# Patient Record
Sex: FEMALE | Race: WHITE | Employment: OTHER | URBAN - METROPOLITAN AREA
[De-identification: names, ages, dates, MRNs, and addresses within clinical notes are randomized per-mention and may not be internally consistent; named-entity substitution may affect disease eponyms.]

---

## 2018-04-27 DIAGNOSIS — C50.921 INVASIVE DUCTAL CARCINOMA OF MALE BREAST, RIGHT (HCC): Primary | ICD-10-CM

## 2018-04-30 LAB
ALBUMIN: 4.1 GM/DL (ref 3.1–4.5)
ALP BLD-CCNC: 71 U/L (ref 45–117)
ALT SERPL-CCNC: 41 U/L (ref 12–78)
AST SERPL-CCNC: 32 IU/L (ref 3–35)
BILIRUB SERPL-MCNC: 0.4 MG/DL (ref 0.2–1)
BUN BLDV-MCNC: 16 MG/DL (ref 7–24)
CALCIUM SERPL-MCNC: 9.5 MG/DL (ref 8.5–10.5)
CHLORIDE BLD-SCNC: 100 MMOL/L (ref 98–107)
CO2: 31 MMOL/L (ref 21–32)
CREAT SERPL-MCNC: 1.01 MG/DL (ref 0.55–1.02)
GFR AFRICAN AMERICAN: > 60 ML/MIN
GFR SERPL CREATININE-BSD FRML MDRD: 55 ML/MIN/
GLUCOSE: 152 MG/DL (ref 65–99)
POTASSIUM SERPL-SCNC: 4.6 MMOL/L (ref 3.5–5.1)
SODIUM BLD-SCNC: 136 MMOL/L (ref 136–145)
TOTAL PROTEIN: 8.1 GM/DL (ref 6.4–8.2)

## 2018-05-01 ENCOUNTER — TELEPHONE (OUTPATIENT)
Dept: BREAST CENTER | Age: 68
End: 2018-05-01

## 2018-05-11 ENCOUNTER — HOSPITAL ENCOUNTER (OUTPATIENT)
Dept: NUCLEAR MEDICINE | Age: 68
Discharge: HOME | End: 2018-05-11
Payer: MEDICARE

## 2018-05-11 ENCOUNTER — HOSPITAL ENCOUNTER (OUTPATIENT)
Dept: CT IMAGING | Age: 68
Discharge: HOME | End: 2018-05-13
Payer: MEDICARE

## 2018-05-11 DIAGNOSIS — C50.921 INVASIVE DUCTAL CARCINOMA OF MALE BREAST, RIGHT (HCC): ICD-10-CM

## 2018-05-11 DIAGNOSIS — C50.011 MALIGNANT NEOPLASM OF NIPPLE OF RIGHT BREAST IN FEMALE, UNSPECIFIED ESTROGEN RECEPTOR STATUS (HCC): ICD-10-CM

## 2018-05-11 PROCEDURE — 74177 CT ABD & PELVIS W/CONTRAST: CPT

## 2018-05-11 PROCEDURE — 71260 CT THORAX DX C+: CPT

## 2018-05-11 PROCEDURE — 3430000000 HC RX DIAGNOSTIC RADIOPHARMACEUTICAL: Performed by: RADIOLOGY

## 2018-05-11 PROCEDURE — 78306 BONE IMAGING WHOLE BODY: CPT

## 2018-05-11 PROCEDURE — A9503 TC99M MEDRONATE: HCPCS | Performed by: RADIOLOGY

## 2018-05-11 PROCEDURE — 6360000004 HC RX CONTRAST MEDICATION: Performed by: RADIOLOGY

## 2018-05-11 RX ORDER — TC 99M MEDRONATE 20 MG/10ML
25 INJECTION, POWDER, LYOPHILIZED, FOR SOLUTION INTRAVENOUS
Status: COMPLETED | OUTPATIENT
Start: 2018-05-11 | End: 2018-05-11

## 2018-05-11 RX ADMIN — IOHEXOL 50 ML: 240 INJECTION, SOLUTION INTRATHECAL; INTRAVASCULAR; INTRAVENOUS; ORAL at 11:05

## 2018-05-11 RX ADMIN — Medication 25 MILLICURIE: at 14:24

## 2018-05-11 RX ADMIN — IOPAMIDOL 100 ML: 755 INJECTION, SOLUTION INTRAVENOUS at 11:04

## 2018-05-15 ENCOUNTER — HOSPITAL ENCOUNTER (OUTPATIENT)
Age: 68
Discharge: HOME | End: 2018-05-17
Payer: MEDICARE

## 2018-05-15 ENCOUNTER — TELEPHONE (OUTPATIENT)
Dept: BREAST CENTER | Age: 68
End: 2018-05-15

## 2018-05-15 ENCOUNTER — OFFICE VISIT (OUTPATIENT)
Dept: BREAST CENTER | Age: 68
End: 2018-05-15
Payer: MEDICARE

## 2018-05-15 VITALS
RESPIRATION RATE: 16 BRPM | BODY MASS INDEX: 27.94 KG/M2 | SYSTOLIC BLOOD PRESSURE: 138 MMHG | HEART RATE: 62 BPM | HEIGHT: 63 IN | WEIGHT: 157.7 LBS | OXYGEN SATURATION: 98 % | TEMPERATURE: 97.6 F | DIASTOLIC BLOOD PRESSURE: 80 MMHG

## 2018-05-15 DIAGNOSIS — C50.911 INFILTRATING DUCTAL CARCINOMA OF RIGHT BREAST (HCC): ICD-10-CM

## 2018-05-15 DIAGNOSIS — Z17.1 MALIGNANT NEOPLASM OF CENTRAL PORTION OF RIGHT BREAST IN FEMALE, ESTROGEN RECEPTOR NEGATIVE (HCC): ICD-10-CM

## 2018-05-15 DIAGNOSIS — C77.3 BREAST CANCER METASTASIZED TO AXILLARY LYMPH NODE, RIGHT (HCC): ICD-10-CM

## 2018-05-15 DIAGNOSIS — C50.111 MALIGNANT NEOPLASM OF CENTRAL PORTION OF RIGHT BREAST IN FEMALE, ESTROGEN RECEPTOR NEGATIVE (HCC): ICD-10-CM

## 2018-05-15 DIAGNOSIS — C50.911 BREAST CANCER METASTASIZED TO AXILLARY LYMPH NODE, RIGHT (HCC): ICD-10-CM

## 2018-05-15 DIAGNOSIS — E07.89 THYROID MASS OF UNCLEAR ETIOLOGY: Primary | ICD-10-CM

## 2018-05-15 DIAGNOSIS — C50.921 INVASIVE DUCTAL CARCINOMA OF MALE BREAST, RIGHT (HCC): ICD-10-CM

## 2018-05-15 DIAGNOSIS — R93.7 ABNORMAL CT OF SPINE: ICD-10-CM

## 2018-05-15 LAB
BASOPHILS ABSOLUTE: 0.05 E9/L (ref 0–0.2)
BASOPHILS RELATIVE PERCENT: 0.7 % (ref 0–2)
EOSINOPHILS ABSOLUTE: 0.5 E9/L (ref 0.05–0.5)
EOSINOPHILS RELATIVE PERCENT: 7.3 % (ref 0–6)
HCT VFR BLD CALC: 41.7 % (ref 34–48)
HEMOGLOBIN: 13.8 G/DL (ref 11.5–15.5)
IMMATURE GRANULOCYTES #: 0.06 E9/L
IMMATURE GRANULOCYTES %: 0.9 % (ref 0–5)
LYMPHOCYTES ABSOLUTE: 1.62 E9/L (ref 1.5–4)
LYMPHOCYTES RELATIVE PERCENT: 23.8 % (ref 20–42)
MCH RBC QN AUTO: 28.8 PG (ref 26–35)
MCHC RBC AUTO-ENTMCNC: 33.1 % (ref 32–34.5)
MCV RBC AUTO: 86.9 FL (ref 80–99.9)
MONOCYTES ABSOLUTE: 0.55 E9/L (ref 0.1–0.95)
MONOCYTES RELATIVE PERCENT: 8.1 % (ref 2–12)
NEUTROPHILS ABSOLUTE: 4.04 E9/L (ref 1.8–7.3)
NEUTROPHILS RELATIVE PERCENT: 59.2 % (ref 43–80)
PDW BLD-RTO: 13.2 FL (ref 11.5–15)
PLATELET # BLD: 185 E9/L (ref 130–450)
PMV BLD AUTO: 11.7 FL (ref 7–12)
RBC # BLD: 4.8 E12/L (ref 3.5–5.5)
WBC # BLD: 6.8 E9/L (ref 4.5–11.5)

## 2018-05-15 PROCEDURE — 1123F ACP DISCUSS/DSCN MKR DOCD: CPT | Performed by: SURGERY

## 2018-05-15 PROCEDURE — 4004F PT TOBACCO SCREEN RCVD TLK: CPT | Performed by: SURGERY

## 2018-05-15 PROCEDURE — G8427 DOCREV CUR MEDS BY ELIG CLIN: HCPCS | Performed by: SURGERY

## 2018-05-15 PROCEDURE — 85025 COMPLETE CBC W/AUTO DIFF WBC: CPT

## 2018-05-15 PROCEDURE — 1090F PRES/ABSN URINE INCON ASSESS: CPT | Performed by: SURGERY

## 2018-05-15 PROCEDURE — 99205 OFFICE O/P NEW HI 60 MIN: CPT | Performed by: SURGERY

## 2018-05-15 PROCEDURE — G8400 PT W/DXA NO RESULTS DOC: HCPCS | Performed by: SURGERY

## 2018-05-15 PROCEDURE — 3017F COLORECTAL CA SCREEN DOC REV: CPT | Performed by: SURGERY

## 2018-05-15 PROCEDURE — 36415 COLL VENOUS BLD VENIPUNCTURE: CPT

## 2018-05-15 PROCEDURE — 4040F PNEUMOC VAC/ADMIN/RCVD: CPT | Performed by: SURGERY

## 2018-05-15 PROCEDURE — G8419 CALC BMI OUT NRM PARAM NOF/U: HCPCS | Performed by: SURGERY

## 2018-05-15 PROCEDURE — 99203 OFFICE O/P NEW LOW 30 MIN: CPT | Performed by: SURGERY

## 2018-05-15 PROCEDURE — 99203 OFFICE O/P NEW LOW 30 MIN: CPT

## 2018-05-15 RX ORDER — LISINOPRIL 20 MG/1
20 TABLET ORAL EVERY EVENING
COMMUNITY

## 2018-05-15 RX ORDER — ESCITALOPRAM OXALATE 20 MG/1
10 TABLET ORAL EVERY EVENING
COMMUNITY

## 2018-05-15 RX ORDER — ATENOLOL AND CHLORTHALIDONE TABLET 50; 25 MG/1; MG/1
1 TABLET ORAL DAILY
COMMUNITY

## 2018-05-15 RX ORDER — LANOLIN ALCOHOL/MO/W.PET/CERES
3 CREAM (GRAM) TOPICAL DAILY
COMMUNITY
End: 2019-06-24 | Stop reason: ALTCHOICE

## 2018-05-15 RX ORDER — CETIRIZINE HYDROCHLORIDE 10 MG/1
10 TABLET ORAL DAILY
COMMUNITY

## 2018-05-15 RX ORDER — SIMVASTATIN 40 MG
40 TABLET ORAL DAILY
Status: ON HOLD | COMMUNITY
End: 2021-02-24 | Stop reason: HOSPADM

## 2018-05-15 RX ORDER — ASPIRIN AND DIPYRIDAMOLE 25; 200 MG/1; MG/1
1 CAPSULE, EXTENDED RELEASE ORAL 2 TIMES DAILY
Status: ON HOLD | COMMUNITY
End: 2021-02-24 | Stop reason: HOSPADM

## 2018-05-15 RX ORDER — ACYCLOVIR 400 MG/1
400 TABLET ORAL
COMMUNITY
End: 2018-06-20

## 2018-05-15 RX ORDER — ALPRAZOLAM 0.25 MG/1
0.25 TABLET ORAL NIGHTLY PRN
COMMUNITY
End: 2018-10-17

## 2018-05-15 RX ORDER — ZOLPIDEM TARTRATE 5 MG/1
5 TABLET ORAL NIGHTLY PRN
COMMUNITY
End: 2018-10-17

## 2018-05-15 ASSESSMENT — ENCOUNTER SYMPTOMS
BACK PAIN: 1
CHOKING: 1
TROUBLE SWALLOWING: 1

## 2018-05-18 ENCOUNTER — TELEPHONE (OUTPATIENT)
Dept: BREAST CENTER | Age: 68
End: 2018-05-18

## 2018-05-23 ENCOUNTER — HOSPITAL ENCOUNTER (OUTPATIENT)
Dept: PET IMAGING | Age: 68
Discharge: HOME | End: 2018-05-25
Payer: MEDICARE

## 2018-05-23 ENCOUNTER — TELEPHONE (OUTPATIENT)
Dept: BREAST CENTER | Age: 68
End: 2018-05-23

## 2018-05-23 DIAGNOSIS — C50.111 MALIGNANT NEOPLASM OF CENTRAL PORTION OF RIGHT BREAST IN FEMALE, ESTROGEN RECEPTOR NEGATIVE (HCC): ICD-10-CM

## 2018-05-23 DIAGNOSIS — C77.3 BREAST CANCER METASTASIZED TO AXILLARY LYMPH NODE, RIGHT (HCC): ICD-10-CM

## 2018-05-23 DIAGNOSIS — C50.911 BREAST CANCER METASTASIZED TO AXILLARY LYMPH NODE, RIGHT (HCC): ICD-10-CM

## 2018-05-23 DIAGNOSIS — E07.89 THYROID MASS OF UNCLEAR ETIOLOGY: ICD-10-CM

## 2018-05-23 DIAGNOSIS — Z17.1 MALIGNANT NEOPLASM OF CENTRAL PORTION OF RIGHT BREAST IN FEMALE, ESTROGEN RECEPTOR NEGATIVE (HCC): ICD-10-CM

## 2018-05-23 DIAGNOSIS — R93.7 ABNORMAL CT OF SPINE: ICD-10-CM

## 2018-05-23 PROCEDURE — 78815 PET IMAGE W/CT SKULL-THIGH: CPT

## 2018-05-23 PROCEDURE — A9552 F18 FDG: HCPCS | Performed by: RADIOLOGY

## 2018-05-23 PROCEDURE — 3430000000 HC RX DIAGNOSTIC RADIOPHARMACEUTICAL: Performed by: RADIOLOGY

## 2018-05-23 RX ORDER — FLUDEOXYGLUCOSE F 18 200 MCI/ML
15 INJECTION, SOLUTION INTRAVENOUS
Status: COMPLETED | OUTPATIENT
Start: 2018-05-23 | End: 2018-05-23

## 2018-05-23 RX ADMIN — FLUDEOXYGLUCOSE F 18 15 MILLICURIE: 200 INJECTION, SOLUTION INTRAVENOUS at 09:14

## 2018-05-24 ENCOUNTER — TELEPHONE (OUTPATIENT)
Dept: BREAST CENTER | Age: 68
End: 2018-05-24

## 2018-05-24 DIAGNOSIS — C77.3 CARCINOMA OF RIGHT BREAST METASTATIC TO AXILLARY LYMPH NODE (HCC): ICD-10-CM

## 2018-05-24 DIAGNOSIS — C50.911 CARCINOMA OF RIGHT BREAST METASTATIC TO AXILLARY LYMPH NODE (HCC): ICD-10-CM

## 2018-05-24 DIAGNOSIS — C77.0 SECONDARY MALIGNANT NEOPLASM OF CERVICAL LYMPH NODE (HCC): ICD-10-CM

## 2018-05-24 DIAGNOSIS — E07.9 THYROID MASS: Primary | ICD-10-CM

## 2018-05-25 ENCOUNTER — HOSPITAL ENCOUNTER (OUTPATIENT)
Age: 68
Discharge: HOME | End: 2018-05-27
Payer: MEDICARE

## 2018-05-30 ENCOUNTER — HOSPITAL ENCOUNTER (OUTPATIENT)
Dept: ULTRASOUND IMAGING | Age: 68
Discharge: HOME | End: 2018-06-01
Payer: MEDICARE

## 2018-05-30 DIAGNOSIS — C77.0 SECONDARY MALIGNANT NEOPLASM OF CERVICAL LYMPH NODE (HCC): ICD-10-CM

## 2018-05-30 DIAGNOSIS — C77.3 CARCINOMA OF RIGHT BREAST METASTATIC TO AXILLARY LYMPH NODE (HCC): ICD-10-CM

## 2018-05-30 DIAGNOSIS — E07.9 THYROID MASS: ICD-10-CM

## 2018-05-30 DIAGNOSIS — C50.911 CARCINOMA OF RIGHT BREAST METASTATIC TO AXILLARY LYMPH NODE (HCC): ICD-10-CM

## 2018-05-30 DIAGNOSIS — E04.1 THYROID NODULE: ICD-10-CM

## 2018-05-30 DIAGNOSIS — R59.9 ENLARGED LYMPH NODE: ICD-10-CM

## 2018-05-30 PROCEDURE — 88305 TISSUE EXAM BY PATHOLOGIST: CPT

## 2018-05-30 PROCEDURE — 88173 CYTOPATH EVAL FNA REPORT: CPT

## 2018-05-30 PROCEDURE — 10022 US FINE NEEDLE ASPIRATION: CPT

## 2018-05-30 PROCEDURE — 76942 ECHO GUIDE FOR BIOPSY: CPT

## 2018-05-31 ENCOUNTER — TELEPHONE (OUTPATIENT)
Dept: INFUSION THERAPY | Age: 68
End: 2018-05-31

## 2018-05-31 ENCOUNTER — HOSPITAL ENCOUNTER (OUTPATIENT)
Dept: INFUSION THERAPY | Age: 68
Discharge: HOME | End: 2018-05-31
Payer: MEDICARE

## 2018-05-31 ENCOUNTER — OFFICE VISIT (OUTPATIENT)
Dept: ONCOLOGY | Age: 68
End: 2018-05-31
Payer: MEDICARE

## 2018-05-31 VITALS
DIASTOLIC BLOOD PRESSURE: 64 MMHG | BODY MASS INDEX: 27.36 KG/M2 | WEIGHT: 154.4 LBS | RESPIRATION RATE: 20 BRPM | SYSTOLIC BLOOD PRESSURE: 121 MMHG | HEIGHT: 63 IN | TEMPERATURE: 99.2 F | HEART RATE: 65 BPM

## 2018-05-31 DIAGNOSIS — C77.3 BREAST CANCER METASTASIZED TO AXILLARY LYMPH NODE, RIGHT (HCC): ICD-10-CM

## 2018-05-31 DIAGNOSIS — C50.911 BREAST CANCER METASTASIZED TO AXILLARY LYMPH NODE, RIGHT (HCC): ICD-10-CM

## 2018-05-31 DIAGNOSIS — C50.111 MALIGNANT NEOPLASM OF CENTRAL PORTION OF RIGHT BREAST IN FEMALE, ESTROGEN RECEPTOR NEGATIVE (HCC): ICD-10-CM

## 2018-05-31 DIAGNOSIS — Z17.1 MALIGNANT NEOPLASM OF CENTRAL PORTION OF RIGHT BREAST IN FEMALE, ESTROGEN RECEPTOR NEGATIVE (HCC): ICD-10-CM

## 2018-05-31 LAB
ALBUMIN SERPL-MCNC: 4.1 G/DL (ref 3.5–5.2)
ALP BLD-CCNC: 69 U/L (ref 35–104)
ALT SERPL-CCNC: 30 U/L (ref 0–32)
ANION GAP SERPL CALCULATED.3IONS-SCNC: 15 MMOL/L (ref 7–16)
AST SERPL-CCNC: 19 U/L (ref 0–31)
BASOPHILS ABSOLUTE: 0.06 E9/L (ref 0–0.2)
BASOPHILS RELATIVE PERCENT: 0.7 % (ref 0–2)
BILIRUB SERPL-MCNC: 0.5 MG/DL (ref 0–1.2)
BUN BLDV-MCNC: 14 MG/DL (ref 8–23)
CALCIUM SERPL-MCNC: 9.5 MG/DL (ref 8.6–10.2)
CEA: 4.3 NG/ML (ref 0–5.2)
CHLORIDE BLD-SCNC: 95 MMOL/L (ref 98–107)
CO2: 27 MMOL/L (ref 22–29)
CREAT SERPL-MCNC: 1 MG/DL (ref 0.5–1)
EOSINOPHILS ABSOLUTE: 0.25 E9/L (ref 0.05–0.5)
EOSINOPHILS RELATIVE PERCENT: 3 % (ref 0–6)
GFR AFRICAN AMERICAN: >60
GFR NON-AFRICAN AMERICAN: 55 ML/MIN/1.73
GLUCOSE BLD-MCNC: 225 MG/DL (ref 74–109)
HCT VFR BLD CALC: 42.2 % (ref 34–48)
HEMOGLOBIN: 14.2 G/DL (ref 11.5–15.5)
IMMATURE GRANULOCYTES #: 0.05 E9/L
IMMATURE GRANULOCYTES %: 0.6 % (ref 0–5)
LYMPHOCYTES ABSOLUTE: 1.22 E9/L (ref 1.5–4)
LYMPHOCYTES RELATIVE PERCENT: 14.6 % (ref 20–42)
MCH RBC QN AUTO: 28.7 PG (ref 26–35)
MCHC RBC AUTO-ENTMCNC: 33.6 % (ref 32–34.5)
MCV RBC AUTO: 85.3 FL (ref 80–99.9)
MONOCYTES ABSOLUTE: 0.72 E9/L (ref 0.1–0.95)
MONOCYTES RELATIVE PERCENT: 8.6 % (ref 2–12)
NEUTROPHILS ABSOLUTE: 6.05 E9/L (ref 1.8–7.3)
NEUTROPHILS RELATIVE PERCENT: 72.5 % (ref 43–80)
PDW BLD-RTO: 12.8 FL (ref 11.5–15)
PLATELET # BLD: 211 E9/L (ref 130–450)
PMV BLD AUTO: 11.2 FL (ref 7–12)
POTASSIUM SERPL-SCNC: 3.7 MMOL/L (ref 3.5–5)
RBC # BLD: 4.95 E12/L (ref 3.5–5.5)
SODIUM BLD-SCNC: 137 MMOL/L (ref 132–146)
TOTAL PROTEIN: 7.4 G/DL (ref 6.4–8.3)
WBC # BLD: 8.4 E9/L (ref 4.5–11.5)

## 2018-05-31 PROCEDURE — G8419 CALC BMI OUT NRM PARAM NOF/U: HCPCS | Performed by: INTERNAL MEDICINE

## 2018-05-31 PROCEDURE — 36415 COLL VENOUS BLD VENIPUNCTURE: CPT | Performed by: INTERNAL MEDICINE

## 2018-05-31 PROCEDURE — 36415 COLL VENOUS BLD VENIPUNCTURE: CPT

## 2018-05-31 PROCEDURE — 85025 COMPLETE CBC W/AUTO DIFF WBC: CPT

## 2018-05-31 PROCEDURE — 1090F PRES/ABSN URINE INCON ASSESS: CPT | Performed by: INTERNAL MEDICINE

## 2018-05-31 PROCEDURE — 3017F COLORECTAL CA SCREEN DOC REV: CPT | Performed by: INTERNAL MEDICINE

## 2018-05-31 PROCEDURE — 82378 CARCINOEMBRYONIC ANTIGEN: CPT

## 2018-05-31 PROCEDURE — 99214 OFFICE O/P EST MOD 30 MIN: CPT | Performed by: INTERNAL MEDICINE

## 2018-05-31 PROCEDURE — G8427 DOCREV CUR MEDS BY ELIG CLIN: HCPCS | Performed by: INTERNAL MEDICINE

## 2018-05-31 PROCEDURE — 80053 COMPREHEN METABOLIC PANEL: CPT

## 2018-05-31 PROCEDURE — 86300 IMMUNOASSAY TUMOR CA 15-3: CPT

## 2018-05-31 PROCEDURE — 99204 OFFICE O/P NEW MOD 45 MIN: CPT | Performed by: INTERNAL MEDICINE

## 2018-06-01 ENCOUNTER — TELEPHONE (OUTPATIENT)
Dept: ONCOLOGY | Age: 68
End: 2018-06-01

## 2018-06-01 DIAGNOSIS — Z17.1 MALIGNANT NEOPLASM OF CENTRAL PORTION OF RIGHT BREAST IN FEMALE, ESTROGEN RECEPTOR NEGATIVE (HCC): Primary | ICD-10-CM

## 2018-06-01 DIAGNOSIS — C50.111 MALIGNANT NEOPLASM OF CENTRAL PORTION OF RIGHT BREAST IN FEMALE, ESTROGEN RECEPTOR NEGATIVE (HCC): Primary | ICD-10-CM

## 2018-06-02 LAB
CA 27.29: 26.4 U/ML (ref 0–40)
URINE CULTURE, ROUTINE: ABNORMAL

## 2018-06-04 ENCOUNTER — TELEPHONE (OUTPATIENT)
Dept: SURGERY | Age: 68
End: 2018-06-04

## 2018-06-05 ENCOUNTER — HOSPITAL ENCOUNTER (OUTPATIENT)
Dept: MRI IMAGING | Age: 68
Discharge: HOME | End: 2018-06-07
Payer: MEDICARE

## 2018-06-05 DIAGNOSIS — C50.911 BREAST CANCER METASTASIZED TO AXILLARY LYMPH NODE, RIGHT (HCC): ICD-10-CM

## 2018-06-05 DIAGNOSIS — C77.3 BREAST CANCER METASTASIZED TO AXILLARY LYMPH NODE, RIGHT (HCC): ICD-10-CM

## 2018-06-05 PROCEDURE — A9577 INJ MULTIHANCE: HCPCS | Performed by: RADIOLOGY

## 2018-06-05 PROCEDURE — C8908 MRI W/O FOL W/CONT, BREAST,: HCPCS

## 2018-06-05 PROCEDURE — 6360000004 HC RX CONTRAST MEDICATION: Performed by: RADIOLOGY

## 2018-06-05 RX ADMIN — GADOBENATE DIMEGLUMINE 20 ML: 529 INJECTION, SOLUTION INTRAVENOUS at 11:37

## 2018-06-10 LAB
ABSOLUTE BASO #: 0 10*3/UL (ref 0–0.1)
ABSOLUTE EOS #: 0.3 10*3/UL (ref 0–0.4)
ABSOLUTE NEUT #: 4.2 10*3/UL (ref 2.3–7.9)
ALBUMIN: 3.7 GM/DL (ref 3.1–4.5)
ALP BLD-CCNC: 76 U/L (ref 45–117)
ALT SERPL-CCNC: 40 U/L (ref 12–78)
AST SERPL-CCNC: 32 IU/L (ref 3–35)
BASOPHILS %: 0.7 % (ref 0–1)
BILIRUB SERPL-MCNC: 0.3 MG/DL (ref 0.2–1)
BUN BLDV-MCNC: 15 MG/DL (ref 7–24)
CALCIUM SERPL-MCNC: 8.9 MG/DL (ref 8.5–10.5)
CHLORIDE BLD-SCNC: 98 MMOL/L (ref 98–107)
CO2: 30 MMOL/L (ref 21–32)
CREAT SERPL-MCNC: 1.19 MG/DL (ref 0.55–1.02)
EOSINOPHILS %: 5.6 % (ref 1–4)
GFR AFRICAN AMERICAN: 54 ML/MIN
GFR SERPL CREATININE-BSD FRML MDRD: 45 ML/MIN/
GLUCOSE: 424 MG/DL (ref 65–99)
HCT VFR BLD CALC: 39.9 % (ref 37–47)
HEMOGLOBIN: 13.4 G/DL (ref 12–16)
IMMATURE GRANULOCYTES #: 0 10*3/UL (ref 0–0.1)
IMMATURE GRANULOCYTES: 0.5 % (ref 0–1)
LYMPHOCYTE %: 17 % (ref 27–41)
LYMPHOCYTES # BLD: 1 10*3/UL (ref 1.3–4.4)
MCH RBC QN AUTO: 29.2 PG (ref 27–31)
MCHC RBC AUTO-ENTMCNC: 33.6 G/DL (ref 33–37)
MCV RBC AUTO: 86.9 FL (ref 81–99)
MONOCYTES # BLD: 0.5 10*3/UL (ref 0.1–1)
MONOCYTES %: 7.4 % (ref 3–9)
NEUTROPHILS %: 68.8 % (ref 47–73)
NUCLEATED RED BLOOD CELLS: 0 % (ref 0–0)
PDW BLD-RTO: 12.5 % (ref 0–14.5)
PLATELET # BLD: 158 10*3/UL (ref 130–400)
PMV BLD AUTO: 10.9 FL (ref 9.6–12.3)
POTASSIUM SERPL-SCNC: 3.9 MMOL/L (ref 3.5–5.1)
RBC # BLD: 4.59 10*6/UL (ref 4.1–5.1)
SODIUM BLD-SCNC: 137 MMOL/L (ref 136–145)
TOTAL PROTEIN: 7.4 GM/DL (ref 6.4–8.2)
WBC # BLD: 6.1 10*3/UL (ref 4.8–10.8)

## 2018-06-11 ENCOUNTER — TELEPHONE (OUTPATIENT)
Dept: ONCOLOGY | Age: 68
End: 2018-06-11

## 2018-06-11 ENCOUNTER — TELEPHONE (OUTPATIENT)
Dept: SURGERY | Age: 68
End: 2018-06-11

## 2018-06-12 ENCOUNTER — APPOINTMENT (OUTPATIENT)
Dept: GENERAL RADIOLOGY | Age: 68
End: 2018-06-12
Attending: SURGERY
Payer: MEDICARE

## 2018-06-12 ENCOUNTER — ANESTHESIA EVENT (OUTPATIENT)
Dept: OPERATING ROOM | Age: 68
End: 2018-06-12
Payer: MEDICARE

## 2018-06-12 ENCOUNTER — ANESTHESIA (OUTPATIENT)
Dept: OPERATING ROOM | Age: 68
End: 2018-06-12
Payer: MEDICARE

## 2018-06-12 ENCOUNTER — HOSPITAL ENCOUNTER (OUTPATIENT)
Age: 68
Setting detail: OUTPATIENT SURGERY
Discharge: HOME | End: 2018-06-12
Attending: SURGERY | Admitting: SURGERY
Payer: MEDICARE

## 2018-06-12 VITALS
BODY MASS INDEX: 27.29 KG/M2 | DIASTOLIC BLOOD PRESSURE: 64 MMHG | HEIGHT: 63 IN | RESPIRATION RATE: 16 BRPM | OXYGEN SATURATION: 96 % | WEIGHT: 154 LBS | TEMPERATURE: 98.4 F | HEART RATE: 69 BPM | SYSTOLIC BLOOD PRESSURE: 137 MMHG

## 2018-06-12 VITALS
DIASTOLIC BLOOD PRESSURE: 77 MMHG | RESPIRATION RATE: 17 BRPM | OXYGEN SATURATION: 100 % | SYSTOLIC BLOOD PRESSURE: 117 MMHG

## 2018-06-12 DIAGNOSIS — Z45.2 ENCOUNTER FOR INSERTION OF VENOUS ACCESS PORT: ICD-10-CM

## 2018-06-12 LAB — METER GLUCOSE: 184 MG/DL (ref 70–110)

## 2018-06-12 PROCEDURE — 2500000003 HC RX 250 WO HCPCS: Performed by: SURGERY

## 2018-06-12 PROCEDURE — 3700000001 HC ADD 15 MINUTES (ANESTHESIA): Performed by: SURGERY

## 2018-06-12 PROCEDURE — 82962 GLUCOSE BLOOD TEST: CPT

## 2018-06-12 PROCEDURE — 3209999900 FLUORO FOR SURGICAL PROCEDURES

## 2018-06-12 PROCEDURE — 7100000000 HC PACU RECOVERY - FIRST 15 MIN: Performed by: SURGERY

## 2018-06-12 PROCEDURE — 6370000000 HC RX 637 (ALT 250 FOR IP): Performed by: ANESTHESIOLOGY

## 2018-06-12 PROCEDURE — 3600000003 HC SURGERY LEVEL 3 BASE: Performed by: SURGERY

## 2018-06-12 PROCEDURE — 7100000010 HC PHASE II RECOVERY - FIRST 15 MIN: Performed by: SURGERY

## 2018-06-12 PROCEDURE — 36561 INSERT TUNNELED CV CATH: CPT | Performed by: SURGERY

## 2018-06-12 PROCEDURE — 6360000002 HC RX W HCPCS: Performed by: NURSE ANESTHETIST, CERTIFIED REGISTERED

## 2018-06-12 PROCEDURE — 3600000013 HC SURGERY LEVEL 3 ADDTL 15MIN: Performed by: SURGERY

## 2018-06-12 PROCEDURE — 71045 X-RAY EXAM CHEST 1 VIEW: CPT

## 2018-06-12 PROCEDURE — 6360000002 HC RX W HCPCS: Performed by: SURGERY

## 2018-06-12 PROCEDURE — 7100000011 HC PHASE II RECOVERY - ADDTL 15 MIN: Performed by: SURGERY

## 2018-06-12 PROCEDURE — 2580000003 HC RX 258: Performed by: SURGERY

## 2018-06-12 PROCEDURE — 7100000001 HC PACU RECOVERY - ADDTL 15 MIN: Performed by: SURGERY

## 2018-06-12 PROCEDURE — 3700000000 HC ANESTHESIA ATTENDED CARE: Performed by: SURGERY

## 2018-06-12 DEVICE — PORT SMARTPORT 8FR CT TI W/VLV SHTH: Type: IMPLANTABLE DEVICE | Site: CHEST | Status: FUNCTIONAL

## 2018-06-12 RX ORDER — LIDOCAINE HYDROCHLORIDE AND EPINEPHRINE 10; 10 MG/ML; UG/ML
INJECTION, SOLUTION INFILTRATION; PERINEURAL PRN
Status: DISCONTINUED | OUTPATIENT
Start: 2018-06-12 | End: 2018-06-12 | Stop reason: HOSPADM

## 2018-06-12 RX ORDER — OXYCODONE HYDROCHLORIDE AND ACETAMINOPHEN 5; 325 MG/1; MG/1
1 TABLET ORAL
Status: COMPLETED | OUTPATIENT
Start: 2018-06-12 | End: 2018-06-12

## 2018-06-12 RX ORDER — MEPERIDINE HYDROCHLORIDE 50 MG/ML
12.5 INJECTION INTRAMUSCULAR; INTRAVENOUS; SUBCUTANEOUS EVERY 5 MIN PRN
Status: DISCONTINUED | OUTPATIENT
Start: 2018-06-12 | End: 2018-06-12 | Stop reason: HOSPADM

## 2018-06-12 RX ORDER — PROMETHAZINE HYDROCHLORIDE 25 MG/ML
6.25 INJECTION, SOLUTION INTRAMUSCULAR; INTRAVENOUS
Status: DISCONTINUED | OUTPATIENT
Start: 2018-06-12 | End: 2018-06-12 | Stop reason: HOSPADM

## 2018-06-12 RX ORDER — SODIUM CHLORIDE 0.9 % (FLUSH) 0.9 %
10 SYRINGE (ML) INJECTION PRN
Status: DISCONTINUED | OUTPATIENT
Start: 2018-06-12 | End: 2018-06-12 | Stop reason: HOSPADM

## 2018-06-12 RX ORDER — HYDROCODONE BITARTRATE AND ACETAMINOPHEN 5; 325 MG/1; MG/1
1 TABLET ORAL EVERY 6 HOURS PRN
Qty: 5 TABLET | Refills: 0 | Status: SHIPPED | OUTPATIENT
Start: 2018-06-12 | End: 2018-06-15

## 2018-06-12 RX ORDER — ONDANSETRON 4 MG/1
4 TABLET, FILM COATED ORAL DAILY PRN
Qty: 12 TABLET | Refills: 0 | Status: SHIPPED | OUTPATIENT
Start: 2018-06-12 | End: 2018-06-24

## 2018-06-12 RX ORDER — HEPARIN SODIUM 1000 [USP'U]/ML
INJECTION, SOLUTION INTRAVENOUS; SUBCUTANEOUS PRN
Status: DISCONTINUED | OUTPATIENT
Start: 2018-06-12 | End: 2018-06-12 | Stop reason: HOSPADM

## 2018-06-12 RX ORDER — MORPHINE SULFATE 2 MG/ML
1 INJECTION, SOLUTION INTRAMUSCULAR; INTRAVENOUS EVERY 5 MIN PRN
Status: DISCONTINUED | OUTPATIENT
Start: 2018-06-12 | End: 2018-06-12 | Stop reason: HOSPADM

## 2018-06-12 RX ORDER — FENTANYL CITRATE 50 UG/ML
INJECTION, SOLUTION INTRAMUSCULAR; INTRAVENOUS PRN
Status: DISCONTINUED | OUTPATIENT
Start: 2018-06-12 | End: 2018-06-12 | Stop reason: SDUPTHER

## 2018-06-12 RX ORDER — MIDAZOLAM HYDROCHLORIDE 1 MG/ML
INJECTION INTRAMUSCULAR; INTRAVENOUS PRN
Status: DISCONTINUED | OUTPATIENT
Start: 2018-06-12 | End: 2018-06-12 | Stop reason: SDUPTHER

## 2018-06-12 RX ORDER — SODIUM CHLORIDE 9 MG/ML
INJECTION, SOLUTION INTRAVENOUS CONTINUOUS
Status: DISCONTINUED | OUTPATIENT
Start: 2018-06-12 | End: 2018-06-12 | Stop reason: HOSPADM

## 2018-06-12 RX ORDER — SODIUM CHLORIDE 0.9 % (FLUSH) 0.9 %
10 SYRINGE (ML) INJECTION EVERY 12 HOURS SCHEDULED
Status: DISCONTINUED | OUTPATIENT
Start: 2018-06-12 | End: 2018-06-12 | Stop reason: HOSPADM

## 2018-06-12 RX ORDER — HEPARIN SODIUM (PORCINE) LOCK FLUSH IV SOLN 100 UNIT/ML 100 UNIT/ML
SOLUTION INTRAVENOUS PRN
Status: DISCONTINUED | OUTPATIENT
Start: 2018-06-12 | End: 2018-06-12 | Stop reason: HOSPADM

## 2018-06-12 RX ORDER — PROPOFOL 10 MG/ML
INJECTION, EMULSION INTRAVENOUS PRN
Status: DISCONTINUED | OUTPATIENT
Start: 2018-06-12 | End: 2018-06-12 | Stop reason: SDUPTHER

## 2018-06-12 RX ORDER — DOCUSATE SODIUM 100 MG/1
100 CAPSULE, LIQUID FILLED ORAL 2 TIMES DAILY
Qty: 50 CAPSULE | Refills: 0 | Status: SHIPPED | OUTPATIENT
Start: 2018-06-12 | End: 2018-09-07

## 2018-06-12 RX ADMIN — FENTANYL CITRATE 100 MCG: 50 INJECTION, SOLUTION INTRAMUSCULAR; INTRAVENOUS at 07:35

## 2018-06-12 RX ADMIN — SODIUM CHLORIDE: 9 INJECTION, SOLUTION INTRAVENOUS at 06:20

## 2018-06-12 RX ADMIN — PROPOFOL 50 MG: 10 INJECTION, EMULSION INTRAVENOUS at 07:37

## 2018-06-12 RX ADMIN — PROPOFOL 50 MG: 10 INJECTION, EMULSION INTRAVENOUS at 07:49

## 2018-06-12 RX ADMIN — CEFAZOLIN SODIUM 2 G: 2 SOLUTION INTRAVENOUS at 07:29

## 2018-06-12 RX ADMIN — OXYCODONE HYDROCHLORIDE AND ACETAMINOPHEN 1 TABLET: 5; 325 TABLET ORAL at 08:59

## 2018-06-12 RX ADMIN — PROPOFOL 50 MG: 10 INJECTION, EMULSION INTRAVENOUS at 07:43

## 2018-06-12 RX ADMIN — MIDAZOLAM HYDROCHLORIDE 2 MG: 1 INJECTION, SOLUTION INTRAMUSCULAR; INTRAVENOUS at 07:29

## 2018-06-12 ASSESSMENT — PAIN SCALES - GENERAL
PAINLEVEL_OUTOF10: 10
PAINLEVEL_OUTOF10: 5
PAINLEVEL_OUTOF10: 2

## 2018-06-12 ASSESSMENT — PULMONARY FUNCTION TESTS
PIF_VALUE: 0
PIF_VALUE: 1
PIF_VALUE: 0

## 2018-06-12 ASSESSMENT — PAIN DESCRIPTION - ORIENTATION
ORIENTATION: RIGHT

## 2018-06-12 ASSESSMENT — PAIN DESCRIPTION - LOCATION
LOCATION: JAW;NECK;TEETH

## 2018-06-12 ASSESSMENT — PAIN DESCRIPTION - PAIN TYPE
TYPE: CHRONIC PAIN

## 2018-06-12 ASSESSMENT — PAIN - FUNCTIONAL ASSESSMENT: PAIN_FUNCTIONAL_ASSESSMENT: 0-10

## 2018-06-12 ASSESSMENT — ACTIVITIES OF DAILY LIVING (ADL): EFFECT OF PAIN ON DAILY ACTIVITIES: WITH MOVEMENT

## 2018-06-12 ASSESSMENT — PAIN DESCRIPTION - FREQUENCY: FREQUENCY: CONTINUOUS

## 2018-06-12 ASSESSMENT — PAIN DESCRIPTION - DESCRIPTORS
DESCRIPTORS: ACHING;STABBING
DESCRIPTORS: STABBING

## 2018-06-16 ENCOUNTER — HOSPITAL ENCOUNTER (OUTPATIENT)
Dept: NON INVASIVE DIAGNOSTICS | Age: 68
Discharge: HOME | End: 2018-06-16
Payer: MEDICARE

## 2018-06-16 DIAGNOSIS — C77.3 BREAST CANCER METASTASIZED TO AXILLARY LYMPH NODE, RIGHT (HCC): ICD-10-CM

## 2018-06-16 DIAGNOSIS — C50.911 BREAST CANCER METASTASIZED TO AXILLARY LYMPH NODE, RIGHT (HCC): ICD-10-CM

## 2018-06-16 PROCEDURE — 93308 TTE F-UP OR LMTD: CPT

## 2018-06-20 ENCOUNTER — HOSPITAL ENCOUNTER (OUTPATIENT)
Dept: INFUSION THERAPY | Age: 68
Discharge: HOME | End: 2018-06-20
Payer: MEDICARE

## 2018-06-20 ENCOUNTER — OFFICE VISIT (OUTPATIENT)
Dept: ONCOLOGY | Age: 68
End: 2018-06-20
Payer: MEDICARE

## 2018-06-20 VITALS
TEMPERATURE: 98.7 F | HEIGHT: 63 IN | HEART RATE: 69 BPM | WEIGHT: 152.5 LBS | SYSTOLIC BLOOD PRESSURE: 141 MMHG | RESPIRATION RATE: 16 BRPM | BODY MASS INDEX: 27.02 KG/M2 | DIASTOLIC BLOOD PRESSURE: 76 MMHG

## 2018-06-20 DIAGNOSIS — C50.111 MALIGNANT NEOPLASM OF CENTRAL PORTION OF RIGHT BREAST IN FEMALE, ESTROGEN RECEPTOR NEGATIVE (HCC): Primary | ICD-10-CM

## 2018-06-20 DIAGNOSIS — Z17.1 MALIGNANT NEOPLASM OF CENTRAL PORTION OF RIGHT BREAST IN FEMALE, ESTROGEN RECEPTOR NEGATIVE (HCC): Primary | ICD-10-CM

## 2018-06-20 DIAGNOSIS — Z17.1 MALIGNANT NEOPLASM OF CENTRAL PORTION OF RIGHT BREAST IN FEMALE, ESTROGEN RECEPTOR NEGATIVE (HCC): ICD-10-CM

## 2018-06-20 DIAGNOSIS — C50.111 MALIGNANT NEOPLASM OF CENTRAL PORTION OF RIGHT BREAST IN FEMALE, ESTROGEN RECEPTOR NEGATIVE (HCC): ICD-10-CM

## 2018-06-20 LAB
ALBUMIN SERPL-MCNC: 4.4 G/DL (ref 3.5–5.2)
ALP BLD-CCNC: 73 U/L (ref 35–104)
ALT SERPL-CCNC: 42 U/L (ref 0–32)
ANION GAP SERPL CALCULATED.3IONS-SCNC: 16 MMOL/L (ref 7–16)
AST SERPL-CCNC: 45 U/L (ref 0–31)
BILIRUB SERPL-MCNC: 0.4 MG/DL (ref 0–1.2)
BUN BLDV-MCNC: 19 MG/DL (ref 8–23)
CALCIUM SERPL-MCNC: 9.9 MG/DL (ref 8.6–10.2)
CHLORIDE BLD-SCNC: 99 MMOL/L (ref 98–107)
CO2: 27 MMOL/L (ref 22–29)
CREAT SERPL-MCNC: 1.3 MG/DL (ref 0.5–1)
GFR AFRICAN AMERICAN: 49
GFR NON-AFRICAN AMERICAN: 41 ML/MIN/1.73
GLUCOSE BLD-MCNC: 130 MG/DL (ref 74–109)
MAGNESIUM: 1.7 MG/DL (ref 1.6–2.6)
POTASSIUM SERPL-SCNC: 4.9 MMOL/L (ref 3.5–5)
SODIUM BLD-SCNC: 142 MMOL/L (ref 132–146)
TOTAL PROTEIN: 8 G/DL (ref 6.4–8.3)

## 2018-06-20 PROCEDURE — G8400 PT W/DXA NO RESULTS DOC: HCPCS | Performed by: INTERNAL MEDICINE

## 2018-06-20 PROCEDURE — 1123F ACP DISCUSS/DSCN MKR DOCD: CPT | Performed by: INTERNAL MEDICINE

## 2018-06-20 PROCEDURE — 80053 COMPREHEN METABOLIC PANEL: CPT

## 2018-06-20 PROCEDURE — 99215 OFFICE O/P EST HI 40 MIN: CPT | Performed by: INTERNAL MEDICINE

## 2018-06-20 PROCEDURE — 83735 ASSAY OF MAGNESIUM: CPT

## 2018-06-20 PROCEDURE — 4040F PNEUMOC VAC/ADMIN/RCVD: CPT | Performed by: INTERNAL MEDICINE

## 2018-06-20 PROCEDURE — 99212 OFFICE O/P EST SF 10 MIN: CPT

## 2018-06-20 PROCEDURE — G8419 CALC BMI OUT NRM PARAM NOF/U: HCPCS | Performed by: INTERNAL MEDICINE

## 2018-06-20 PROCEDURE — 36415 COLL VENOUS BLD VENIPUNCTURE: CPT

## 2018-06-20 PROCEDURE — 1090F PRES/ABSN URINE INCON ASSESS: CPT | Performed by: INTERNAL MEDICINE

## 2018-06-20 PROCEDURE — G8427 DOCREV CUR MEDS BY ELIG CLIN: HCPCS | Performed by: INTERNAL MEDICINE

## 2018-06-20 PROCEDURE — 4004F PT TOBACCO SCREEN RCVD TLK: CPT | Performed by: INTERNAL MEDICINE

## 2018-06-20 PROCEDURE — 85025 COMPLETE CBC W/AUTO DIFF WBC: CPT

## 2018-06-20 PROCEDURE — 3017F COLORECTAL CA SCREEN DOC REV: CPT | Performed by: INTERNAL MEDICINE

## 2018-06-20 RX ORDER — METHYLPREDNISOLONE SODIUM SUCCINATE 125 MG/2ML
125 INJECTION, POWDER, LYOPHILIZED, FOR SOLUTION INTRAMUSCULAR; INTRAVENOUS ONCE
Status: CANCELLED | OUTPATIENT
Start: 2018-06-21 | End: 2018-06-21

## 2018-06-20 RX ORDER — DEXAMETHASONE SODIUM PHOSPHATE 10 MG/ML
6 INJECTION, SOLUTION INTRAMUSCULAR; INTRAVENOUS ONCE
Status: CANCELLED | OUTPATIENT
Start: 2018-06-21

## 2018-06-20 RX ORDER — 0.9 % SODIUM CHLORIDE 0.9 %
10 VIAL (ML) INJECTION ONCE
Status: CANCELLED | OUTPATIENT
Start: 2018-06-21 | End: 2018-06-21

## 2018-06-20 RX ORDER — SODIUM CHLORIDE 9 MG/ML
INJECTION, SOLUTION INTRAVENOUS CONTINUOUS
Status: CANCELLED | OUTPATIENT
Start: 2018-06-21

## 2018-06-20 RX ORDER — HEPARIN SODIUM (PORCINE) LOCK FLUSH IV SOLN 100 UNIT/ML 100 UNIT/ML
500 SOLUTION INTRAVENOUS PRN
Status: CANCELLED | OUTPATIENT
Start: 2018-06-21

## 2018-06-20 RX ORDER — PALONOSETRON 0.05 MG/ML
0.25 INJECTION, SOLUTION INTRAVENOUS ONCE
Status: CANCELLED | OUTPATIENT
Start: 2018-06-21

## 2018-06-20 RX ORDER — DOXORUBICIN HYDROCHLORIDE 2 MG/ML
60 INJECTION, SOLUTION INTRAVENOUS ONCE
Status: CANCELLED | OUTPATIENT
Start: 2018-06-21

## 2018-06-20 RX ORDER — EPINEPHRINE 1 MG/ML
0.3 INJECTION, SOLUTION, CONCENTRATE INTRAVENOUS PRN
Status: CANCELLED | OUTPATIENT
Start: 2018-06-21

## 2018-06-20 RX ORDER — SODIUM CHLORIDE 0.9 % (FLUSH) 0.9 %
10 SYRINGE (ML) INJECTION PRN
Status: CANCELLED | OUTPATIENT
Start: 2018-06-21

## 2018-06-20 RX ORDER — SODIUM CHLORIDE 0.9 % (FLUSH) 0.9 %
5 SYRINGE (ML) INJECTION PRN
Status: CANCELLED | OUTPATIENT
Start: 2018-06-21

## 2018-06-20 RX ORDER — DIPHENHYDRAMINE HYDROCHLORIDE 50 MG/ML
50 INJECTION INTRAMUSCULAR; INTRAVENOUS ONCE
Status: CANCELLED | OUTPATIENT
Start: 2018-06-21 | End: 2018-06-21

## 2018-06-20 RX ORDER — DIPHENOXYLATE HYDROCHLORIDE AND ATROPINE SULFATE 2.5; .025 MG/1; MG/1
2 TABLET ORAL 4 TIMES DAILY PRN
Qty: 120 TABLET | Refills: 3 | Status: SHIPPED | OUTPATIENT
Start: 2018-06-20 | End: 2019-06-10

## 2018-06-20 RX ORDER — SODIUM CHLORIDE 9 MG/ML
INJECTION, SOLUTION INTRAVENOUS ONCE
Status: CANCELLED | OUTPATIENT
Start: 2018-06-21 | End: 2018-06-21

## 2018-06-20 RX ORDER — PROCHLORPERAZINE MALEATE 10 MG
10 TABLET ORAL EVERY 6 HOURS PRN
Qty: 120 TABLET | Refills: 3 | Status: SHIPPED | OUTPATIENT
Start: 2018-06-20 | End: 2020-04-14 | Stop reason: ALTCHOICE

## 2018-06-20 RX ORDER — ONDANSETRON HYDROCHLORIDE 8 MG/1
8 TABLET, FILM COATED ORAL EVERY 8 HOURS PRN
Qty: 30 TABLET | Refills: 1 | Status: SHIPPED | OUTPATIENT
Start: 2018-06-20 | End: 2019-02-20 | Stop reason: ALTCHOICE

## 2018-06-20 RX ORDER — LORAZEPAM 2 MG/ML
0.5 INJECTION INTRAMUSCULAR ONCE
Status: CANCELLED | OUTPATIENT
Start: 2018-06-21

## 2018-06-21 ENCOUNTER — HOSPITAL ENCOUNTER (OUTPATIENT)
Dept: INFUSION THERAPY | Age: 68
Discharge: HOME | End: 2018-06-21
Payer: MEDICARE

## 2018-06-21 ENCOUNTER — TELEPHONE (OUTPATIENT)
Dept: ONCOLOGY | Age: 68
End: 2018-06-21

## 2018-06-21 VITALS
TEMPERATURE: 99.1 F | SYSTOLIC BLOOD PRESSURE: 119 MMHG | HEART RATE: 66 BPM | DIASTOLIC BLOOD PRESSURE: 58 MMHG | RESPIRATION RATE: 20 BRPM

## 2018-06-21 DIAGNOSIS — Z17.1 MALIGNANT NEOPLASM OF CENTRAL PORTION OF RIGHT BREAST IN FEMALE, ESTROGEN RECEPTOR NEGATIVE (HCC): Primary | ICD-10-CM

## 2018-06-21 DIAGNOSIS — C50.111 MALIGNANT NEOPLASM OF CENTRAL PORTION OF RIGHT BREAST IN FEMALE, ESTROGEN RECEPTOR NEGATIVE (HCC): ICD-10-CM

## 2018-06-21 DIAGNOSIS — C50.111 MALIGNANT NEOPLASM OF CENTRAL PORTION OF RIGHT BREAST IN FEMALE, ESTROGEN RECEPTOR NEGATIVE (HCC): Primary | ICD-10-CM

## 2018-06-21 DIAGNOSIS — Z17.1 MALIGNANT NEOPLASM OF CENTRAL PORTION OF RIGHT BREAST IN FEMALE, ESTROGEN RECEPTOR NEGATIVE (HCC): ICD-10-CM

## 2018-06-21 LAB
BASOPHILS ABSOLUTE: 0.08 E9/L (ref 0–0.2)
BASOPHILS RELATIVE PERCENT: 1 % (ref 0–2)
EOSINOPHILS ABSOLUTE: 0.39 E9/L (ref 0.05–0.5)
EOSINOPHILS RELATIVE PERCENT: 4.9 % (ref 0–6)
HCT VFR BLD CALC: 43.7 % (ref 34–48)
HEMOGLOBIN: 14.5 G/DL (ref 11.5–15.5)
IMMATURE GRANULOCYTES #: 0.04 E9/L
IMMATURE GRANULOCYTES %: 0.5 % (ref 0–5)
LYMPHOCYTES ABSOLUTE: 1.52 E9/L (ref 1.5–4)
LYMPHOCYTES RELATIVE PERCENT: 18.9 % (ref 20–42)
MCH RBC QN AUTO: 28.8 PG (ref 26–35)
MCHC RBC AUTO-ENTMCNC: 33.2 % (ref 32–34.5)
MCV RBC AUTO: 86.7 FL (ref 80–99.9)
MONOCYTES ABSOLUTE: 0.64 E9/L (ref 0.1–0.95)
MONOCYTES RELATIVE PERCENT: 8 % (ref 2–12)
NEUTROPHILS ABSOLUTE: 5.36 E9/L (ref 1.8–7.3)
NEUTROPHILS RELATIVE PERCENT: 66.7 % (ref 43–80)
PDW BLD-RTO: 12.4 FL (ref 11.5–15)
PLATELET # BLD: 313 E9/L (ref 130–450)
PMV BLD AUTO: 10.2 FL (ref 7–12)
RBC # BLD: 5.04 E12/L (ref 3.5–5.5)
WBC # BLD: 8.1 E9/L (ref 4.5–11.5)

## 2018-06-21 PROCEDURE — 6360000002 HC RX W HCPCS: Performed by: INTERNAL MEDICINE

## 2018-06-21 PROCEDURE — 96375 TX/PRO/DX INJ NEW DRUG ADDON: CPT

## 2018-06-21 PROCEDURE — 96413 CHEMO IV INFUSION 1 HR: CPT

## 2018-06-21 PROCEDURE — 96365 THER/PROPH/DIAG IV INF INIT: CPT

## 2018-06-21 PROCEDURE — 2580000003 HC RX 258

## 2018-06-21 PROCEDURE — 96374 THER/PROPH/DIAG INJ IV PUSH: CPT

## 2018-06-21 PROCEDURE — 96361 HYDRATE IV INFUSION ADD-ON: CPT

## 2018-06-21 PROCEDURE — 2580000003 HC RX 258: Performed by: INTERNAL MEDICINE

## 2018-06-21 PROCEDURE — 96377 APPLICATON ON-BODY INJECTOR: CPT

## 2018-06-21 PROCEDURE — 96411 CHEMO IV PUSH ADDL DRUG: CPT

## 2018-06-21 RX ORDER — SODIUM CHLORIDE 0.9 % (FLUSH) 0.9 %
10 SYRINGE (ML) INJECTION PRN
Status: DISCONTINUED | OUTPATIENT
Start: 2018-06-21 | End: 2018-06-22 | Stop reason: HOSPADM

## 2018-06-21 RX ORDER — DOXORUBICIN HYDROCHLORIDE 2 MG/ML
60 INJECTION, SOLUTION INTRAVENOUS ONCE
Status: COMPLETED | OUTPATIENT
Start: 2018-06-21 | End: 2018-06-21

## 2018-06-21 RX ORDER — SODIUM CHLORIDE 0.9 % (FLUSH) 0.9 %
SYRINGE (ML) INJECTION
Status: COMPLETED
Start: 2018-06-21 | End: 2018-06-21

## 2018-06-21 RX ORDER — 0.9 % SODIUM CHLORIDE 0.9 %
1000 INTRAVENOUS SOLUTION INTRAVENOUS ONCE
Status: COMPLETED | OUTPATIENT
Start: 2018-06-21 | End: 2018-06-21

## 2018-06-21 RX ORDER — SODIUM CHLORIDE 9 MG/ML
INJECTION, SOLUTION INTRAVENOUS
Status: COMPLETED
Start: 2018-06-21 | End: 2018-06-21

## 2018-06-21 RX ORDER — PALONOSETRON 0.05 MG/ML
0.25 INJECTION, SOLUTION INTRAVENOUS ONCE
Status: COMPLETED | OUTPATIENT
Start: 2018-06-21 | End: 2018-06-21

## 2018-06-21 RX ORDER — LORAZEPAM 2 MG/ML
0.5 INJECTION INTRAMUSCULAR ONCE
Status: COMPLETED | OUTPATIENT
Start: 2018-06-21 | End: 2018-06-21

## 2018-06-21 RX ORDER — HEPARIN SODIUM (PORCINE) LOCK FLUSH IV SOLN 100 UNIT/ML 100 UNIT/ML
500 SOLUTION INTRAVENOUS PRN
Status: DISCONTINUED | OUTPATIENT
Start: 2018-06-21 | End: 2018-06-22 | Stop reason: HOSPADM

## 2018-06-21 RX ORDER — DEXAMETHASONE SODIUM PHOSPHATE 10 MG/ML
6 INJECTION, SOLUTION INTRAMUSCULAR; INTRAVENOUS ONCE
Status: COMPLETED | OUTPATIENT
Start: 2018-06-21 | End: 2018-06-21

## 2018-06-21 RX ADMIN — LORAZEPAM 0.5 MG: 2 INJECTION INTRAMUSCULAR; INTRAVENOUS at 11:36

## 2018-06-21 RX ADMIN — PALONOSETRON HYDROCHLORIDE 0.25 MG: 0.25 INJECTION INTRAVENOUS at 11:19

## 2018-06-21 RX ADMIN — Medication 10 ML: at 11:39

## 2018-06-21 RX ADMIN — DEXAMETHASONE SODIUM PHOSPHATE 6 MG: 10 INJECTION, SOLUTION INTRAMUSCULAR; INTRAVENOUS at 11:24

## 2018-06-21 RX ADMIN — Medication 10 ML: at 11:43

## 2018-06-21 RX ADMIN — SODIUM CHLORIDE 1000 ML: 9 INJECTION, SOLUTION INTRAVENOUS at 10:50

## 2018-06-21 RX ADMIN — CYCLOPHOSPHAMIDE 1060 MG: 1 INJECTION, POWDER, FOR SOLUTION INTRAVENOUS; ORAL at 12:46

## 2018-06-21 RX ADMIN — DOXORUBICIN HYDROCHLORIDE 106 MG: 2 INJECTION, SOLUTION INTRAVENOUS at 12:20

## 2018-06-21 RX ADMIN — Medication 1000 ML: at 10:50

## 2018-06-21 RX ADMIN — PEGFILGRASTIM 6 MG: KIT SUBCUTANEOUS at 13:38

## 2018-06-21 RX ADMIN — HEPARIN 500 UNITS: 100 SYRINGE at 14:07

## 2018-06-21 RX ADMIN — Medication 10 ML: at 14:07

## 2018-06-21 RX ADMIN — SODIUM CHLORIDE 150 MG: 9 INJECTION, SOLUTION INTRAVENOUS at 11:28

## 2018-06-21 NOTE — PROGRESS NOTES
Discharged to home in stable condition, tolerated chemo infusion well, dc papers signed, pt given copy, port deaccessed, flushed  Per protocol, good blood return noted, site asymptomatic.

## 2018-06-22 ENCOUNTER — TELEPHONE (OUTPATIENT)
Dept: ADMINISTRATIVE | Age: 68
End: 2018-06-22

## 2018-07-04 ENCOUNTER — HOSPITAL ENCOUNTER (OUTPATIENT)
Age: 68
Discharge: HOME | End: 2018-07-04
Payer: MEDICARE

## 2018-07-04 DIAGNOSIS — C50.111 MALIGNANT NEOPLASM OF CENTRAL PORTION OF RIGHT BREAST IN FEMALE, ESTROGEN RECEPTOR NEGATIVE (HCC): ICD-10-CM

## 2018-07-04 DIAGNOSIS — Z17.1 MALIGNANT NEOPLASM OF CENTRAL PORTION OF RIGHT BREAST IN FEMALE, ESTROGEN RECEPTOR NEGATIVE (HCC): ICD-10-CM

## 2018-07-04 LAB
ALBUMIN SERPL-MCNC: 4.6 G/DL (ref 3.5–5.2)
ALP BLD-CCNC: 81 U/L (ref 35–104)
ALT SERPL-CCNC: 24 U/L (ref 0–32)
ANION GAP SERPL CALCULATED.3IONS-SCNC: 15 MMOL/L (ref 7–16)
AST SERPL-CCNC: 24 U/L (ref 0–31)
ATYPICAL LYMPHOCYTE RELATIVE PERCENT: 2 % (ref 0–4)
BASOPHILS ABSOLUTE: 0.1 E9/L (ref 0–0.2)
BASOPHILS RELATIVE PERCENT: 1 % (ref 0–2)
BILIRUB SERPL-MCNC: 0.2 MG/DL (ref 0–1.2)
BUN BLDV-MCNC: 10 MG/DL (ref 8–23)
CALCIUM SERPL-MCNC: 10 MG/DL (ref 8.6–10.2)
CHLORIDE BLD-SCNC: 92 MMOL/L (ref 98–107)
CO2: 28 MMOL/L (ref 22–29)
CREAT SERPL-MCNC: 1 MG/DL (ref 0.5–1)
EOSINOPHILS ABSOLUTE: 0.22 E9/L (ref 0.05–0.5)
EOSINOPHILS RELATIVE PERCENT: 2.1 % (ref 0–6)
GFR AFRICAN AMERICAN: >60
GFR NON-AFRICAN AMERICAN: 55 ML/MIN/1.73
GLUCOSE BLD-MCNC: 187 MG/DL (ref 74–109)
HCT VFR BLD CALC: 39.3 % (ref 34–48)
HEMOGLOBIN: 13.2 G/DL (ref 11.5–15.5)
LYMPHOCYTES ABSOLUTE: 1.25 E9/L (ref 1.5–4)
LYMPHOCYTES RELATIVE PERCENT: 10.2 % (ref 20–42)
MAGNESIUM: 1.5 MG/DL (ref 1.6–2.6)
MCH RBC QN AUTO: 28.8 PG (ref 26–35)
MCHC RBC AUTO-ENTMCNC: 33.6 % (ref 32–34.5)
MCV RBC AUTO: 85.8 FL (ref 80–99.9)
METAMYELOCYTES RELATIVE PERCENT: 3.1 % (ref 0–1)
MONOCYTES ABSOLUTE: 0.83 E9/L (ref 0.1–0.95)
MONOCYTES RELATIVE PERCENT: 8.2 % (ref 2–12)
MYELOCYTE PERCENT: 2 % (ref 0–0)
NEUTROPHILS ABSOLUTE: 8.01 E9/L (ref 1.8–7.3)
NEUTROPHILS RELATIVE PERCENT: 71.4 % (ref 43–80)
NUCLEATED RED BLOOD CELLS: 1 /100 WBC
PDW BLD-RTO: 12.7 FL (ref 11.5–15)
PLATELET # BLD: 162 E9/L (ref 130–450)
PMV BLD AUTO: 10.6 FL (ref 7–12)
POLYCHROMASIA: ABNORMAL
POTASSIUM SERPL-SCNC: 4.5 MMOL/L (ref 3.5–5)
RBC # BLD: 4.58 E12/L (ref 3.5–5.5)
SODIUM BLD-SCNC: 135 MMOL/L (ref 132–146)
TOTAL PROTEIN: 7.5 G/DL (ref 6.4–8.3)
WBC # BLD: 10.4 E9/L (ref 4.5–11.5)

## 2018-07-04 PROCEDURE — 80053 COMPREHEN METABOLIC PANEL: CPT

## 2018-07-04 PROCEDURE — 83735 ASSAY OF MAGNESIUM: CPT

## 2018-07-04 PROCEDURE — 85025 COMPLETE CBC W/AUTO DIFF WBC: CPT

## 2018-07-04 PROCEDURE — 36415 COLL VENOUS BLD VENIPUNCTURE: CPT

## 2018-07-05 ENCOUNTER — HOSPITAL ENCOUNTER (OUTPATIENT)
Dept: INFUSION THERAPY | Age: 68
Discharge: HOME | End: 2018-07-05
Payer: MEDICARE

## 2018-07-05 ENCOUNTER — OFFICE VISIT (OUTPATIENT)
Dept: ONCOLOGY | Age: 68
End: 2018-07-05
Payer: MEDICARE

## 2018-07-05 VITALS
TEMPERATURE: 97.2 F | HEIGHT: 62 IN | WEIGHT: 152.2 LBS | DIASTOLIC BLOOD PRESSURE: 68 MMHG | RESPIRATION RATE: 20 BRPM | BODY MASS INDEX: 28.01 KG/M2 | SYSTOLIC BLOOD PRESSURE: 118 MMHG | HEART RATE: 82 BPM

## 2018-07-05 VITALS
RESPIRATION RATE: 20 BRPM | DIASTOLIC BLOOD PRESSURE: 60 MMHG | HEART RATE: 77 BPM | SYSTOLIC BLOOD PRESSURE: 123 MMHG | TEMPERATURE: 97.9 F

## 2018-07-05 DIAGNOSIS — Z17.1 MALIGNANT NEOPLASM OF CENTRAL PORTION OF RIGHT BREAST IN FEMALE, ESTROGEN RECEPTOR NEGATIVE (HCC): ICD-10-CM

## 2018-07-05 DIAGNOSIS — C50.111 MALIGNANT NEOPLASM OF CENTRAL PORTION OF RIGHT BREAST IN FEMALE, ESTROGEN RECEPTOR NEGATIVE (HCC): ICD-10-CM

## 2018-07-05 DIAGNOSIS — C50.111 MALIGNANT NEOPLASM OF CENTRAL PORTION OF RIGHT BREAST IN FEMALE, ESTROGEN RECEPTOR NEGATIVE (HCC): Primary | ICD-10-CM

## 2018-07-05 DIAGNOSIS — Z17.1 MALIGNANT NEOPLASM OF CENTRAL PORTION OF RIGHT BREAST IN FEMALE, ESTROGEN RECEPTOR NEGATIVE (HCC): Primary | ICD-10-CM

## 2018-07-05 PROCEDURE — 96417 CHEMO IV INFUS EACH ADDL SEQ: CPT

## 2018-07-05 PROCEDURE — G8400 PT W/DXA NO RESULTS DOC: HCPCS | Performed by: NURSE PRACTITIONER

## 2018-07-05 PROCEDURE — 2580000003 HC RX 258

## 2018-07-05 PROCEDURE — 96377 APPLICATON ON-BODY INJECTOR: CPT

## 2018-07-05 PROCEDURE — 99213 OFFICE O/P EST LOW 20 MIN: CPT | Performed by: NURSE PRACTITIONER

## 2018-07-05 PROCEDURE — 3017F COLORECTAL CA SCREEN DOC REV: CPT | Performed by: NURSE PRACTITIONER

## 2018-07-05 PROCEDURE — 96366 THER/PROPH/DIAG IV INF ADDON: CPT

## 2018-07-05 PROCEDURE — 4040F PNEUMOC VAC/ADMIN/RCVD: CPT | Performed by: NURSE PRACTITIONER

## 2018-07-05 PROCEDURE — 96375 TX/PRO/DX INJ NEW DRUG ADDON: CPT

## 2018-07-05 PROCEDURE — 4004F PT TOBACCO SCREEN RCVD TLK: CPT | Performed by: NURSE PRACTITIONER

## 2018-07-05 PROCEDURE — 2580000003 HC RX 258: Performed by: NURSE PRACTITIONER

## 2018-07-05 PROCEDURE — 6360000002 HC RX W HCPCS: Performed by: NURSE PRACTITIONER

## 2018-07-05 PROCEDURE — G8427 DOCREV CUR MEDS BY ELIG CLIN: HCPCS | Performed by: NURSE PRACTITIONER

## 2018-07-05 PROCEDURE — 1123F ACP DISCUSS/DSCN MKR DOCD: CPT | Performed by: NURSE PRACTITIONER

## 2018-07-05 PROCEDURE — G8419 CALC BMI OUT NRM PARAM NOF/U: HCPCS | Performed by: NURSE PRACTITIONER

## 2018-07-05 PROCEDURE — 1090F PRES/ABSN URINE INCON ASSESS: CPT | Performed by: NURSE PRACTITIONER

## 2018-07-05 PROCEDURE — 96413 CHEMO IV INFUSION 1 HR: CPT

## 2018-07-05 RX ORDER — NAPROXEN 500 MG/1
500 TABLET ORAL 2 TIMES DAILY WITH MEALS
Qty: 60 TABLET | Refills: 3 | Status: ON HOLD | OUTPATIENT
Start: 2018-07-05 | End: 2018-10-01 | Stop reason: HOSPADM

## 2018-07-05 RX ORDER — LORAZEPAM 2 MG/ML
0.5 INJECTION INTRAMUSCULAR ONCE
Status: CANCELLED | OUTPATIENT
Start: 2018-07-05

## 2018-07-05 RX ORDER — DEXAMETHASONE SODIUM PHOSPHATE 10 MG/ML
6 INJECTION, SOLUTION INTRAMUSCULAR; INTRAVENOUS ONCE
Status: CANCELLED | OUTPATIENT
Start: 2018-07-05

## 2018-07-05 RX ORDER — SODIUM CHLORIDE 9 MG/ML
INJECTION, SOLUTION INTRAVENOUS CONTINUOUS
Status: CANCELLED
Start: 2018-07-05

## 2018-07-05 RX ORDER — DEXAMETHASONE SODIUM PHOSPHATE 10 MG/ML
6 INJECTION, SOLUTION INTRAMUSCULAR; INTRAVENOUS ONCE
Status: COMPLETED | OUTPATIENT
Start: 2018-07-05 | End: 2018-07-05

## 2018-07-05 RX ORDER — LORAZEPAM 2 MG/ML
0.5 INJECTION INTRAMUSCULAR ONCE
Status: COMPLETED | OUTPATIENT
Start: 2018-07-05 | End: 2018-07-05

## 2018-07-05 RX ORDER — DOXORUBICIN HYDROCHLORIDE 2 MG/ML
60 INJECTION, SOLUTION INTRAVENOUS ONCE
Status: COMPLETED | OUTPATIENT
Start: 2018-07-05 | End: 2018-07-05

## 2018-07-05 RX ORDER — SODIUM CHLORIDE 9 MG/ML
INJECTION, SOLUTION INTRAVENOUS
Status: COMPLETED
Start: 2018-07-05 | End: 2018-07-05

## 2018-07-05 RX ORDER — DOXORUBICIN HYDROCHLORIDE 2 MG/ML
60 INJECTION, SOLUTION INTRAVENOUS ONCE
Status: CANCELLED | OUTPATIENT
Start: 2018-07-05

## 2018-07-05 RX ORDER — DIPHENHYDRAMINE HYDROCHLORIDE 50 MG/ML
50 INJECTION INTRAMUSCULAR; INTRAVENOUS ONCE
Status: CANCELLED | OUTPATIENT
Start: 2018-07-05 | End: 2018-07-05

## 2018-07-05 RX ORDER — 0.9 % SODIUM CHLORIDE 0.9 %
1000 INTRAVENOUS SOLUTION INTRAVENOUS ONCE
Status: COMPLETED | OUTPATIENT
Start: 2018-07-05 | End: 2018-07-05

## 2018-07-05 RX ORDER — SODIUM CHLORIDE 0.9 % (FLUSH) 0.9 %
5 SYRINGE (ML) INJECTION PRN
Status: CANCELLED | OUTPATIENT
Start: 2018-07-05

## 2018-07-05 RX ORDER — 0.9 % SODIUM CHLORIDE 0.9 %
10 VIAL (ML) INJECTION ONCE
Status: CANCELLED | OUTPATIENT
Start: 2018-07-05 | End: 2018-07-05

## 2018-07-05 RX ORDER — METHYLPREDNISOLONE SODIUM SUCCINATE 125 MG/2ML
125 INJECTION, POWDER, LYOPHILIZED, FOR SOLUTION INTRAMUSCULAR; INTRAVENOUS ONCE
Status: CANCELLED | OUTPATIENT
Start: 2018-07-05 | End: 2018-07-05

## 2018-07-05 RX ORDER — SODIUM CHLORIDE 9 MG/ML
INJECTION, SOLUTION INTRAVENOUS CONTINUOUS
Status: CANCELLED | OUTPATIENT
Start: 2018-07-05

## 2018-07-05 RX ORDER — SODIUM CHLORIDE 0.9 % (FLUSH) 0.9 %
10 SYRINGE (ML) INJECTION PRN
Status: DISCONTINUED | OUTPATIENT
Start: 2018-07-05 | End: 2018-07-06 | Stop reason: HOSPADM

## 2018-07-05 RX ORDER — EPINEPHRINE 1 MG/ML
0.3 INJECTION, SOLUTION, CONCENTRATE INTRAVENOUS PRN
Status: CANCELLED | OUTPATIENT
Start: 2018-07-05

## 2018-07-05 RX ORDER — 0.9 % SODIUM CHLORIDE 0.9 %
1000 INTRAVENOUS SOLUTION INTRAVENOUS ONCE
Status: CANCELLED
Start: 2018-07-05 | End: 2018-07-05

## 2018-07-05 RX ORDER — PALONOSETRON 0.05 MG/ML
0.25 INJECTION, SOLUTION INTRAVENOUS ONCE
Status: COMPLETED | OUTPATIENT
Start: 2018-07-05 | End: 2018-07-05

## 2018-07-05 RX ORDER — PALONOSETRON 0.05 MG/ML
0.25 INJECTION, SOLUTION INTRAVENOUS ONCE
Status: CANCELLED | OUTPATIENT
Start: 2018-07-05

## 2018-07-05 RX ORDER — SODIUM CHLORIDE 0.9 % (FLUSH) 0.9 %
10 SYRINGE (ML) INJECTION PRN
Status: CANCELLED | OUTPATIENT
Start: 2018-07-05

## 2018-07-05 RX ORDER — MAGNESIUM SULFATE IN WATER 40 MG/ML
2 INJECTION, SOLUTION INTRAVENOUS ONCE
Status: CANCELLED
Start: 2018-07-05 | End: 2018-07-05

## 2018-07-05 RX ORDER — HEPARIN SODIUM (PORCINE) LOCK FLUSH IV SOLN 100 UNIT/ML 100 UNIT/ML
500 SOLUTION INTRAVENOUS PRN
Status: CANCELLED | OUTPATIENT
Start: 2018-07-05

## 2018-07-05 RX ORDER — HEPARIN SODIUM (PORCINE) LOCK FLUSH IV SOLN 100 UNIT/ML 100 UNIT/ML
500 SOLUTION INTRAVENOUS PRN
Status: DISCONTINUED | OUTPATIENT
Start: 2018-07-05 | End: 2018-07-06 | Stop reason: HOSPADM

## 2018-07-05 RX ADMIN — LORAZEPAM 0.5 MG: 2 INJECTION INTRAMUSCULAR; INTRAVENOUS at 12:04

## 2018-07-05 RX ADMIN — PALONOSETRON HYDROCHLORIDE 0.25 MG: 0.25 INJECTION INTRAVENOUS at 11:53

## 2018-07-05 RX ADMIN — Medication 10 ML: at 11:58

## 2018-07-05 RX ADMIN — Medication 10 ML: at 11:15

## 2018-07-05 RX ADMIN — SODIUM CHLORIDE 150 MG: 9 INJECTION, SOLUTION INTRAVENOUS at 12:50

## 2018-07-05 RX ADMIN — MAGNESIUM SULFATE HEPTAHYDRATE: 500 INJECTION, SOLUTION INTRAMUSCULAR; INTRAVENOUS at 14:43

## 2018-07-05 RX ADMIN — PEGFILGRASTIM 6 MG: KIT SUBCUTANEOUS at 14:41

## 2018-07-05 RX ADMIN — Medication 10 ML: at 12:05

## 2018-07-05 RX ADMIN — SODIUM CHLORIDE 250 ML: 9 INJECTION, SOLUTION INTRAVENOUS at 11:45

## 2018-07-05 RX ADMIN — DEXAMETHASONE SODIUM PHOSPHATE 6 MG: 10 INJECTION, SOLUTION INTRAMUSCULAR; INTRAVENOUS at 11:57

## 2018-07-05 RX ADMIN — Medication 10 ML: at 13:30

## 2018-07-05 RX ADMIN — Medication 10 ML: at 16:58

## 2018-07-05 RX ADMIN — Medication 250 ML: at 11:45

## 2018-07-05 RX ADMIN — DOXORUBICIN HYDROCHLORIDE 106 MG: 2 INJECTION, SOLUTION INTRAVENOUS at 13:32

## 2018-07-05 RX ADMIN — HEPARIN 500 UNITS: 100 SYRINGE at 16:58

## 2018-07-05 RX ADMIN — CYCLOPHOSPHAMIDE 1060 MG: 1 INJECTION, POWDER, FOR SOLUTION INTRAVENOUS; ORAL at 13:56

## 2018-07-05 NOTE — PROGRESS NOTES
imodium PRN. No constipation. GENITOURINARY: No dysuria, urinary frequency, hematuria. NEURO: No syncope, presyncope, headache. Remainder:  ROS NEGATIVE    Past Medical History:      Diagnosis Date    Acid reflux     Breast cancer ([address]     right    Hypertension     Insomnia     Seasonal allergies     Stroke [facility]) 2013    tia    Type 2 diabetes mellitus without complication (HCC)      Medications:  Reviewed and reconciled. Allergies: Allergies   Allergen Reactions    Avelox [Moxifloxacin] Hives     Physical Exam:  GENERAL: Alert, oriented x 3, not in acute distress. HEENT: PERRLA; EOMI. Oropharynx clear. NECK: Supple. Post-biopsy  LUNGS: Good air entry bilaterally. No wheezing, crackles or ronchi. CARDIOVASCULAR: Regular rate. No murmurs, rubs or gallops. ABDOMEN: Soft. Non-tender, non-distended. Positive bowel sounds. EXTREMITIES: Without clubbing, cyanosis, or edema. NEUROLOGIC: No focal deficits. ECOG PS 1    Impression/Plan:  Right Breast Cancer: On 04/10/2018:  A. Right breast, core biopsy: Poorly differentiated ductal carcinoma, [place] score 3+3+2 = 8.  B. Right lymph node, core biopsy:  Metastatic poorly differentiated carcinoma    Comment: Per report from [facility] laboratory:  Estrogen receptor: Negative  Progesterone receptor: Negative  HER-2/jocelyn status: Negative (0)  Enclosed keratin immunohistochemical stains are positive    CT chest 05/11/2018:  Nodule just above the thyroid measures 2 x 2.7 cm. Thyroid nodules range up to 10 mm. Right axillary lymph nodes range up to 1.9 x 3.1 cm. No significant mediastinal or hilar LN. 2.4 mm groundglass nodule in the lingula     CT abdomen/pelvis 05/11/2018 noted no evidence of metastatic disease. Bone scan 05/11/2018 noted no convincing evidence of metastatic disease.     PET/CT scan 05/23/2018:  Multiple enlarged hypermetabolic lymph nodes at level of the right axilla measuring up to 33 x 22 mm with hypermetabolic

## 2018-07-16 ENCOUNTER — OFFICE VISIT (OUTPATIENT)
Dept: ENT CLINIC | Age: 68
End: 2018-07-16
Payer: MEDICARE

## 2018-07-16 VITALS
SYSTOLIC BLOOD PRESSURE: 118 MMHG | HEART RATE: 69 BPM | TEMPERATURE: 98.5 F | OXYGEN SATURATION: 98 % | HEIGHT: 62 IN | DIASTOLIC BLOOD PRESSURE: 80 MMHG | WEIGHT: 152 LBS | BODY MASS INDEX: 27.97 KG/M2

## 2018-07-16 DIAGNOSIS — D11.9 WARTHIN'S TUMOR: Primary | ICD-10-CM

## 2018-07-16 PROCEDURE — 1090F PRES/ABSN URINE INCON ASSESS: CPT | Performed by: OTOLARYNGOLOGY

## 2018-07-16 PROCEDURE — 99204 OFFICE O/P NEW MOD 45 MIN: CPT | Performed by: OTOLARYNGOLOGY

## 2018-07-16 PROCEDURE — 1123F ACP DISCUSS/DSCN MKR DOCD: CPT | Performed by: OTOLARYNGOLOGY

## 2018-07-16 PROCEDURE — G8419 CALC BMI OUT NRM PARAM NOF/U: HCPCS | Performed by: OTOLARYNGOLOGY

## 2018-07-16 PROCEDURE — 4040F PNEUMOC VAC/ADMIN/RCVD: CPT | Performed by: OTOLARYNGOLOGY

## 2018-07-16 PROCEDURE — 4004F PT TOBACCO SCREEN RCVD TLK: CPT | Performed by: OTOLARYNGOLOGY

## 2018-07-16 PROCEDURE — 1101F PT FALLS ASSESS-DOCD LE1/YR: CPT | Performed by: OTOLARYNGOLOGY

## 2018-07-16 PROCEDURE — G8427 DOCREV CUR MEDS BY ELIG CLIN: HCPCS | Performed by: OTOLARYNGOLOGY

## 2018-07-16 PROCEDURE — G8400 PT W/DXA NO RESULTS DOC: HCPCS | Performed by: OTOLARYNGOLOGY

## 2018-07-16 PROCEDURE — 3017F COLORECTAL CA SCREEN DOC REV: CPT | Performed by: OTOLARYNGOLOGY

## 2018-07-16 ASSESSMENT — ENCOUNTER SYMPTOMS
EYES NEGATIVE: 1
GASTROINTESTINAL NEGATIVE: 1
RESPIRATORY NEGATIVE: 1

## 2018-07-16 NOTE — PROGRESS NOTES
Subjective:      Patient ID:  Nba Peters is a 80 y.o. female. HPI:    Patient presents today for Patient w/ PMHx of metastatic breast cancer presents today for a post-mandibular mass that was noted 3 months ago. She is currently undergoing chemotherapy for her medical condition. She recently had a biopsy of her anterior neck and right submandibular region which came back negative. Patient reports developing a right sided neck hematoma soon after the biopsies which she attributes to starting a blood thinner. She is a current smoker of 50 pack years. Denies alcohol or illicit drug use. She reports associated unintentional weight loss of 5 pounds in 2 months, night sweats, and fatigue. Denies fever or chills. She is a current smoker of 50 pack years. Denies alcohol or illicit drug use. Patient's medications, allergies, past medical, surgical, social and family histories were reviewed and updated as appropriate. Review of Systems   Constitutional: Positive for fatigue and unexpected weight change. Negative for chills, diaphoresis and fever. HENT: Negative. Eyes: Negative. Respiratory: Negative. Cardiovascular: Negative. Gastrointestinal: Negative. Hematological: Bruises/bleeds easily. Objective:   Physical Exam   HENT:   Head: Normocephalic and atraumatic. Right Ear: External ear normal.   Left Ear: External ear normal.   Nose: Nose normal. No mucosal edema, rhinorrhea, nose lacerations or sinus tenderness. Mouth/Throat: Uvula is midline and oropharynx is clear and moist. No oral lesions. No uvula swelling. No oropharyngeal exudate. Neck: Neck supple. Assessment:       Diagnosis Orders   1. Warthin's tumor           Plan:      1. Informed patient that mass is likely due to Warthin's tumor due to her extensive history of tobacco use. I educated the patient on the benign nature of the disease and advised her to remove her parotid gland post-cancer treatment.  I

## 2018-07-19 ENCOUNTER — OFFICE VISIT (OUTPATIENT)
Dept: ONCOLOGY | Age: 68
End: 2018-07-19
Payer: MEDICARE

## 2018-07-19 ENCOUNTER — HOSPITAL ENCOUNTER (OUTPATIENT)
Dept: INFUSION THERAPY | Age: 68
Discharge: HOME | End: 2018-07-19
Payer: MEDICARE

## 2018-07-19 VITALS
HEART RATE: 73 BPM | SYSTOLIC BLOOD PRESSURE: 115 MMHG | DIASTOLIC BLOOD PRESSURE: 58 MMHG | TEMPERATURE: 97.6 F | RESPIRATION RATE: 18 BRPM

## 2018-07-19 VITALS
TEMPERATURE: 98.7 F | WEIGHT: 153.3 LBS | HEART RATE: 88 BPM | SYSTOLIC BLOOD PRESSURE: 115 MMHG | HEIGHT: 62 IN | DIASTOLIC BLOOD PRESSURE: 60 MMHG | RESPIRATION RATE: 20 BRPM | BODY MASS INDEX: 28.21 KG/M2

## 2018-07-19 DIAGNOSIS — C50.111 MALIGNANT NEOPLASM OF CENTRAL PORTION OF RIGHT BREAST IN FEMALE, ESTROGEN RECEPTOR NEGATIVE (HCC): ICD-10-CM

## 2018-07-19 DIAGNOSIS — Z17.1 MALIGNANT NEOPLASM OF CENTRAL PORTION OF RIGHT BREAST IN FEMALE, ESTROGEN RECEPTOR NEGATIVE (HCC): ICD-10-CM

## 2018-07-19 LAB
ALBUMIN SERPL-MCNC: 4.1 G/DL (ref 3.5–5.2)
ALP BLD-CCNC: 79 U/L (ref 35–104)
ALT SERPL-CCNC: 21 U/L (ref 0–32)
ANION GAP SERPL CALCULATED.3IONS-SCNC: 17 MMOL/L (ref 7–16)
ANISOCYTOSIS: ABNORMAL
AST SERPL-CCNC: 26 U/L (ref 0–31)
BASOPHILS ABSOLUTE: 0 E9/L (ref 0–0.2)
BASOPHILS RELATIVE PERCENT: 0.8 % (ref 0–2)
BILIRUB SERPL-MCNC: 0.4 MG/DL (ref 0–1.2)
BUN BLDV-MCNC: 15 MG/DL (ref 8–23)
CALCIUM SERPL-MCNC: 8.5 MG/DL (ref 8.6–10.2)
CHLORIDE BLD-SCNC: 94 MMOL/L (ref 98–107)
CO2: 25 MMOL/L (ref 22–29)
CREAT SERPL-MCNC: 1.2 MG/DL (ref 0.5–1)
EOSINOPHILS ABSOLUTE: 0.26 E9/L (ref 0.05–0.5)
EOSINOPHILS RELATIVE PERCENT: 1.8 % (ref 0–6)
GFR AFRICAN AMERICAN: 54
GFR NON-AFRICAN AMERICAN: 45 ML/MIN/1.73
GLUCOSE BLD-MCNC: 189 MG/DL (ref 74–109)
HCT VFR BLD CALC: 31.7 % (ref 34–48)
HEMOGLOBIN: 10.8 G/DL (ref 11.5–15.5)
LYMPHOCYTES ABSOLUTE: 1.72 E9/L (ref 1.5–4)
LYMPHOCYTES RELATIVE PERCENT: 12.3 % (ref 20–42)
MAGNESIUM: 1.2 MG/DL (ref 1.6–2.6)
MCH RBC QN AUTO: 29 PG (ref 26–35)
MCHC RBC AUTO-ENTMCNC: 34.1 % (ref 32–34.5)
MCV RBC AUTO: 85 FL (ref 80–99.9)
METAMYELOCYTES RELATIVE PERCENT: 4.4 % (ref 0–1)
MONOCYTES ABSOLUTE: 0.72 E9/L (ref 0.1–0.95)
MONOCYTES RELATIVE PERCENT: 5.3 % (ref 2–12)
MYELOCYTE PERCENT: 4.4 % (ref 0–0)
NEUTROPHILS ABSOLUTE: 11.58 E9/L (ref 1.8–7.3)
NEUTROPHILS RELATIVE PERCENT: 71.9 % (ref 43–80)
PDW BLD-RTO: 13.4 FL (ref 11.5–15)
PLATELET # BLD: 192 E9/L (ref 130–450)
PMV BLD AUTO: 10.4 FL (ref 7–12)
POLYCHROMASIA: ABNORMAL
POTASSIUM SERPL-SCNC: 3.5 MMOL/L (ref 3.5–5)
RBC # BLD: 3.73 E12/L (ref 3.5–5.5)
SODIUM BLD-SCNC: 136 MMOL/L (ref 132–146)
TOTAL PROTEIN: 6.6 G/DL (ref 6.4–8.3)
WBC # BLD: 14.3 E9/L (ref 4.5–11.5)

## 2018-07-19 PROCEDURE — 80053 COMPREHEN METABOLIC PANEL: CPT

## 2018-07-19 PROCEDURE — 4004F PT TOBACCO SCREEN RCVD TLK: CPT | Performed by: NURSE PRACTITIONER

## 2018-07-19 PROCEDURE — G8400 PT W/DXA NO RESULTS DOC: HCPCS | Performed by: NURSE PRACTITIONER

## 2018-07-19 PROCEDURE — 6360000002 HC RX W HCPCS: Performed by: NURSE PRACTITIONER

## 2018-07-19 PROCEDURE — 99213 OFFICE O/P EST LOW 20 MIN: CPT | Performed by: NURSE PRACTITIONER

## 2018-07-19 PROCEDURE — 96417 CHEMO IV INFUS EACH ADDL SEQ: CPT

## 2018-07-19 PROCEDURE — G8427 DOCREV CUR MEDS BY ELIG CLIN: HCPCS | Performed by: NURSE PRACTITIONER

## 2018-07-19 PROCEDURE — 85025 COMPLETE CBC W/AUTO DIFF WBC: CPT

## 2018-07-19 PROCEDURE — 96361 HYDRATE IV INFUSION ADD-ON: CPT

## 2018-07-19 PROCEDURE — 1090F PRES/ABSN URINE INCON ASSESS: CPT | Performed by: NURSE PRACTITIONER

## 2018-07-19 PROCEDURE — 2580000003 HC RX 258: Performed by: NURSE PRACTITIONER

## 2018-07-19 PROCEDURE — 96413 CHEMO IV INFUSION 1 HR: CPT

## 2018-07-19 PROCEDURE — 96375 TX/PRO/DX INJ NEW DRUG ADDON: CPT

## 2018-07-19 PROCEDURE — 96377 APPLICATON ON-BODY INJECTOR: CPT

## 2018-07-19 PROCEDURE — 4040F PNEUMOC VAC/ADMIN/RCVD: CPT | Performed by: NURSE PRACTITIONER

## 2018-07-19 PROCEDURE — G8419 CALC BMI OUT NRM PARAM NOF/U: HCPCS | Performed by: NURSE PRACTITIONER

## 2018-07-19 PROCEDURE — 1101F PT FALLS ASSESS-DOCD LE1/YR: CPT | Performed by: NURSE PRACTITIONER

## 2018-07-19 PROCEDURE — 1123F ACP DISCUSS/DSCN MKR DOCD: CPT | Performed by: NURSE PRACTITIONER

## 2018-07-19 PROCEDURE — 3017F COLORECTAL CA SCREEN DOC REV: CPT | Performed by: NURSE PRACTITIONER

## 2018-07-19 PROCEDURE — 96366 THER/PROPH/DIAG IV INF ADDON: CPT

## 2018-07-19 PROCEDURE — 83735 ASSAY OF MAGNESIUM: CPT

## 2018-07-19 RX ORDER — PALONOSETRON 0.05 MG/ML
0.25 INJECTION, SOLUTION INTRAVENOUS ONCE
Status: CANCELLED | OUTPATIENT
Start: 2018-07-19

## 2018-07-19 RX ORDER — SODIUM CHLORIDE 0.9 % (FLUSH) 0.9 %
5 SYRINGE (ML) INJECTION PRN
Status: CANCELLED | OUTPATIENT
Start: 2018-07-19

## 2018-07-19 RX ORDER — 0.9 % SODIUM CHLORIDE 0.9 %
1000 INTRAVENOUS SOLUTION INTRAVENOUS ONCE
Status: CANCELLED
Start: 2018-07-19 | End: 2018-07-19

## 2018-07-19 RX ORDER — SODIUM CHLORIDE 9 MG/ML
INJECTION, SOLUTION INTRAVENOUS CONTINUOUS
Status: CANCELLED | OUTPATIENT
Start: 2018-07-19

## 2018-07-19 RX ORDER — LORAZEPAM 2 MG/ML
0.5 INJECTION INTRAMUSCULAR ONCE
Status: COMPLETED | OUTPATIENT
Start: 2018-07-19 | End: 2018-07-19

## 2018-07-19 RX ORDER — DOXORUBICIN HYDROCHLORIDE 2 MG/ML
60 INJECTION, SOLUTION INTRAVENOUS ONCE
Status: COMPLETED | OUTPATIENT
Start: 2018-07-19 | End: 2018-07-19

## 2018-07-19 RX ORDER — HEPARIN SODIUM (PORCINE) LOCK FLUSH IV SOLN 100 UNIT/ML 100 UNIT/ML
500 SOLUTION INTRAVENOUS PRN
Status: CANCELLED | OUTPATIENT
Start: 2018-07-19

## 2018-07-19 RX ORDER — DEXAMETHASONE SODIUM PHOSPHATE 10 MG/ML
6 INJECTION, SOLUTION INTRAMUSCULAR; INTRAVENOUS ONCE
Status: COMPLETED | OUTPATIENT
Start: 2018-07-19 | End: 2018-07-19

## 2018-07-19 RX ORDER — SODIUM CHLORIDE 0.9 % (FLUSH) 0.9 %
10 SYRINGE (ML) INJECTION PRN
Status: CANCELLED | OUTPATIENT
Start: 2018-07-19

## 2018-07-19 RX ORDER — DOXORUBICIN HYDROCHLORIDE 2 MG/ML
60 INJECTION, SOLUTION INTRAVENOUS ONCE
Status: CANCELLED | OUTPATIENT
Start: 2018-07-19

## 2018-07-19 RX ORDER — HEPARIN SODIUM (PORCINE) LOCK FLUSH IV SOLN 100 UNIT/ML 100 UNIT/ML
500 SOLUTION INTRAVENOUS PRN
Status: DISCONTINUED | OUTPATIENT
Start: 2018-07-19 | End: 2018-07-20 | Stop reason: HOSPADM

## 2018-07-19 RX ORDER — LORAZEPAM 2 MG/ML
0.5 INJECTION INTRAMUSCULAR ONCE
Status: CANCELLED | OUTPATIENT
Start: 2018-07-19

## 2018-07-19 RX ORDER — EPINEPHRINE 1 MG/ML
0.3 INJECTION, SOLUTION, CONCENTRATE INTRAVENOUS PRN
Status: CANCELLED | OUTPATIENT
Start: 2018-07-19

## 2018-07-19 RX ORDER — DIPHENHYDRAMINE HYDROCHLORIDE 50 MG/ML
50 INJECTION INTRAMUSCULAR; INTRAVENOUS ONCE
Status: CANCELLED | OUTPATIENT
Start: 2018-07-19 | End: 2018-07-19

## 2018-07-19 RX ORDER — DEXAMETHASONE SODIUM PHOSPHATE 10 MG/ML
6 INJECTION, SOLUTION INTRAMUSCULAR; INTRAVENOUS ONCE
Status: CANCELLED | OUTPATIENT
Start: 2018-07-19

## 2018-07-19 RX ORDER — SODIUM CHLORIDE 0.9 % (FLUSH) 0.9 %
10 SYRINGE (ML) INJECTION PRN
Status: DISCONTINUED | OUTPATIENT
Start: 2018-07-19 | End: 2018-07-20 | Stop reason: HOSPADM

## 2018-07-19 RX ORDER — METHYLPREDNISOLONE SODIUM SUCCINATE 125 MG/2ML
125 INJECTION, POWDER, LYOPHILIZED, FOR SOLUTION INTRAMUSCULAR; INTRAVENOUS ONCE
Status: CANCELLED | OUTPATIENT
Start: 2018-07-19 | End: 2018-07-19

## 2018-07-19 RX ORDER — PALONOSETRON 0.05 MG/ML
0.25 INJECTION, SOLUTION INTRAVENOUS ONCE
Status: COMPLETED | OUTPATIENT
Start: 2018-07-19 | End: 2018-07-19

## 2018-07-19 RX ORDER — 0.9 % SODIUM CHLORIDE 0.9 %
10 VIAL (ML) INJECTION ONCE
Status: CANCELLED | OUTPATIENT
Start: 2018-07-19 | End: 2018-07-19

## 2018-07-19 RX ORDER — 0.9 % SODIUM CHLORIDE 0.9 %
1000 INTRAVENOUS SOLUTION INTRAVENOUS ONCE
Status: COMPLETED | OUTPATIENT
Start: 2018-07-19 | End: 2018-07-19

## 2018-07-19 RX ADMIN — Medication 10 ML: at 12:09

## 2018-07-19 RX ADMIN — DEXAMETHASONE SODIUM PHOSPHATE 6 MG: 10 INJECTION, SOLUTION INTRAMUSCULAR; INTRAVENOUS at 12:21

## 2018-07-19 RX ADMIN — PEGFILGRASTIM 6 MG: KIT SUBCUTANEOUS at 15:03

## 2018-07-19 RX ADMIN — Medication 10 ML: at 12:31

## 2018-07-19 RX ADMIN — LORAZEPAM 0.5 MG: 2 INJECTION INTRAMUSCULAR; INTRAVENOUS at 12:31

## 2018-07-19 RX ADMIN — MAGNESIUM SULFATE HEPTAHYDRATE: 500 INJECTION, SOLUTION INTRAMUSCULAR; INTRAVENOUS at 12:11

## 2018-07-19 RX ADMIN — CYCLOPHOSPHAMIDE 1060 MG: 1 INJECTION, POWDER, FOR SOLUTION INTRAVENOUS; ORAL at 13:37

## 2018-07-19 RX ADMIN — HEPARIN 500 UNITS: 100 SYRINGE at 14:57

## 2018-07-19 RX ADMIN — SODIUM CHLORIDE 150 MG: 9 INJECTION, SOLUTION INTRAVENOUS at 12:37

## 2018-07-19 RX ADMIN — Medication 10 ML: at 14:57

## 2018-07-19 RX ADMIN — DOXORUBICIN HYDROCHLORIDE 106 MG: 2 INJECTION, SOLUTION INTRAVENOUS at 13:18

## 2018-07-19 RX ADMIN — PALONOSETRON HYDROCHLORIDE 0.25 MG: 0.25 INJECTION INTRAVENOUS at 12:17

## 2018-07-19 RX ADMIN — Medication 10 ML: at 12:11

## 2018-07-19 RX ADMIN — SODIUM CHLORIDE 1000 ML: 9 INJECTION, SOLUTION INTRAVENOUS at 12:09

## 2018-07-19 NOTE — PROGRESS NOTES
Patient here for treatment. Port accessed per protocol. Port wouldn't flush patient but pink tinged fluid returned. Patient repositioned and port now flushed easily with good blood return noted. Lab work drawn and sent. Port then flushed with saline and patient stated that it hurst and burns and requests that needle be removed. Port de-access and patient sent to physician office and office notified to evaluate port.

## 2018-07-19 NOTE — PROGRESS NOTES
Department of [facility] Oncology  Attending Clinic Note    Reason for Visit: Follow-up on a patient with Right Breast Cancer    PCP:  Trish Cabral DO, FACOI    History of Present Illness: The mass was located in the 12 o'clock position of the right breast.     Breast cancer risk factors include age, gender, mother with breast cancer. Age of menarche was 16. Total hysterectomy at age 53. Patient was on hormonal therapy, patient unsure of how long. Patient is . Age of first live birth was 33. Patient did not breast feed.     Bilateral screening mammogram on 2018:  Probable 15 mm thick soft tissue mass in the 12:00 position of the right breast.     Right Diagnostic Mammogram on 2018:  there is an 18 mm mass in the 12:00 position of the right breast consistent with carcinoma until proven otherwise. There is also an abnormal right axillary lymph node. Right Breast U/S on 2018:  18 mm mass in the 12:00 position of the right breast consistent with carcinoma until proven otherwise. Enlarged right axillary lymph node. On 04/10/2018:  A. Right breast, core biopsy, slides for review ( A): Poorly differentiated ductal carcinoma, [place] score 3+3+2 = 8.  B. Right lymph node, core biopsy, slides for review ( B):  Metastatic poorly differentiated carcinoma    Comment:   Per report from [facility] laboratory:  Estrogen receptor: Negative  Progesterone receptor: Negative  HER-2/jocelyn status: Negative (0)  Enclosed keratin immunohistochemical stains are positive    Review of Systems;  CONSTITUTIONAL: No fever, chills. Good appetite and energy level. ENMT: Eyes: No diplopia; Nose: No epistaxis. Mouth: No sore throat. RESPIRATORY: No hemoptysis, shortness of breath, cough. CARDIOVASCULAR: No chest pain, palpitations. GASTROINTESTINAL: + nausea managed by PRN Zofran/Compazine. No vomiting or abdominal pain. intermittent diarrhea controlled with imodium PRN.   No

## 2018-07-19 NOTE — PROGRESS NOTES
Patient tolerated infusions well without complaint. Port flushed per protocol and de-accessed. IV  site removed and site looks good. Patient discharged ambulatory.

## 2018-08-01 ENCOUNTER — HOSPITAL ENCOUNTER (OUTPATIENT)
Age: 68
Discharge: HOME | End: 2018-08-01
Payer: MEDICARE

## 2018-08-01 DIAGNOSIS — C50.111 MALIGNANT NEOPLASM OF CENTRAL PORTION OF RIGHT BREAST IN FEMALE, ESTROGEN RECEPTOR NEGATIVE (HCC): ICD-10-CM

## 2018-08-01 DIAGNOSIS — Z17.1 MALIGNANT NEOPLASM OF CENTRAL PORTION OF RIGHT BREAST IN FEMALE, ESTROGEN RECEPTOR NEGATIVE (HCC): ICD-10-CM

## 2018-08-01 LAB
ALBUMIN SERPL-MCNC: 4.6 G/DL (ref 3.5–5.2)
ALP BLD-CCNC: 90 U/L (ref 35–104)
ALT SERPL-CCNC: 23 U/L (ref 0–32)
ANION GAP SERPL CALCULATED.3IONS-SCNC: 15 MMOL/L (ref 7–16)
ANISOCYTOSIS: ABNORMAL
AST SERPL-CCNC: 25 U/L (ref 0–31)
ATYPICAL LYMPHOCYTE RELATIVE PERCENT: 2 % (ref 0–4)
BASOPHILS ABSOLUTE: 0.15 E9/L (ref 0–0.2)
BASOPHILS RELATIVE PERCENT: 1 % (ref 0–2)
BILIRUB SERPL-MCNC: 0.3 MG/DL (ref 0–1.2)
BUN BLDV-MCNC: 7 MG/DL (ref 8–23)
CALCIUM SERPL-MCNC: 9.6 MG/DL (ref 8.6–10.2)
CHLORIDE BLD-SCNC: 92 MMOL/L (ref 98–107)
CO2: 28 MMOL/L (ref 22–29)
CREAT SERPL-MCNC: 1.1 MG/DL (ref 0.5–1)
EOSINOPHILS ABSOLUTE: 0 E9/L (ref 0.05–0.5)
EOSINOPHILS RELATIVE PERCENT: 0 % (ref 0–6)
GFR AFRICAN AMERICAN: 60
GFR NON-AFRICAN AMERICAN: 49 ML/MIN/1.73
GLUCOSE BLD-MCNC: 157 MG/DL (ref 74–109)
HCT VFR BLD CALC: 30 % (ref 34–48)
HEMOGLOBIN: 10.4 G/DL (ref 11.5–15.5)
LYMPHOCYTES ABSOLUTE: 1.22 E9/L (ref 1.5–4)
LYMPHOCYTES RELATIVE PERCENT: 6 % (ref 20–42)
MAGNESIUM: 1.5 MG/DL (ref 1.6–2.6)
MCH RBC QN AUTO: 29.5 PG (ref 26–35)
MCHC RBC AUTO-ENTMCNC: 34.7 % (ref 32–34.5)
MCV RBC AUTO: 85 FL (ref 80–99.9)
METAMYELOCYTES RELATIVE PERCENT: 2 % (ref 0–1)
MONOCYTES ABSOLUTE: 1.37 E9/L (ref 0.1–0.95)
MONOCYTES RELATIVE PERCENT: 9 % (ref 2–12)
MYELOCYTE PERCENT: 2 % (ref 0–0)
NEUTROPHILS ABSOLUTE: 12.46 E9/L (ref 1.8–7.3)
NEUTROPHILS RELATIVE PERCENT: 78 % (ref 43–80)
OVALOCYTES: ABNORMAL
PDW BLD-RTO: 15.9 FL (ref 11.5–15)
PLATELET # BLD: 183 E9/L (ref 130–450)
PMV BLD AUTO: 10.9 FL (ref 7–12)
POIKILOCYTES: ABNORMAL
POTASSIUM SERPL-SCNC: 4.2 MMOL/L (ref 3.5–5)
RBC # BLD: 3.53 E12/L (ref 3.5–5.5)
SODIUM BLD-SCNC: 135 MMOL/L (ref 132–146)
TOTAL PROTEIN: 7.4 G/DL (ref 6.4–8.3)
WBC # BLD: 15.2 E9/L (ref 4.5–11.5)

## 2018-08-01 PROCEDURE — 36415 COLL VENOUS BLD VENIPUNCTURE: CPT

## 2018-08-01 PROCEDURE — 83735 ASSAY OF MAGNESIUM: CPT

## 2018-08-01 PROCEDURE — 85025 COMPLETE CBC W/AUTO DIFF WBC: CPT

## 2018-08-01 PROCEDURE — 80053 COMPREHEN METABOLIC PANEL: CPT

## 2018-08-02 ENCOUNTER — HOSPITAL ENCOUNTER (OUTPATIENT)
Dept: INFUSION THERAPY | Age: 68
Discharge: HOME | End: 2018-08-02
Payer: MEDICARE

## 2018-08-02 ENCOUNTER — TELEPHONE (OUTPATIENT)
Dept: ONCOLOGY | Age: 68
End: 2018-08-02

## 2018-08-02 ENCOUNTER — OFFICE VISIT (OUTPATIENT)
Dept: ONCOLOGY | Age: 68
End: 2018-08-02
Payer: MEDICARE

## 2018-08-02 VITALS
SYSTOLIC BLOOD PRESSURE: 101 MMHG | WEIGHT: 150 LBS | HEART RATE: 82 BPM | HEIGHT: 62 IN | TEMPERATURE: 97.8 F | DIASTOLIC BLOOD PRESSURE: 70 MMHG | BODY MASS INDEX: 27.6 KG/M2 | RESPIRATION RATE: 20 BRPM

## 2018-08-02 VITALS
TEMPERATURE: 98.1 F | SYSTOLIC BLOOD PRESSURE: 102 MMHG | HEART RATE: 75 BPM | DIASTOLIC BLOOD PRESSURE: 54 MMHG | RESPIRATION RATE: 20 BRPM

## 2018-08-02 DIAGNOSIS — Z17.1 MALIGNANT NEOPLASM OF CENTRAL PORTION OF RIGHT BREAST IN FEMALE, ESTROGEN RECEPTOR NEGATIVE (HCC): ICD-10-CM

## 2018-08-02 DIAGNOSIS — C50.111 MALIGNANT NEOPLASM OF CENTRAL PORTION OF RIGHT BREAST IN FEMALE, ESTROGEN RECEPTOR NEGATIVE (HCC): ICD-10-CM

## 2018-08-02 DIAGNOSIS — Z17.1 MALIGNANT NEOPLASM OF CENTRAL PORTION OF RIGHT BREAST IN FEMALE, ESTROGEN RECEPTOR NEGATIVE (HCC): Primary | ICD-10-CM

## 2018-08-02 DIAGNOSIS — C50.111 MALIGNANT NEOPLASM OF CENTRAL PORTION OF RIGHT BREAST IN FEMALE, ESTROGEN RECEPTOR NEGATIVE (HCC): Primary | ICD-10-CM

## 2018-08-02 PROCEDURE — 2580000003 HC RX 258

## 2018-08-02 PROCEDURE — 1101F PT FALLS ASSESS-DOCD LE1/YR: CPT | Performed by: INTERNAL MEDICINE

## 2018-08-02 PROCEDURE — 3017F COLORECTAL CA SCREEN DOC REV: CPT | Performed by: INTERNAL MEDICINE

## 2018-08-02 PROCEDURE — G8419 CALC BMI OUT NRM PARAM NOF/U: HCPCS | Performed by: INTERNAL MEDICINE

## 2018-08-02 PROCEDURE — 96372 THER/PROPH/DIAG INJ SC/IM: CPT

## 2018-08-02 PROCEDURE — 2580000003 HC RX 258: Performed by: INTERNAL MEDICINE

## 2018-08-02 PROCEDURE — 96366 THER/PROPH/DIAG IV INF ADDON: CPT

## 2018-08-02 PROCEDURE — 96374 THER/PROPH/DIAG INJ IV PUSH: CPT

## 2018-08-02 PROCEDURE — 1090F PRES/ABSN URINE INCON ASSESS: CPT | Performed by: INTERNAL MEDICINE

## 2018-08-02 PROCEDURE — 1123F ACP DISCUSS/DSCN MKR DOCD: CPT | Performed by: INTERNAL MEDICINE

## 2018-08-02 PROCEDURE — 99214 OFFICE O/P EST MOD 30 MIN: CPT | Performed by: INTERNAL MEDICINE

## 2018-08-02 PROCEDURE — G8427 DOCREV CUR MEDS BY ELIG CLIN: HCPCS | Performed by: INTERNAL MEDICINE

## 2018-08-02 PROCEDURE — 96375 TX/PRO/DX INJ NEW DRUG ADDON: CPT

## 2018-08-02 PROCEDURE — G8400 PT W/DXA NO RESULTS DOC: HCPCS | Performed by: INTERNAL MEDICINE

## 2018-08-02 PROCEDURE — 96365 THER/PROPH/DIAG IV INF INIT: CPT

## 2018-08-02 PROCEDURE — 6360000002 HC RX W HCPCS: Performed by: NURSE PRACTITIONER

## 2018-08-02 PROCEDURE — 96377 APPLICATON ON-BODY INJECTOR: CPT

## 2018-08-02 PROCEDURE — 6360000002 HC RX W HCPCS

## 2018-08-02 PROCEDURE — 96413 CHEMO IV INFUSION 1 HR: CPT

## 2018-08-02 PROCEDURE — 4004F PT TOBACCO SCREEN RCVD TLK: CPT | Performed by: INTERNAL MEDICINE

## 2018-08-02 PROCEDURE — 96367 TX/PROPH/DG ADDL SEQ IV INF: CPT

## 2018-08-02 PROCEDURE — 6360000002 HC RX W HCPCS: Performed by: INTERNAL MEDICINE

## 2018-08-02 PROCEDURE — 4040F PNEUMOC VAC/ADMIN/RCVD: CPT | Performed by: INTERNAL MEDICINE

## 2018-08-02 PROCEDURE — 96411 CHEMO IV PUSH ADDL DRUG: CPT

## 2018-08-02 RX ORDER — LORAZEPAM 2 MG/ML
0.5 INJECTION INTRAMUSCULAR ONCE
Status: CANCELLED | OUTPATIENT
Start: 2018-08-02

## 2018-08-02 RX ORDER — OCTREOTIDE ACETATE 100 UG/ML
100 INJECTION, SOLUTION INTRAVENOUS; SUBCUTANEOUS ONCE
Status: COMPLETED | OUTPATIENT
Start: 2018-08-02 | End: 2018-08-02

## 2018-08-02 RX ORDER — METHYLPREDNISOLONE SODIUM SUCCINATE 125 MG/2ML
125 INJECTION, POWDER, LYOPHILIZED, FOR SOLUTION INTRAMUSCULAR; INTRAVENOUS ONCE
Status: CANCELLED | OUTPATIENT
Start: 2018-08-02 | End: 2018-08-02

## 2018-08-02 RX ORDER — LORAZEPAM 2 MG/ML
0.5 INJECTION INTRAMUSCULAR ONCE
Status: COMPLETED | OUTPATIENT
Start: 2018-08-02 | End: 2018-08-02

## 2018-08-02 RX ORDER — SODIUM CHLORIDE 9 MG/ML
INJECTION, SOLUTION INTRAVENOUS ONCE
Status: CANCELLED | OUTPATIENT
Start: 2018-08-02 | End: 2018-08-02

## 2018-08-02 RX ORDER — DEXAMETHASONE 4 MG/1
4 TABLET ORAL 2 TIMES DAILY
Qty: 40 TABLET | Refills: 0 | Status: SHIPPED | OUTPATIENT
Start: 2018-08-02 | End: 2018-10-17

## 2018-08-02 RX ORDER — SODIUM CHLORIDE 9 MG/ML
250 INJECTION, SOLUTION INTRAVENOUS ONCE
Status: DISCONTINUED | OUTPATIENT
Start: 2018-08-02 | End: 2018-08-03 | Stop reason: HOSPADM

## 2018-08-02 RX ORDER — HEPARIN SODIUM (PORCINE) LOCK FLUSH IV SOLN 100 UNIT/ML 100 UNIT/ML
500 SOLUTION INTRAVENOUS PRN
Status: DISCONTINUED | OUTPATIENT
Start: 2018-08-02 | End: 2018-08-03 | Stop reason: HOSPADM

## 2018-08-02 RX ORDER — PALONOSETRON 0.05 MG/ML
0.25 INJECTION, SOLUTION INTRAVENOUS ONCE
Status: COMPLETED | OUTPATIENT
Start: 2018-08-02 | End: 2018-08-02

## 2018-08-02 RX ORDER — SUCRALFATE 1 G/1
1 TABLET ORAL 4 TIMES DAILY
Qty: 120 TABLET | Refills: 0 | Status: SHIPPED | OUTPATIENT
Start: 2018-08-02 | End: 2018-09-07

## 2018-08-02 RX ORDER — DEXAMETHASONE SODIUM PHOSPHATE 10 MG/ML
6 INJECTION, SOLUTION INTRAMUSCULAR; INTRAVENOUS ONCE
Status: COMPLETED | OUTPATIENT
Start: 2018-08-02 | End: 2018-08-02

## 2018-08-02 RX ORDER — HEPARIN SODIUM (PORCINE) LOCK FLUSH IV SOLN 100 UNIT/ML 100 UNIT/ML
500 SOLUTION INTRAVENOUS PRN
Status: CANCELLED | OUTPATIENT
Start: 2018-08-02

## 2018-08-02 RX ORDER — EPINEPHRINE 1 MG/ML
0.3 INJECTION, SOLUTION, CONCENTRATE INTRAVENOUS PRN
Status: CANCELLED | OUTPATIENT
Start: 2018-08-02

## 2018-08-02 RX ORDER — SODIUM CHLORIDE 0.9 % (FLUSH) 0.9 %
SYRINGE (ML) INJECTION
Status: COMPLETED
Start: 2018-08-02 | End: 2018-08-02

## 2018-08-02 RX ORDER — SODIUM CHLORIDE 9 MG/ML
INJECTION, SOLUTION INTRAVENOUS CONTINUOUS
Status: CANCELLED | OUTPATIENT
Start: 2018-08-02

## 2018-08-02 RX ORDER — HEPARIN SODIUM (PORCINE) LOCK FLUSH IV SOLN 100 UNIT/ML 100 UNIT/ML
SOLUTION INTRAVENOUS
Status: COMPLETED
Start: 2018-08-02 | End: 2018-08-02

## 2018-08-02 RX ORDER — 0.9 % SODIUM CHLORIDE 0.9 %
10 VIAL (ML) INJECTION ONCE
Status: CANCELLED | OUTPATIENT
Start: 2018-08-02 | End: 2018-08-02

## 2018-08-02 RX ORDER — PALONOSETRON 0.05 MG/ML
0.25 INJECTION, SOLUTION INTRAVENOUS ONCE
Status: CANCELLED | OUTPATIENT
Start: 2018-08-02

## 2018-08-02 RX ORDER — DOXORUBICIN HYDROCHLORIDE 2 MG/ML
60 INJECTION, SOLUTION INTRAVENOUS ONCE
Status: CANCELLED | OUTPATIENT
Start: 2018-08-02

## 2018-08-02 RX ORDER — SODIUM CHLORIDE 0.9 % (FLUSH) 0.9 %
10 SYRINGE (ML) INJECTION PRN
Status: CANCELLED | OUTPATIENT
Start: 2018-08-02

## 2018-08-02 RX ORDER — SODIUM CHLORIDE 0.9 % (FLUSH) 0.9 %
5 SYRINGE (ML) INJECTION PRN
Status: CANCELLED | OUTPATIENT
Start: 2018-08-02

## 2018-08-02 RX ORDER — DIPHENHYDRAMINE HYDROCHLORIDE 50 MG/ML
50 INJECTION INTRAMUSCULAR; INTRAVENOUS ONCE
Status: CANCELLED | OUTPATIENT
Start: 2018-08-02 | End: 2018-08-02

## 2018-08-02 RX ORDER — DOXORUBICIN HYDROCHLORIDE 2 MG/ML
60 INJECTION, SOLUTION INTRAVENOUS ONCE
Status: COMPLETED | OUTPATIENT
Start: 2018-08-02 | End: 2018-08-02

## 2018-08-02 RX ORDER — SODIUM CHLORIDE 0.9 % (FLUSH) 0.9 %
10 SYRINGE (ML) INJECTION PRN
Status: DISCONTINUED | OUTPATIENT
Start: 2018-08-02 | End: 2018-08-03 | Stop reason: HOSPADM

## 2018-08-02 RX ORDER — DEXAMETHASONE SODIUM PHOSPHATE 10 MG/ML
6 INJECTION, SOLUTION INTRAMUSCULAR; INTRAVENOUS ONCE
Status: CANCELLED | OUTPATIENT
Start: 2018-08-02

## 2018-08-02 RX ADMIN — CYCLOPHOSPHAMIDE 1060 MG: 1 INJECTION, POWDER, FOR SOLUTION INTRAVENOUS; ORAL at 11:24

## 2018-08-02 RX ADMIN — Medication 10 ML: at 09:32

## 2018-08-02 RX ADMIN — PALONOSETRON HYDROCHLORIDE 0.25 MG: 0.25 INJECTION INTRAVENOUS at 09:56

## 2018-08-02 RX ADMIN — DOXORUBICIN HYDROCHLORIDE 106 MG: 2 INJECTION, SOLUTION INTRAVENOUS at 10:57

## 2018-08-02 RX ADMIN — Medication 10 ML: at 14:33

## 2018-08-02 RX ADMIN — Medication 10 ML: at 10:05

## 2018-08-02 RX ADMIN — HEPARIN SODIUM (PORCINE) LOCK FLUSH IV SOLN 100 UNIT/ML 500 UNITS: 100 SOLUTION at 14:34

## 2018-08-02 RX ADMIN — HEPARIN 500 UNITS: 100 SYRINGE at 14:34

## 2018-08-02 RX ADMIN — DEXAMETHASONE SODIUM PHOSPHATE 6 MG: 10 INJECTION, SOLUTION INTRAMUSCULAR; INTRAVENOUS at 09:59

## 2018-08-02 RX ADMIN — Medication 10 ML: at 10:06

## 2018-08-02 RX ADMIN — PEGFILGRASTIM 6 MG: KIT SUBCUTANEOUS at 13:43

## 2018-08-02 RX ADMIN — SODIUM CHLORIDE 250 ML: 9 INJECTION, SOLUTION INTRAVENOUS at 10:05

## 2018-08-02 RX ADMIN — OCTREOTIDE ACETATE 100 MCG: 100 INJECTION, SOLUTION INTRAVENOUS; SUBCUTANEOUS at 09:53

## 2018-08-02 RX ADMIN — MAGNESIUM SULFATE HEPTAHYDRATE: 500 INJECTION, SOLUTION INTRAMUSCULAR; INTRAVENOUS at 12:20

## 2018-08-02 RX ADMIN — LORAZEPAM 0.5 MG: 2 INJECTION INTRAMUSCULAR; INTRAVENOUS at 10:10

## 2018-08-02 RX ADMIN — SODIUM CHLORIDE 150 MG: 9 INJECTION, SOLUTION INTRAVENOUS at 10:16

## 2018-08-02 NOTE — PROGRESS NOTES
Patient was having diarrhea when she got to oncology infusion services. Spoke with Chuyita Sánchez Ohms, APRN-MARILYN, [facility], Care in collaboration with Dr. Swathi Rain and recommend octreotide 100 mcg sub-q once now. Patient then after receiving that with dexamethasone she had 1 episode of emesis. Patient then got her ativan premed stat and is resting comfortably now. No episodes of N/V/D have been reported since 1000 am.      Spoke with Patient at 11 am to confirm the plan is to receive 1 liter of fluid with 2 grams of magnesium today for SCr and replace magnesium d/t diarrhea. She will get another dose of octreotide if she has another episode prior to leaving today. She is to take 2 imodium at bedtime tonight also and then take Lomotil next day for ANY episodes of diarrhea. Patient had the opportunity to ask questions with all questions being answered to their satisfaction. I told patient to call if there are any questions. The patient expresses understanding and acceptance of instructions.      Electronically signed by DORIAN Cason [facility] on 8/2/2018 at 12:17 PM

## 2018-08-02 NOTE — PROGRESS NOTES
Department of [facility] Oncology  Attending Clinic Note    Reason for Visit: Follow-up on a patient with Right Breast Cancer    PCP:  Emmanuel Lo DO, FACOI    History of Present Illness: The mass was located in the 12 o'clock position of the right breast.     Breast cancer risk factors include age, gender, mother with breast cancer. Age of menarche was 16. Total hysterectomy at age 53. Patient was on hormonal therapy, patient unsure of how long. Patient is . Age of first live birth was 33. Patient did not breast feed.     Bilateral screening mammogram on 2018:  Probable 15 mm thick soft tissue mass in the 12:00 position of the right breast.     Right Diagnostic Mammogram on 2018:  there is an 18 mm mass in the 12:00 position of the right breast consistent with carcinoma until proven otherwise. There is also an abnormal right axillary lymph node. Right Breast U/S on 2018:  18 mm mass in the 12:00 position of the right breast consistent with carcinoma until proven otherwise. Enlarged right axillary lymph node. On 04/10/2018:  A. Right breast, core biopsy, slides for review ( A): Poorly differentiated ductal carcinoma, [place] score 3+3+2 = 8.  B. Right lymph node, core biopsy, slides for review ( B):  Metastatic poorly differentiated carcinoma    Comment:   Per report from [facility] laboratory:  Estrogen receptor: Negative  Progesterone receptor: Negative  HER-2/jocelyn status: Negative (0)  Enclosed keratin immunohistochemical stains are positive    Stage IIIA T2 N2 M0 IDC Grade 3 Triple Negative Right Breast Cancer: We recommended neoadjuvant chemotherapy consisting of Dose-Dense AC-T. Side effects of AC-T reviewed with patient. She agreed to proceed. Mediport was placed. 2018 2D-Echo: EF 60%. Cycle # 1 Dose-Dense AC was on 2018. Cycle # 2 Dose-Dense AC was on 2018. Cycle # 3 Dose-Dense AC was on 2018.     Cycle # 4 Dose-Dense AC is scheduled 08/02/2018. Review of Systems;  CONSTITUTIONAL: No fever, chills. Fair appetite and energy level. ENMT: Eyes: No diplopia; Nose: No epistaxis. Mouth: No sore throat. RESPIRATORY: No hemoptysis, shortness of breath, cough. CARDIOVASCULAR: No chest pain, palpitations. GASTROINTESTINAL: No vomiting or abdominal pain. Intermittent diarrhea controlled with imodium PRN. GENITOURINARY: No dysuria, urinary frequency, hematuria. NEURO: No syncope, presyncope, headache. Remainder:  ROS NEGATIVE    Past Medical History:      Diagnosis Date    Acid reflux     [address] [facility] [address] [address]     right    Hypertension     Insomnia     Seasonal allergies     Stroke [facility]) 2013    tia    Type 2 diabetes mellitus without complication (HCC)      Medications:  Reviewed and reconciled. Allergies: Allergies   Allergen Reactions    Avelox [Moxifloxacin] Hives     Physical Exam:  /70 (Site: Right Arm, Position: Sitting, Cuff Size: Medium Adult)   Pulse 82   Temp 97.8 °F (36.6 °C) (Temporal)   Resp 20   Ht 5' 2\" (1.575 m)   Wt 150 lb (68 kg)   BMI 27.44 kg/m²   GENERAL: Alert, oriented x 3, not in acute distress. HEENT: PERRLA; EOMI. Oropharynx clear. NECK: Supple. LUNGS: Good air entry bilaterally. No wheezing, crackles or ronchi. CARDIOVASCULAR: Regular rate. No murmurs, rubs or gallops. ABDOMEN: Soft. Non-tender, non-distended. Positive bowel sounds. EXTREMITIES: Without clubbing, cyanosis, or edema. NEUROLOGIC: No focal deficits. ECOG PS 1    Impression/Plan:  Right Breast Cancer: On 04/10/2018:  A.  Right breast, core biopsy: Poorly differentiated ductal carcinoma, [place] score 3+3+2 = 8.  B. Right lymph node, core biopsy:  Metastatic poorly differentiated carcinoma    Comment: Per report from [facility] laboratory:  Estrogen receptor: Negative  Progesterone receptor: Negative  HER-2/jocelyn status: Negative (0)  Enclosed keratin immunohistochemical stains are positive    CT chest 05/11/2018:  Nodule just above the thyroid measures 2 x 2.7 cm. Thyroid nodules range up to 10 mm. Right axillary lymph nodes range up to 1.9 x 3.1 cm. No significant mediastinal or hilar LN. 2.4 mm groundglass nodule in the lingula     CT abdomen/pelvis 05/11/2018 noted no evidence of metastatic disease. Bone scan 05/11/2018 noted no convincing evidence of metastatic disease. PET/CT scan 05/23/2018:  Multiple enlarged hypermetabolic lymph nodes at level of the right axilla measuring up to 33 x 22 mm with hypermetabolic activity and SUV measuring up to 5.6. Enlarged lymph node located adjacent to posterior aspect of the right parotid gland which measures 12 x 10 mm with SUV of 4.8. Nodule associated with thyroid isthmus measuring 22 x 27 mm. This nodule is solid; however no FDG uptake within this lesion. Otherwise negative. U/S guided fine needle aspiration of a right parotid tail on 05/30/2018:   Cytology: Negative for malignant cells. Sheets of benign-appearing oncocytic cells present in a background of mature lymphocytes. Pattern best fits with Warthin tumor. U/S guided fine needle aspiration of thyroid isthmus on 05/30/2018:  Negative for malignant cells. Benign-appearing follicular cells, foam cells, abundant colloid and blood present. These findings would be compatible with colloid nodule/nodular goiter. 06/10/2018 CT Soft Tissue: 2.3 x 1.7 cm solid lesion within the superficial tail of the right parotid gland. Question a focal solid neoplasm such as a Warthin's tumor. 2.5 x 2.0 cm hyperdense structure within the midline visceral space just inferior to the cricoid cartilage  This may represent a solid lesion arising from the thyroid gland. ENT team consulted. 06/05/2018 MRI Breast: 2.5 x 0.7 x 1.4 cm Irregular, enhancing mass in the right breast 12:00 with associated artifact from a metallic clip.    At least 15 abnormal right axillary lymph nodes consistent with known metastatic disease. No evidence of neoplasm on the left. 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 Side effects of AC-T reviewed with patient. She agreed to proceed. Mediport was placed. 06/16/2018  2D-Echo: EF 60%. Cycle # 1 Dose-Dense AC was on 06/21/2018. Cycle # 2 Dose-Dense AC was on 07/05/2018. Cycle # 3 Dose-Dense AC was on 07/19/2018. Cycle # 4 Dose-Dense AC is scheduled 08/02/2018. We will give her 1L IVF NS 0.9% for increased Creat, and 2 grams of Magnesium for hypoMg    RTC 2 weeks for Cycle # 1 Dose-Dense Taxol. Saloni Gavin MD  Medical Oncologist  Board Certified, Medical Oncology  Board Certified, Internal Medicine  August 2, 2018    Chuyita Gasca, [address] [place] [address] in collaboration with Dr. Saloni Gavin / Dr. María Guerrero

## 2018-08-14 ENCOUNTER — TELEPHONE (OUTPATIENT)
Dept: ONCOLOGY | Age: 68
End: 2018-08-14

## 2018-08-14 DIAGNOSIS — C50.111 MALIGNANT NEOPLASM OF CENTRAL PORTION OF RIGHT BREAST IN FEMALE, ESTROGEN RECEPTOR NEGATIVE (HCC): Primary | ICD-10-CM

## 2018-08-14 DIAGNOSIS — Z17.1 MALIGNANT NEOPLASM OF CENTRAL PORTION OF RIGHT BREAST IN FEMALE, ESTROGEN RECEPTOR NEGATIVE (HCC): Primary | ICD-10-CM

## 2018-08-15 ENCOUNTER — HOSPITAL ENCOUNTER (OUTPATIENT)
Age: 68
Discharge: HOME | End: 2018-08-15
Payer: MEDICARE

## 2018-08-15 DIAGNOSIS — Z17.1 MALIGNANT NEOPLASM OF CENTRAL PORTION OF RIGHT BREAST IN FEMALE, ESTROGEN RECEPTOR NEGATIVE (HCC): ICD-10-CM

## 2018-08-15 DIAGNOSIS — C50.111 MALIGNANT NEOPLASM OF CENTRAL PORTION OF RIGHT BREAST IN FEMALE, ESTROGEN RECEPTOR NEGATIVE (HCC): ICD-10-CM

## 2018-08-15 LAB
ALBUMIN SERPL-MCNC: 4.4 G/DL (ref 3.5–5.2)
ALP BLD-CCNC: 92 U/L (ref 35–104)
ALT SERPL-CCNC: 20 U/L (ref 0–32)
ANION GAP SERPL CALCULATED.3IONS-SCNC: 15 MMOL/L (ref 7–16)
ANISOCYTOSIS: ABNORMAL
AST SERPL-CCNC: 24 U/L (ref 0–31)
BASOPHILIC STIPPLING: ABNORMAL
BASOPHILS ABSOLUTE: 0.11 E9/L (ref 0–0.2)
BASOPHILS RELATIVE PERCENT: 1 % (ref 0–2)
BILIRUB SERPL-MCNC: 0.4 MG/DL (ref 0–1.2)
BUN BLDV-MCNC: 11 MG/DL (ref 8–23)
CALCIUM SERPL-MCNC: 9.5 MG/DL (ref 8.6–10.2)
CHLORIDE BLD-SCNC: 97 MMOL/L (ref 98–107)
CO2: 26 MMOL/L (ref 22–29)
CREAT SERPL-MCNC: 1.1 MG/DL (ref 0.5–1)
DOHLE BODIES: ABNORMAL
EOSINOPHILS ABSOLUTE: 0.11 E9/L (ref 0.05–0.5)
EOSINOPHILS RELATIVE PERCENT: 1 % (ref 0–6)
GFR AFRICAN AMERICAN: 60
GFR NON-AFRICAN AMERICAN: 49 ML/MIN/1.73
GLUCOSE BLD-MCNC: 226 MG/DL (ref 74–109)
HCT VFR BLD CALC: 26.1 % (ref 34–48)
HEMOGLOBIN: 9.1 G/DL (ref 11.5–15.5)
LYMPHOCYTES ABSOLUTE: 0.63 E9/L (ref 1.5–4)
LYMPHOCYTES RELATIVE PERCENT: 6 % (ref 20–42)
MAGNESIUM: 1.5 MG/DL (ref 1.6–2.6)
MCH RBC QN AUTO: 30.5 PG (ref 26–35)
MCHC RBC AUTO-ENTMCNC: 34.9 % (ref 32–34.5)
MCV RBC AUTO: 87.6 FL (ref 80–99.9)
METAMYELOCYTES RELATIVE PERCENT: 4 % (ref 0–1)
MONOCYTES ABSOLUTE: 0.84 E9/L (ref 0.1–0.95)
MONOCYTES RELATIVE PERCENT: 8 % (ref 2–12)
NEUTROPHILS ABSOLUTE: 8.82 E9/L (ref 1.8–7.3)
NEUTROPHILS RELATIVE PERCENT: 80 % (ref 43–80)
NUCLEATED RED BLOOD CELLS: 3 /100 WBC
OVALOCYTES: ABNORMAL
PDW BLD-RTO: 17.9 FL (ref 11.5–15)
PLATELET # BLD: 124 E9/L (ref 130–450)
PMV BLD AUTO: 10.8 FL (ref 7–12)
POIKILOCYTES: ABNORMAL
POLYCHROMASIA: ABNORMAL
POTASSIUM SERPL-SCNC: 4.6 MMOL/L (ref 3.5–5)
RBC # BLD: 2.98 E12/L (ref 3.5–5.5)
SODIUM BLD-SCNC: 138 MMOL/L (ref 132–146)
TOTAL PROTEIN: 7.2 G/DL (ref 6.4–8.3)
WBC # BLD: 10.5 E9/L (ref 4.5–11.5)

## 2018-08-15 PROCEDURE — 83735 ASSAY OF MAGNESIUM: CPT

## 2018-08-15 PROCEDURE — 36415 COLL VENOUS BLD VENIPUNCTURE: CPT

## 2018-08-15 PROCEDURE — 80053 COMPREHEN METABOLIC PANEL: CPT

## 2018-08-15 PROCEDURE — 85025 COMPLETE CBC W/AUTO DIFF WBC: CPT

## 2018-08-16 ENCOUNTER — TELEPHONE (OUTPATIENT)
Dept: ONCOLOGY | Age: 68
End: 2018-08-16

## 2018-08-16 ENCOUNTER — HOSPITAL ENCOUNTER (OUTPATIENT)
Dept: INFUSION THERAPY | Age: 68
Discharge: HOME | End: 2018-08-16
Payer: MEDICARE

## 2018-08-16 ENCOUNTER — OFFICE VISIT (OUTPATIENT)
Dept: ONCOLOGY | Age: 68
End: 2018-08-16
Payer: MEDICARE

## 2018-08-16 VITALS
DIASTOLIC BLOOD PRESSURE: 81 MMHG | HEART RATE: 92 BPM | HEIGHT: 62 IN | RESPIRATION RATE: 20 BRPM | TEMPERATURE: 96.9 F | WEIGHT: 148.1 LBS | SYSTOLIC BLOOD PRESSURE: 130 MMHG | BODY MASS INDEX: 27.25 KG/M2

## 2018-08-16 VITALS
SYSTOLIC BLOOD PRESSURE: 120 MMHG | OXYGEN SATURATION: 99 % | DIASTOLIC BLOOD PRESSURE: 61 MMHG | TEMPERATURE: 97.8 F | RESPIRATION RATE: 20 BRPM | HEART RATE: 80 BPM

## 2018-08-16 DIAGNOSIS — C50.111 MALIGNANT NEOPLASM OF CENTRAL PORTION OF RIGHT BREAST IN FEMALE, ESTROGEN RECEPTOR NEGATIVE (HCC): Primary | ICD-10-CM

## 2018-08-16 DIAGNOSIS — C50.111 MALIGNANT NEOPLASM OF CENTRAL PORTION OF RIGHT BREAST IN FEMALE, ESTROGEN RECEPTOR NEGATIVE (HCC): ICD-10-CM

## 2018-08-16 DIAGNOSIS — Z17.1 MALIGNANT NEOPLASM OF CENTRAL PORTION OF RIGHT BREAST IN FEMALE, ESTROGEN RECEPTOR NEGATIVE (HCC): Primary | ICD-10-CM

## 2018-08-16 DIAGNOSIS — Z17.1 MALIGNANT NEOPLASM OF CENTRAL PORTION OF RIGHT BREAST IN FEMALE, ESTROGEN RECEPTOR NEGATIVE (HCC): ICD-10-CM

## 2018-08-16 PROCEDURE — 96413 CHEMO IV INFUSION 1 HR: CPT

## 2018-08-16 PROCEDURE — 96415 CHEMO IV INFUSION ADDL HR: CPT

## 2018-08-16 PROCEDURE — 1101F PT FALLS ASSESS-DOCD LE1/YR: CPT | Performed by: INTERNAL MEDICINE

## 2018-08-16 PROCEDURE — 96375 TX/PRO/DX INJ NEW DRUG ADDON: CPT

## 2018-08-16 PROCEDURE — 99214 OFFICE O/P EST MOD 30 MIN: CPT | Performed by: INTERNAL MEDICINE

## 2018-08-16 PROCEDURE — 6360000002 HC RX W HCPCS: Performed by: INTERNAL MEDICINE

## 2018-08-16 PROCEDURE — 2500000003 HC RX 250 WO HCPCS: Performed by: INTERNAL MEDICINE

## 2018-08-16 PROCEDURE — 96361 HYDRATE IV INFUSION ADD-ON: CPT

## 2018-08-16 PROCEDURE — G8419 CALC BMI OUT NRM PARAM NOF/U: HCPCS | Performed by: INTERNAL MEDICINE

## 2018-08-16 PROCEDURE — G8427 DOCREV CUR MEDS BY ELIG CLIN: HCPCS | Performed by: INTERNAL MEDICINE

## 2018-08-16 PROCEDURE — 96377 APPLICATON ON-BODY INJECTOR: CPT

## 2018-08-16 PROCEDURE — 4040F PNEUMOC VAC/ADMIN/RCVD: CPT | Performed by: INTERNAL MEDICINE

## 2018-08-16 PROCEDURE — 1123F ACP DISCUSS/DSCN MKR DOCD: CPT | Performed by: INTERNAL MEDICINE

## 2018-08-16 PROCEDURE — S0028 INJECTION, FAMOTIDINE, 20 MG: HCPCS | Performed by: INTERNAL MEDICINE

## 2018-08-16 PROCEDURE — 2580000003 HC RX 258: Performed by: INTERNAL MEDICINE

## 2018-08-16 PROCEDURE — 1090F PRES/ABSN URINE INCON ASSESS: CPT | Performed by: INTERNAL MEDICINE

## 2018-08-16 PROCEDURE — 96366 THER/PROPH/DIAG IV INF ADDON: CPT

## 2018-08-16 PROCEDURE — G8400 PT W/DXA NO RESULTS DOC: HCPCS | Performed by: INTERNAL MEDICINE

## 2018-08-16 PROCEDURE — 4004F PT TOBACCO SCREEN RCVD TLK: CPT | Performed by: INTERNAL MEDICINE

## 2018-08-16 PROCEDURE — 3017F COLORECTAL CA SCREEN DOC REV: CPT | Performed by: INTERNAL MEDICINE

## 2018-08-16 RX ORDER — 0.9 % SODIUM CHLORIDE 0.9 %
1000 INTRAVENOUS SOLUTION INTRAVENOUS ONCE
Status: CANCELLED
Start: 2018-08-16 | End: 2018-08-16

## 2018-08-16 RX ORDER — SODIUM CHLORIDE 0.9 % (FLUSH) 0.9 %
5 SYRINGE (ML) INJECTION PRN
Status: CANCELLED | OUTPATIENT
Start: 2018-08-16

## 2018-08-16 RX ORDER — SODIUM CHLORIDE 9 MG/ML
INJECTION, SOLUTION INTRAVENOUS CONTINUOUS
Status: CANCELLED | OUTPATIENT
Start: 2018-08-16

## 2018-08-16 RX ORDER — 0.9 % SODIUM CHLORIDE 0.9 %
10 VIAL (ML) INJECTION ONCE
Status: CANCELLED | OUTPATIENT
Start: 2018-08-16 | End: 2018-08-16

## 2018-08-16 RX ORDER — EPINEPHRINE 1 MG/ML
0.3 INJECTION, SOLUTION, CONCENTRATE INTRAVENOUS PRN
Status: CANCELLED | OUTPATIENT
Start: 2018-08-16

## 2018-08-16 RX ORDER — DIPHENHYDRAMINE HYDROCHLORIDE 50 MG/ML
50 INJECTION INTRAMUSCULAR; INTRAVENOUS ONCE
Status: CANCELLED | OUTPATIENT
Start: 2018-08-16 | End: 2018-08-16

## 2018-08-16 RX ORDER — MEPERIDINE HYDROCHLORIDE 25 MG/ML
12.5 INJECTION INTRAMUSCULAR; INTRAVENOUS; SUBCUTANEOUS ONCE
Status: CANCELLED | OUTPATIENT
Start: 2018-08-16 | End: 2018-08-16

## 2018-08-16 RX ORDER — DIPHENHYDRAMINE HYDROCHLORIDE 50 MG/ML
50 INJECTION INTRAMUSCULAR; INTRAVENOUS ONCE
Status: COMPLETED | OUTPATIENT
Start: 2018-08-16 | End: 2018-08-16

## 2018-08-16 RX ORDER — 0.9 % SODIUM CHLORIDE 0.9 %
10 VIAL (ML) INJECTION ONCE
Status: DISCONTINUED | OUTPATIENT
Start: 2018-08-16 | End: 2018-08-17 | Stop reason: HOSPADM

## 2018-08-16 RX ORDER — HEPARIN SODIUM (PORCINE) LOCK FLUSH IV SOLN 100 UNIT/ML 100 UNIT/ML
500 SOLUTION INTRAVENOUS PRN
Status: DISCONTINUED | OUTPATIENT
Start: 2018-08-16 | End: 2018-08-17 | Stop reason: HOSPADM

## 2018-08-16 RX ORDER — 0.9 % SODIUM CHLORIDE 0.9 %
1000 INTRAVENOUS SOLUTION INTRAVENOUS ONCE
Status: COMPLETED | OUTPATIENT
Start: 2018-08-16 | End: 2018-08-16

## 2018-08-16 RX ORDER — DEXAMETHASONE SODIUM PHOSPHATE 10 MG/ML
6 INJECTION, SOLUTION INTRAMUSCULAR; INTRAVENOUS ONCE
Status: COMPLETED | OUTPATIENT
Start: 2018-08-16 | End: 2018-08-16

## 2018-08-16 RX ORDER — ONDANSETRON 2 MG/ML
8 INJECTION INTRAMUSCULAR; INTRAVENOUS ONCE
Status: COMPLETED | OUTPATIENT
Start: 2018-08-16 | End: 2018-08-16

## 2018-08-16 RX ORDER — ONDANSETRON 2 MG/ML
8 INJECTION INTRAMUSCULAR; INTRAVENOUS ONCE
Status: CANCELLED | OUTPATIENT
Start: 2018-08-16

## 2018-08-16 RX ORDER — METHYLPREDNISOLONE SODIUM SUCCINATE 125 MG/2ML
125 INJECTION, POWDER, LYOPHILIZED, FOR SOLUTION INTRAMUSCULAR; INTRAVENOUS ONCE
Status: CANCELLED | OUTPATIENT
Start: 2018-08-16 | End: 2018-08-16

## 2018-08-16 RX ORDER — DEXAMETHASONE SODIUM PHOSPHATE 10 MG/ML
6 INJECTION, SOLUTION INTRAMUSCULAR; INTRAVENOUS ONCE
Status: CANCELLED | OUTPATIENT
Start: 2018-08-16

## 2018-08-16 RX ORDER — SODIUM CHLORIDE 0.9 % (FLUSH) 0.9 %
10 SYRINGE (ML) INJECTION PRN
Status: DISCONTINUED | OUTPATIENT
Start: 2018-08-16 | End: 2018-08-17 | Stop reason: HOSPADM

## 2018-08-16 RX ORDER — SODIUM CHLORIDE 0.9 % (FLUSH) 0.9 %
10 SYRINGE (ML) INJECTION PRN
Status: CANCELLED | OUTPATIENT
Start: 2018-08-16

## 2018-08-16 RX ORDER — HEPARIN SODIUM (PORCINE) LOCK FLUSH IV SOLN 100 UNIT/ML 100 UNIT/ML
500 SOLUTION INTRAVENOUS PRN
Status: CANCELLED | OUTPATIENT
Start: 2018-08-16

## 2018-08-16 RX ADMIN — PEGFILGRASTIM 6 MG: KIT SUBCUTANEOUS at 13:35

## 2018-08-16 RX ADMIN — PACLITAXEL 306 MG: 6 INJECTION INTRAVENOUS at 09:51

## 2018-08-16 RX ADMIN — DEXAMETHASONE SODIUM PHOSPHATE 6 MG: 10 INJECTION, SOLUTION INTRAMUSCULAR; INTRAVENOUS at 09:23

## 2018-08-16 RX ADMIN — Medication 10 ML: at 09:15

## 2018-08-16 RX ADMIN — SODIUM CHLORIDE 1000 ML: 9 INJECTION, SOLUTION INTRAVENOUS at 09:10

## 2018-08-16 RX ADMIN — ONDANSETRON 8 MG: 2 INJECTION INTRAMUSCULAR; INTRAVENOUS at 09:12

## 2018-08-16 RX ADMIN — DIPHENHYDRAMINE HYDROCHLORIDE 50 MG: 50 INJECTION, SOLUTION INTRAMUSCULAR; INTRAVENOUS at 09:16

## 2018-08-16 RX ADMIN — Medication 10 ML: at 09:03

## 2018-08-16 RX ADMIN — FAMOTIDINE 20 MG: 10 INJECTION, SOLUTION INTRAVENOUS at 09:18

## 2018-08-16 RX ADMIN — Medication 10 ML: at 13:34

## 2018-08-16 RX ADMIN — MAGNESIUM SULFATE HEPTAHYDRATE: 500 INJECTION, SOLUTION INTRAMUSCULAR; INTRAVENOUS at 10:34

## 2018-08-16 RX ADMIN — HEPARIN 500 UNITS: 100 SYRINGE at 13:34

## 2018-08-16 NOTE — PROGRESS NOTES
Patient tolerated infusion well without complaint. Port flushed per protocol and de-accessed. Patient discharged ambulatory.

## 2018-08-16 NOTE — PROGRESS NOTES
Department of [facility] Oncology  Attending Clinic Note    Reason for Visit: Follow-up on a patient with Right Breast Cancer    PCP:  Mel Guillermo DO, FACOI    History of Present Illness: The mass was located in the 12 o'clock position of the right breast.     Breast cancer risk factors include age, gender, mother with breast cancer. Age of menarche was 16. Total hysterectomy at age 53. Patient was on hormonal therapy, patient unsure of how long. Patient is . Age of first live birth was 33. Patient did not breast feed.     Bilateral screening mammogram on 2018:  Probable 15 mm thick soft tissue mass in the 12:00 position of the right breast.     Right Diagnostic Mammogram on 2018:  there is an 18 mm mass in the 12:00 position of the right breast consistent with carcinoma until proven otherwise. There is also an abnormal right axillary lymph node. Right Breast U/S on 2018:  18 mm mass in the 12:00 position of the right breast consistent with carcinoma until proven otherwise. Enlarged right axillary lymph node. On 04/10/2018:  A. Right breast, core biopsy, slides for review ( A): Poorly differentiated ductal carcinoma, [place] score 3+3+2 = 8.  B. Right lymph node, core biopsy, slides for review ( B):  Metastatic poorly differentiated carcinoma    Comment:   Per report from [facility] laboratory:  Estrogen receptor: Negative  Progesterone receptor: Negative  HER-2/jocelyn status: Negative (0)  Enclosed keratin immunohistochemical stains are positive    Stage IIIA T2 N2 M0 IDC Grade 3 Triple Negative Right Breast Cancer: We recommended neoadjuvant chemotherapy consisting of Dose-Dense AC-T. Side effects of AC-T reviewed with patient. She agreed to proceed. Mediport was placed. 2018 2D-Echo: EF 60%. Cycle # 1 Dose-Dense AC was on 2018. Cycle # 2 Dose-Dense AC was on 2018. Cycle # 3 Dose-Dense AC was on 2018.     Cycle # 4 Dose-Dense stains are positive    CT chest 05/11/2018:  Nodule just above the thyroid measures 2 x 2.7 cm. Thyroid nodules range up to 10 mm. Right axillary lymph nodes range up to 1.9 x 3.1 cm. No significant mediastinal or hilar LN. 2.4 mm groundglass nodule in the lingula     CT abdomen/pelvis 05/11/2018 noted no evidence of metastatic disease. Bone scan 05/11/2018 noted no convincing evidence of metastatic disease. PET/CT scan 05/23/2018:  Multiple enlarged hypermetabolic lymph nodes at level of the right axilla measuring up to 33 x 22 mm with hypermetabolic activity and SUV measuring up to 5.6. Enlarged lymph node located adjacent to posterior aspect of the right parotid gland which measures 12 x 10 mm with SUV of 4.8. Nodule associated with thyroid isthmus measuring 22 x 27 mm. This nodule is solid; however no FDG uptake within this lesion. Otherwise negative. U/S guided fine needle aspiration of a right parotid tail on 05/30/2018:   Cytology: Negative for malignant cells. Sheets of benign-appearing oncocytic cells present in a background of mature lymphocytes. Pattern best fits with Warthin tumor. U/S guided fine needle aspiration of thyroid isthmus on 05/30/2018:  Negative for malignant cells. Benign-appearing follicular cells, foam cells, abundant colloid and blood present. These findings would be compatible with colloid nodule/nodular goiter. 06/10/2018 CT Soft Tissue: 2.3 x 1.7 cm solid lesion within the superficial tail of the right parotid gland. Question a focal solid neoplasm such as a Warthin's tumor. 2.5 x 2.0 cm hyperdense structure within the midline visceral space just inferior to the cricoid cartilage  This may represent a solid lesion arising from the thyroid gland. ENT team consulted. 06/05/2018 MRI Breast: 2.5 x 0.7 x 1.4 cm Irregular, enhancing mass in the right breast 12:00 with associated artifact from a metallic clip.    At least 15 abnormal right axillary lymph nodes

## 2018-08-16 NOTE — TELEPHONE ENCOUNTER
Met with patient and  during her follow up with Dr. Jovany Cunha for her recent  Breast  cancer diagnosis. Reviewed with patient symptom management and resources available. Patient was friendly and receptive. Reviewed with patient most common side effect with Taxol is numbness and tingling, she will reach out to our office if this becomes too bothersome. Today is Taxol #1. Encouraged patient to call with questions or concerns between office visits. Verbalizes understanding. Patient appreciative of visit. NN Will continue to follow.

## 2018-08-29 ENCOUNTER — HOSPITAL ENCOUNTER (OUTPATIENT)
Age: 68
Discharge: HOME | End: 2018-08-29
Payer: MEDICARE

## 2018-08-29 DIAGNOSIS — Z17.1 MALIGNANT NEOPLASM OF CENTRAL PORTION OF RIGHT BREAST IN FEMALE, ESTROGEN RECEPTOR NEGATIVE (HCC): ICD-10-CM

## 2018-08-29 DIAGNOSIS — C50.111 MALIGNANT NEOPLASM OF CENTRAL PORTION OF RIGHT BREAST IN FEMALE, ESTROGEN RECEPTOR NEGATIVE (HCC): ICD-10-CM

## 2018-08-29 LAB
ALBUMIN SERPL-MCNC: 4.3 G/DL (ref 3.5–5.2)
ALP BLD-CCNC: 110 U/L (ref 35–104)
ALT SERPL-CCNC: 17 U/L (ref 0–32)
ANION GAP SERPL CALCULATED.3IONS-SCNC: 15 MMOL/L (ref 7–16)
ANISOCYTOSIS: ABNORMAL
AST SERPL-CCNC: 22 U/L (ref 0–31)
BASOPHILIC STIPPLING: ABNORMAL
BASOPHILS ABSOLUTE: 0 E9/L (ref 0–0.2)
BASOPHILS RELATIVE PERCENT: 0 % (ref 0–2)
BILIRUB SERPL-MCNC: 0.9 MG/DL (ref 0–1.2)
BUN BLDV-MCNC: 10 MG/DL (ref 8–23)
CALCIUM SERPL-MCNC: 9.1 MG/DL (ref 8.6–10.2)
CHLORIDE BLD-SCNC: 95 MMOL/L (ref 98–107)
CO2: 24 MMOL/L (ref 22–29)
CREAT SERPL-MCNC: 1 MG/DL (ref 0.5–1)
DOHLE BODIES: ABNORMAL
EOSINOPHILS ABSOLUTE: 0 E9/L (ref 0.05–0.5)
EOSINOPHILS RELATIVE PERCENT: 0 % (ref 0–6)
GFR AFRICAN AMERICAN: >60
GFR NON-AFRICAN AMERICAN: 55 ML/MIN/1.73
GLUCOSE BLD-MCNC: 240 MG/DL (ref 74–109)
HCT VFR BLD CALC: 23.8 % (ref 34–48)
HEMOGLOBIN: 8 G/DL (ref 11.5–15.5)
LYMPHOCYTES ABSOLUTE: 0.51 E9/L (ref 1.5–4)
LYMPHOCYTES RELATIVE PERCENT: 2.6 % (ref 20–42)
MAGNESIUM: 1.5 MG/DL (ref 1.6–2.6)
MCH RBC QN AUTO: 30.9 PG (ref 26–35)
MCHC RBC AUTO-ENTMCNC: 33.6 % (ref 32–34.5)
MCV RBC AUTO: 91.9 FL (ref 80–99.9)
MONOCYTES ABSOLUTE: 0.51 E9/L (ref 0.1–0.95)
MONOCYTES RELATIVE PERCENT: 2.6 % (ref 2–12)
NEUTROPHILS ABSOLUTE: 16.06 E9/L (ref 1.8–7.3)
NEUTROPHILS RELATIVE PERCENT: 94.8 % (ref 43–80)
NUCLEATED RED BLOOD CELLS: 0 /100 WBC
OVALOCYTES: ABNORMAL
PDW BLD-RTO: 21.4 FL (ref 11.5–15)
PLATELET # BLD: 115 E9/L (ref 130–450)
PMV BLD AUTO: 10.7 FL (ref 7–12)
POIKILOCYTES: ABNORMAL
POTASSIUM SERPL-SCNC: 3.9 MMOL/L (ref 3.5–5)
RBC # BLD: 2.59 E12/L (ref 3.5–5.5)
SODIUM BLD-SCNC: 134 MMOL/L (ref 132–146)
TEAR DROP CELLS: ABNORMAL
TOTAL PROTEIN: 7.1 G/DL (ref 6.4–8.3)
TOXIC GRANULATION: ABNORMAL
WBC # BLD: 16.9 E9/L (ref 4.5–11.5)

## 2018-08-29 PROCEDURE — 85025 COMPLETE CBC W/AUTO DIFF WBC: CPT

## 2018-08-29 PROCEDURE — 36415 COLL VENOUS BLD VENIPUNCTURE: CPT

## 2018-08-29 PROCEDURE — 83735 ASSAY OF MAGNESIUM: CPT

## 2018-08-29 PROCEDURE — 80053 COMPREHEN METABOLIC PANEL: CPT

## 2018-08-30 ENCOUNTER — HOSPITAL ENCOUNTER (OUTPATIENT)
Dept: INFUSION THERAPY | Age: 68
Discharge: HOME | End: 2018-08-30
Payer: MEDICARE

## 2018-08-30 ENCOUNTER — OFFICE VISIT (OUTPATIENT)
Dept: ONCOLOGY | Age: 68
End: 2018-08-30
Payer: MEDICARE

## 2018-08-30 VITALS
SYSTOLIC BLOOD PRESSURE: 112 MMHG | TEMPERATURE: 97.5 F | DIASTOLIC BLOOD PRESSURE: 56 MMHG | RESPIRATION RATE: 18 BRPM | HEART RATE: 74 BPM | OXYGEN SATURATION: 98 %

## 2018-08-30 VITALS
SYSTOLIC BLOOD PRESSURE: 107 MMHG | HEIGHT: 62 IN | TEMPERATURE: 97.2 F | DIASTOLIC BLOOD PRESSURE: 77 MMHG | HEART RATE: 92 BPM | WEIGHT: 148.2 LBS | BODY MASS INDEX: 27.27 KG/M2 | RESPIRATION RATE: 20 BRPM

## 2018-08-30 DIAGNOSIS — C50.111 MALIGNANT NEOPLASM OF CENTRAL PORTION OF RIGHT BREAST IN FEMALE, ESTROGEN RECEPTOR NEGATIVE (HCC): ICD-10-CM

## 2018-08-30 DIAGNOSIS — Z17.1 MALIGNANT NEOPLASM OF CENTRAL PORTION OF RIGHT BREAST IN FEMALE, ESTROGEN RECEPTOR NEGATIVE (HCC): ICD-10-CM

## 2018-08-30 DIAGNOSIS — C50.111 MALIGNANT NEOPLASM OF CENTRAL PORTION OF RIGHT BREAST IN FEMALE, ESTROGEN RECEPTOR NEGATIVE (HCC): Primary | ICD-10-CM

## 2018-08-30 DIAGNOSIS — T45.1X5A CHEMOTHERAPY-INDUCED NEUROPATHY (HCC): Primary | ICD-10-CM

## 2018-08-30 DIAGNOSIS — G62.0 CHEMOTHERAPY-INDUCED NEUROPATHY (HCC): Primary | ICD-10-CM

## 2018-08-30 DIAGNOSIS — Z17.1 MALIGNANT NEOPLASM OF CENTRAL PORTION OF RIGHT BREAST IN FEMALE, ESTROGEN RECEPTOR NEGATIVE (HCC): Primary | ICD-10-CM

## 2018-08-30 LAB
ABO/RH: NORMAL
ANTIBODY SCREEN: NORMAL
BLOOD BANK DISPENSE STATUS: NORMAL
BLOOD BANK PRODUCT CODE: NORMAL
BPU ID: NORMAL
DESCRIPTION BLOOD BANK: NORMAL

## 2018-08-30 PROCEDURE — 3017F COLORECTAL CA SCREEN DOC REV: CPT | Performed by: INTERNAL MEDICINE

## 2018-08-30 PROCEDURE — 86923 COMPATIBILITY TEST ELECTRIC: CPT

## 2018-08-30 PROCEDURE — G8427 DOCREV CUR MEDS BY ELIG CLIN: HCPCS | Performed by: INTERNAL MEDICINE

## 2018-08-30 PROCEDURE — 2580000003 HC RX 258

## 2018-08-30 PROCEDURE — 1101F PT FALLS ASSESS-DOCD LE1/YR: CPT | Performed by: INTERNAL MEDICINE

## 2018-08-30 PROCEDURE — 99214 OFFICE O/P EST MOD 30 MIN: CPT | Performed by: INTERNAL MEDICINE

## 2018-08-30 PROCEDURE — 1123F ACP DISCUSS/DSCN MKR DOCD: CPT | Performed by: INTERNAL MEDICINE

## 2018-08-30 PROCEDURE — 96366 THER/PROPH/DIAG IV INF ADDON: CPT

## 2018-08-30 PROCEDURE — G8419 CALC BMI OUT NRM PARAM NOF/U: HCPCS | Performed by: INTERNAL MEDICINE

## 2018-08-30 PROCEDURE — 96415 CHEMO IV INFUSION ADDL HR: CPT

## 2018-08-30 PROCEDURE — 6370000000 HC RX 637 (ALT 250 FOR IP)

## 2018-08-30 PROCEDURE — 6360000002 HC RX W HCPCS: Performed by: INTERNAL MEDICINE

## 2018-08-30 PROCEDURE — P9016 RBC LEUKOCYTES REDUCED: HCPCS

## 2018-08-30 PROCEDURE — 4040F PNEUMOC VAC/ADMIN/RCVD: CPT | Performed by: INTERNAL MEDICINE

## 2018-08-30 PROCEDURE — 86900 BLOOD TYPING SEROLOGIC ABO: CPT

## 2018-08-30 PROCEDURE — 96375 TX/PRO/DX INJ NEW DRUG ADDON: CPT

## 2018-08-30 PROCEDURE — 1090F PRES/ABSN URINE INCON ASSESS: CPT | Performed by: INTERNAL MEDICINE

## 2018-08-30 PROCEDURE — 2580000003 HC RX 258: Performed by: INTERNAL MEDICINE

## 2018-08-30 PROCEDURE — 2500000003 HC RX 250 WO HCPCS: Performed by: INTERNAL MEDICINE

## 2018-08-30 PROCEDURE — S0028 INJECTION, FAMOTIDINE, 20 MG: HCPCS | Performed by: INTERNAL MEDICINE

## 2018-08-30 PROCEDURE — 86901 BLOOD TYPING SEROLOGIC RH(D): CPT

## 2018-08-30 PROCEDURE — 4004F PT TOBACCO SCREEN RCVD TLK: CPT | Performed by: INTERNAL MEDICINE

## 2018-08-30 PROCEDURE — 96377 APPLICATON ON-BODY INJECTOR: CPT

## 2018-08-30 PROCEDURE — 96413 CHEMO IV INFUSION 1 HR: CPT

## 2018-08-30 PROCEDURE — G8400 PT W/DXA NO RESULTS DOC: HCPCS | Performed by: INTERNAL MEDICINE

## 2018-08-30 PROCEDURE — 86850 RBC ANTIBODY SCREEN: CPT

## 2018-08-30 PROCEDURE — 36430 TRANSFUSION BLD/BLD COMPNT: CPT

## 2018-08-30 RX ORDER — DIPHENHYDRAMINE HYDROCHLORIDE 50 MG/ML
50 INJECTION INTRAMUSCULAR; INTRAVENOUS ONCE
Status: CANCELLED | OUTPATIENT
Start: 2018-08-30 | End: 2018-08-30

## 2018-08-30 RX ORDER — SODIUM CHLORIDE 0.9 % (FLUSH) 0.9 %
20 SYRINGE (ML) INJECTION PRN
Status: CANCELLED | OUTPATIENT
Start: 2018-08-30

## 2018-08-30 RX ORDER — GABAPENTIN 300 MG/1
300 CAPSULE ORAL 3 TIMES DAILY
Qty: 90 CAPSULE | Refills: 1 | Status: SHIPPED | OUTPATIENT
Start: 2018-08-30 | End: 2019-02-20 | Stop reason: ALTCHOICE

## 2018-08-30 RX ORDER — SODIUM CHLORIDE 9 MG/ML
INJECTION, SOLUTION INTRAVENOUS CONTINUOUS
Status: CANCELLED | OUTPATIENT
Start: 2018-08-30

## 2018-08-30 RX ORDER — ONDANSETRON 2 MG/ML
8 INJECTION INTRAMUSCULAR; INTRAVENOUS ONCE
Status: CANCELLED | OUTPATIENT
Start: 2018-08-30

## 2018-08-30 RX ORDER — LOPERAMIDE HYDROCHLORIDE 2 MG/1
2 CAPSULE ORAL 4 TIMES DAILY PRN
COMMUNITY

## 2018-08-30 RX ORDER — 0.9 % SODIUM CHLORIDE 0.9 %
10 VIAL (ML) INJECTION ONCE
Status: CANCELLED | OUTPATIENT
Start: 2018-08-30 | End: 2018-08-30

## 2018-08-30 RX ORDER — ACETAMINOPHEN 325 MG/1
650 TABLET ORAL ONCE
Status: CANCELLED | OUTPATIENT
Start: 2018-08-30 | End: 2018-08-30

## 2018-08-30 RX ORDER — MEPERIDINE HYDROCHLORIDE 25 MG/ML
12.5 INJECTION INTRAMUSCULAR; INTRAVENOUS; SUBCUTANEOUS ONCE
Status: CANCELLED | OUTPATIENT
Start: 2018-08-30 | End: 2018-08-30

## 2018-08-30 RX ORDER — SODIUM CHLORIDE 9 MG/ML
INJECTION, SOLUTION INTRAVENOUS CONTINUOUS
Status: DISCONTINUED | OUTPATIENT
Start: 2018-08-30 | End: 2018-08-31 | Stop reason: HOSPADM

## 2018-08-30 RX ORDER — FUROSEMIDE 10 MG/ML
20 INJECTION INTRAMUSCULAR; INTRAVENOUS ONCE
Status: CANCELLED | OUTPATIENT
Start: 2018-08-30 | End: 2018-08-30

## 2018-08-30 RX ORDER — DIPHENHYDRAMINE HYDROCHLORIDE 50 MG/ML
25 INJECTION INTRAMUSCULAR; INTRAVENOUS ONCE
Status: CANCELLED | OUTPATIENT
Start: 2018-08-30 | End: 2018-08-30

## 2018-08-30 RX ORDER — DIPHENHYDRAMINE HCL 25 MG
25 TABLET ORAL ONCE
Status: CANCELLED | OUTPATIENT
Start: 2018-08-30 | End: 2018-08-30

## 2018-08-30 RX ORDER — EPINEPHRINE 1 MG/ML
0.3 INJECTION, SOLUTION, CONCENTRATE INTRAVENOUS PRN
Status: CANCELLED | OUTPATIENT
Start: 2018-08-30

## 2018-08-30 RX ORDER — SODIUM CHLORIDE 0.9 % (FLUSH) 0.9 %
5 SYRINGE (ML) INJECTION PRN
Status: CANCELLED | OUTPATIENT
Start: 2018-08-30

## 2018-08-30 RX ORDER — METHYLPREDNISOLONE SODIUM SUCCINATE 125 MG/2ML
125 INJECTION, POWDER, LYOPHILIZED, FOR SOLUTION INTRAMUSCULAR; INTRAVENOUS ONCE
Status: CANCELLED | OUTPATIENT
Start: 2018-08-30 | End: 2018-08-30

## 2018-08-30 RX ORDER — 0.9 % SODIUM CHLORIDE 0.9 %
10 VIAL (ML) INJECTION ONCE
Status: COMPLETED | OUTPATIENT
Start: 2018-08-30 | End: 2018-08-30

## 2018-08-30 RX ORDER — HEPARIN SODIUM (PORCINE) LOCK FLUSH IV SOLN 100 UNIT/ML 100 UNIT/ML
500 SOLUTION INTRAVENOUS PRN
Status: DISCONTINUED | OUTPATIENT
Start: 2018-08-30 | End: 2018-08-31 | Stop reason: HOSPADM

## 2018-08-30 RX ORDER — SODIUM CHLORIDE 9 MG/ML
INJECTION, SOLUTION INTRAVENOUS
Status: COMPLETED
Start: 2018-08-30 | End: 2018-08-30

## 2018-08-30 RX ORDER — SODIUM CHLORIDE 0.9 % (FLUSH) 0.9 %
10 SYRINGE (ML) INJECTION PRN
Status: CANCELLED | OUTPATIENT
Start: 2018-08-30

## 2018-08-30 RX ORDER — SODIUM CHLORIDE 0.9 % (FLUSH) 0.9 %
10 SYRINGE (ML) INJECTION PRN
Status: DISCONTINUED | OUTPATIENT
Start: 2018-08-30 | End: 2018-08-31 | Stop reason: HOSPADM

## 2018-08-30 RX ORDER — DEXAMETHASONE SODIUM PHOSPHATE 10 MG/ML
6 INJECTION, SOLUTION INTRAMUSCULAR; INTRAVENOUS ONCE
Status: COMPLETED | OUTPATIENT
Start: 2018-08-30 | End: 2018-08-30

## 2018-08-30 RX ORDER — ONDANSETRON 2 MG/ML
8 INJECTION INTRAMUSCULAR; INTRAVENOUS ONCE
Status: COMPLETED | OUTPATIENT
Start: 2018-08-30 | End: 2018-08-30

## 2018-08-30 RX ORDER — ACETAMINOPHEN 325 MG/1
TABLET ORAL
Status: COMPLETED
Start: 2018-08-30 | End: 2018-08-30

## 2018-08-30 RX ORDER — DEXAMETHASONE SODIUM PHOSPHATE 10 MG/ML
6 INJECTION, SOLUTION INTRAMUSCULAR; INTRAVENOUS ONCE
Status: CANCELLED | OUTPATIENT
Start: 2018-08-30

## 2018-08-30 RX ORDER — HEPARIN SODIUM (PORCINE) LOCK FLUSH IV SOLN 100 UNIT/ML 100 UNIT/ML
500 SOLUTION INTRAVENOUS PRN
Status: CANCELLED | OUTPATIENT
Start: 2018-08-30

## 2018-08-30 RX ORDER — LANOLIN ALCOHOL/MO/W.PET/CERES
400 CREAM (GRAM) TOPICAL 2 TIMES DAILY
Qty: 60 TABLET | Refills: 0 | Status: SHIPPED | OUTPATIENT
Start: 2018-08-30 | End: 2018-09-13

## 2018-08-30 RX ORDER — DIPHENHYDRAMINE HYDROCHLORIDE 50 MG/ML
50 INJECTION INTRAMUSCULAR; INTRAVENOUS ONCE
Status: COMPLETED | OUTPATIENT
Start: 2018-08-30 | End: 2018-08-30

## 2018-08-30 RX ORDER — ACETAMINOPHEN 325 MG/1
650 TABLET ORAL ONCE
Status: COMPLETED | OUTPATIENT
Start: 2018-08-30 | End: 2018-08-30

## 2018-08-30 RX ORDER — SODIUM CHLORIDE 9 MG/ML
250 INJECTION, SOLUTION INTRAVENOUS ONCE
Status: COMPLETED | OUTPATIENT
Start: 2018-08-30 | End: 2018-08-30

## 2018-08-30 RX ADMIN — Medication 10 ML: at 09:35

## 2018-08-30 RX ADMIN — Medication 10 ML: at 09:20

## 2018-08-30 RX ADMIN — Medication 10 ML: at 09:25

## 2018-08-30 RX ADMIN — Medication 10 ML: at 09:41

## 2018-08-30 RX ADMIN — FAMOTIDINE 20 MG: 10 INJECTION, SOLUTION INTRAVENOUS at 09:20

## 2018-08-30 RX ADMIN — DEXAMETHASONE SODIUM PHOSPHATE 6 MG: 10 INJECTION, SOLUTION INTRAMUSCULAR; INTRAVENOUS at 09:35

## 2018-08-30 RX ADMIN — SODIUM CHLORIDE 250 ML: 9 INJECTION, SOLUTION INTRAVENOUS at 09:19

## 2018-08-30 RX ADMIN — PACLITAXEL 306 MG: 6 INJECTION INTRAVENOUS at 09:54

## 2018-08-30 RX ADMIN — HEPARIN 500 UNITS: 100 SYRINGE at 15:23

## 2018-08-30 RX ADMIN — Medication 10 ML: at 15:23

## 2018-08-30 RX ADMIN — DIPHENHYDRAMINE HYDROCHLORIDE 50 MG: 50 INJECTION, SOLUTION INTRAMUSCULAR; INTRAVENOUS at 09:25

## 2018-08-30 RX ADMIN — ACETAMINOPHEN 650 MG: 325 TABLET ORAL at 13:08

## 2018-08-30 RX ADMIN — ACETAMINOPHEN 650 MG: 325 TABLET, FILM COATED ORAL at 13:08

## 2018-08-30 RX ADMIN — MAGNESIUM SULFATE HEPTAHYDRATE: 500 INJECTION, SOLUTION INTRAMUSCULAR; INTRAVENOUS at 09:51

## 2018-08-30 RX ADMIN — SODIUM CHLORIDE: 9 INJECTION, SOLUTION INTRAVENOUS at 13:09

## 2018-08-30 RX ADMIN — ONDANSETRON 8 MG: 2 INJECTION, SOLUTION INTRAMUSCULAR; INTRAVENOUS at 09:41

## 2018-08-30 RX ADMIN — PEGFILGRASTIM 6 MG: KIT SUBCUTANEOUS at 13:24

## 2018-08-30 NOTE — PROGRESS NOTES
Department of [facility] Oncology  Attending Clinic Note    Reason for Visit: Follow-up on a patient with Right Breast Cancer    PCP:  Arleth Hernandez DO, FACOI    History of Present Illness: The mass was located in the 12 o'clock position of the right breast.     Breast cancer risk factors include age, gender, mother with breast cancer. Age of menarche was 16. Total hysterectomy at age 53. Patient was on hormonal therapy, patient unsure of how long. Patient is . Age of first live birth was 33. Patient did not breast feed.     Bilateral screening mammogram on 2018:  Probable 15 mm thick soft tissue mass in the 12:00 position of the right breast.     Right Diagnostic Mammogram on 2018:  there is an 18 mm mass in the 12:00 position of the right breast consistent with carcinoma until proven otherwise. There is also an abnormal right axillary lymph node. Right Breast U/S on 2018:  18 mm mass in the 12:00 position of the right breast consistent with carcinoma until proven otherwise. Enlarged right axillary lymph node. On 04/10/2018:  A. Right breast, core biopsy, slides for review ( A): Poorly differentiated ductal carcinoma, [place] score 3+3+2 = 8.  B. Right lymph node, core biopsy, slides for review ( B):  Metastatic poorly differentiated carcinoma    Comment:   Per report from [facility] laboratory:  Estrogen receptor: Negative  Progesterone receptor: Negative  HER-2/jocelyn status: Negative (0)  Enclosed keratin immunohistochemical stains are positive    Stage IIIA T2 N2 M0 IDC Grade 3 Triple Negative Right Breast Cancer: We recommended neoadjuvant chemotherapy consisting of Dose-Dense AC-T. Side effects of AC-T reviewed with patient. She agreed to proceed. Mediport was placed. 2018 2D-Echo: EF 60%. Cycle # 1 Dose-Dense AC was on 2018. Cycle # 2 Dose-Dense AC was on 2018. Cycle # 3 Dose-Dense AC was on 2018.     Cycle # 4 Dose-Dense breast 12:00 with associated artifact from a metallic clip. At least 15 abnormal right axillary lymph nodes consistent with known metastatic disease. No evidence of neoplasm on the left. 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 Side effects of AC-T reviewed with patient. She agreed to proceed. Mediport was placed. 06/16/2018  2D-Echo: EF 60%. Cycle # 1 Dose-Dense AC was on 06/21/2018. Cycle # 2 Dose-Dense AC was on 07/05/2018. Cycle # 3 Dose-Dense AC was on 07/19/2018. Cycle # 4 Dose-Dense AC was on 08/02/2018. Cycle # 1 Dose-Dense Taxol was on 08/16/2018. Cycle # 2 Dose-Dense Taxol is today 08/30/2018. HypoMg; to be replaced. Fatigue and SOB on exertion; 1 unit prbc ordered. Peripheral neuropathy; recommended VitB complex and Gabapentin 300 mg po TID. RTC 2 weeks for Cycle # 3 Dose-Dense Taxol. Saeid Jacobs MD  Medical Oncologist  Board Certified, Medical Oncology  Board Certified, Internal Medicine  August 30, 2018    Chuyita Baker, [address] [facility] in collaboration with Dr. Saeid Jacobs / Dr. Luna Matthews

## 2018-08-30 NOTE — PROGRESS NOTES
Assisted patient to and from bathroom.  also present. Blood infusing well. Vitals stable. No distress noted. Patient denies any complaints.

## 2018-09-07 ENCOUNTER — HOSPITAL ENCOUNTER (OUTPATIENT)
Dept: INFUSION THERAPY | Age: 68
Discharge: HOME | End: 2018-09-07
Payer: MEDICARE

## 2018-09-07 ENCOUNTER — OFFICE VISIT (OUTPATIENT)
Dept: ONCOLOGY | Age: 68
End: 2018-09-07
Payer: MEDICARE

## 2018-09-07 VITALS
SYSTOLIC BLOOD PRESSURE: 117 MMHG | TEMPERATURE: 97 F | DIASTOLIC BLOOD PRESSURE: 59 MMHG | RESPIRATION RATE: 20 BRPM | HEART RATE: 68 BPM

## 2018-09-07 VITALS
SYSTOLIC BLOOD PRESSURE: 122 MMHG | WEIGHT: 147.5 LBS | TEMPERATURE: 97.3 F | HEART RATE: 81 BPM | HEIGHT: 62 IN | DIASTOLIC BLOOD PRESSURE: 64 MMHG | RESPIRATION RATE: 20 BRPM | BODY MASS INDEX: 27.14 KG/M2

## 2018-09-07 DIAGNOSIS — Z17.1 MALIGNANT NEOPLASM OF CENTRAL PORTION OF RIGHT BREAST IN FEMALE, ESTROGEN RECEPTOR NEGATIVE (HCC): ICD-10-CM

## 2018-09-07 DIAGNOSIS — C50.111 MALIGNANT NEOPLASM OF CENTRAL PORTION OF RIGHT BREAST IN FEMALE, ESTROGEN RECEPTOR NEGATIVE (HCC): ICD-10-CM

## 2018-09-07 DIAGNOSIS — C50.911 BREAST CANCER METASTASIZED TO AXILLARY LYMPH NODE, RIGHT (HCC): ICD-10-CM

## 2018-09-07 DIAGNOSIS — C77.3 BREAST CANCER METASTASIZED TO AXILLARY LYMPH NODE, RIGHT (HCC): ICD-10-CM

## 2018-09-07 LAB
ALBUMIN SERPL-MCNC: 3.8 G/DL (ref 3.5–5.2)
ALP BLD-CCNC: 121 U/L (ref 35–104)
ALT SERPL-CCNC: 29 U/L (ref 0–32)
ANION GAP SERPL CALCULATED.3IONS-SCNC: 14 MMOL/L (ref 7–16)
ANISOCYTOSIS: ABNORMAL
AST SERPL-CCNC: 26 U/L (ref 0–31)
BASOPHILS ABSOLUTE: 0 E9/L (ref 0–0.2)
BASOPHILS RELATIVE PERCENT: 0.7 % (ref 0–2)
BILIRUB SERPL-MCNC: 0.4 MG/DL (ref 0–1.2)
BUN BLDV-MCNC: 11 MG/DL (ref 8–23)
CALCIUM SERPL-MCNC: 9.2 MG/DL (ref 8.6–10.2)
CHLORIDE BLD-SCNC: 94 MMOL/L (ref 98–107)
CO2: 26 MMOL/L (ref 22–29)
CREAT SERPL-MCNC: 0.9 MG/DL (ref 0.5–1)
EOSINOPHILS ABSOLUTE: 0.63 E9/L (ref 0.05–0.5)
EOSINOPHILS RELATIVE PERCENT: 2.7 % (ref 0–6)
GFR AFRICAN AMERICAN: >60
GFR NON-AFRICAN AMERICAN: >60 ML/MIN/1.73
GLUCOSE BLD-MCNC: 215 MG/DL (ref 74–109)
HCT VFR BLD CALC: 25.8 % (ref 34–48)
HEMOGLOBIN: 8.7 G/DL (ref 11.5–15.5)
LYMPHOCYTES ABSOLUTE: 0.71 E9/L (ref 1.5–4)
LYMPHOCYTES RELATIVE PERCENT: 2.7 % (ref 20–42)
MCH RBC QN AUTO: 30.9 PG (ref 26–35)
MCHC RBC AUTO-ENTMCNC: 33.7 % (ref 32–34.5)
MCV RBC AUTO: 91.5 FL (ref 80–99.9)
METAMYELOCYTES RELATIVE PERCENT: 5.3 % (ref 0–1)
MONOCYTES ABSOLUTE: 1.88 E9/L (ref 0.1–0.95)
MONOCYTES RELATIVE PERCENT: 8 % (ref 2–12)
NEUTROPHILS ABSOLUTE: 19.98 E9/L (ref 1.8–7.3)
NEUTROPHILS RELATIVE PERCENT: 79.6 % (ref 43–80)
NUCLEATED RED BLOOD CELLS: 2.7 /100 WBC
OVALOCYTES: ABNORMAL
PDW BLD-RTO: 20.1 FL (ref 11.5–15)
PLATELET # BLD: 105 E9/L (ref 130–450)
PMV BLD AUTO: 11.5 FL (ref 7–12)
POIKILOCYTES: ABNORMAL
POLYCHROMASIA: ABNORMAL
POTASSIUM SERPL-SCNC: 3.3 MMOL/L (ref 3.5–5)
PROMYELOCYTES PERCENT: 1.8 % (ref 0–0)
RBC # BLD: 2.82 E12/L (ref 3.5–5.5)
SODIUM BLD-SCNC: 134 MMOL/L (ref 132–146)
TEAR DROP CELLS: ABNORMAL
TOTAL PROTEIN: 6.4 G/DL (ref 6.4–8.3)
WBC # BLD: 23.5 E9/L (ref 4.5–11.5)

## 2018-09-07 PROCEDURE — 99213 OFFICE O/P EST LOW 20 MIN: CPT | Performed by: NURSE PRACTITIONER

## 2018-09-07 PROCEDURE — 85025 COMPLETE CBC W/AUTO DIFF WBC: CPT

## 2018-09-07 PROCEDURE — G8419 CALC BMI OUT NRM PARAM NOF/U: HCPCS | Performed by: NURSE PRACTITIONER

## 2018-09-07 PROCEDURE — 6360000002 HC RX W HCPCS: Performed by: NURSE PRACTITIONER

## 2018-09-07 PROCEDURE — 1123F ACP DISCUSS/DSCN MKR DOCD: CPT | Performed by: NURSE PRACTITIONER

## 2018-09-07 PROCEDURE — G8427 DOCREV CUR MEDS BY ELIG CLIN: HCPCS | Performed by: NURSE PRACTITIONER

## 2018-09-07 PROCEDURE — 2580000003 HC RX 258: Performed by: NURSE PRACTITIONER

## 2018-09-07 PROCEDURE — 96366 THER/PROPH/DIAG IV INF ADDON: CPT

## 2018-09-07 PROCEDURE — 4004F PT TOBACCO SCREEN RCVD TLK: CPT | Performed by: NURSE PRACTITIONER

## 2018-09-07 PROCEDURE — G8400 PT W/DXA NO RESULTS DOC: HCPCS | Performed by: NURSE PRACTITIONER

## 2018-09-07 PROCEDURE — 96365 THER/PROPH/DIAG IV INF INIT: CPT

## 2018-09-07 PROCEDURE — 3017F COLORECTAL CA SCREEN DOC REV: CPT | Performed by: NURSE PRACTITIONER

## 2018-09-07 PROCEDURE — 1101F PT FALLS ASSESS-DOCD LE1/YR: CPT | Performed by: NURSE PRACTITIONER

## 2018-09-07 PROCEDURE — 80053 COMPREHEN METABOLIC PANEL: CPT

## 2018-09-07 PROCEDURE — 4040F PNEUMOC VAC/ADMIN/RCVD: CPT | Performed by: NURSE PRACTITIONER

## 2018-09-07 PROCEDURE — 1090F PRES/ABSN URINE INCON ASSESS: CPT | Performed by: NURSE PRACTITIONER

## 2018-09-07 RX ORDER — SODIUM CHLORIDE 0.9 % (FLUSH) 0.9 %
10 SYRINGE (ML) INJECTION PRN
Status: CANCELLED | OUTPATIENT
Start: 2018-09-07

## 2018-09-07 RX ORDER — RANITIDINE 150 MG/1
150 TABLET ORAL NIGHTLY PRN
COMMUNITY
End: 2021-03-19 | Stop reason: ALTCHOICE

## 2018-09-07 RX ORDER — HEPARIN SODIUM (PORCINE) LOCK FLUSH IV SOLN 100 UNIT/ML 100 UNIT/ML
500 SOLUTION INTRAVENOUS PRN
Status: CANCELLED | OUTPATIENT
Start: 2018-09-07

## 2018-09-07 RX ORDER — SODIUM CHLORIDE 0.9 % (FLUSH) 0.9 %
10 SYRINGE (ML) INJECTION PRN
Status: DISCONTINUED | OUTPATIENT
Start: 2018-09-07 | End: 2018-09-08 | Stop reason: HOSPADM

## 2018-09-07 RX ORDER — HEPARIN SODIUM (PORCINE) LOCK FLUSH IV SOLN 100 UNIT/ML 100 UNIT/ML
500 SOLUTION INTRAVENOUS PRN
Status: DISCONTINUED | OUTPATIENT
Start: 2018-09-07 | End: 2018-09-08 | Stop reason: HOSPADM

## 2018-09-07 RX ADMIN — Medication 10 ML: at 13:31

## 2018-09-07 RX ADMIN — MAGNESIUM SULFATE HEPTAHYDRATE: 500 INJECTION, SOLUTION INTRAMUSCULAR; INTRAVENOUS at 11:20

## 2018-09-07 RX ADMIN — HEPARIN 500 UNITS: 100 SYRINGE at 13:31

## 2018-09-07 NOTE — PROGRESS NOTES
Department of [facility] Oncology  Attending Clinic Note    Reason for Visit: Follow-up on a patient with Right Breast Cancer    PCP:  García Fernández DO, FACOI    History of Present Illness: The mass was located in the 12 o'clock position of the right breast.     Breast cancer risk factors include age, gender, mother with breast cancer. Age of menarche was 16. Total hysterectomy at age 53. Patient was on hormonal therapy, patient unsure of how long. Patient is . Age of first live birth was 33. Patient did not breast feed.     Bilateral screening mammogram on 2018:  Probable 15 mm thick soft tissue mass in the 12:00 position of the right breast.     Right Diagnostic Mammogram on 2018:  there is an 18 mm mass in the 12:00 position of the right breast consistent with carcinoma until proven otherwise. There is also an abnormal right axillary lymph node. Right Breast U/S on 2018:  18 mm mass in the 12:00 position of the right breast consistent with carcinoma until proven otherwise. Enlarged right axillary lymph node. On 04/10/2018:  A. Right breast, core biopsy, slides for review ( A): Poorly differentiated ductal carcinoma, [place] score 3+3+2 = 8.  B. 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 Side effects of AC-T reviewed with patient. She agreed to proceed. Mediport was placed. 2018 2D-Echo: EF 60%. Cycle # 1 Dose-Dense AC was on 2018. Cycle # 2 Dose-Dense AC was on 2018. Cycle # 3 Dose-Dense AC was on 2018.     Cycle # 4 Dose-Dense AC was on 08/02/2018. Cycle # 1 Dose-Dense Taxol was on 08/16/2018. Cycle # 2 Dose-Dense Taxol is today 08/30/2018. Review of Systems;  CONSTITUTIONAL: No fever, chills. Fair appetite. Positive for fatigue. ENMT: Eyes: No diplopia; Nose: No epistaxis. Mouth: No sore throat. RESPIRATORY: No hemoptysis. Shortness of breath on exertion has resolved and been more managable. CARDIOVASCULAR: No chest pain, palpitations. GASTROINTESTINAL: No nausea/vomiting. ++Diarrhea and abdominal cramping intermittently. GENITOURINARY: No dysuria, urinary frequency, hematuria. NEURO: No syncope, presyncope, headache. Positive for peripheral neuropathy, which she states has increased in the past week. Remainder:  ROS NEGATIVE    Past Medical History:      Diagnosis Date    Acid reflux     Breast cancer ([facility]     right    Hypertension     Insomnia     Seasonal allergies     Stroke [facility]) 2013    tia    Type 2 diabetes mellitus without complication (HCC)      Medications:  Reviewed and reconciled. Allergies: Allergies   Allergen Reactions    Avelox [Moxifloxacin] Hives     Physical Exam:  /64 (Site: Right Upper Arm, Position: Sitting, Cuff Size: Medium Adult)   Pulse 81   Temp 97.3 °F (36.3 °C) (Temporal)   Resp 20   Ht 5' 2\" (1.575 m)   Wt 147 lb 8 oz (66.9 kg)   BMI 26.98 kg/m²   GENERAL: Alert, oriented x 3, not in acute distress. HEENT: PERRLA; EOMI. Oropharynx clear. NECK: Supple. No palpable LN  LUNGS: Good air entry bilaterally. No wheezing, crackles or ronchi. CARDIOVASCULAR: Regular rate. No murmurs, rubs or gallops. Left chest Mediport site. ABDOMEN: Soft. Non-tender, non-distended. Positive bowel sounds. EXTREMITIES: Without clubbing, cyanosis, or edema. NEUROLOGIC: No focal deficits. ECOG PS 1    Impression/Plan:  Right Breast Cancer: On 04/10/2018:  A.  Right breast, core biopsy: Poorly differentiated ductal carcinoma, [place] score 3+3+2 = 8.  B. Right lymph node, core the thyroid gland. ENT team consulted. 06/05/2018 MRI Breast: 2.5 x 0.7 x 1.4 cm Irregular, enhancing mass in the right breast 12:00 with associated artifact from a metallic clip. At least 15 abnormal right axillary lymph nodes consistent with known metastatic disease. No evidence of neoplasm on the left. 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 Side effects of AC-T reviewed with patient. She agreed to proceed. Mediport was placed. 06/16/2018  2D-Echo: EF 60%. Cycle # 1 Dose-Dense AC was on 06/21/2018. Cycle # 2 Dose-Dense AC was on 07/05/2018. Cycle # 3 Dose-Dense AC was on 07/19/2018. Cycle # 4 Dose-Dense AC was on 08/02/2018. Cycle # 1 Dose-Dense Taxol was on 08/16/2018. Cycle # 2 Dose-Dense Taxol is today 08/30/2018. Peripheral neuropathy; patient has started VitB complex and Gabapentin 300 mg po BID. Patient was seen today for increased diarrhea, and her  is concerned for her. Reviewed again her medications, and the proper use of the same to manage and prevent dehydration from diarrhea. Patient will receive IVF with Magnesium today, in addition to oral potassium replacement. RTC 1 weeks for Cycle # 3 Dose-Dense Taxol. Chuyita Huggins, [address] [place] [address] in collaboration with Dr. José Rebolledo / Dr. Franks Lat

## 2018-09-12 ENCOUNTER — HOSPITAL ENCOUNTER (OUTPATIENT)
Age: 68
Discharge: HOME | End: 2018-09-12
Payer: MEDICARE

## 2018-09-12 DIAGNOSIS — Z17.1 MALIGNANT NEOPLASM OF CENTRAL PORTION OF RIGHT BREAST IN FEMALE, ESTROGEN RECEPTOR NEGATIVE (HCC): ICD-10-CM

## 2018-09-12 DIAGNOSIS — C50.111 MALIGNANT NEOPLASM OF CENTRAL PORTION OF RIGHT BREAST IN FEMALE, ESTROGEN RECEPTOR NEGATIVE (HCC): ICD-10-CM

## 2018-09-12 LAB
ALBUMIN SERPL-MCNC: 4 G/DL (ref 3.5–5.2)
ALP BLD-CCNC: 105 U/L (ref 35–104)
ALT SERPL-CCNC: 21 U/L (ref 0–32)
ANION GAP SERPL CALCULATED.3IONS-SCNC: 13 MMOL/L (ref 7–16)
ANISOCYTOSIS: ABNORMAL
AST SERPL-CCNC: 22 U/L (ref 0–31)
BASOPHILIC STIPPLING: ABNORMAL
BASOPHILS ABSOLUTE: 0 E9/L (ref 0–0.2)
BASOPHILS RELATIVE PERCENT: 0 % (ref 0–2)
BILIRUB SERPL-MCNC: 0.6 MG/DL (ref 0–1.2)
BUN BLDV-MCNC: 12 MG/DL (ref 8–23)
CALCIUM SERPL-MCNC: 9.2 MG/DL (ref 8.6–10.2)
CHLORIDE BLD-SCNC: 99 MMOL/L (ref 98–107)
CO2: 25 MMOL/L (ref 22–29)
CREAT SERPL-MCNC: 1 MG/DL (ref 0.5–1)
EOSINOPHILS ABSOLUTE: 0.63 E9/L (ref 0.05–0.5)
EOSINOPHILS RELATIVE PERCENT: 6.1 % (ref 0–6)
GFR AFRICAN AMERICAN: >60
GFR NON-AFRICAN AMERICAN: 55 ML/MIN/1.73
GLUCOSE BLD-MCNC: 246 MG/DL (ref 74–109)
HCT VFR BLD CALC: 26.1 % (ref 34–48)
HEMOGLOBIN: 8.5 G/DL (ref 11.5–15.5)
LYMPHOCYTES ABSOLUTE: 0.62 E9/L (ref 1.5–4)
LYMPHOCYTES RELATIVE PERCENT: 6.1 % (ref 20–42)
MAGNESIUM: 1.6 MG/DL (ref 1.6–2.6)
MCH RBC QN AUTO: 31.7 PG (ref 26–35)
MCHC RBC AUTO-ENTMCNC: 32.6 % (ref 32–34.5)
MCV RBC AUTO: 97.4 FL (ref 80–99.9)
MONOCYTES ABSOLUTE: 0.41 E9/L (ref 0.1–0.95)
MONOCYTES RELATIVE PERCENT: 3.5 % (ref 2–12)
NEUTROPHILS ABSOLUTE: 8.65 E9/L (ref 1.8–7.3)
NEUTROPHILS RELATIVE PERCENT: 84.3 % (ref 43–80)
NUCLEATED RED BLOOD CELLS: 0 /100 WBC
PDW BLD-RTO: 21.3 FL (ref 11.5–15)
PLATELET # BLD: 115 E9/L (ref 130–450)
PMV BLD AUTO: 10.5 FL (ref 7–12)
POIKILOCYTES: ABNORMAL
POLYCHROMASIA: ABNORMAL
POTASSIUM SERPL-SCNC: 5 MMOL/L (ref 3.5–5)
RBC # BLD: 2.68 E12/L (ref 3.5–5.5)
SODIUM BLD-SCNC: 137 MMOL/L (ref 132–146)
TEAR DROP CELLS: ABNORMAL
TOTAL PROTEIN: 6.7 G/DL (ref 6.4–8.3)
WBC # BLD: 10.3 E9/L (ref 4.5–11.5)

## 2018-09-12 PROCEDURE — 36415 COLL VENOUS BLD VENIPUNCTURE: CPT

## 2018-09-12 PROCEDURE — 80053 COMPREHEN METABOLIC PANEL: CPT

## 2018-09-12 PROCEDURE — 83735 ASSAY OF MAGNESIUM: CPT

## 2018-09-12 PROCEDURE — 85025 COMPLETE CBC W/AUTO DIFF WBC: CPT

## 2018-09-13 ENCOUNTER — HOSPITAL ENCOUNTER (OUTPATIENT)
Dept: INFUSION THERAPY | Age: 68
Discharge: HOME | End: 2018-09-13
Payer: MEDICARE

## 2018-09-13 ENCOUNTER — OFFICE VISIT (OUTPATIENT)
Dept: ONCOLOGY | Age: 68
End: 2018-09-13
Payer: MEDICARE

## 2018-09-13 VITALS
TEMPERATURE: 96.4 F | HEIGHT: 62 IN | DIASTOLIC BLOOD PRESSURE: 83 MMHG | WEIGHT: 151 LBS | OXYGEN SATURATION: 98 % | BODY MASS INDEX: 27.79 KG/M2 | HEART RATE: 98 BPM | RESPIRATION RATE: 12 BRPM | SYSTOLIC BLOOD PRESSURE: 132 MMHG

## 2018-09-13 VITALS
SYSTOLIC BLOOD PRESSURE: 121 MMHG | RESPIRATION RATE: 18 BRPM | DIASTOLIC BLOOD PRESSURE: 57 MMHG | HEART RATE: 71 BPM | TEMPERATURE: 97.9 F

## 2018-09-13 DIAGNOSIS — C50.111 MALIGNANT NEOPLASM OF CENTRAL PORTION OF RIGHT BREAST IN FEMALE, ESTROGEN RECEPTOR NEGATIVE (HCC): ICD-10-CM

## 2018-09-13 DIAGNOSIS — Z17.1 MALIGNANT NEOPLASM OF CENTRAL PORTION OF RIGHT BREAST IN FEMALE, ESTROGEN RECEPTOR NEGATIVE (HCC): ICD-10-CM

## 2018-09-13 DIAGNOSIS — Z85.3 PERSONAL HISTORY OF BREAST CANCER: Primary | ICD-10-CM

## 2018-09-13 PROCEDURE — 96413 CHEMO IV INFUSION 1 HR: CPT

## 2018-09-13 PROCEDURE — 2500000003 HC RX 250 WO HCPCS: Performed by: INTERNAL MEDICINE

## 2018-09-13 PROCEDURE — 96415 CHEMO IV INFUSION ADDL HR: CPT

## 2018-09-13 PROCEDURE — 1123F ACP DISCUSS/DSCN MKR DOCD: CPT | Performed by: INTERNAL MEDICINE

## 2018-09-13 PROCEDURE — 96375 TX/PRO/DX INJ NEW DRUG ADDON: CPT

## 2018-09-13 PROCEDURE — G8427 DOCREV CUR MEDS BY ELIG CLIN: HCPCS | Performed by: INTERNAL MEDICINE

## 2018-09-13 PROCEDURE — 1101F PT FALLS ASSESS-DOCD LE1/YR: CPT | Performed by: INTERNAL MEDICINE

## 2018-09-13 PROCEDURE — 96377 APPLICATON ON-BODY INJECTOR: CPT

## 2018-09-13 PROCEDURE — 2580000003 HC RX 258: Performed by: INTERNAL MEDICINE

## 2018-09-13 PROCEDURE — 6360000002 HC RX W HCPCS: Performed by: NURSE PRACTITIONER

## 2018-09-13 PROCEDURE — S0028 INJECTION, FAMOTIDINE, 20 MG: HCPCS | Performed by: INTERNAL MEDICINE

## 2018-09-13 PROCEDURE — G8400 PT W/DXA NO RESULTS DOC: HCPCS | Performed by: INTERNAL MEDICINE

## 2018-09-13 PROCEDURE — 3017F COLORECTAL CA SCREEN DOC REV: CPT | Performed by: INTERNAL MEDICINE

## 2018-09-13 PROCEDURE — 6360000002 HC RX W HCPCS: Performed by: INTERNAL MEDICINE

## 2018-09-13 PROCEDURE — 4004F PT TOBACCO SCREEN RCVD TLK: CPT | Performed by: INTERNAL MEDICINE

## 2018-09-13 PROCEDURE — 99214 OFFICE O/P EST MOD 30 MIN: CPT | Performed by: INTERNAL MEDICINE

## 2018-09-13 PROCEDURE — G8419 CALC BMI OUT NRM PARAM NOF/U: HCPCS | Performed by: INTERNAL MEDICINE

## 2018-09-13 PROCEDURE — 1090F PRES/ABSN URINE INCON ASSESS: CPT | Performed by: INTERNAL MEDICINE

## 2018-09-13 PROCEDURE — 96361 HYDRATE IV INFUSION ADD-ON: CPT

## 2018-09-13 PROCEDURE — 4040F PNEUMOC VAC/ADMIN/RCVD: CPT | Performed by: INTERNAL MEDICINE

## 2018-09-13 RX ORDER — DIPHENHYDRAMINE HYDROCHLORIDE 50 MG/ML
50 INJECTION INTRAMUSCULAR; INTRAVENOUS ONCE
Status: CANCELLED | OUTPATIENT
Start: 2018-09-13 | End: 2018-09-13

## 2018-09-13 RX ORDER — SODIUM CHLORIDE 0.9 % (FLUSH) 0.9 %
10 SYRINGE (ML) INJECTION PRN
Status: DISCONTINUED | OUTPATIENT
Start: 2018-09-13 | End: 2018-09-14 | Stop reason: HOSPADM

## 2018-09-13 RX ORDER — 0.9 % SODIUM CHLORIDE 0.9 %
1000 INTRAVENOUS SOLUTION INTRAVENOUS ONCE
Status: CANCELLED
Start: 2018-09-13 | End: 2018-09-13

## 2018-09-13 RX ORDER — SODIUM CHLORIDE 9 MG/ML
INJECTION, SOLUTION INTRAVENOUS CONTINUOUS
Status: CANCELLED | OUTPATIENT
Start: 2018-09-13

## 2018-09-13 RX ORDER — 0.9 % SODIUM CHLORIDE 0.9 %
10 VIAL (ML) INJECTION ONCE
Status: CANCELLED | OUTPATIENT
Start: 2018-09-13 | End: 2018-09-13

## 2018-09-13 RX ORDER — HEPARIN SODIUM (PORCINE) LOCK FLUSH IV SOLN 100 UNIT/ML 100 UNIT/ML
500 SOLUTION INTRAVENOUS PRN
Status: CANCELLED | OUTPATIENT
Start: 2018-09-13

## 2018-09-13 RX ORDER — SODIUM CHLORIDE 0.9 % (FLUSH) 0.9 %
5 SYRINGE (ML) INJECTION PRN
Status: CANCELLED | OUTPATIENT
Start: 2018-09-13

## 2018-09-13 RX ORDER — DIPHENHYDRAMINE HYDROCHLORIDE 50 MG/ML
50 INJECTION INTRAMUSCULAR; INTRAVENOUS ONCE
Status: DISCONTINUED | OUTPATIENT
Start: 2018-09-13 | End: 2018-09-13

## 2018-09-13 RX ORDER — EPINEPHRINE 1 MG/ML
0.3 INJECTION, SOLUTION, CONCENTRATE INTRAVENOUS PRN
Status: CANCELLED | OUTPATIENT
Start: 2018-09-13

## 2018-09-13 RX ORDER — HEPARIN SODIUM (PORCINE) LOCK FLUSH IV SOLN 100 UNIT/ML 100 UNIT/ML
500 SOLUTION INTRAVENOUS PRN
Status: DISCONTINUED | OUTPATIENT
Start: 2018-09-13 | End: 2018-09-14 | Stop reason: HOSPADM

## 2018-09-13 RX ORDER — 0.9 % SODIUM CHLORIDE 0.9 %
10 VIAL (ML) INJECTION ONCE
Status: COMPLETED | OUTPATIENT
Start: 2018-09-13 | End: 2018-09-13

## 2018-09-13 RX ORDER — ONDANSETRON 2 MG/ML
8 INJECTION INTRAMUSCULAR; INTRAVENOUS ONCE
Status: CANCELLED | OUTPATIENT
Start: 2018-09-13

## 2018-09-13 RX ORDER — ONDANSETRON 2 MG/ML
8 INJECTION INTRAMUSCULAR; INTRAVENOUS ONCE
Status: COMPLETED | OUTPATIENT
Start: 2018-09-13 | End: 2018-09-13

## 2018-09-13 RX ORDER — SODIUM CHLORIDE 0.9 % (FLUSH) 0.9 %
10 SYRINGE (ML) INJECTION PRN
Status: CANCELLED | OUTPATIENT
Start: 2018-09-13

## 2018-09-13 RX ORDER — 0.9 % SODIUM CHLORIDE 0.9 %
1000 INTRAVENOUS SOLUTION INTRAVENOUS ONCE
Status: COMPLETED | OUTPATIENT
Start: 2018-09-13 | End: 2018-09-13

## 2018-09-13 RX ORDER — MEPERIDINE HYDROCHLORIDE 25 MG/ML
12.5 INJECTION INTRAMUSCULAR; INTRAVENOUS; SUBCUTANEOUS ONCE
Status: CANCELLED | OUTPATIENT
Start: 2018-09-13 | End: 2018-09-13

## 2018-09-13 RX ORDER — METHYLPREDNISOLONE SODIUM SUCCINATE 125 MG/2ML
125 INJECTION, POWDER, LYOPHILIZED, FOR SOLUTION INTRAMUSCULAR; INTRAVENOUS ONCE
Status: CANCELLED | OUTPATIENT
Start: 2018-09-13 | End: 2018-09-13

## 2018-09-13 RX ORDER — DIPHENHYDRAMINE HYDROCHLORIDE 50 MG/ML
25 INJECTION INTRAMUSCULAR; INTRAVENOUS ONCE
Status: COMPLETED | OUTPATIENT
Start: 2018-09-13 | End: 2018-09-13

## 2018-09-13 RX ORDER — DEXAMETHASONE SODIUM PHOSPHATE 10 MG/ML
6 INJECTION, SOLUTION INTRAMUSCULAR; INTRAVENOUS ONCE
Status: COMPLETED | OUTPATIENT
Start: 2018-09-13 | End: 2018-09-13

## 2018-09-13 RX ORDER — DEXAMETHASONE SODIUM PHOSPHATE 10 MG/ML
6 INJECTION, SOLUTION INTRAMUSCULAR; INTRAVENOUS ONCE
Status: CANCELLED | OUTPATIENT
Start: 2018-09-13

## 2018-09-13 RX ADMIN — SODIUM CHLORIDE 1000 ML: 9 INJECTION, SOLUTION INTRAVENOUS at 11:08

## 2018-09-13 RX ADMIN — Medication 10 ML: at 11:40

## 2018-09-13 RX ADMIN — Medication 10 ML: at 11:24

## 2018-09-13 RX ADMIN — Medication 10 ML: at 10:47

## 2018-09-13 RX ADMIN — ONDANSETRON 8 MG: 2 INJECTION, SOLUTION INTRAMUSCULAR; INTRAVENOUS at 11:24

## 2018-09-13 RX ADMIN — PEGFILGRASTIM 6 MG: KIT SUBCUTANEOUS at 15:08

## 2018-09-13 RX ADMIN — DEXAMETHASONE SODIUM PHOSPHATE 6 MG: 10 INJECTION, SOLUTION INTRAMUSCULAR; INTRAVENOUS at 11:17

## 2018-09-13 RX ADMIN — PACLITAXEL 306 MG: 6 INJECTION INTRAVENOUS at 11:55

## 2018-09-13 RX ADMIN — Medication 10 ML: at 11:09

## 2018-09-13 RX ADMIN — FAMOTIDINE 20 MG: 10 INJECTION, SOLUTION INTRAVENOUS at 11:09

## 2018-09-13 RX ADMIN — HEPARIN 500 UNITS: 100 SYRINGE at 15:46

## 2018-09-13 RX ADMIN — DIPHENHYDRAMINE HYDROCHLORIDE 25 MG: 50 INJECTION, SOLUTION INTRAMUSCULAR; INTRAVENOUS at 11:43

## 2018-09-13 RX ADMIN — Medication 10 ML: at 11:17

## 2018-09-13 RX ADMIN — Medication 10 ML: at 15:46

## 2018-09-13 NOTE — PROGRESS NOTES
AC was on 08/02/2018. Cycle # 1 Dose-Dense Taxol was on 08/16/2018. Cycle # 2 Dose-Dense Taxol was on 08/30/2018. Cycle # 3 Dose Dense Taxol is scheduled 09/13/2018. Review of Systems;  CONSTITUTIONAL: No fever, chills. Fair appetite. Fatigue stable. ENMT: Eyes: No diplopia; Nose: No epistaxis. Mouth: No sore throat. RESPIRATORY: No hemoptysis. Shortness of breath on exertion. CARDIOVASCULAR: No chest pain, palpitations. GASTROINTESTINAL: No nausea/vomiting or abdominal pain. GENITOURINARY: No dysuria, urinary frequency, hematuria. NEURO: No syncope, presyncope, headache. Peripheral neuropathy. Remainder:  ROS NEGATIVE    Past Medical History:      Diagnosis Date    Acid reflux     Breast cancer ([facility] [address] [facility]     right    Hypertension     Insomnia     Seasonal allergies     Stroke [facility]) 2013    tia    Type 2 diabetes mellitus without complication ([facility])      Medications:  Reviewed and reconciled. Allergies: Allergies   Allergen Reactions    Avelox [Moxifloxacin] Hives     Physical Exam:  /83 (Site: Left Upper Arm, Position: Sitting, Cuff Size: Medium Adult)   Pulse 98   Temp 96.4 °F (35.8 °C) (Temporal)   Resp 12   Ht 5' 2\" (1.575 m)   Wt 151 lb (68.5 kg)   SpO2 98%   Breastfeeding? No   BMI 27.62 kg/m²   GENERAL: Alert, oriented x 3, not in acute distress. HEENT: PERRLA; EOMI. Oropharynx clear. NECK: Supple. No palpable LN  LUNGS: Good air entry bilaterally. No wheezing, crackles or ronchi. CARDIOVASCULAR: Regular rate. No murmurs, rubs or gallops. ABDOMEN: Soft. Non-tender, non-distended. Positive bowel sounds. EXTREMITIES: Without clubbing, cyanosis, or edema. NEUROLOGIC: No focal deficits. ECOG PS 1    Impression/Plan:  Right Breast Cancer: On 04/10/2018:  A.  Right breast, core biopsy: Poorly differentiated ductal carcinoma, [place] score 3+3+2 = 8.  B. Right lymph node, core biopsy:  Metastatic poorly differentiated carcinoma    Comment: Per report from

## 2018-09-22 ENCOUNTER — APPOINTMENT (OUTPATIENT)
Dept: GENERAL RADIOLOGY | Age: 68
End: 2018-09-22
Payer: MEDICARE

## 2018-09-22 ENCOUNTER — HOSPITAL ENCOUNTER (EMERGENCY)
Age: 68
Discharge: HOME | End: 2018-09-22
Attending: EMERGENCY MEDICINE
Payer: MEDICARE

## 2018-09-22 VITALS
DIASTOLIC BLOOD PRESSURE: 44 MMHG | TEMPERATURE: 99.6 F | SYSTOLIC BLOOD PRESSURE: 96 MMHG | OXYGEN SATURATION: 97 % | BODY MASS INDEX: 27.23 KG/M2 | WEIGHT: 148 LBS | RESPIRATION RATE: 16 BRPM | HEIGHT: 62 IN | HEART RATE: 96 BPM

## 2018-09-22 DIAGNOSIS — J04.0 LARYNGITIS: Primary | ICD-10-CM

## 2018-09-22 PROCEDURE — 6370000000 HC RX 637 (ALT 250 FOR IP): Performed by: EMERGENCY MEDICINE

## 2018-09-22 PROCEDURE — 71046 X-RAY EXAM CHEST 2 VIEWS: CPT

## 2018-09-22 PROCEDURE — 99283 EMERGENCY DEPT VISIT LOW MDM: CPT

## 2018-09-22 RX ORDER — IBUPROFEN 800 MG/1
800 TABLET ORAL EVERY 8 HOURS PRN
Qty: 30 TABLET | Refills: 1 | Status: ON HOLD | OUTPATIENT
Start: 2018-09-22 | End: 2018-10-01 | Stop reason: HOSPADM

## 2018-09-22 RX ORDER — CODEINE PHOSPHATE AND GUAIFENESIN 10; 100 MG/5ML; MG/5ML
5 SOLUTION ORAL 3 TIMES DAILY PRN
Qty: 420 ML | Refills: 0 | Status: ON HOLD | OUTPATIENT
Start: 2018-09-22 | End: 2018-10-01 | Stop reason: HOSPADM

## 2018-09-22 RX ORDER — BENZONATATE 100 MG/1
100 CAPSULE ORAL 3 TIMES DAILY PRN
Qty: 20 CAPSULE | Refills: 0 | Status: ON HOLD | OUTPATIENT
Start: 2018-09-22 | End: 2018-10-01 | Stop reason: HOSPADM

## 2018-09-22 RX ORDER — OXYCODONE HYDROCHLORIDE AND ACETAMINOPHEN 5; 325 MG/1; MG/1
1 TABLET ORAL ONCE
Status: COMPLETED | OUTPATIENT
Start: 2018-09-22 | End: 2018-09-22

## 2018-09-22 RX ADMIN — OXYCODONE HYDROCHLORIDE AND ACETAMINOPHEN 1 TABLET: 5; 325 TABLET ORAL at 20:37

## 2018-09-22 ASSESSMENT — PAIN DESCRIPTION - LOCATION: LOCATION: HIP;OTHER (COMMENT)

## 2018-09-22 ASSESSMENT — PAIN SCALES - GENERAL
PAINLEVEL_OUTOF10: 10
PAINLEVEL_OUTOF10: 10

## 2018-09-22 ASSESSMENT — PAIN DESCRIPTION - DESCRIPTORS: DESCRIPTORS: ACHING

## 2018-09-22 ASSESSMENT — PAIN DESCRIPTION - ORIENTATION: ORIENTATION: RIGHT;LEFT

## 2018-09-22 ASSESSMENT — PAIN DESCRIPTION - FREQUENCY: FREQUENCY: CONTINUOUS

## 2018-09-23 NOTE — ED PROVIDER NOTES
HPI:  9/22/18, Time: 9:07 PM         Dolores Mckinney is a 80 y.o. female presenting to the ED for cough, beginning 2 days ago. The complaint has been persistent, mild in severity, and worsened by nothing. Patient states that she has had a persistent cough with chills and myalgias. The patient is currently being treated and is on her last round of chemotherapy for breast cancer. She otherwise denies a sore throat or any significant congestion. No chest pain or shortness of breath. She does have some baseline nausea and vomiting from her chemotherapy which has not increased past baseline. No neck swelling or fullness. Review of Systems:   Pertinent positives and negatives are stated within HPI, all other systems reviewed and are negative.          --------------------------------------------- PAST HISTORY ---------------------------------------------  Past Medical History:  has a past medical history of Acid reflux; Breast cancer ([facility] Hypertension; Insomnia; Seasonal allergies; Stroke [facility]); and Type 2 diabetes mellitus without complication ([facility] Past Surgical History:  has a past surgical history that includes Tubal ligation; Cholecystectomy; Rotator cuff repair (Right); Hysterectomy, total abdominal; Breast biopsy; and pr insj prph ctr vad w/subq port age 12 yr/> (N/A, 6/12/2018). Social History:  reports that she has been smoking Cigarettes. She has a 75.00 pack-year smoking history. She has never used smokeless tobacco. She reports that she does not drink alcohol or use drugs. Family History: family history includes Cancer in her sister; Cancer (age of onset: [de-identified]) in her mother. The patients home medications have been reviewed. Allergies:  Avelox Normal    -------------------------------------------------- RESULTS -------------------------------------------------  All laboratory and radiology results have been personally reviewed by myself   LABS:  No results found for this visit on

## 2018-09-24 ENCOUNTER — TELEPHONE (OUTPATIENT)
Dept: ONCOLOGY | Age: 68
End: 2018-09-24

## 2018-09-24 PROBLEM — R65.10 SIRS (SYSTEMIC INFLAMMATORY RESPONSE SYNDROME) (HCC): Status: ACTIVE | Noted: 2018-09-24

## 2018-09-25 ENCOUNTER — APPOINTMENT (OUTPATIENT)
Dept: ULTRASOUND IMAGING | Age: 68
DRG: 854 | End: 2018-09-25
Attending: FAMILY MEDICINE
Payer: MEDICARE

## 2018-09-25 ENCOUNTER — HOSPITAL ENCOUNTER (INPATIENT)
Age: 68
LOS: 6 days | Discharge: HOME | DRG: 854 | End: 2018-10-01
Attending: FAMILY MEDICINE | Admitting: FAMILY MEDICINE
Payer: MEDICARE

## 2018-09-25 ENCOUNTER — APPOINTMENT (OUTPATIENT)
Dept: GENERAL RADIOLOGY | Age: 68
DRG: 854 | End: 2018-09-25
Attending: FAMILY MEDICINE
Payer: MEDICARE

## 2018-09-25 PROBLEM — N17.9 AKI (ACUTE KIDNEY INJURY) (HCC): Status: ACTIVE | Noted: 2018-09-25

## 2018-09-25 PROBLEM — Z86.73 HISTORY OF STROKE: Status: ACTIVE | Noted: 2018-09-25

## 2018-09-25 PROBLEM — E11.65 TYPE 2 DIABETES MELLITUS WITH HYPERGLYCEMIA, WITHOUT LONG-TERM CURRENT USE OF INSULIN (HCC): Status: ACTIVE | Noted: 2018-09-25

## 2018-09-25 PROBLEM — E78.5 HLD (HYPERLIPIDEMIA): Status: ACTIVE | Noted: 2018-09-25

## 2018-09-25 PROBLEM — I10 HTN (HYPERTENSION): Status: ACTIVE | Noted: 2018-09-25

## 2018-09-25 PROBLEM — E87.1 HYPONATREMIA: Status: ACTIVE | Noted: 2018-09-25

## 2018-09-25 LAB
AMORPHOUS: ABNORMAL
ANION GAP SERPL CALCULATED.3IONS-SCNC: 14 MMOL/L (ref 7–16)
ANION GAP SERPL CALCULATED.3IONS-SCNC: 14 MMOL/L (ref 7–16)
ANION GAP SERPL CALCULATED.3IONS-SCNC: 18 MMOL/L (ref 7–16)
BACTERIA: ABNORMAL /HPF
BILIRUBIN URINE: NEGATIVE
BLOOD, URINE: ABNORMAL
BUN BLDV-MCNC: 29 MG/DL (ref 8–23)
CALCIUM IONIZED: 1.07 MMOL/L (ref 1.15–1.33)
CALCIUM SERPL-MCNC: 7 MG/DL (ref 8.6–10.2)
CALCIUM SERPL-MCNC: 7.4 MG/DL (ref 8.6–10.2)
CALCIUM SERPL-MCNC: 7.7 MG/DL (ref 8.6–10.2)
CHLORIDE BLD-SCNC: 92 MMOL/L (ref 98–107)
CHLORIDE BLD-SCNC: 94 MMOL/L (ref 98–107)
CHLORIDE BLD-SCNC: 95 MMOL/L (ref 98–107)
CHLORIDE URINE RANDOM: 22 MMOL/L
CLARITY: ABNORMAL
CO2: 17 MMOL/L (ref 22–29)
CO2: 19 MMOL/L (ref 22–29)
CO2: 22 MMOL/L (ref 22–29)
COLOR: YELLOW
CREAT SERPL-MCNC: 2 MG/DL (ref 0.5–1)
CREAT SERPL-MCNC: 2.1 MG/DL (ref 0.5–1)
CREAT SERPL-MCNC: 2.2 MG/DL (ref 0.5–1)
CREATININE URINE: 26 MG/DL (ref 29–226)
EPITHELIAL CELLS, UA: ABNORMAL /HPF
FILM ARRAY ADENOVIRUS: NORMAL
FILM ARRAY BORDETELLA PERTUSSIS: NORMAL
FILM ARRAY CHLAMYDOPHILIA PNEUMONIAE: NORMAL
FILM ARRAY CORONAVIRUS 229E: NORMAL
FILM ARRAY CORONAVIRUS HKU1: NORMAL
FILM ARRAY CORONAVIRUS NL63: NORMAL
FILM ARRAY CORONAVIRUS OC43: NORMAL
FILM ARRAY INFLUENZA A VIRUS 09H1: NORMAL
FILM ARRAY INFLUENZA A VIRUS H1: NORMAL
FILM ARRAY INFLUENZA A VIRUS H3: NORMAL
FILM ARRAY INFLUENZA A VIRUS: NORMAL
FILM ARRAY INFLUENZA B: NORMAL
FILM ARRAY METAPNEUMOVIRUS: NORMAL
FILM ARRAY MYCOPLASMA PNEUMONIAE: NORMAL
FILM ARRAY PARAINFLUENZA VIRUS 1: NORMAL
FILM ARRAY PARAINFLUENZA VIRUS 2: NORMAL
FILM ARRAY PARAINFLUENZA VIRUS 3: NORMAL
FILM ARRAY PARAINFLUENZA VIRUS 4: NORMAL
FILM ARRAY RESPIRATORY SYNCITIAL VIRUS: NORMAL
FILM ARRAY RHINOVIRUS/ENTEROVIRUS: NORMAL
GFR AFRICAN AMERICAN: 27
GFR AFRICAN AMERICAN: 28
GFR AFRICAN AMERICAN: 30
GFR NON-AFRICAN AMERICAN: 22 ML/MIN/1.73
GFR NON-AFRICAN AMERICAN: 23 ML/MIN/1.73
GFR NON-AFRICAN AMERICAN: 25 ML/MIN/1.73
GLUCOSE BLD-MCNC: 235 MG/DL (ref 74–109)
GLUCOSE BLD-MCNC: 344 MG/DL (ref 74–109)
GLUCOSE BLD-MCNC: 379 MG/DL (ref 74–109)
GLUCOSE URINE: NEGATIVE MG/DL
KETONES, URINE: NEGATIVE MG/DL
LACTIC ACID, SEPSIS: 1 MMOL/L (ref 0.5–1.9)
LEUKOCYTE ESTERASE, URINE: NEGATIVE
MAGNESIUM: 1.4 MG/DL (ref 1.6–2.6)
METER GLUCOSE: 255 MG/DL (ref 70–110)
METER GLUCOSE: 357 MG/DL (ref 70–110)
METER GLUCOSE: 397 MG/DL (ref 70–110)
METER GLUCOSE: 404 MG/DL (ref 70–110)
NITRITE, URINE: NEGATIVE
OSMOLALITY URINE: 177 MOSM/KG (ref 300–900)
OSMOLALITY: 292 MOSM/KG (ref 285–310)
PH UA: 5.5 (ref 5–9)
POTASSIUM REFLEX MAGNESIUM: 3.1 MMOL/L (ref 3.5–5)
POTASSIUM SERPL-SCNC: 3.8 MMOL/L (ref 3.5–5)
POTASSIUM SERPL-SCNC: 4 MMOL/L (ref 3.5–5)
POTASSIUM, UR: 8.4 MMOL/L
PROTEIN UA: NEGATIVE MG/DL
RBC UA: ABNORMAL /HPF (ref 0–2)
SODIUM BLD-SCNC: 128 MMOL/L (ref 132–146)
SODIUM BLD-SCNC: 128 MMOL/L (ref 132–146)
SODIUM BLD-SCNC: 129 MMOL/L (ref 132–146)
SODIUM URINE: 25 MMOL/L
SPECIFIC GRAVITY UA: <=1.005 (ref 1–1.03)
T4 FREE: 1.31 NG/DL (ref 0.93–1.7)
TOTAL CK: 321 U/L (ref 20–180)
TSH SERPL DL<=0.05 MIU/L-ACNC: 0.51 UIU/ML (ref 0.27–4.2)
UROBILINOGEN, URINE: 0.2 E.U./DL
WBC UA: ABNORMAL /HPF (ref 0–5)

## 2018-09-25 PROCEDURE — 99222 1ST HOSP IP/OBS MODERATE 55: CPT | Performed by: INTERNAL MEDICINE

## 2018-09-25 PROCEDURE — 84133 ASSAY OF URINE POTASSIUM: CPT

## 2018-09-25 PROCEDURE — 87088 URINE BACTERIA CULTURE: CPT

## 2018-09-25 PROCEDURE — 2580000003 HC RX 258: Performed by: STUDENT IN AN ORGANIZED HEALTH CARE EDUCATION/TRAINING PROGRAM

## 2018-09-25 PROCEDURE — 80048 BASIC METABOLIC PNL TOTAL CA: CPT

## 2018-09-25 PROCEDURE — 82330 ASSAY OF CALCIUM: CPT

## 2018-09-25 PROCEDURE — 81001 URINALYSIS AUTO W/SCOPE: CPT

## 2018-09-25 PROCEDURE — 71045 X-RAY EXAM CHEST 1 VIEW: CPT

## 2018-09-25 PROCEDURE — 82436 ASSAY OF URINE CHLORIDE: CPT

## 2018-09-25 PROCEDURE — 76770 US EXAM ABDO BACK WALL COMP: CPT

## 2018-09-25 PROCEDURE — 87581 M.PNEUMON DNA AMP PROBE: CPT

## 2018-09-25 PROCEDURE — 51702 INSERT TEMP BLADDER CATH: CPT

## 2018-09-25 PROCEDURE — 2580000003 HC RX 258: Performed by: NURSE PRACTITIONER

## 2018-09-25 PROCEDURE — 36415 COLL VENOUS BLD VENIPUNCTURE: CPT

## 2018-09-25 PROCEDURE — 6370000000 HC RX 637 (ALT 250 FOR IP): Performed by: STUDENT IN AN ORGANIZED HEALTH CARE EDUCATION/TRAINING PROGRAM

## 2018-09-25 PROCEDURE — 87040 BLOOD CULTURE FOR BACTERIA: CPT

## 2018-09-25 PROCEDURE — 84443 ASSAY THYROID STIM HORMONE: CPT

## 2018-09-25 PROCEDURE — 82962 GLUCOSE BLOOD TEST: CPT

## 2018-09-25 PROCEDURE — 87486 CHLMYD PNEUM DNA AMP PROBE: CPT

## 2018-09-25 PROCEDURE — 87186 SC STD MICRODIL/AGAR DIL: CPT

## 2018-09-25 PROCEDURE — 87070 CULTURE OTHR SPECIMN AEROBIC: CPT

## 2018-09-25 PROCEDURE — APPSS180 APP SPLIT SHARED TIME > 60 MINUTES: Performed by: NURSE PRACTITIONER

## 2018-09-25 PROCEDURE — 82570 ASSAY OF URINE CREATININE: CPT

## 2018-09-25 PROCEDURE — 6360000002 HC RX W HCPCS: Performed by: NURSE PRACTITIONER

## 2018-09-25 PROCEDURE — 6370000000 HC RX 637 (ALT 250 FOR IP): Performed by: CLINICAL NURSE SPECIALIST

## 2018-09-25 PROCEDURE — 83605 ASSAY OF LACTIC ACID: CPT

## 2018-09-25 PROCEDURE — 87205 SMEAR GRAM STAIN: CPT

## 2018-09-25 PROCEDURE — 6360000002 HC RX W HCPCS: Performed by: STUDENT IN AN ORGANIZED HEALTH CARE EDUCATION/TRAINING PROGRAM

## 2018-09-25 PROCEDURE — 2060000000 HC ICU INTERMEDIATE R&B

## 2018-09-25 PROCEDURE — 2580000003 HC RX 258: Performed by: INTERNAL MEDICINE

## 2018-09-25 PROCEDURE — 83735 ASSAY OF MAGNESIUM: CPT

## 2018-09-25 PROCEDURE — 82550 ASSAY OF CK (CPK): CPT

## 2018-09-25 PROCEDURE — 87450 HC DIRECT STREP B ANTIGEN: CPT

## 2018-09-25 PROCEDURE — 87077 CULTURE AEROBIC IDENTIFY: CPT

## 2018-09-25 PROCEDURE — 83930 ASSAY OF BLOOD OSMOLALITY: CPT

## 2018-09-25 PROCEDURE — 6370000000 HC RX 637 (ALT 250 FOR IP): Performed by: INTERNAL MEDICINE

## 2018-09-25 PROCEDURE — 87633 RESP VIRUS 12-25 TARGETS: CPT

## 2018-09-25 PROCEDURE — 84439 ASSAY OF FREE THYROXINE: CPT

## 2018-09-25 PROCEDURE — 84300 ASSAY OF URINE SODIUM: CPT

## 2018-09-25 PROCEDURE — 83935 ASSAY OF URINE OSMOLALITY: CPT

## 2018-09-25 PROCEDURE — 87798 DETECT AGENT NOS DNA AMP: CPT

## 2018-09-25 RX ORDER — ACETAMINOPHEN 325 MG/1
650 TABLET ORAL EVERY 4 HOURS PRN
Status: DISCONTINUED | OUTPATIENT
Start: 2018-09-25 | End: 2018-10-01 | Stop reason: HOSPADM

## 2018-09-25 RX ORDER — SODIUM CHLORIDE 9 MG/ML
INJECTION, SOLUTION INTRAVENOUS CONTINUOUS
Status: DISCONTINUED | OUTPATIENT
Start: 2018-09-25 | End: 2018-09-29

## 2018-09-25 RX ORDER — PANTOPRAZOLE SODIUM 40 MG/1
40 TABLET, DELAYED RELEASE ORAL DAILY
Status: DISCONTINUED | OUTPATIENT
Start: 2018-09-25 | End: 2018-10-01 | Stop reason: HOSPADM

## 2018-09-25 RX ORDER — ALPRAZOLAM 0.25 MG/1
0.25 TABLET ORAL NIGHTLY PRN
Status: DISCONTINUED | OUTPATIENT
Start: 2018-09-25 | End: 2018-10-01 | Stop reason: HOSPADM

## 2018-09-25 RX ORDER — SIMVASTATIN 40 MG
40 TABLET ORAL NIGHTLY
Status: DISCONTINUED | OUTPATIENT
Start: 2018-09-25 | End: 2018-10-01 | Stop reason: HOSPADM

## 2018-09-25 RX ORDER — DIPHENOXYLATE HYDROCHLORIDE AND ATROPINE SULFATE 2.5; .025 MG/1; MG/1
2 TABLET ORAL 4 TIMES DAILY PRN
Status: DISCONTINUED | OUTPATIENT
Start: 2018-09-25 | End: 2018-10-01 | Stop reason: HOSPADM

## 2018-09-25 RX ORDER — POTASSIUM CHLORIDE 7.45 MG/ML
10 INJECTION INTRAVENOUS ONCE
Status: COMPLETED | OUTPATIENT
Start: 2018-09-25 | End: 2018-09-25

## 2018-09-25 RX ORDER — ESCITALOPRAM OXALATE 10 MG/1
20 TABLET ORAL EVERY EVENING
Status: DISCONTINUED | OUTPATIENT
Start: 2018-09-25 | End: 2018-10-01 | Stop reason: HOSPADM

## 2018-09-25 RX ORDER — AZITHROMYCIN 250 MG/1
500 TABLET, FILM COATED ORAL DAILY
Status: DISCONTINUED | OUTPATIENT
Start: 2018-09-25 | End: 2018-09-26

## 2018-09-25 RX ORDER — SODIUM CHLORIDE 9 MG/ML
INJECTION, SOLUTION INTRAVENOUS CONTINUOUS
Status: DISCONTINUED | OUTPATIENT
Start: 2018-09-25 | End: 2018-09-25

## 2018-09-25 RX ORDER — POTASSIUM CHLORIDE 7.45 MG/ML
10 INJECTION INTRAVENOUS ONCE
Status: DISCONTINUED | OUTPATIENT
Start: 2018-09-25 | End: 2018-09-25 | Stop reason: SDUPTHER

## 2018-09-25 RX ORDER — 0.9 % SODIUM CHLORIDE 0.9 %
30 INTRAVENOUS SOLUTION INTRAVENOUS ONCE
Status: COMPLETED | OUTPATIENT
Start: 2018-09-25 | End: 2018-09-25

## 2018-09-25 RX ORDER — VITAMIN C
2 TAB ORAL DAILY
Status: DISCONTINUED | OUTPATIENT
Start: 2018-09-25 | End: 2018-10-01 | Stop reason: HOSPADM

## 2018-09-25 RX ORDER — DOXYCYCLINE HYCLATE 100 MG/1
100 CAPSULE ORAL EVERY 12 HOURS SCHEDULED
Status: DISCONTINUED | OUTPATIENT
Start: 2018-09-25 | End: 2018-09-26

## 2018-09-25 RX ORDER — NICOTINE POLACRILEX 4 MG
15 LOZENGE BUCCAL PRN
Status: DISCONTINUED | OUTPATIENT
Start: 2018-09-25 | End: 2018-10-01 | Stop reason: HOSPADM

## 2018-09-25 RX ORDER — NICOTINE 21 MG/24HR
1 PATCH, TRANSDERMAL 24 HOURS TRANSDERMAL DAILY
Status: DISCONTINUED | OUTPATIENT
Start: 2018-09-26 | End: 2018-10-01 | Stop reason: HOSPADM

## 2018-09-25 RX ORDER — 0.9 % SODIUM CHLORIDE 0.9 %
1000 INTRAVENOUS SOLUTION INTRAVENOUS ONCE
Status: COMPLETED | OUTPATIENT
Start: 2018-09-25 | End: 2018-09-25

## 2018-09-25 RX ORDER — DEXAMETHASONE 4 MG/1
4 TABLET ORAL 2 TIMES DAILY
Status: DISCONTINUED | OUTPATIENT
Start: 2018-09-25 | End: 2018-09-25

## 2018-09-25 RX ORDER — DEXTROSE MONOHYDRATE 25 G/50ML
12.5 INJECTION, SOLUTION INTRAVENOUS PRN
Status: DISCONTINUED | OUTPATIENT
Start: 2018-09-25 | End: 2018-10-01 | Stop reason: HOSPADM

## 2018-09-25 RX ORDER — GABAPENTIN 300 MG/1
300 CAPSULE ORAL 3 TIMES DAILY
Status: DISCONTINUED | OUTPATIENT
Start: 2018-09-25 | End: 2018-10-01 | Stop reason: HOSPADM

## 2018-09-25 RX ORDER — MAGNESIUM SULFATE IN WATER 40 MG/ML
2 INJECTION, SOLUTION INTRAVENOUS ONCE
Status: DISCONTINUED | OUTPATIENT
Start: 2018-09-25 | End: 2018-09-25 | Stop reason: SDUPTHER

## 2018-09-25 RX ORDER — SODIUM CHLORIDE 0.9 % (FLUSH) 0.9 %
10 SYRINGE (ML) INJECTION PRN
Status: DISCONTINUED | OUTPATIENT
Start: 2018-09-25 | End: 2018-10-01 | Stop reason: HOSPADM

## 2018-09-25 RX ORDER — SODIUM CHLORIDE 0.9 % (FLUSH) 0.9 %
10 SYRINGE (ML) INJECTION EVERY 12 HOURS SCHEDULED
Status: DISCONTINUED | OUTPATIENT
Start: 2018-09-25 | End: 2018-10-01 | Stop reason: HOSPADM

## 2018-09-25 RX ORDER — ONDANSETRON 2 MG/ML
8 INJECTION INTRAMUSCULAR; INTRAVENOUS EVERY 8 HOURS PRN
Status: DISCONTINUED | OUTPATIENT
Start: 2018-09-25 | End: 2018-10-01 | Stop reason: HOSPADM

## 2018-09-25 RX ORDER — ASPIRIN AND DIPYRIDAMOLE 25; 200 MG/1; MG/1
1 CAPSULE, EXTENDED RELEASE ORAL 2 TIMES DAILY
Status: DISCONTINUED | OUTPATIENT
Start: 2018-09-25 | End: 2018-10-01 | Stop reason: HOSPADM

## 2018-09-25 RX ORDER — MAGNESIUM SULFATE IN WATER 40 MG/ML
2 INJECTION, SOLUTION INTRAVENOUS ONCE
Status: COMPLETED | OUTPATIENT
Start: 2018-09-25 | End: 2018-09-25

## 2018-09-25 RX ORDER — DEXTROSE MONOHYDRATE 50 MG/ML
100 INJECTION, SOLUTION INTRAVENOUS PRN
Status: DISCONTINUED | OUTPATIENT
Start: 2018-09-25 | End: 2018-10-01 | Stop reason: HOSPADM

## 2018-09-25 RX ADMIN — INSULIN LISPRO 15 UNITS: 100 INJECTION, SOLUTION INTRAVENOUS; SUBCUTANEOUS at 13:32

## 2018-09-25 RX ADMIN — VITAMIN C 2 TABLET: TAB at 09:49

## 2018-09-25 RX ADMIN — DEXAMETHASONE 4 MG: 4 TABLET ORAL at 21:30

## 2018-09-25 RX ADMIN — GABAPENTIN 300 MG: 300 CAPSULE ORAL at 09:48

## 2018-09-25 RX ADMIN — Medication 10 ML: at 21:34

## 2018-09-25 RX ADMIN — GABAPENTIN 300 MG: 300 CAPSULE ORAL at 21:30

## 2018-09-25 RX ADMIN — PANTOPRAZOLE SODIUM 40 MG: 40 TABLET, DELAYED RELEASE ORAL at 09:47

## 2018-09-25 RX ADMIN — VANCOMYCIN HYDROCHLORIDE 1000 MG: 1 INJECTION, POWDER, LYOPHILIZED, FOR SOLUTION INTRAVENOUS at 11:48

## 2018-09-25 RX ADMIN — ENOXAPARIN SODIUM 40 MG: 40 INJECTION SUBCUTANEOUS at 09:49

## 2018-09-25 RX ADMIN — DIPHENOXYLATE HYDROCHLORIDE AND ATROPINE SULFATE 2 TABLET: 2.5; .025 TABLET ORAL at 18:02

## 2018-09-25 RX ADMIN — Medication 10 ML: at 12:03

## 2018-09-25 RX ADMIN — INSULIN LISPRO 18 UNITS: 100 INJECTION, SOLUTION INTRAVENOUS; SUBCUTANEOUS at 17:35

## 2018-09-25 RX ADMIN — DEXAMETHASONE 4 MG: 4 TABLET ORAL at 09:48

## 2018-09-25 RX ADMIN — DOXYCYCLINE HYCLATE 100 MG: 100 CAPSULE ORAL at 21:30

## 2018-09-25 RX ADMIN — AZITHROMYCIN 500 MG: 250 TABLET, FILM COATED ORAL at 14:30

## 2018-09-25 RX ADMIN — SIMVASTATIN 40 MG: 40 TABLET, FILM COATED ORAL at 21:43

## 2018-09-25 RX ADMIN — POTASSIUM CHLORIDE 10 MEQ: 7.46 INJECTION, SOLUTION INTRAVENOUS at 13:25

## 2018-09-25 RX ADMIN — INSULIN LISPRO 9 UNITS: 100 INJECTION, SOLUTION INTRAVENOUS; SUBCUTANEOUS at 10:56

## 2018-09-25 RX ADMIN — CEFEPIME HYDROCHLORIDE 2 G: 2 INJECTION, POWDER, FOR SOLUTION INTRAVENOUS at 10:48

## 2018-09-25 RX ADMIN — ESCITALOPRAM OXALATE 20 MG: 10 TABLET, FILM COATED ORAL at 17:30

## 2018-09-25 RX ADMIN — GABAPENTIN 300 MG: 300 CAPSULE ORAL at 14:30

## 2018-09-25 RX ADMIN — INSULIN LISPRO 7 UNITS: 100 INJECTION, SOLUTION INTRAVENOUS; SUBCUTANEOUS at 21:30

## 2018-09-25 RX ADMIN — ASPIRIN AND EXTENDED-RELEASE DIPYRIDAMOLE 1 CAPSULE: 25; 200 CAPSULE ORAL at 21:30

## 2018-09-25 RX ADMIN — MAGNESIUM SULFATE 2 G: 2 INJECTION INTRAVENOUS at 14:30

## 2018-09-25 RX ADMIN — SODIUM CHLORIDE: 9 INJECTION, SOLUTION INTRAVENOUS at 20:10

## 2018-09-25 RX ADMIN — SODIUM CHLORIDE 1000 ML: 9 INJECTION, SOLUTION INTRAVENOUS at 13:25

## 2018-09-25 RX ADMIN — HEPARIN 500 UNITS: 100 SYRINGE at 17:34

## 2018-09-25 RX ADMIN — SODIUM CHLORIDE 2034 ML: 9 INJECTION, SOLUTION INTRAVENOUS at 10:48

## 2018-09-25 RX ADMIN — ASPIRIN AND EXTENDED-RELEASE DIPYRIDAMOLE 1 CAPSULE: 25; 200 CAPSULE ORAL at 09:50

## 2018-09-25 ASSESSMENT — PAIN SCALES - GENERAL
PAINLEVEL_OUTOF10: 0

## 2018-09-25 NOTE — CONSULTS
6/12/2018    PORT INSERTION performed by Karina Moran MD at [address] Right     TUBAL LIGATION      TUNNELED CENTRAL VENOUS CATHETER W/ SUBCUTANEOUS PORT  06/2018    left chest       Family History   Problem Relation Age of Onset    Cancer Mother [de-identified]        breast    Heart Disease Mother     Diabetes Mother     Hypertension Mother     Cancer Sister         lymphoma    Hypertension Father     Stroke Maternal Grandmother     Stroke Paternal Grandmother         reports that she has been smoking Cigarettes. She has a 75.00 pack-year smoking history. She has never used smokeless tobacco. She reports that she does not drink alcohol or use drugs. Allergies:   Avelox [moxifloxacin]    Current Medications:      sodium chloride flush 0.9 % injection 10 mL 2 times per day   sodium chloride flush 0.9 % injection 10 mL PRN   enoxaparin (LOVENOX) injection 40 mg Daily   pantoprazole (PROTONIX) tablet 40 mg Daily   acetaminophen (TYLENOL) tablet 650 mg Q4H PRN   cefepime (MAXIPIME) 2 g IVPB minibag Q8H   vancomycin 1000 mg IVPB in 250 mL D5W addavial Q12H   ALPRAZolam (XANAX) tablet 0.25 mg Nightly PRN   vitamin B and C (TOTAL B-C) 2 tablet Daily   dexamethasone (DECADRON) tablet 4 mg BID   diphenoxylate-atropine (LOMOTIL) 2.5-0.025 MG per tablet 2 tablet 4x Daily PRN   dipyridamole-aspirin (AGGRENOX)  MG per extended release capsule 1 capsule BID   escitalopram (LEXAPRO) tablet 20 mg QPM   gabapentin (NEURONTIN) capsule 300 mg TID   simvastatin (ZOCOR) tablet 40 mg Nightly   insulin lispro (HUMALOG) injection vial 0-18 Units TID    insulin lispro (HUMALOG) injection vial 0-9 Units Nightly   glucose (GLUTOSE) 40 % oral gel 15 g PRN   dextrose 50 % solution 12.5 g PRN   glucagon (rDNA) injection 1 mg PRN   dextrose 5 % solution PRN   magnesium sulfate 2 g in 50 mL IVPB premix Once   potassium chloride 10 mEq/100 mL IVPB (Peripheral Line) Once       Review of Systems:   Pertinent items are noted in HPI. Physical exam:   Constitutional:    Vitals:   VITALS:  /61   Pulse 112   Temp 98.3 °F (36.8 °C) (Temporal)   Resp 18   Ht 5' 2\" (1.575 m)   Wt 149 lb 8 oz (67.8 kg)   SpO2 98%   BMI 27.34 kg/m²   24HR INTAKE/OUTPUT:    Intake/Output Summary (Last 24 hours) at 09/25/18 1206  Last data filed at 09/25/18 1042   Gross per 24 hour   Intake              240 ml   Output                0 ml   Net              240 ml     Skin: No rash, turgor WNL, subclavian mediport to left chest  Heent:  Eomi, mmm  Neck: No jvd noted, trachea midline.   Cardiovascular: Regular with S1, S2 without murmur, rubs, gallops  Respiratory: Clear to ausculation in bilateral lung fields  Abdomen:  +bs, soft, nt, nd  Ext: No lower extremity edema  Psychiatric: mood and affect appropriate  Musculoskeletal:  Rom, muscular strength intact    Data:   Labs:  CBC with Differential:    Lab Results   Component Value Date    WBC 10.9 09/24/2018    RBC 2.49 09/24/2018    HGB 7.7 09/24/2018    HCT 23.2 09/24/2018    PLT 93 09/24/2018    MCV 93.2 09/24/2018    MCH 30.9 09/24/2018    MCHC 33.2 09/24/2018    RDW 18.6 09/24/2018    NRBC 0.2 09/24/2018    METASPCT 2 09/24/2018    METASPCT 5.3 09/07/2018    LYMPHOPCT 6.1 09/12/2018    LYMPHOPCT 17.0 06/10/2018    PROMYELOPCT 1.8 09/07/2018    MONOPCT 0.3 09/24/2018    MONOPCT 3 09/24/2018    MONOPCT 3.5 09/12/2018    MYELOPCT 2.0 08/01/2018    BASOPCT 0.0 09/24/2018    BASOPCT 0.0 09/12/2018    MONOSABS 0.41 09/12/2018    LYMPHSABS 0.62 09/12/2018    EOSABS 0.63 09/12/2018    BASOSABS 0.00 09/12/2018     CMP:    Lab Results   Component Value Date     09/25/2018    K 3.1 09/25/2018    CL 92 09/25/2018    CO2 22 09/25/2018    BUN 29 09/25/2018    CREATININE 2.2 09/25/2018    GFRAA 27 09/25/2018    LABGLOM 22 09/25/2018    LABGLOM 31 09/24/2018    GLUCOSE 235 09/25/2018    GLUCOSE 432 09/24/2018    PROT 7.3 09/24/2018    LABALBU 2.9 09/24/2018    CALCIUM 7.7 09/25/2018    BILITOT

## 2018-09-25 NOTE — H&P
Hospital Medicine History & Physical      PCP: Shante Bob MD    Date of Admission: 9/25/2018    Date of Service: Pt seen/examined on 9/25/18 and Admitted to Inpatient with expected LOS greater than two midnights due to medical therapy. Chief Complaint:  fever      History Of Present Illness:    80 y.o. female who presented to [address] [place] [address] with fever. History obtained from the patient and . The patient was seen at [facility] on Saturday for a persistent cough; she was treated with tessalon and discharged home with ibuprofen. On Monday she developed a fever and chills and went to [facility] for evaluation. She was then transferred to [facility] for possible sepsis, hyponatremia and ZHAO. Her mediport was accessed in [facility]. She states she her cough has been intermittently productive along with the fever. She denies shortness of breath or chest pain. She is currently undergoing chemotherapy for breast cancer with lymph node involvement; she follows with Dr. Dewey Manzano. She has PMH of breast cancer, HTN, DM, GERD and TIA. She is a current smoker. Currently, she is lying in bed. She is alert and oriented. Her voice is raspy, breath sounds are clear throughout. No leg edema is appreciated.     Past Medical History:          Diagnosis Date    Acid reflux     Breast cancer ([facility]     right    Hypertension     Insomnia     Seasonal allergies     Stroke [facility]) 2013    tia    Type 2 diabetes mellitus without complication ([facility]        Past Surgical History:          Procedure Laterality Date    BREAST BIOPSY      CHOLECYSTECTOMY      HYSTERECTOMY, TOTAL ABDOMINAL      [place] INSJ PRPH CTR VAD W/SUBQ PORT AGE 5 YR/> N/A 6/12/2018    PORT INSERTION performed by Suzette Hays MD at [facility] Right     TUBAL LIGATION      TUNNELED CENTRAL VENOUS CATHETER W/ SUBCUTANEOUS PORT  06/2018    left chest       Medications Prior to Admission:      Prior to Admission Historical Provider, MD   simvastatin (ZOCOR) 40 MG tablet Take 40 mg by mouth daily    Yes Historical Provider, MD   dipyridamole-aspirin (AGGRENOX)  MG per extended release capsule Take 1 capsule by mouth 2 times daily   Yes Historical Provider, MD   metFORMIN (GLUCOPHAGE) 500 MG tablet Take 1,000 mg by mouth 2 times daily (with meals)    Yes Historical Provider, MD   Dulaglutide (TRULICITY) 8.93 JK/8.4FB SOPN Inject into the skin every 7 days saturday   Yes Historical Provider, MD   linagliptin (TRADJENTA) 5 MG tablet Take 5 mg by mouth daily   Yes Historical Provider, MD   atenolol-chlorthalidone (TENORETIC) 50-25 MG per tablet Take 1 tablet by mouth daily   Yes Historical Provider, MD   lisinopril (PRINIVIL;ZESTRIL) 20 MG tablet Take 20 mg by mouth every evening    Yes Historical Provider, MD   zolpidem (AMBIEN) 5 MG tablet Take 5 mg by mouth nightly as needed for Sleep. .   Yes Historical Provider, MD   ALPRAZolam Marga Combe) 0.25 MG tablet Take 0.25 mg by mouth nightly as needed for Sleep. .   Yes Historical Provider, MD   cetirizine (ZYRTEC ALLERGY) 10 MG tablet Take 10 mg by mouth daily   Yes Historical Provider, MD   melatonin 3 MG TABS tablet Take 3 mg by mouth daily   Yes Historical Provider, MD       Allergies: Avelox [moxifloxacin]    Social History:      The patient currently lives with her . She is retired. TOBACCO:   reports that she has been smoking Cigarettes. She has a 75.00 pack-year smoking history. She has never used smokeless tobacco.  ETOH:   reports that she does not drink alcohol. Family History:     Positive as follows:    Family History   Problem Relation Age of Onset    Cancer Mother [de-identified]        breast    Heart Disease Mother     Diabetes Mother     Hypertension Mother     Cancer Sister         lymphoma    Hypertension Father     Stroke Maternal Grandmother     Stroke Paternal Grandmother        REVIEW OF SYSTEMS:   Pertinent positives as noted in the HPI.  All other systems reviewed and negative. PHYSICAL EXAM:    BP (!) 83/48   Pulse 117   Temp 98.8 °F (37.1 °C) (Temporal)   Resp 20   Ht 5' 2\" (1.575 m)   Wt 149 lb 8 oz (67.8 kg)   SpO2 95%   BMI 27.34 kg/m²     General appearance:  No apparent distress, appears stated age and cooperative. HEENT:  Normal cephalic, atraumatic without obvious deformity. Pupils equal, round, and reactive to light. Extra ocular muscles intact. Conjunctivae/corneas clear. Neck: Supple, with full range of motion. No jugular venous distention. Trachea midline. Respiratory:  Normal respiratory effort. Clear to auscultation, bilaterally without Rales/Wheezes/Rhonchi. Cardiovascular:  Regular rate and rhythm with normal S1/S2 with murmur. Abdomen: Soft, non-tender, non-distended with normal bowel sounds. Musculoskeletal:  No clubbing, cyanosis or edema bilaterally. Full range of motion without deformity. Skin: Skin color, texture, turgor normal.  No rashes or lesions. Neurologic:  Neurovascularly intact without any focal sensory/motor deficits.    Psychiatric:  Alert and oriented, thought content appropriate, normal insight  Capillary Refill: Brisk,< 3 seconds   Peripheral Pulses: +2 palpable, equal bilaterally       Labs:     Recent Labs      09/24/18   1802   WBC  10.9*   HGB  7.7*   HCT  23.2*   PLT  93*     Recent Labs      09/24/18   1802   NA  123*   K  3.5   CL  88*   CO2  26   BUN  18   CREATININE  1.63*   CALCIUM  8.7     Recent Labs      09/24/18   1802   AST  35   ALT  40   BILITOT  1.0   ALKPHOS  125*     Recent Labs      09/24/18   1802   INR  1.0*     Recent Labs      09/24/18   1802   TROPONINI  0.036       Urinalysis:    No results found for: Nikki Coup, WBCUA, BACTERIA, RBCUA, BLOODU, SPECGRAV, GLUCOSEU      ASSESSMENT/PLAN:    Active Hospital Problems    Diagnosis Date Noted    Type 2 diabetes mellitus with hyperglycemia, without long-term current use of insulin ([facility] [E11.65] 09/25/2018    HTN (hypertension)

## 2018-09-25 NOTE — CONSULTS
Past Surgical History:      Procedure Laterality Date    BREAST BIOPSY      CHOLECYSTECTOMY      HYSTERECTOMY, TOTAL ABDOMINAL      [place] INSJ PRPH CTR VAD W/SUBQ PORT AGE 5 YR/> N/A 6/12/2018    PORT INSERTION performed by [place] MD at [address] [place] [address] Right     TUBAL LIGATION      TUNNELED CENTRAL VENOUS CATHETER W/ SUBCUTANEOUS PORT  06/2018    left chest       Family History:  Family History   Problem Relation Age of Onset    Cancer Mother [de-identified]        breast    Heart Disease Mother     Diabetes Mother     Hypertension Mother     Cancer Sister         lymphoma    Hypertension Father     Stroke Maternal Grandmother     Stroke Paternal Grandmother        Medications:  Medications reviewed and reconciled. Social History:  Social History     Social History    Marital status:      Spouse name: N/A    Number of children: N/A    Years of education: N/A     Occupational History    Not on file. Social History Main Topics    Smoking status: Current Every Day Smoker     Packs/day: 1.50     Years: 50.00     Types: Cigarettes    Smokeless tobacco: Never Used      Comment: smoking x 50 years    Alcohol use No    Drug use: No    Sexual activity: No     Other Topics Concern    Not on file     Social History Narrative    No narrative on file       Allergies: Allergies   Allergen Reactions    Avelox [Moxifloxacin] Hives       Physical Exam:  BP (!) 96/49   Pulse 112   Temp 98.4 °F (36.9 °C) (Temporal)   Resp 22   Ht 5' 2\" (1.575 m)   Wt 149 lb 8 oz (67.8 kg)   SpO2 98%   BMI 27.34 kg/m²   GENERAL: Alert, oriented x 3, not in acute distress. HEENT: PERRLA; EOMI. Oropharynx clear. NECK: Supple. Without lymphadenopathy. LUNGS: Good air entry bilaterally. No wheezing, crackles or ronchi. CARDIOVASCULAR: Regular rate. No murmurs, rubs or gallops. ABDOMEN: Soft. Non-tender, non-distended. Positive bowel sounds.   EXTREMITIES: Without clubbing, cyanosis, or

## 2018-09-25 NOTE — PROGRESS NOTES
Pharmacy Consultation Note  (Antibiotic Dosing and Monitoring)    Initial consult date: 18  Consulting physician: Dr. Garrison Washington  Drug(s): vancomycin  Indication: empiric coverage for sepsis    Ht Readings from Last 1 Encounters:   18 5' 2\" (1.575 m)     Wt Readings from Last 1 Encounters:   18 149 lb 8 oz (67.8 kg)       Age/  Gender IBW DW  Allergy Information   80 y.o. female 50.1 kg 57.2 kg  Avelox [moxifloxacin]                 Date  WBC BUN/CR Drug/Dose Time   Given Level(s)   (Time) Comments   18 -- /2.2 Vancomycin 1,000 mg x 1 dose 1148     18     Random level (1030)                        Estimated Creatinine Clearance: 22 mL/min (A) (based on SCr of 2.2 mg/dL (H)). Intake/Output Summary (Last 24 hours) at 18 1224  Last data filed at 18 1042   Gross per 24 hour   Intake              240 ml   Output                0 ml   Net              240 ml     Line: Implanted Venous Port (18)    Temp max: Temp (24hrs), Av.6 °F (37 °C), Min:98.3 °F (36.8 °C), Max:98.8 °F (37.1 °C)      Cultures:  available culture and sensitivity results were reviewed in EPIC  · Blood cultures (): pending  · Respiratory cultures (): pending      Assessment:  · 80 yof who presented to [address] [place] [address] on  for persistent cough; discharged home with tessalon and ibuprofen due to negative X-  · ray. Presented to [facility] on  with fever and chills and was then transferred to [facility] for possible sepsis, hyponatremia, and ZHAO. Patient has been undergoing chemo. · Consulted by Dr. Garrison Washington to dose/monitor vancomycin  · Goal trough level:  15-20 mcg/mL  · : day #1 vanco and cefepime. Scr is 2.2 (Baseline Scr ~1). Plan:  Infectious Disease has been consulted. Clinical Pharmacy will defer to ID for ATB dosing and monitoring and sign-off. ID physician will re-consult Clinical Pharmacy if needed.       Zach Hester, PharmD PGY1 Resident 2018 12:24 PM  [id number]

## 2018-09-25 NOTE — CONSULTS
ears   Nose:   Nares normal, septum midline, mucosa normal, no drainage    or sinus tenderness   Throat:   Lips, mucosa, and tongue normal; teeth and gums normal   Neck:   Supple, trachea midline, no adenopathy; no JVD   Lungs:     Clear to auscultation bilaterally, respirations unlabored   Chest wall:    No tenderness or deformity,Port site intact    Heart:    Regular rate and rhythm, S1 and S2 normal, no murmur, rub   or gallop   Abdomen:     Soft, non-tender, bowel sounds active all four quadrants,     no masses, no organomegaly   Extremities:   Extremities normal, atraumatic, no cyanosis or edema   Pulses:   2+ and symmetric all extremities   Skin:   Skin color, texture, turgor normal, no rashes or lesions       CBC+dif:  Reviewed and trend followed    Radiology:  Noted    Microbiology:  Pending    Assessment:  · Sepsis likely from community-acquired pneumonia in immunocompromised host  · Breast Cancer currently on chemotherapy last chemo with Taxol 2 weeks ago    Plan:    · Oral azithromycin and doxycycline. · Dc vancomycin and cefepime  · Obtain strep pneumoniae urinary Legionella antigens and respiratory viral panel  · Monitor labs  · Will follow with you    Thank you for having us see this patient in consultation. I will be discussing this case with the treating physicians.     Electronically signed by Bianca Bentley MD on 9/25/2018 at 1:17 PM

## 2018-09-26 ENCOUNTER — APPOINTMENT (OUTPATIENT)
Dept: NUCLEAR MEDICINE | Age: 68
DRG: 854 | End: 2018-09-26
Attending: FAMILY MEDICINE
Payer: MEDICARE

## 2018-09-26 LAB
ABO/RH: NORMAL
ALBUMIN SERPL-MCNC: 2.5 G/DL (ref 3.5–5.2)
ALP BLD-CCNC: 90 U/L (ref 35–104)
ALT SERPL-CCNC: 28 U/L (ref 0–32)
ANION GAP SERPL CALCULATED.3IONS-SCNC: 10 MMOL/L (ref 7–16)
ANION GAP SERPL CALCULATED.3IONS-SCNC: 13 MMOL/L (ref 7–16)
ANION GAP SERPL CALCULATED.3IONS-SCNC: 14 MMOL/L (ref 7–16)
ANISOCYTOSIS: ABNORMAL
ANTIBODY SCREEN: NORMAL
AST SERPL-CCNC: 34 U/L (ref 0–31)
BASOPHILS ABSOLUTE: 0 E9/L (ref 0–0.2)
BASOPHILS RELATIVE PERCENT: 0 % (ref 0–2)
BILIRUB SERPL-MCNC: 0.3 MG/DL (ref 0–1.2)
BUN BLDV-MCNC: 31 MG/DL (ref 8–23)
BUN BLDV-MCNC: 33 MG/DL (ref 8–23)
BUN BLDV-MCNC: 33 MG/DL (ref 8–23)
CALCIUM SERPL-MCNC: 7.3 MG/DL (ref 8.6–10.2)
CALCIUM SERPL-MCNC: 7.5 MG/DL (ref 8.6–10.2)
CALCIUM SERPL-MCNC: 7.6 MG/DL (ref 8.6–10.2)
CHLORIDE BLD-SCNC: 100 MMOL/L (ref 98–107)
CHLORIDE BLD-SCNC: 99 MMOL/L (ref 98–107)
CHLORIDE BLD-SCNC: 99 MMOL/L (ref 98–107)
CO2: 19 MMOL/L (ref 22–29)
CO2: 19 MMOL/L (ref 22–29)
CO2: 21 MMOL/L (ref 22–29)
CORTISOL TOTAL: 4.78 MCG/DL (ref 2.68–18.4)
CREAT SERPL-MCNC: 1.9 MG/DL (ref 0.5–1)
CREAT SERPL-MCNC: 1.9 MG/DL (ref 0.5–1)
CREAT SERPL-MCNC: 2 MG/DL (ref 0.5–1)
EOSINOPHIL, URINE: 0 % (ref 0–1)
EOSINOPHILS ABSOLUTE: 0 E9/L (ref 0.05–0.5)
EOSINOPHILS RELATIVE PERCENT: 0 % (ref 0–6)
GFR AFRICAN AMERICAN: 30
GFR AFRICAN AMERICAN: 32
GFR AFRICAN AMERICAN: 32
GFR NON-AFRICAN AMERICAN: 25 ML/MIN/1.73
GFR NON-AFRICAN AMERICAN: 26 ML/MIN/1.73
GFR NON-AFRICAN AMERICAN: 26 ML/MIN/1.73
GLUCOSE BLD-MCNC: 275 MG/DL (ref 74–109)
GLUCOSE BLD-MCNC: 289 MG/DL (ref 74–109)
GLUCOSE BLD-MCNC: 289 MG/DL (ref 74–109)
HCT VFR BLD CALC: 17.3 % (ref 34–48)
HEMOGLOBIN: 5.6 G/DL (ref 11.5–15.5)
HYPOCHROMIA: ABNORMAL
L. PNEUMOPHILA SEROGP 1 UR AG: NORMAL
LYMPHOCYTES ABSOLUTE: 0.2 E9/L (ref 1.5–4)
LYMPHOCYTES RELATIVE PERCENT: 2 % (ref 20–42)
MAGNESIUM: 1.9 MG/DL (ref 1.6–2.6)
MCH RBC QN AUTO: 30.6 PG (ref 26–35)
MCHC RBC AUTO-ENTMCNC: 32.4 % (ref 32–34.5)
MCV RBC AUTO: 94.5 FL (ref 80–99.9)
METER GLUCOSE: 176 MG/DL (ref 70–110)
METER GLUCOSE: 421 MG/DL (ref 70–110)
METER GLUCOSE: 453 MG/DL (ref 70–110)
MONOCYTES ABSOLUTE: 0.1 E9/L (ref 0.1–0.95)
MONOCYTES RELATIVE PERCENT: 1 % (ref 2–12)
NEUTROPHILS ABSOLUTE: 9.7 E9/L (ref 1.8–7.3)
NEUTROPHILS RELATIVE PERCENT: 97 % (ref 43–80)
OVALOCYTES: ABNORMAL
PARATHYROID HORMONE INTACT: 127 PG/ML (ref 15–65)
PDW BLD-RTO: 19.1 FL (ref 11.5–15)
PHOSPHORUS: 2.4 MG/DL (ref 2.5–4.5)
PLATELET # BLD: 76 E9/L (ref 130–450)
PLATELET CONFIRMATION: NORMAL
PMV BLD AUTO: 11.2 FL (ref 7–12)
POIKILOCYTES: ABNORMAL
POLYCHROMASIA: ABNORMAL
POTASSIUM REFLEX MAGNESIUM: 4.1 MMOL/L (ref 3.5–5)
POTASSIUM SERPL-SCNC: 3.8 MMOL/L (ref 3.5–5)
POTASSIUM SERPL-SCNC: 4 MMOL/L (ref 3.5–5)
RBC # BLD: 1.83 E12/L (ref 3.5–5.5)
SODIUM BLD-SCNC: 131 MMOL/L (ref 132–146)
SODIUM BLD-SCNC: 131 MMOL/L (ref 132–146)
SODIUM BLD-SCNC: 132 MMOL/L (ref 132–146)
STREP PNEUMONIAE ANTIGEN, URINE: NORMAL
TEAR DROP CELLS: ABNORMAL
TOTAL PROTEIN: 5.3 G/DL (ref 6.4–8.3)
WBC # BLD: 10 E9/L (ref 4.5–11.5)

## 2018-09-26 PROCEDURE — 78708 K FLOW/FUNCT IMAGE W/DRUG: CPT

## 2018-09-26 PROCEDURE — 2580000003 HC RX 258: Performed by: NURSE PRACTITIONER

## 2018-09-26 PROCEDURE — 86901 BLOOD TYPING SEROLOGIC RH(D): CPT

## 2018-09-26 PROCEDURE — 6370000000 HC RX 637 (ALT 250 FOR IP): Performed by: INTERNAL MEDICINE

## 2018-09-26 PROCEDURE — 2580000003 HC RX 258: Performed by: STUDENT IN AN ORGANIZED HEALTH CARE EDUCATION/TRAINING PROGRAM

## 2018-09-26 PROCEDURE — 2580000003 HC RX 258: Performed by: INTERNAL MEDICINE

## 2018-09-26 PROCEDURE — 2060000000 HC ICU INTERMEDIATE R&B

## 2018-09-26 PROCEDURE — 6370000000 HC RX 637 (ALT 250 FOR IP): Performed by: CLINICAL NURSE SPECIALIST

## 2018-09-26 PROCEDURE — P9016 RBC LEUKOCYTES REDUCED: HCPCS

## 2018-09-26 PROCEDURE — 6360000002 HC RX W HCPCS: Performed by: INTERNAL MEDICINE

## 2018-09-26 PROCEDURE — 82533 TOTAL CORTISOL: CPT

## 2018-09-26 PROCEDURE — 36415 COLL VENOUS BLD VENIPUNCTURE: CPT

## 2018-09-26 PROCEDURE — 6360000002 HC RX W HCPCS: Performed by: NURSE PRACTITIONER

## 2018-09-26 PROCEDURE — 3430000000 HC RX DIAGNOSTIC RADIOPHARMACEUTICAL: Performed by: RADIOLOGY

## 2018-09-26 PROCEDURE — 85025 COMPLETE CBC W/AUTO DIFF WBC: CPT

## 2018-09-26 PROCEDURE — 86850 RBC ANTIBODY SCREEN: CPT

## 2018-09-26 PROCEDURE — 80048 BASIC METABOLIC PNL TOTAL CA: CPT

## 2018-09-26 PROCEDURE — 84100 ASSAY OF PHOSPHORUS: CPT

## 2018-09-26 PROCEDURE — 86923 COMPATIBILITY TEST ELECTRIC: CPT

## 2018-09-26 PROCEDURE — 83735 ASSAY OF MAGNESIUM: CPT

## 2018-09-26 PROCEDURE — A9562 TC99M MERTIATIDE: HCPCS | Performed by: RADIOLOGY

## 2018-09-26 PROCEDURE — 82962 GLUCOSE BLOOD TEST: CPT

## 2018-09-26 PROCEDURE — 80053 COMPREHEN METABOLIC PANEL: CPT

## 2018-09-26 PROCEDURE — 6370000000 HC RX 637 (ALT 250 FOR IP): Performed by: STUDENT IN AN ORGANIZED HEALTH CARE EDUCATION/TRAINING PROGRAM

## 2018-09-26 PROCEDURE — 36430 TRANSFUSION BLD/BLD COMPNT: CPT

## 2018-09-26 PROCEDURE — 86900 BLOOD TYPING SEROLOGIC ABO: CPT

## 2018-09-26 PROCEDURE — 87040 BLOOD CULTURE FOR BACTERIA: CPT

## 2018-09-26 PROCEDURE — 6370000000 HC RX 637 (ALT 250 FOR IP): Performed by: FAMILY MEDICINE

## 2018-09-26 PROCEDURE — 83970 ASSAY OF PARATHORMONE: CPT

## 2018-09-26 PROCEDURE — 6370000000 HC RX 637 (ALT 250 FOR IP): Performed by: NURSE PRACTITIONER

## 2018-09-26 RX ORDER — DIPHENHYDRAMINE HYDROCHLORIDE 50 MG/ML
25 INJECTION INTRAMUSCULAR; INTRAVENOUS ONCE
Status: COMPLETED | OUTPATIENT
Start: 2018-09-26 | End: 2018-09-26

## 2018-09-26 RX ORDER — 0.9 % SODIUM CHLORIDE 0.9 %
1000 INTRAVENOUS SOLUTION INTRAVENOUS ONCE
Status: COMPLETED | OUTPATIENT
Start: 2018-09-26 | End: 2018-09-26

## 2018-09-26 RX ORDER — 0.9 % SODIUM CHLORIDE 0.9 %
250 INTRAVENOUS SOLUTION INTRAVENOUS ONCE
Status: COMPLETED | OUTPATIENT
Start: 2018-09-26 | End: 2018-09-26

## 2018-09-26 RX ORDER — FUROSEMIDE 10 MG/ML
10 INJECTION INTRAMUSCULAR; INTRAVENOUS ONCE
Status: COMPLETED | OUTPATIENT
Start: 2018-09-26 | End: 2018-09-26

## 2018-09-26 RX ORDER — ACETAMINOPHEN 325 MG/1
650 TABLET ORAL ONCE
Status: COMPLETED | OUTPATIENT
Start: 2018-09-26 | End: 2018-09-26

## 2018-09-26 RX ADMIN — ESCITALOPRAM OXALATE 20 MG: 10 TABLET, FILM COATED ORAL at 17:12

## 2018-09-26 RX ADMIN — ONDANSETRON 8 MG: 2 INJECTION INTRAMUSCULAR; INTRAVENOUS at 21:40

## 2018-09-26 RX ADMIN — INSULIN LISPRO 3 UNITS: 100 INJECTION, SOLUTION INTRAVENOUS; SUBCUTANEOUS at 06:57

## 2018-09-26 RX ADMIN — HYDROCORTISONE SODIUM SUCCINATE 100 MG: 100 INJECTION, POWDER, FOR SOLUTION INTRAMUSCULAR; INTRAVENOUS at 09:27

## 2018-09-26 RX ADMIN — VITAMIN C 2 TABLET: TAB at 08:50

## 2018-09-26 RX ADMIN — SODIUM CHLORIDE: 9 INJECTION, SOLUTION INTRAVENOUS at 23:56

## 2018-09-26 RX ADMIN — ASPIRIN AND EXTENDED-RELEASE DIPYRIDAMOLE 1 CAPSULE: 25; 200 CAPSULE ORAL at 08:50

## 2018-09-26 RX ADMIN — DOXYCYCLINE HYCLATE 100 MG: 100 CAPSULE ORAL at 08:50

## 2018-09-26 RX ADMIN — ACETAMINOPHEN 650 MG: 325 TABLET, FILM COATED ORAL at 09:27

## 2018-09-26 RX ADMIN — Medication 6.5 MILLICURIE: at 14:34

## 2018-09-26 RX ADMIN — GABAPENTIN 300 MG: 300 CAPSULE ORAL at 20:57

## 2018-09-26 RX ADMIN — PANTOPRAZOLE SODIUM 40 MG: 40 TABLET, DELAYED RELEASE ORAL at 08:50

## 2018-09-26 RX ADMIN — FUROSEMIDE 10 MG: 10 INJECTION, SOLUTION INTRAMUSCULAR; INTRAVENOUS at 15:03

## 2018-09-26 RX ADMIN — DIPHENHYDRAMINE HYDROCHLORIDE 25 MG: 50 INJECTION, SOLUTION INTRAMUSCULAR; INTRAVENOUS at 09:27

## 2018-09-26 RX ADMIN — CEFEPIME HYDROCHLORIDE 1 G: 1 INJECTION, POWDER, FOR SOLUTION INTRAMUSCULAR; INTRAVENOUS at 13:21

## 2018-09-26 RX ADMIN — SODIUM CHLORIDE: 9 INJECTION, SOLUTION INTRAVENOUS at 07:40

## 2018-09-26 RX ADMIN — SODIUM CHLORIDE 250 ML: 9 INJECTION, SOLUTION INTRAVENOUS at 12:05

## 2018-09-26 RX ADMIN — Medication 10 ML: at 08:51

## 2018-09-26 RX ADMIN — DIPHENOXYLATE HYDROCHLORIDE AND ATROPINE SULFATE 2 TABLET: 2.5; .025 TABLET ORAL at 13:28

## 2018-09-26 RX ADMIN — INSULIN LISPRO 18 UNITS: 100 INJECTION, SOLUTION INTRAVENOUS; SUBCUTANEOUS at 12:01

## 2018-09-26 RX ADMIN — Medication 10 ML: at 09:27

## 2018-09-26 RX ADMIN — SIMVASTATIN 40 MG: 40 TABLET, FILM COATED ORAL at 20:57

## 2018-09-26 RX ADMIN — GABAPENTIN 300 MG: 300 CAPSULE ORAL at 08:50

## 2018-09-26 RX ADMIN — GABAPENTIN 300 MG: 300 CAPSULE ORAL at 13:28

## 2018-09-26 RX ADMIN — SODIUM CHLORIDE 1000 ML: 9 INJECTION, SOLUTION INTRAVENOUS at 06:02

## 2018-09-26 RX ADMIN — ASPIRIN AND EXTENDED-RELEASE DIPYRIDAMOLE 1 CAPSULE: 25; 200 CAPSULE ORAL at 20:57

## 2018-09-26 RX ADMIN — INSULIN LISPRO 18 UNITS: 100 INJECTION, SOLUTION INTRAVENOUS; SUBCUTANEOUS at 17:12

## 2018-09-26 RX ADMIN — INSULIN LISPRO 9 UNITS: 100 INJECTION, SOLUTION INTRAVENOUS; SUBCUTANEOUS at 21:03

## 2018-09-26 ASSESSMENT — PAIN SCALES - GENERAL
PAINLEVEL_OUTOF10: 0

## 2018-09-26 NOTE — PROGRESS NOTES
[facility]  Nephrology Attending Progress Note  Adi Kirkland.  Aundrea Lawrence MD        SUBJECTIVE:     9/26: with cough and fatigue  9/27: remains fatigued      PROBLEM LIST:    Patient Active Problem List   Diagnosis    Malignant neoplasm of central portion of right breast in female, estrogen receptor negative ([facility]    Breast cancer metastasized to axillary lymph node, right ([facility]    Encounter for insertion of venous access port    SIRS (systemic inflammatory response syndrome) (HCC)    Type 2 diabetes mellitus with hyperglycemia, without long-term current use of insulin (HCC)    HTN (hypertension)    History of stroke    Hyponatremia    ZHAO (acute kidney injury) ([facility]        PAST MEDICAL HISTORY:    Past Medical History:   Diagnosis Date    Acid reflux     [facility]     right    Hypertension     Insomnia     Seasonal allergies     [facility] 2013    tia    Type 2 diabetes mellitus without complication (HCC)        DIET:    DIET CARDIAC; Carb Control: 5 carb choices (75 gms)/meal     PHYSICAL EXAM:     Patient Vitals for the past 24 hrs:   BP Temp Temp src Pulse Resp SpO2 Weight   09/26/18 1045 (!) 112/55 97.2 °F (36.2 °C) Temporal 89 20 96 % -   09/26/18 1019 (!) 113/58 97.1 °F (36.2 °C) Temporal 91 20 97 % -   09/26/18 0559 - - - - - - 157 lb 8 oz (71.4 kg)   09/25/18 2354 108/64 97.6 °F (36.4 °C) Oral 82 20 97 % -   09/25/18 2013 (!) 106/55 97.8 °F (36.6 °C) Oral 92 20 97 % -   09/25/18 1545 (!) 103/50 97.5 °F (36.4 °C) Temporal 105 21 96 % -   09/25/18 1424 (!) 96/49 98.4 °F (36.9 °C) Temporal 112 22 98 % -   @      Intake/Output Summary (Last 24 hours) at 09/26/18 1236  Last data filed at 09/26/18 0800   Gross per 24 hour   Intake             1020 ml   Output             1800 ml   Net             -780 ml         Wt Readings from Last 3 Encounters:   09/26/18 157 lb 8 oz (71.4 kg)   09/22/18 148 lb (67.1 kg)   09/13/18 151 lb (68.5 kg)       Constitutional:  Pt is in no acute --   90   MG  1.6  1.4*   --    --    --   1.9   PHOS   --    --    --    --    --   2.4*    < > = values in this interval not displayed. Lab Results   Component Value Date    LABALBU 2.5 (L) 09/26/2018    LABALBU 2.9 (L) 09/24/2018    LABALBU 4.0 09/12/2018     Lab Results   Component Value Date    TSH 0.505 09/25/2018     No results found for: PHART, PO2ART, YPJ8HKS    Iron Studies: No results found for: IRON, TIBC, FERRITIN      IMPRESSION/RECOMMENDATIONS:      1. Stage II ZHAO: likely from decreased effective renal perfusion in an immunocompromised patient with current chemo with current infectious process further complicated by the use of NSAIDs, ACEI, neurontin, and recent ATB use. Baseline SrCreat 0.9-1.0, current SrCreat 2.2. She received 2L NS bolus at [facility], with concurrent hyponatremia and correction from 123-128 over last 24 hours, we will hold any additional IV fluids at this time. Urine lytes are pending. Renal US ordered.      2- Hypokalemia: replace prn     3- Hyponatremia: Na+ 123 on 9/24, now improved , Hyponatremia could be further complicated by use of SSRI.     4- Hypomagnesia/hypophos: replace prn     5- Hypocalcemia: Ca++ 7.7, but corrects for albumin     6- Anemia of chronic disease: Will defer to heme/onc as they are managing. On B12 supp.     7- HTN- at goal.  ACEI from home meds D/C at admission. Will monitor BP and add additional agents if needed.       8- Leukocytosis-  Currently on vanc and cefepime           Thank you Dr. Renee Bray MD for allowing us to participate in care of Amada.  Alejandro Guerrero MD

## 2018-09-26 NOTE — PROGRESS NOTES
Hospitalist Progress Note      PCP: Kevin Salas MD    Date of Admission: 9/25/2018  Days in the hospital: 1    Chief Complaint: fever    Hospital Course:     Pt was admitted for sepsis most likely from community acquired pneumonia. Pt was initally hypotensive and responded to fluids. ID was consulted and pt was cultured. Started on PO azithromycin and doxycycline. Pt was found to have hyponatremia and started on iv-fluids and nephrology was consulted. She also had hg of 5.6 and was transfused 2 U PRBC. Subjective  Patient seen and examined at bedside. Patient states that she feels better today and did not have any chills overnight. Found to have Hg of 5.6 this morning and will be transfused 2 U PRBC. Patient denies any fevers, chills, chest pain, shortness of breath, nausea, vomiting.       Medications:  Reviewed    Infusion Medications    dextrose      sodium chloride 100 mL/hr at 09/26/18 0740     Scheduled Medications    sodium chloride  250 mL Intravenous Once    sodium chloride flush  10 mL Intravenous 2 times per day    enoxaparin  40 mg Subcutaneous Daily    pantoprazole  40 mg Oral Daily    vitamin B and C  2 tablet Oral Daily    dipyridamole-aspirin  1 capsule Oral BID    escitalopram  20 mg Oral QPM    gabapentin  300 mg Oral TID    simvastatin  40 mg Oral Nightly    insulin lispro  0-18 Units Subcutaneous TID WC    insulin lispro  0-9 Units Subcutaneous Nightly    azithromycin  500 mg Oral Daily    doxycycline hyclate  100 mg Oral 2 times per day    nicotine  1 patch Transdermal Daily     PRN Meds: sodium chloride flush, acetaminophen, ALPRAZolam, diphenoxylate-atropine, glucose, dextrose, glucagon (rDNA), dextrose, ondansetron, heparin flush (100 units/mL)      Intake/Output Summary (Last 24 hours) at 09/26/18 1020  Last data filed at 09/26/18 0800   Gross per 24 hour   Intake             1260 ml   Output             1800 ml   Net             -540 ml       Exam:    /64

## 2018-09-27 ENCOUNTER — APPOINTMENT (OUTPATIENT)
Dept: GENERAL RADIOLOGY | Age: 68
DRG: 854 | End: 2018-09-27
Attending: FAMILY MEDICINE
Payer: MEDICARE

## 2018-09-27 ENCOUNTER — APPOINTMENT (OUTPATIENT)
Dept: CT IMAGING | Age: 68
DRG: 854 | End: 2018-09-27
Attending: FAMILY MEDICINE
Payer: MEDICARE

## 2018-09-27 LAB
ALBUMIN SERPL-MCNC: 3.1 G/DL (ref 3.5–5.2)
ALP BLD-CCNC: 98 U/L (ref 35–104)
ALT SERPL-CCNC: 27 U/L (ref 0–32)
ANION GAP SERPL CALCULATED.3IONS-SCNC: 17 MMOL/L (ref 7–16)
ANISOCYTOSIS: ABNORMAL
AST SERPL-CCNC: 23 U/L (ref 0–31)
BASOPHILS ABSOLUTE: 0.01 E9/L (ref 0–0.2)
BASOPHILS RELATIVE PERCENT: 0.1 % (ref 0–2)
BILIRUB SERPL-MCNC: 0.4 MG/DL (ref 0–1.2)
BUN BLDV-MCNC: 37 MG/DL (ref 8–23)
BURR CELLS: ABNORMAL
CALCIUM SERPL-MCNC: 8.1 MG/DL (ref 8.6–10.2)
CHLORIDE BLD-SCNC: 99 MMOL/L (ref 98–107)
CK MB: 6.3 NG/ML (ref 0–4.3)
CO2: 19 MMOL/L (ref 22–29)
CREAT SERPL-MCNC: 1.7 MG/DL (ref 0.5–1)
CULTURE, RESPIRATORY: NORMAL
EKG ATRIAL RATE: 126 BPM
EKG P AXIS: 49 DEGREES
EKG P-R INTERVAL: 148 MS
EKG Q-T INTERVAL: 304 MS
EKG QRS DURATION: 80 MS
EKG QTC CALCULATION (BAZETT): 440 MS
EKG R AXIS: 32 DEGREES
EKG T AXIS: 90 DEGREES
EKG VENTRICULAR RATE: 126 BPM
EOSINOPHILS ABSOLUTE: 0.01 E9/L (ref 0.05–0.5)
EOSINOPHILS RELATIVE PERCENT: 0.1 % (ref 0–6)
GFR AFRICAN AMERICAN: 36
GFR NON-AFRICAN AMERICAN: 30 ML/MIN/1.73
GLUCOSE BLD-MCNC: 296 MG/DL (ref 74–109)
HCT VFR BLD CALC: 26.7 % (ref 34–48)
HEMOGLOBIN: 8.8 G/DL (ref 11.5–15.5)
IMMATURE GRANULOCYTES #: 0.2 E9/L
IMMATURE GRANULOCYTES %: 1.2 % (ref 0–5)
LYMPHOCYTES ABSOLUTE: 0.53 E9/L (ref 1.5–4)
LYMPHOCYTES RELATIVE PERCENT: 3.2 % (ref 20–42)
MAGNESIUM: 1.8 MG/DL (ref 1.6–2.6)
MCH RBC QN AUTO: 29.9 PG (ref 26–35)
MCHC RBC AUTO-ENTMCNC: 33 % (ref 32–34.5)
MCV RBC AUTO: 90.8 FL (ref 80–99.9)
METER GLUCOSE: 223 MG/DL (ref 70–110)
METER GLUCOSE: 240 MG/DL (ref 70–110)
METER GLUCOSE: 253 MG/DL (ref 70–110)
METER GLUCOSE: 386 MG/DL (ref 70–110)
METER GLUCOSE: 430 MG/DL (ref 70–110)
METER GLUCOSE: 435 MG/DL (ref 70–110)
MONOCYTES ABSOLUTE: 0.46 E9/L (ref 0.1–0.95)
MONOCYTES RELATIVE PERCENT: 2.8 % (ref 2–12)
NEUTROPHILS ABSOLUTE: 15.28 E9/L (ref 1.8–7.3)
NEUTROPHILS RELATIVE PERCENT: 92.6 % (ref 43–80)
PDW BLD-RTO: 19.6 FL (ref 11.5–15)
PLATELET # BLD: 103 E9/L (ref 130–450)
PMV BLD AUTO: 11.5 FL (ref 7–12)
POIKILOCYTES: ABNORMAL
POLYCHROMASIA: ABNORMAL
POTASSIUM REFLEX MAGNESIUM: 3.5 MMOL/L (ref 3.5–5)
RBC # BLD: 2.94 E12/L (ref 3.5–5.5)
SMEAR, RESPIRATORY: NORMAL
SODIUM BLD-SCNC: 135 MMOL/L (ref 132–146)
TEAR DROP CELLS: ABNORMAL
TOTAL CK: 61 U/L (ref 20–180)
TOTAL PROTEIN: 5.7 G/DL (ref 6.4–8.3)
TROPONIN: 0.16 NG/ML (ref 0–0.03)
TROPONIN: 0.24 NG/ML (ref 0–0.03)
TROPONIN: 0.5 NG/ML (ref 0–0.03)
URINE CULTURE, ROUTINE: NORMAL
WBC # BLD: 16.5 E9/L (ref 4.5–11.5)

## 2018-09-27 PROCEDURE — 80053 COMPREHEN METABOLIC PANEL: CPT

## 2018-09-27 PROCEDURE — 36415 COLL VENOUS BLD VENIPUNCTURE: CPT

## 2018-09-27 PROCEDURE — 82553 CREATINE MB FRACTION: CPT

## 2018-09-27 PROCEDURE — 6370000000 HC RX 637 (ALT 250 FOR IP): Performed by: INTERNAL MEDICINE

## 2018-09-27 PROCEDURE — 2060000000 HC ICU INTERMEDIATE R&B

## 2018-09-27 PROCEDURE — 2580000003 HC RX 258: Performed by: INTERNAL MEDICINE

## 2018-09-27 PROCEDURE — 94664 DEMO&/EVAL PT USE INHALER: CPT

## 2018-09-27 PROCEDURE — 93005 ELECTROCARDIOGRAM TRACING: CPT | Performed by: STUDENT IN AN ORGANIZED HEALTH CARE EDUCATION/TRAINING PROGRAM

## 2018-09-27 PROCEDURE — 6370000000 HC RX 637 (ALT 250 FOR IP): Performed by: HOSPITALIST

## 2018-09-27 PROCEDURE — 84484 ASSAY OF TROPONIN QUANT: CPT

## 2018-09-27 PROCEDURE — 83735 ASSAY OF MAGNESIUM: CPT

## 2018-09-27 PROCEDURE — 94640 AIRWAY INHALATION TREATMENT: CPT

## 2018-09-27 PROCEDURE — 6370000000 HC RX 637 (ALT 250 FOR IP): Performed by: CLINICAL NURSE SPECIALIST

## 2018-09-27 PROCEDURE — 6360000002 HC RX W HCPCS: Performed by: INTERNAL MEDICINE

## 2018-09-27 PROCEDURE — 71045 X-RAY EXAM CHEST 1 VIEW: CPT

## 2018-09-27 PROCEDURE — 2500000003 HC RX 250 WO HCPCS: Performed by: INTERNAL MEDICINE

## 2018-09-27 PROCEDURE — 6370000000 HC RX 637 (ALT 250 FOR IP): Performed by: FAMILY MEDICINE

## 2018-09-27 PROCEDURE — 99222 1ST HOSP IP/OBS MODERATE 55: CPT | Performed by: INTERNAL MEDICINE

## 2018-09-27 PROCEDURE — 6360000002 HC RX W HCPCS: Performed by: STUDENT IN AN ORGANIZED HEALTH CARE EDUCATION/TRAINING PROGRAM

## 2018-09-27 PROCEDURE — 6370000000 HC RX 637 (ALT 250 FOR IP): Performed by: STUDENT IN AN ORGANIZED HEALTH CARE EDUCATION/TRAINING PROGRAM

## 2018-09-27 PROCEDURE — APPSS60 APP SPLIT SHARED TIME 46-60 MINUTES: Performed by: NURSE PRACTITIONER

## 2018-09-27 PROCEDURE — 2500000003 HC RX 250 WO HCPCS: Performed by: STUDENT IN AN ORGANIZED HEALTH CARE EDUCATION/TRAINING PROGRAM

## 2018-09-27 PROCEDURE — 87040 BLOOD CULTURE FOR BACTERIA: CPT

## 2018-09-27 PROCEDURE — 2700000000 HC OXYGEN THERAPY PER DAY

## 2018-09-27 PROCEDURE — 82962 GLUCOSE BLOOD TEST: CPT

## 2018-09-27 PROCEDURE — 85025 COMPLETE CBC W/AUTO DIFF WBC: CPT

## 2018-09-27 PROCEDURE — 2580000003 HC RX 258: Performed by: STUDENT IN AN ORGANIZED HEALTH CARE EDUCATION/TRAINING PROGRAM

## 2018-09-27 PROCEDURE — 74176 CT ABD & PELVIS W/O CONTRAST: CPT

## 2018-09-27 PROCEDURE — 99233 SBSQ HOSP IP/OBS HIGH 50: CPT | Performed by: INTERNAL MEDICINE

## 2018-09-27 PROCEDURE — 94760 N-INVAS EAR/PLS OXIMETRY 1: CPT

## 2018-09-27 PROCEDURE — 82550 ASSAY OF CK (CPK): CPT

## 2018-09-27 RX ORDER — ZOLPIDEM TARTRATE 5 MG/1
5 TABLET ORAL NIGHTLY PRN
Status: DISCONTINUED | OUTPATIENT
Start: 2018-09-28 | End: 2018-09-27 | Stop reason: CLARIF

## 2018-09-27 RX ORDER — METOPROLOL SUCCINATE 25 MG/1
25 TABLET, EXTENDED RELEASE ORAL DAILY
Status: DISCONTINUED | OUTPATIENT
Start: 2018-09-27 | End: 2018-09-27

## 2018-09-27 RX ORDER — ZOLPIDEM TARTRATE 5 MG/1
5 TABLET ORAL NIGHTLY PRN
Status: DISCONTINUED | OUTPATIENT
Start: 2018-09-27 | End: 2018-10-01 | Stop reason: HOSPADM

## 2018-09-27 RX ORDER — METOPROLOL TARTRATE 5 MG/5ML
5 INJECTION INTRAVENOUS EVERY 6 HOURS PRN
Status: DISCONTINUED | OUTPATIENT
Start: 2018-09-27 | End: 2018-09-27

## 2018-09-27 RX ORDER — IPRATROPIUM BROMIDE AND ALBUTEROL SULFATE 2.5; .5 MG/3ML; MG/3ML
1 SOLUTION RESPIRATORY (INHALATION)
Status: DISCONTINUED | OUTPATIENT
Start: 2018-09-27 | End: 2018-10-01 | Stop reason: HOSPADM

## 2018-09-27 RX ORDER — MORPHINE SULFATE 2 MG/ML
2 INJECTION, SOLUTION INTRAMUSCULAR; INTRAVENOUS EVERY 4 HOURS PRN
Status: DISCONTINUED | OUTPATIENT
Start: 2018-09-27 | End: 2018-09-28

## 2018-09-27 RX ADMIN — ACETAMINOPHEN 650 MG: 325 TABLET, FILM COATED ORAL at 08:46

## 2018-09-27 RX ADMIN — IPRATROPIUM BROMIDE AND ALBUTEROL SULFATE 1 AMPULE: .5; 3 SOLUTION RESPIRATORY (INHALATION) at 21:31

## 2018-09-27 RX ADMIN — ALPRAZOLAM 0.25 MG: 0.25 TABLET ORAL at 06:59

## 2018-09-27 RX ADMIN — INSULIN LISPRO 9 UNITS: 100 INJECTION, SOLUTION INTRAVENOUS; SUBCUTANEOUS at 23:02

## 2018-09-27 RX ADMIN — IPRATROPIUM BROMIDE AND ALBUTEROL SULFATE 1 AMPULE: .5; 3 SOLUTION RESPIRATORY (INHALATION) at 18:26

## 2018-09-27 RX ADMIN — MORPHINE SULFATE 2 MG: 2 INJECTION, SOLUTION INTRAMUSCULAR; INTRAVENOUS at 08:47

## 2018-09-27 RX ADMIN — SODIUM PHOSPHATE, MONOBASIC, MONOHYDRATE 10 MMOL: 276; 142 INJECTION, SOLUTION INTRAVENOUS at 21:00

## 2018-09-27 RX ADMIN — SODIUM CHLORIDE: 9 INJECTION, SOLUTION INTRAVENOUS at 22:12

## 2018-09-27 RX ADMIN — SIMVASTATIN 40 MG: 40 TABLET, FILM COATED ORAL at 20:54

## 2018-09-27 RX ADMIN — INSULIN LISPRO 15 UNITS: 100 INJECTION, SOLUTION INTRAVENOUS; SUBCUTANEOUS at 16:58

## 2018-09-27 RX ADMIN — VITAMIN C 2 TABLET: TAB at 09:07

## 2018-09-27 RX ADMIN — ZOLPIDEM TARTRATE 5 MG: 5 TABLET ORAL at 23:00

## 2018-09-27 RX ADMIN — GABAPENTIN 300 MG: 300 CAPSULE ORAL at 15:13

## 2018-09-27 RX ADMIN — METOPROLOL SUCCINATE 25 MG: 25 TABLET, FILM COATED, EXTENDED RELEASE ORAL at 17:03

## 2018-09-27 RX ADMIN — SODIUM CHLORIDE: 9 INJECTION, SOLUTION INTRAVENOUS at 11:41

## 2018-09-27 RX ADMIN — GABAPENTIN 300 MG: 300 CAPSULE ORAL at 09:08

## 2018-09-27 RX ADMIN — ASPIRIN AND EXTENDED-RELEASE DIPYRIDAMOLE 1 CAPSULE: 25; 200 CAPSULE ORAL at 09:07

## 2018-09-27 RX ADMIN — ESCITALOPRAM OXALATE 20 MG: 10 TABLET, FILM COATED ORAL at 20:54

## 2018-09-27 RX ADMIN — INSULIN LISPRO 6 UNITS: 100 INJECTION, SOLUTION INTRAVENOUS; SUBCUTANEOUS at 11:33

## 2018-09-27 RX ADMIN — IPRATROPIUM BROMIDE AND ALBUTEROL SULFATE 1 AMPULE: .5; 3 SOLUTION RESPIRATORY (INHALATION) at 08:57

## 2018-09-27 RX ADMIN — PANTOPRAZOLE SODIUM 40 MG: 40 TABLET, DELAYED RELEASE ORAL at 09:08

## 2018-09-27 RX ADMIN — ALPRAZOLAM 0.25 MG: 0.25 TABLET ORAL at 23:00

## 2018-09-27 RX ADMIN — CEFEPIME HYDROCHLORIDE 1 G: 1 INJECTION, POWDER, FOR SOLUTION INTRAMUSCULAR; INTRAVENOUS at 12:26

## 2018-09-27 RX ADMIN — IPRATROPIUM BROMIDE AND ALBUTEROL SULFATE 1 AMPULE: .5; 3 SOLUTION RESPIRATORY (INHALATION) at 04:17

## 2018-09-27 RX ADMIN — INSULIN LISPRO 6 UNITS: 100 INJECTION, SOLUTION INTRAVENOUS; SUBCUTANEOUS at 09:08

## 2018-09-27 RX ADMIN — CEFEPIME HYDROCHLORIDE 1 G: 1 INJECTION, POWDER, FOR SOLUTION INTRAMUSCULAR; INTRAVENOUS at 01:02

## 2018-09-27 RX ADMIN — METOPROLOL TARTRATE 5 MG: 1 INJECTION, SOLUTION INTRAVENOUS at 08:46

## 2018-09-27 RX ADMIN — GABAPENTIN 300 MG: 300 CAPSULE ORAL at 20:54

## 2018-09-27 RX ADMIN — IPRATROPIUM BROMIDE AND ALBUTEROL SULFATE 1 AMPULE: .5; 3 SOLUTION RESPIRATORY (INHALATION) at 12:41

## 2018-09-27 RX ADMIN — Medication 10 ML: at 08:47

## 2018-09-27 RX ADMIN — ASPIRIN AND EXTENDED-RELEASE DIPYRIDAMOLE 1 CAPSULE: 25; 200 CAPSULE ORAL at 20:54

## 2018-09-27 ASSESSMENT — ENCOUNTER SYMPTOMS
SHORTNESS OF BREATH: 1
ABDOMINAL PAIN: 0
DIARRHEA: 0
VOMITING: 0
COUGH: 1
BACK PAIN: 1
SPUTUM PRODUCTION: 1

## 2018-09-27 ASSESSMENT — PAIN SCALES - GENERAL
PAINLEVEL_OUTOF10: 0
PAINLEVEL_OUTOF10: 0
PAINLEVEL_OUTOF10: 10
PAINLEVEL_OUTOF10: 0
PAINLEVEL_OUTOF10: 10
PAINLEVEL_OUTOF10: 0
PAINLEVEL_OUTOF10: 0

## 2018-09-27 ASSESSMENT — PAIN DESCRIPTION - LOCATION: LOCATION: HIP

## 2018-09-27 ASSESSMENT — PAIN DESCRIPTION - FREQUENCY: FREQUENCY: CONTINUOUS

## 2018-09-27 ASSESSMENT — PAIN DESCRIPTION - PROGRESSION: CLINICAL_PROGRESSION: RAPIDLY WORSENING

## 2018-09-27 ASSESSMENT — PAIN DESCRIPTION - DESCRIPTORS: DESCRIPTORS: SHOOTING;STABBING;THROBBING

## 2018-09-27 ASSESSMENT — PAIN DESCRIPTION - ORIENTATION: ORIENTATION: RIGHT;LEFT

## 2018-09-27 ASSESSMENT — PAIN DESCRIPTION - PAIN TYPE: TYPE: CHRONIC PAIN

## 2018-09-27 ASSESSMENT — PAIN DESCRIPTION - ONSET: ONSET: PROGRESSIVE

## 2018-09-27 NOTE — CONSULTS
every 8 hours as needed for Pain Take with food. guaiFENesin-codeine (GUAIFENESIN AC) 100-10 MG/5ML liquid, Take 5 mLs by mouth 3 times daily as needed for Cough for up to 5 days. .  ranitidine (ZANTAC) 150 MG tablet, Take 150 mg by mouth nightly as needed for Heartburn (severe heartburn)  loperamide (IMODIUM) 2 MG capsule, Take 2 mg by mouth 4 times daily as needed for Diarrhea  B-Complex TABS, Take 2 tablets by mouth daily  gabapentin (NEURONTIN) 300 MG capsule, Take 1 capsule by mouth 3 times daily for 90 days. Start with 1 capsule at bedtime for 1 week and increase every week until reach 3xday. dexamethasone (DECADRON) 4 MG tablet, Take 1 tablet by mouth 2 times daily Take for 3 days, Starting the day before chemotherapy, the day of and day after chemotherapy. naproxen (NAPROSYN) 500 MG tablet, Take 1 tablet by mouth 2 times daily (with meals)  ondansetron (ZOFRAN) 8 MG tablet, Take 1 tablet by mouth every 8 hours as needed for Nausea or Vomiting  prochlorperazine (COMPAZINE) 10 MG tablet, Take 1 tablet by mouth every 6 hours as needed (Nausea)  diphenoxylate-atropine (DIPHENATOL) 2.5-0.025 MG per tablet, Take 2 tablets by mouth 4 times daily as needed for Diarrhea. .  escitalopram (LEXAPRO) 20 MG tablet, Take 20 mg by mouth every evening   simvastatin (ZOCOR) 40 MG tablet, Take 40 mg by mouth daily   dipyridamole-aspirin (AGGRENOX)  MG per extended release capsule, Take 1 capsule by mouth 2 times daily  metFORMIN (GLUCOPHAGE) 500 MG tablet, Take 1,000 mg by mouth 2 times daily (with meals)   Dulaglutide (TRULICITY) 0.70 RH/3.9RP SOPN, Inject into the skin every 7 days saturday  linagliptin (TRADJENTA) 5 MG tablet, Take 5 mg by mouth daily  atenolol-chlorthalidone (TENORETIC) 50-25 MG per tablet, Take 1 tablet by mouth daily  lisinopril (PRINIVIL;ZESTRIL) 20 MG tablet, Take 20 mg by mouth every evening   zolpidem (AMBIEN) 5 MG tablet, Take 5 mg by mouth nightly as needed for Sleep. Lupe Fields   ALPRAZostanislaw Chavez) 0.25 MG tablet, Take 0.25 mg by mouth nightly as needed for Sleep. .  cetirizine (ZYRTEC ALLERGY) 10 MG tablet, Take 10 mg by mouth daily  melatonin 3 MG TABS tablet, Take 3 mg by mouth daily  [DISCONTINUED] nystatin (MYCOSTATIN) 139000 UNIT/ML suspension, Take 5 mLs by mouth 4 times daily    Allergies: Avelox [moxifloxacin]    Social History:    reports that she has been smoking Cigarettes. She has a 75.00 pack-year smoking history. She has never used smokeless tobacco. She reports that she does not drink alcohol or use drugs. Family History:   Non-contributory to this Urological problem  family history includes Cancer in her sister; Cancer (age of onset: [de-identified]) in her mother; Diabetes in her mother; Heart Disease in her mother; Hypertension in her father and mother; Stroke in her maternal grandmother and paternal grandmother. Review of Systems:  Review of Systems   Constitutional: Positive for chills, diaphoresis, fever and malaise/fatigue. Respiratory: Positive for cough, sputum production and shortness of breath. Cardiovascular: Negative for chest pain and palpitations. Gastrointestinal: Negative for abdominal pain, diarrhea and vomiting. Genitourinary: Negative for dysuria, flank pain, frequency, hematuria and urgency. Musculoskeletal: Positive for back pain, joint pain and myalgias. Skin: Negative for itching and rash. Physical Exam:     Vitals:  BP (!) 104/59   Pulse 98   Temp 97.7 °F (36.5 °C) (Oral)   Resp 16   Ht 5' 2\" (1.575 m)   Wt 164 lb 8 oz (74.6 kg)   SpO2 99%   BMI 30.09 kg/m²     Physical Exam   Constitutional: She is oriented to person, place, and time. No distress. HENT:   Head: Normocephalic and atraumatic. Eyes: Pupils are equal, round, and reactive to light. Conjunctivae are normal.   Cardiovascular: Normal rate and regular rhythm. Pulmonary/Chest: Effort normal. No respiratory distress. Oxygen per NC     Abdominal: Soft.  Normal appearance and bowel

## 2018-09-27 NOTE — PROGRESS NOTES
BMI 30.09 kg/m²     General appearance: in distress, appears stated age and cooperative. HEENT: Conjunctivae/corneas clear. Pupils equal and round. No injections noted. Neck: Supple. No lesions, scars or masses. Trachea midline. Respiratory:  tachypenic. Clear to auscultation, bilaterally without Rales/Wheezes/Rhonchi. Cardiovascular: tahcycardic and rhythm with normal S1/S2 without murmurs, rubs or gallops. Abdomen: Soft, non-tender, non-distended with normal bowel sounds. Musculoskeletal: No clubbing, cyanosis or edema bilaterally. Brisk capillary refill. 2+ lower extremity pulses (dorsalis pedis). Skin:  No rashes    Neurologic: awake, alert and following commands       Labs:   Recent Labs      09/24/18   1802  09/26/18   0415  09/27/18   0525   WBC  10.9*  10.0  16.5*   HGB  7.7*  5.6*  8.8*   HCT  23.2*  17.3*  26.7*   PLT  93*  76*  103*     Recent Labs      09/26/18   0005  09/26/18   0415  09/27/18   0525   NA  131*  132  131*  135   K  3.8  4.0  4.1  3.5   CL  100  99  99  99   CO2  21*  19*  19*  19*   BUN  31*  33*  33*  37*   CREATININE  2.0*  1.9*  1.9*  1.7*   CALCIUM  7.6*  7.3*  7.5*  8.1*   PHOS   --   2.4*   --      Recent Labs      09/24/18   1802  09/26/18   0415  09/27/18   0525   AST  35  34*  23   ALT  40  28  27   BILITOT  1.0  0.3  0.4   ALKPHOS  125*  90  98     Recent Labs      09/24/18   1802   INR  1.0*     Recent Labs      09/24/18   1802  09/25/18   0911   CKTOTAL   --   321*   TROPONINI  0.036   --        Imaging:  CT ABDOMEN PELVIS WO CONTRAST   Final Result   There are multiple diverticula seen    Splenomegaly   Bilateral mild hydronephrosis   Bilateral ureteral calculi   Findings compatible with mild ascites                           NM KIDNEY W FLOW AND FUNCTION W PHARMACOLOGICAL INTERVENTION   Final Result      No evidence for obstructive uropathy. Good response to Lasix diuresis of the kidneys. US RETROPERITONEAL COMPLETE   Final Result   1.  No

## 2018-09-27 NOTE — CONSULTS
No accessory muscle use or intercostal retractions. +LCW Mediport with IV fluids. RESPIRATORY: Lung sounds - clear throughout fields   CARDIOVASCULAR:     Heart Inspection- shows no noted pulsations  Heart Palpation- no heaves or thrills; PMI is non-displaced   Heart Ausculation- Regular rate and rhythm, no murmur. No s3, s4 or rub   PV: No lower extremity edema. No varicosities. Pedal pulses palpable, no clubbing or cyanosis   ABDOMEN: Soft, obese, non-tender to light palpation. Bowel sounds present. No palpable masses no organomegaly; no abdominal bruit  MS: Good muscle strength and tone. No atrophy or abnormal movements. : Deferred  SKIN: Warm and dry no statis dermatitis or ulcers   NEURO / PSYCH: Oriented to person, place and time. Speech clear and appropriate. Follows all commands. Pleasant affect     DATA:    ECG / Tele strips: SR.   EC2018: ST, NSSTT changes, rate 126 bpm.   Diagnostic:    CXR: 2018  1. Suspected bibasilar infiltrate/pneumonia versus atelectasis versus   a combination of both, clinical correlation for infectious parameters   recommended. 2. Vascular calcifications thoracic aorta. 3. Please note this study does not exclude the possibility of   metastatic breast cancer or underlying pulmonary metastatic malignancy   in this patient with a history of breast cancer. US Retroperitoneal: 2018  1. No evidence of perinephric collections, renal calculi or solid   renal masses. 2. Minimal right-sided hydronephrosis. 3. Simple left renal cyst.         NM Kidney W Flow: 2018     No evidence for obstructive uropathy.       Good response to Lasix diuresis of the kidneys.              Intake/Output Summary (Last 24 hours) at 18 1422  Last data filed at 18 0645   Gross per 24 hour   Intake             3102 ml   Output             2225 ml   Net              877 ml       Labs:   CBC:   Recent Labs      18   0415  18   0525   WBC  10.0  16.5*

## 2018-09-27 NOTE — PROGRESS NOTES
Received a call from [facility] re positive blood cultures that were drawn there on 9-24. Blood cultures are positive for klebsiella Pneumonia. Will notify ID when they round.

## 2018-09-28 ENCOUNTER — ANESTHESIA EVENT (OUTPATIENT)
Dept: OPERATING ROOM | Age: 68
DRG: 854 | End: 2018-09-28
Payer: MEDICARE

## 2018-09-28 ENCOUNTER — ANESTHESIA (OUTPATIENT)
Dept: OPERATING ROOM | Age: 68
DRG: 854 | End: 2018-09-28
Payer: MEDICARE

## 2018-09-28 ENCOUNTER — APPOINTMENT (OUTPATIENT)
Dept: GENERAL RADIOLOGY | Age: 68
DRG: 854 | End: 2018-09-28
Attending: FAMILY MEDICINE
Payer: MEDICARE

## 2018-09-28 VITALS
RESPIRATION RATE: 15 BRPM | SYSTOLIC BLOOD PRESSURE: 114 MMHG | DIASTOLIC BLOOD PRESSURE: 52 MMHG | OXYGEN SATURATION: 100 %

## 2018-09-28 LAB
ALBUMIN SERPL-MCNC: 2.7 G/DL (ref 3.5–5.2)
ALP BLD-CCNC: 102 U/L (ref 35–104)
ALT SERPL-CCNC: 31 U/L (ref 0–32)
ANION GAP SERPL CALCULATED.3IONS-SCNC: 17 MMOL/L (ref 7–16)
ANISOCYTOSIS: ABNORMAL
AST SERPL-CCNC: 31 U/L (ref 0–31)
B.E.: -4.3 MMOL/L (ref -3–0)
BASOPHILIC STIPPLING: ABNORMAL
BASOPHILS ABSOLUTE: 0.01 E9/L (ref 0–0.2)
BASOPHILS RELATIVE PERCENT: 0.1 % (ref 0–2)
BILIRUB SERPL-MCNC: 0.5 MG/DL (ref 0–1.2)
BLOOD BANK DISPENSE STATUS: NORMAL
BLOOD BANK PRODUCT CODE: NORMAL
BPU ID: NORMAL
BUN BLDV-MCNC: 32 MG/DL (ref 8–23)
CALCIUM SERPL-MCNC: 7.9 MG/DL (ref 8.6–10.2)
CHLORIDE BLD-SCNC: 101 MMOL/L (ref 98–107)
CO2: 19 MMOL/L (ref 22–29)
CREAT SERPL-MCNC: 1.5 MG/DL (ref 0.5–1)
DELIVERY SYSTEMS: ABNORMAL
DESCRIPTION BLOOD BANK: NORMAL
DEVICE: ABNORMAL
EOSINOPHILS ABSOLUTE: 0.05 E9/L (ref 0.05–0.5)
EOSINOPHILS RELATIVE PERCENT: 0.4 % (ref 0–6)
GFR AFRICAN AMERICAN: 42
GFR NON-AFRICAN AMERICAN: 35 ML/MIN/1.73
GLUCOSE BLD-MCNC: 303 MG/DL (ref 74–109)
HCO3 ARTERIAL: 20.2 MMOL/L (ref 22–26)
HCT VFR BLD CALC: 22.8 % (ref 34–48)
HEMOGLOBIN: 7.6 G/DL (ref 11.5–15.5)
IMMATURE GRANULOCYTES #: 0.14 E9/L
IMMATURE GRANULOCYTES %: 1.2 % (ref 0–5)
LV EF: 60 %
LVEF MODALITY: NORMAL
LYMPHOCYTES ABSOLUTE: 0.47 E9/L (ref 1.5–4)
LYMPHOCYTES RELATIVE PERCENT: 4.1 % (ref 20–42)
MAGNESIUM: 1.6 MG/DL (ref 1.6–2.6)
MCH RBC QN AUTO: 30.2 PG (ref 26–35)
MCHC RBC AUTO-ENTMCNC: 33.3 % (ref 32–34.5)
MCV RBC AUTO: 90.5 FL (ref 80–99.9)
METER GLUCOSE: 231 MG/DL (ref 70–110)
METER GLUCOSE: 306 MG/DL (ref 70–110)
METER GLUCOSE: 321 MG/DL (ref 70–110)
METER GLUCOSE: 343 MG/DL (ref 70–110)
MONOCYTES ABSOLUTE: 0.48 E9/L (ref 0.1–0.95)
MONOCYTES RELATIVE PERCENT: 4.2 % (ref 2–12)
NEUTROPHILS ABSOLUTE: 10.22 E9/L (ref 1.8–7.3)
NEUTROPHILS RELATIVE PERCENT: 90 % (ref 43–80)
O2 SATURATION: 99.1 % (ref 92–98.5)
OPERATOR ID: 1768
OVALOCYTES: ABNORMAL
PCO2 ARTERIAL: 33.6 MMHG (ref 35–45)
PDW BLD-RTO: 19.1 FL (ref 11.5–15)
PH BLOOD GAS: 7.39 (ref 7.35–7.45)
PLATELET # BLD: 97 E9/L (ref 130–450)
PLATELET CONFIRMATION: NORMAL
PMV BLD AUTO: 10.9 FL (ref 7–12)
PO2 ARTERIAL: 138.1 MMHG (ref 60–80)
POIKILOCYTES: ABNORMAL
POLYCHROMASIA: ABNORMAL
POTASSIUM REFLEX MAGNESIUM: 3.1 MMOL/L (ref 3.5–5)
RBC # BLD: 2.52 E12/L (ref 3.5–5.5)
SODIUM BLD-SCNC: 137 MMOL/L (ref 132–146)
SOURCE, BLOOD GAS: ABNORMAL
TEAR DROP CELLS: ABNORMAL
TOTAL PROTEIN: 5.2 G/DL (ref 6.4–8.3)
WBC # BLD: 11.4 E9/L (ref 4.5–11.5)

## 2018-09-28 PROCEDURE — C1769 GUIDE WIRE: HCPCS | Performed by: UROLOGY

## 2018-09-28 PROCEDURE — C2617 STENT, NON-COR, TEM W/O DEL: HCPCS | Performed by: UROLOGY

## 2018-09-28 PROCEDURE — 6370000000 HC RX 637 (ALT 250 FOR IP): Performed by: CLINICAL NURSE SPECIALIST

## 2018-09-28 PROCEDURE — 7100000000 HC PACU RECOVERY - FIRST 15 MIN: Performed by: UROLOGY

## 2018-09-28 PROCEDURE — 6360000002 HC RX W HCPCS: Performed by: NURSE PRACTITIONER

## 2018-09-28 PROCEDURE — C1758 CATHETER, URETERAL: HCPCS | Performed by: UROLOGY

## 2018-09-28 PROCEDURE — 3600000003 HC SURGERY LEVEL 3 BASE: Performed by: UROLOGY

## 2018-09-28 PROCEDURE — 2580000003 HC RX 258: Performed by: INTERNAL MEDICINE

## 2018-09-28 PROCEDURE — 7100000001 HC PACU RECOVERY - ADDTL 15 MIN: Performed by: UROLOGY

## 2018-09-28 PROCEDURE — 6370000000 HC RX 637 (ALT 250 FOR IP): Performed by: NURSE PRACTITIONER

## 2018-09-28 PROCEDURE — 83735 ASSAY OF MAGNESIUM: CPT

## 2018-09-28 PROCEDURE — 80053 COMPREHEN METABOLIC PANEL: CPT

## 2018-09-28 PROCEDURE — 86923 COMPATIBILITY TEST ELECTRIC: CPT

## 2018-09-28 PROCEDURE — 74420 UROGRAPHY RTRGR +-KUB: CPT

## 2018-09-28 PROCEDURE — 6370000000 HC RX 637 (ALT 250 FOR IP): Performed by: INTERNAL MEDICINE

## 2018-09-28 PROCEDURE — 3600000013 HC SURGERY LEVEL 3 ADDTL 15MIN: Performed by: UROLOGY

## 2018-09-28 PROCEDURE — 36600 WITHDRAWAL OF ARTERIAL BLOOD: CPT

## 2018-09-28 PROCEDURE — 6360000002 HC RX W HCPCS: Performed by: INTERNAL MEDICINE

## 2018-09-28 PROCEDURE — 6360000002 HC RX W HCPCS: Performed by: STUDENT IN AN ORGANIZED HEALTH CARE EDUCATION/TRAINING PROGRAM

## 2018-09-28 PROCEDURE — 93306 TTE W/DOPPLER COMPLETE: CPT

## 2018-09-28 PROCEDURE — P9016 RBC LEUKOCYTES REDUCED: HCPCS

## 2018-09-28 PROCEDURE — 2700000000 HC OXYGEN THERAPY PER DAY

## 2018-09-28 PROCEDURE — 2580000003 HC RX 258

## 2018-09-28 PROCEDURE — 85025 COMPLETE CBC W/AUTO DIFF WBC: CPT

## 2018-09-28 PROCEDURE — 94640 AIRWAY INHALATION TREATMENT: CPT

## 2018-09-28 PROCEDURE — 2060000000 HC ICU INTERMEDIATE R&B

## 2018-09-28 PROCEDURE — 93010 ELECTROCARDIOGRAM REPORT: CPT | Performed by: INTERNAL MEDICINE

## 2018-09-28 PROCEDURE — 6360000002 HC RX W HCPCS: Performed by: ANESTHESIOLOGY

## 2018-09-28 PROCEDURE — 99233 SBSQ HOSP IP/OBS HIGH 50: CPT | Performed by: INTERNAL MEDICINE

## 2018-09-28 PROCEDURE — 6370000000 HC RX 637 (ALT 250 FOR IP): Performed by: FAMILY MEDICINE

## 2018-09-28 PROCEDURE — 82962 GLUCOSE BLOOD TEST: CPT

## 2018-09-28 PROCEDURE — 2709999900 HC NON-CHARGEABLE SUPPLY: Performed by: UROLOGY

## 2018-09-28 PROCEDURE — 0T788DZ DILATION OF BILATERAL URETERS WITH INTRALUMINAL DEVICE, VIA NATURAL OR ARTIFICIAL OPENING ENDOSCOPIC: ICD-10-PCS | Performed by: UROLOGY

## 2018-09-28 PROCEDURE — 6370000000 HC RX 637 (ALT 250 FOR IP): Performed by: HOSPITALIST

## 2018-09-28 PROCEDURE — 2580000003 HC RX 258: Performed by: NURSE PRACTITIONER

## 2018-09-28 PROCEDURE — 3700000001 HC ADD 15 MINUTES (ANESTHESIA): Performed by: UROLOGY

## 2018-09-28 PROCEDURE — 6360000002 HC RX W HCPCS

## 2018-09-28 PROCEDURE — 3700000000 HC ANESTHESIA ATTENDED CARE: Performed by: UROLOGY

## 2018-09-28 PROCEDURE — 36415 COLL VENOUS BLD VENIPUNCTURE: CPT

## 2018-09-28 PROCEDURE — 71045 X-RAY EXAM CHEST 1 VIEW: CPT

## 2018-09-28 PROCEDURE — 6370000000 HC RX 637 (ALT 250 FOR IP): Performed by: STUDENT IN AN ORGANIZED HEALTH CARE EDUCATION/TRAINING PROGRAM

## 2018-09-28 PROCEDURE — 82803 BLOOD GASES ANY COMBINATION: CPT

## 2018-09-28 PROCEDURE — BT141ZZ FLUOROSCOPY OF KIDNEYS, URETERS AND BLADDER USING LOW OSMOLAR CONTRAST: ICD-10-PCS | Performed by: UROLOGY

## 2018-09-28 PROCEDURE — 99223 1ST HOSP IP/OBS HIGH 75: CPT | Performed by: CLINICAL NURSE SPECIALIST

## 2018-09-28 PROCEDURE — 6370000000 HC RX 637 (ALT 250 FOR IP): Performed by: EMERGENCY MEDICINE

## 2018-09-28 DEVICE — URETERAL STENT
Type: IMPLANTABLE DEVICE | Status: FUNCTIONAL
Brand: POLARIS™ ULTRA

## 2018-09-28 DEVICE — STENT URET DBL PGTL BENT TIP GWIRE .035IN PTFE FLX SUT POS: Type: IMPLANTABLE DEVICE | Status: FUNCTIONAL

## 2018-09-28 RX ORDER — MORPHINE SULFATE 15 MG/1
7.5 TABLET ORAL EVERY 4 HOURS PRN
Status: DISCONTINUED | OUTPATIENT
Start: 2018-09-28 | End: 2018-10-01 | Stop reason: HOSPADM

## 2018-09-28 RX ORDER — METOPROLOL SUCCINATE 25 MG/1
25 TABLET, EXTENDED RELEASE ORAL DAILY
Status: DISCONTINUED | OUTPATIENT
Start: 2018-09-28 | End: 2018-10-01 | Stop reason: HOSPADM

## 2018-09-28 RX ORDER — FENTANYL CITRATE 50 UG/ML
INJECTION, SOLUTION INTRAMUSCULAR; INTRAVENOUS PRN
Status: DISCONTINUED | OUTPATIENT
Start: 2018-09-28 | End: 2018-09-28 | Stop reason: SDUPTHER

## 2018-09-28 RX ORDER — ACETAMINOPHEN 325 MG/1
650 TABLET ORAL ONCE
Status: COMPLETED | OUTPATIENT
Start: 2018-09-28 | End: 2018-09-28

## 2018-09-28 RX ORDER — POTASSIUM CHLORIDE 20MEQ/15ML
40 LIQUID (ML) ORAL ONCE
Status: COMPLETED | OUTPATIENT
Start: 2018-09-28 | End: 2018-09-28

## 2018-09-28 RX ORDER — MIDAZOLAM HYDROCHLORIDE 1 MG/ML
INJECTION INTRAMUSCULAR; INTRAVENOUS PRN
Status: DISCONTINUED | OUTPATIENT
Start: 2018-09-28 | End: 2018-09-28 | Stop reason: SDUPTHER

## 2018-09-28 RX ORDER — MORPHINE SULFATE 30 MG/1
15 TABLET ORAL EVERY 4 HOURS PRN
Status: DISCONTINUED | OUTPATIENT
Start: 2018-09-28 | End: 2018-10-01 | Stop reason: HOSPADM

## 2018-09-28 RX ORDER — MORPHINE SULFATE 2 MG/ML
2 INJECTION, SOLUTION INTRAMUSCULAR; INTRAVENOUS ONCE
Status: COMPLETED | OUTPATIENT
Start: 2018-09-28 | End: 2018-09-28

## 2018-09-28 RX ORDER — PROPOFOL 10 MG/ML
INJECTION, EMULSION INTRAVENOUS CONTINUOUS PRN
Status: DISCONTINUED | OUTPATIENT
Start: 2018-09-28 | End: 2018-09-28 | Stop reason: SDUPTHER

## 2018-09-28 RX ORDER — DIPHENHYDRAMINE HCL 25 MG
25 TABLET ORAL ONCE
Status: COMPLETED | OUTPATIENT
Start: 2018-09-28 | End: 2018-09-28

## 2018-09-28 RX ORDER — 0.9 % SODIUM CHLORIDE 0.9 %
250 INTRAVENOUS SOLUTION INTRAVENOUS ONCE
Status: COMPLETED | OUTPATIENT
Start: 2018-09-28 | End: 2018-09-29

## 2018-09-28 RX ORDER — SODIUM CHLORIDE, SODIUM LACTATE, POTASSIUM CHLORIDE, CALCIUM CHLORIDE 600; 310; 30; 20 MG/100ML; MG/100ML; MG/100ML; MG/100ML
INJECTION, SOLUTION INTRAVENOUS CONTINUOUS PRN
Status: DISCONTINUED | OUTPATIENT
Start: 2018-09-28 | End: 2018-09-28 | Stop reason: SDUPTHER

## 2018-09-28 RX ADMIN — ACETAMINOPHEN 650 MG: 325 TABLET, FILM COATED ORAL at 16:46

## 2018-09-28 RX ADMIN — ZOLPIDEM TARTRATE 5 MG: 5 TABLET ORAL at 22:53

## 2018-09-28 RX ADMIN — INSULIN LISPRO 12 UNITS: 100 INJECTION, SOLUTION INTRAVENOUS; SUBCUTANEOUS at 12:33

## 2018-09-28 RX ADMIN — GABAPENTIN 300 MG: 300 CAPSULE ORAL at 12:43

## 2018-09-28 RX ADMIN — ALPRAZOLAM 0.25 MG: 0.25 TABLET ORAL at 22:54

## 2018-09-28 RX ADMIN — MORPHINE SULFATE 15 MG: 30 TABLET ORAL at 18:14

## 2018-09-28 RX ADMIN — SODIUM CHLORIDE, POTASSIUM CHLORIDE, SODIUM LACTATE AND CALCIUM CHLORIDE: 600; 310; 30; 20 INJECTION, SOLUTION INTRAVENOUS at 10:35

## 2018-09-28 RX ADMIN — ESCITALOPRAM OXALATE 20 MG: 10 TABLET, FILM COATED ORAL at 20:50

## 2018-09-28 RX ADMIN — INSULIN LISPRO 3 UNITS: 100 INJECTION, SOLUTION INTRAVENOUS; SUBCUTANEOUS at 20:56

## 2018-09-28 RX ADMIN — PROPOFOL 50 MCG/KG/MIN: 10 INJECTION, EMULSION INTRAVENOUS at 10:38

## 2018-09-28 RX ADMIN — SODIUM CHLORIDE: 9 INJECTION, SOLUTION INTRAVENOUS at 08:41

## 2018-09-28 RX ADMIN — SODIUM CHLORIDE 250 ML: 9 INJECTION, SOLUTION INTRAVENOUS at 18:16

## 2018-09-28 RX ADMIN — MORPHINE SULFATE 2 MG: 2 INJECTION, SOLUTION INTRAMUSCULAR; INTRAVENOUS at 05:12

## 2018-09-28 RX ADMIN — METOPROLOL SUCCINATE 25 MG: 25 TABLET, FILM COATED, EXTENDED RELEASE ORAL at 12:43

## 2018-09-28 RX ADMIN — FENTANYL CITRATE 50 MCG: 50 INJECTION, SOLUTION INTRAMUSCULAR; INTRAVENOUS at 10:38

## 2018-09-28 RX ADMIN — IPRATROPIUM BROMIDE AND ALBUTEROL SULFATE 1 AMPULE: .5; 3 SOLUTION RESPIRATORY (INHALATION) at 07:00

## 2018-09-28 RX ADMIN — VITAMIN C 2 TABLET: TAB at 12:43

## 2018-09-28 RX ADMIN — IPRATROPIUM BROMIDE AND ALBUTEROL SULFATE 1 AMPULE: .5; 3 SOLUTION RESPIRATORY (INHALATION) at 13:16

## 2018-09-28 RX ADMIN — GABAPENTIN 300 MG: 300 CAPSULE ORAL at 20:50

## 2018-09-28 RX ADMIN — PANTOPRAZOLE SODIUM 40 MG: 40 TABLET, DELAYED RELEASE ORAL at 12:44

## 2018-09-28 RX ADMIN — DIPHENHYDRAMINE HCL 25 MG: 25 TABLET ORAL at 16:45

## 2018-09-28 RX ADMIN — MIDAZOLAM HYDROCHLORIDE 1 MG: 1 INJECTION, SOLUTION INTRAMUSCULAR; INTRAVENOUS at 10:35

## 2018-09-28 RX ADMIN — FENTANYL CITRATE 50 MCG: 50 INJECTION, SOLUTION INTRAMUSCULAR; INTRAVENOUS at 10:45

## 2018-09-28 RX ADMIN — ASPIRIN AND EXTENDED-RELEASE DIPYRIDAMOLE 1 CAPSULE: 25; 200 CAPSULE ORAL at 12:43

## 2018-09-28 RX ADMIN — INSULIN LISPRO 12 UNITS: 100 INJECTION, SOLUTION INTRAVENOUS; SUBCUTANEOUS at 16:23

## 2018-09-28 RX ADMIN — HYDROCORTISONE SODIUM SUCCINATE 50 MG: 100 INJECTION, POWDER, FOR SOLUTION INTRAMUSCULAR; INTRAVENOUS at 16:45

## 2018-09-28 RX ADMIN — POTASSIUM CHLORIDE 40 MEQ: 20 SOLUTION ORAL at 08:41

## 2018-09-28 RX ADMIN — SIMVASTATIN 40 MG: 40 TABLET, FILM COATED ORAL at 22:54

## 2018-09-28 RX ADMIN — IPRATROPIUM BROMIDE AND ALBUTEROL SULFATE 1 AMPULE: .5; 3 SOLUTION RESPIRATORY (INHALATION) at 19:19

## 2018-09-28 RX ADMIN — CEFEPIME HYDROCHLORIDE 1 G: 1 INJECTION, POWDER, FOR SOLUTION INTRAMUSCULAR; INTRAVENOUS at 01:27

## 2018-09-28 RX ADMIN — MORPHINE SULFATE 2 MG: 2 INJECTION, SOLUTION INTRAMUSCULAR; INTRAVENOUS at 10:20

## 2018-09-28 RX ADMIN — MIDAZOLAM HYDROCHLORIDE 1 MG: 1 INJECTION, SOLUTION INTRAMUSCULAR; INTRAVENOUS at 10:38

## 2018-09-28 RX ADMIN — CEFEPIME HYDROCHLORIDE 1 G: 1 INJECTION, POWDER, FOR SOLUTION INTRAMUSCULAR; INTRAVENOUS at 12:44

## 2018-09-28 RX ADMIN — ASPIRIN AND EXTENDED-RELEASE DIPYRIDAMOLE 1 CAPSULE: 25; 200 CAPSULE ORAL at 20:50

## 2018-09-28 ASSESSMENT — PAIN SCALES - GENERAL
PAINLEVEL_OUTOF10: 0
PAINLEVEL_OUTOF10: 6
PAINLEVEL_OUTOF10: 0
PAINLEVEL_OUTOF10: 8
PAINLEVEL_OUTOF10: 10
PAINLEVEL_OUTOF10: 0
PAINLEVEL_OUTOF10: 8
PAINLEVEL_OUTOF10: 4
PAINLEVEL_OUTOF10: 0
PAINLEVEL_OUTOF10: 1
PAINLEVEL_OUTOF10: 0

## 2018-09-28 ASSESSMENT — PAIN DESCRIPTION - ORIENTATION
ORIENTATION: RIGHT;LEFT
ORIENTATION: RIGHT;LEFT

## 2018-09-28 ASSESSMENT — PAIN DESCRIPTION - DESCRIPTORS
DESCRIPTORS: ACHING;DISCOMFORT;DULL
DESCRIPTORS: ACHING;DISCOMFORT;DULL

## 2018-09-28 ASSESSMENT — PAIN DESCRIPTION - LOCATION
LOCATION: HIP
LOCATION: HIP

## 2018-09-28 ASSESSMENT — PAIN DESCRIPTION - PAIN TYPE
TYPE: SURGICAL PAIN
TYPE: SURGICAL PAIN

## 2018-09-28 ASSESSMENT — LIFESTYLE VARIABLES: SMOKING_STATUS: 1

## 2018-09-28 ASSESSMENT — ENCOUNTER SYMPTOMS: SHORTNESS OF BREATH: 1

## 2018-09-28 NOTE — PROGRESS NOTES
Transdermal Daily Ino Bob MD   1 patch at 09/27/18 0908       Review of systems:   Heart: as above   Lungs: as above   Eyes: denies changes in vision or discharge. Ears: denies changes in hearing or pain. Nose: denies epistaxis or masses   Throat: denies sore throat or trouble swallowing. Neuro: denies numbness, tingling, tremors. Skin: denies rashes or itching. : denies hematuria, dysuria   GI: denies vomiting, diarrhea   Psych: denies mood changed, anxiety, depression. Physical Exam   BP (!) 118/57   Pulse 96   Temp 98.1 °F (36.7 °C) (Oral)   Resp 15   Ht 5' 2\" (1.575 m)   Wt 175 lb 9.6 oz (79.7 kg)   SpO2 99%   BMI 32.12 kg/m²   Constitutional: Oriented to person, place, and time. No distress. Well developed. Head: Normocephalic and atraumatic. Neck: Neck supple. No hepatojugular reflux. No JVD present. Carotid bruit is not present. No tracheal deviation present. No thyromegaly present. Cardiovascular: Normal rate, regular rhythm, normal heart sounds. intact distal pulses. No gallop and no friction rub. No murmur heard. Pulmonary: Breath sounds normal. No respiratory distress. No wheezes. No rales. Chest: Effort normal. No tenderness. Abdominal: Soft. Bowel sounds are normal. No distension or mass. No tenderness, rebound or guarding. Musculoskeletal: . No tenderness. No clubbing or cyanosis. Extremitites: Intact distal pulses. No edema  Neurological: Alert and oriented to person, place, and time. Skin: Skin is warm and dry. No rash noted. Not diaphoretic. No erythema. Psychiatric: Normal mood and affect. Behavior is normal.   Lymphadenopathy: No cervical adenopathy. No groin adenopathy.       CBC:   Lab Results   Component Value Date    WBC 11.4 09/28/2018    RBC 2.52 09/28/2018    HGB 7.6 09/28/2018    HCT 22.8 09/28/2018    MCV 90.5 09/28/2018    MCH 30.2 09/28/2018    MCHC 33.3 09/28/2018    RDW 19.1 09/28/2018    PLT 97 09/28/2018    MPV 10.9 09/28/2018 BMP:   Lab Results   Component Value Date     09/28/2018    K 3.1 09/28/2018     09/28/2018    CO2 19 09/28/2018    BUN 32 09/28/2018    LABALBU 2.7 09/28/2018    LABALBU 2.9 09/24/2018    CREATININE 1.5 09/28/2018    CALCIUM 7.9 09/28/2018    GFRAA 42 09/28/2018    LABGLOM 35 09/28/2018    LABGLOM 31 09/24/2018     Magnesium:    Lab Results   Component Value Date    MG 1.6 09/28/2018     Cardiac Enzymes:   Lab Results   Component Value Date    CKTOTAL 61 09/27/2018    CKTOTAL 321 (H) 09/25/2018    CKMB 6.3 (H) 09/27/2018    TROPONINI 0.16 (H) 09/27/2018    TROPONINI 0.24 (H) 09/27/2018    TROPONINI 0.50 (H) 09/27/2018      PT/INR:    Lab Results   Component Value Date    PROTIME 10.9 09/24/2018    INR 1.0 09/24/2018     TSH:    Lab Results   Component Value Date    TSH 0.505 09/25/2018     Rhythm Strip: normal sinus rhythm. ASSESSMENT & PLAN:    Patient Active Problem List   Diagnosis    Malignant neoplasm of central portion of right breast in female, estrogen receptor negative ([facility]    Breast cancer metastasized to axillary lymph node, right ([facility] [address]    Encounter for insertion of venous access port    SIRS (systemic inflammatory response syndrome) (HCC)    Type 2 diabetes mellitus with hyperglycemia, without long-term current use of insulin (HCC)    HTN (hypertension)    History of stroke    Hyponatremia    ZHAO (acute kidney injury) ([facility]    Elevated troponin    Other hyperlipidemia     1. Elevated troponin:     Serial CE.     Echo benign. Asymptomatic.     ASA/BB/statin.      No further evaluation at this point. 2. Fever/Confusion/Leukocytosis: Per ID.     3. HTN: Observe.     4. DM     5. Breast CA with chemo    Patient stable from CV standpoint. Please call if needed. Severa Campion, D.O.   Cardiologist  Cardiology, [facility]

## 2018-09-28 NOTE — PROGRESS NOTES
Hospitalist Progress Note      PCP: Nicole Nelson MD    Date of Admission: 9/25/2018  Days in the hospital: 3    Chief Complaint: fever    Hospital Course:     Pt was admitted for sepsis most likely from community acquired pneumonia. Pt was initally hypotensive and responded to fluids. ID was consulted and pt was cultured. Started on PO azithromycin and doxycycline. Pt was found to have hyponatremia and started on iv-fluids and nephrology was consulted. She also had hg of 5.6 and was transfused 2 U PRBC. ABD/Pelvis CT w/o contrast - urethral stone. Urology was consulted. Pt also had troponin of 0.50 that trended down and cardiology was consulted. Subjective  Patient seen and examined at bedside. Pt feeling better today. Denies any chest pain, SOB, fevers and chills.      Medications:  Reviewed    Infusion Medications    dextrose      sodium chloride 100 mL/hr at 09/27/18 2212     Scheduled Medications    metoprolol succinate  25 mg Oral Daily    potassium chloride  40 mEq Oral Once    ipratropium-albuterol  1 ampule Inhalation Q4H WA    cefepime  1 g Intravenous Q12H    sodium chloride flush  10 mL Intravenous 2 times per day    pantoprazole  40 mg Oral Daily    vitamin B and C  2 tablet Oral Daily    dipyridamole-aspirin  1 capsule Oral BID    escitalopram  20 mg Oral QPM    gabapentin  300 mg Oral TID    simvastatin  40 mg Oral Nightly    insulin lispro  0-18 Units Subcutaneous TID WC    insulin lispro  0-9 Units Subcutaneous Nightly    nicotine  1 patch Transdermal Daily     PRN Meds: morphine, zolpidem, sodium chloride flush, acetaminophen, ALPRAZolam, diphenoxylate-atropine, glucose, dextrose, glucagon (rDNA), dextrose, ondansetron, heparin flush (100 units/mL)      Intake/Output Summary (Last 24 hours) at 09/28/18 0811  Last data filed at 09/28/18 0647   Gross per 24 hour   Intake          3615.33 ml   Output             3550 ml   Net            65.33 ml       Exam:    BP (!) 118/57

## 2018-09-28 NOTE — PROGRESS NOTES
97.7 °F (36.5 °C) Oral 96 15 - - -   09/27/18 1826 - - - - 16 99 % - -   09/27/18 1516 (!) 104/59 97.7 °F (36.5 °C) Oral 98 16 99 % - -   09/27/18 1255 - - - - - - 5' 2\" (1.575 m) -   @      Intake/Output Summary (Last 24 hours) at 09/28/18 1252  Last data filed at 09/28/18 1151   Gross per 24 hour   Intake          3725.33 ml   Output             3550 ml   Net           175.33 ml         Wt Readings from Last 3 Encounters:   09/28/18 175 lb 9.6 oz (79.7 kg)   09/22/18 148 lb (67.1 kg)   09/13/18 151 lb (68.5 kg)       Constitutional:  Pt is in no acute distress  Head: normocephalic, atraumatic  Neck: no JVD  Cardiovascular: regular rate and rhythm, no murmurs, gallops, or rubs  Respiratory:  No rales, rhochi, or wheezes  Gastrointestinal:  Soft, nontender, nondistended, bowel sounds x 4  Ext: no edema  Skin: dry, no rash  Neuro: aaox3    MEDS (scheduled):    metoprolol succinate  25 mg Oral Daily    sodium chloride  250 mL Intravenous Once    acetaminophen  650 mg Oral Once    diphenhydrAMINE  25 mg Oral Once    hydrocortisone sodium succinate PF  50 mg Intravenous Once    ipratropium-albuterol  1 ampule Inhalation Q4H WA    cefepime  1 g Intravenous Q12H    sodium chloride flush  10 mL Intravenous 2 times per day    pantoprazole  40 mg Oral Daily    vitamin B and C  2 tablet Oral Daily    dipyridamole-aspirin  1 capsule Oral BID    escitalopram  20 mg Oral QPM    gabapentin  300 mg Oral TID    simvastatin  40 mg Oral Nightly    insulin lispro  0-18 Units Subcutaneous TID WC    insulin lispro  0-9 Units Subcutaneous Nightly    nicotine  1 patch Transdermal Daily       MEDS (infusions):   dextrose      sodium chloride 100 mL/hr at 09/28/18 0841       MEDS (prn):  morphine, zolpidem, sodium chloride flush, acetaminophen, ALPRAZolam, diphenoxylate-atropine, glucose, dextrose, glucagon (rDNA), dextrose, ondansetron, heparin flush (100 units/mL)    DATA:    Recent Labs      09/26/18   0415  09/27/18 0525  09/28/18   0500   WBC  10.0  16.5*  11.4   HGB  5.6*  8.8*  7.6*   HCT  17.3*  26.7*  22.8*   MCV  94.5  90.8  90.5   PLT  76*  103*  97*     Recent Labs      09/26/18   0415  09/27/18   0525  09/28/18   0500   NA  132  131*  135  137   K  4.0  4.1  3.5  3.1*   CL  99  99  99  101   CO2  19*  19*  19*  19*   BUN  33*  33*  37*  32*   CREATININE  1.9*  1.9*  1.7*  1.5*   LABGLOM  26  26  30  35   GLUCOSE  289*  289*  296*  303*   CALCIUM  7.3*  7.5*  8.1*  7.9*   ALT  28  27  31   AST  34*  23  31   BILITOT  0.3  0.4  0.5   ALKPHOS  90  98  102   MG  1.9  1.8  1.6   PHOS  2.4*   --    --        Lab Results   Component Value Date    LABALBU 2.7 (L) 09/28/2018    LABALBU 3.1 (L) 09/27/2018    LABALBU 2.5 (L) 09/26/2018     Lab Results   Component Value Date    TSH 0.505 09/25/2018     No results found for: PHART, PO2ART, SWX5QQU    Iron Studies: No results found for: IRON, TIBC, FERRITIN      IMPRESSION/RECOMMENDATIONS:      1. Stage II ZHAO: l  NSAIDs, ACEI, recent ATB use. Baseline SrCreat 0.9-1.0, current SrCreat 2.2 9/27  Improved with ivf  Renal US with stones and dilan mild hydro  For cytsto/stents     2- Hypokalemia  replace prn     3- Hyponatremia  Na+ 123 on 9/24, now improved with hydration  hyponatremia could be further complicated by use of SSRI.     4- Hypomagnesia/hypophos: replace prn     5- Hypocalcemia  Ca++ 7.7, but corrects for albumin     6- Anemia of chronic disease  Will defer to heme/onc as they are managing. On B12 supp.     7- HTN- at goal.    ACEI from home meds D/C at admission  Will monitor BP and add additional agents if needed.       8- Leukocytosis  uti kleb            Laura Ash.  Luis Carlos Lindsay MD

## 2018-09-28 NOTE — ANESTHESIA PRE PROCEDURE
tablets by mouth 4 times daily as needed for Diarrhea. . 6/20/18 6/20/19 Yes GIULIANA Sutton CNP   escitalopram (LEXAPRO) 20 MG tablet Take 20 mg by mouth every evening    Yes Historical Provider, MD   simvastatin (ZOCOR) 40 MG tablet Take 40 mg by mouth daily    Yes Historical Provider, MD   dipyridamole-aspirin (AGGRENOX)  MG per extended release capsule Take 1 capsule by mouth 2 times daily   Yes Historical Provider, MD   metFORMIN (GLUCOPHAGE) 500 MG tablet Take 1,000 mg by mouth 2 times daily (with meals)    Yes Historical Provider, MD   Dulaglutide (TRULICITY) 0.12 GV/4.6GP SOPN Inject into the skin every 7 days saturday   Yes Historical Provider, MD   linagliptin (TRADJENTA) 5 MG tablet Take 5 mg by mouth daily   Yes Historical Provider, MD   atenolol-chlorthalidone (TENORETIC) 50-25 MG per tablet Take 1 tablet by mouth daily   Yes Historical Provider, MD   lisinopril (PRINIVIL;ZESTRIL) 20 MG tablet Take 20 mg by mouth every evening    Yes Historical Provider, MD   zolpidem (AMBIEN) 5 MG tablet Take 5 mg by mouth nightly as needed for Sleep. .   Yes Historical Provider, MD   ALPRAZolam Andres Davenport) 0.25 MG tablet Take 0.25 mg by mouth nightly as needed for Sleep. .   Yes Historical Provider, MD   cetirizine (ZYRTEC ALLERGY) 10 MG tablet Take 10 mg by mouth daily   Yes Historical Provider, MD   melatonin 3 MG TABS tablet Take 3 mg by mouth daily   Yes Historical Provider, MD       Current medications:    Current Facility-Administered Medications   Medication Dose Route Frequency Provider Last Rate Last Dose    metoprolol succinate (TOPROL XL) extended release tablet 25 mg  25 mg Oral Daily Josue Mccollum MD        0.9 % sodium chloride bolus  250 mL Intravenous Once GIULIANA Sutton CNP        acetaminophen (TYLENOL) tablet 650 mg  650 mg Oral Once GIULIANA Alaniz CNP        diphenhydrAMINE (BENADRYL) tablet 25 mg  25 mg Oral Once GIULIANA Mayen CNP        hydrocortisone sodium succinate PF (SOLU-CORTEF) injection 50 mg  50 mg Intravenous Once Chuyita PAN Amezquita, APRN - CNP        ipratropium-albuterol (DUONEB) nebulizer solution 1 ampule  1 ampule Inhalation Q4H WA Fermin Cabral MD   1 ampule at 09/28/18 0700    morphine (PF) injection 2 mg  2 mg Intravenous Q4H PRN Gabby Mcguire MD   2 mg at 09/28/18 0512    zolpidem (AMBIEN) tablet 5 mg  5 mg Oral Nightly PRN Noé Cee MD   5 mg at 09/27/18 2300    cefepime (MAXIPIME) 1 g IVPB minibag  1 g Intravenous Q12H Melissa Faustin MD   Stopped at 09/28/18 0200    sodium chloride flush 0.9 % injection 10 mL  10 mL Intravenous 2 times per day Gabby Mcguire MD   10 mL at 09/27/18 0847    sodium chloride flush 0.9 % injection 10 mL  10 mL Intravenous PRN Gabby Mcguire MD   10 mL at 09/26/18 0927    pantoprazole (PROTONIX) tablet 40 mg  40 mg Oral Daily Gabby Mcguire MD   40 mg at 09/27/18 0908    acetaminophen (TYLENOL) tablet 650 mg  650 mg Oral Q4H PRN Gabby Mcguire MD   650 mg at 09/27/18 0846    ALPRAZolam (XANAX) tablet 0.25 mg  0.25 mg Oral Nightly PRN Gabby Mcguire MD   0.25 mg at 09/27/18 2300    vitamin B and C (TOTAL B-C) 2 tablet  2 tablet Oral Daily aGbby Mcguire MD   2 tablet at 09/27/18 0907    diphenoxylate-atropine (LOMOTIL) 2.5-0.025 MG per tablet 2 tablet  2 tablet Oral 4x Daily PRN Gabby Mcguire MD   2 tablet at 09/26/18 1328    dipyridamole-aspirin (AGGRENOX)  MG per extended release capsule 1 capsule  1 capsule Oral BID Gabby Mcguire MD   1 capsule at 09/27/18 2054    escitalopram (LEXAPRO) tablet 20 mg  20 mg Oral QPM Gabby Mcguire MD   20 mg at 09/27/18 2054    gabapentin (NEURONTIN) capsule 300 mg  300 mg Oral TID Gabby Mcguire MD   300 mg at 09/27/18 2054    simvastatin (ZOCOR) tablet 40 mg  40 mg Oral Nightly Gabby Mcguire MD   40 mg at 09/27/18 2054    insulin lispro (HUMALOG) injection vial 0-18 Units  0-18 Units Subcutaneous TID GIULIANA Castillo   15 Units at 09/27/18 1658    insulin lispro (HUMALOG) CREATININE 1.5 09/28/2018    GFRAA 42 09/28/2018    LABGLOM 35 09/28/2018    LABGLOM 31 09/24/2018    GLUCOSE 303 09/28/2018    GLUCOSE 432 09/24/2018    PROT 5.2 09/28/2018    CALCIUM 7.9 09/28/2018    BILITOT 0.5 09/28/2018    ALKPHOS 102 09/28/2018    AST 31 09/28/2018    ALT 31 09/28/2018       POC Tests: No results for input(s): POCGLU, POCNA, POCK, POCCL, POCBUN, POCHEMO, POCHCT in the last 72 hours.     Coags:   Lab Results   Component Value Date    PROTIME 10.9 09/24/2018    INR 1.0 09/24/2018    APTT 29.5 09/24/2018       HCG (If Applicable): No results found for: PREGTESTUR, PREGSERUM, HCG, HCGQUANT     ABGs: No results found for: PHART, PO2ART, SQX5KXE, ZIT0IIH, BEART, T4HAVTRA     Type & Screen (If Applicable):  No results found for: LABABO, [address]  9/27/18 ekg  Component Value Ref Range & Units Status Collected Lab   Ventricular Rate 126  BPM Incomplete 09/27/2018  9:45 AM [facility]   Atrial Rate 126  BPM Incomplete 09/27/2018  9:45 AM [facility]   P-R Interval 148  ms Incomplete 09/27/2018  9:45 AM [facility]   QRS Duration 80  ms Incomplete 09/27/2018  9:45 AM [facility]   Q-T Interval 304  ms Incomplete 09/27/2018  9:45 AM [facility]   QTc Calculation (Bazett) 440  ms Incomplete 09/27/2018  9:45 AM [facility]   P Axis 49  degrees Incomplete 09/27/2018  9:45 AM [facility]   R [place] 32  degrees Incomplete 09/27/2018  9:45 AM [facility]   T Axis 90  degrees Incomplete 09/27/2018  9:45 AM [facility]   Testing Performed By     Lab - Abbreviation Name Director Address Valid Date Range   360-[facility] MUSE Unknown Unknown 04/18/16 1121-Present   Narrative     Sinus tachycardia  Anteroseptal infarct , age undetermined  Abnormal ECG  No previous ECGs available   Lab and Collection      6/16/18 echo     Findings      Left Ventricle   Left ventricle is normal in size .   Borderline concentric left ventricular hypertrophy.   No regional wall motion abnormalities seen.   Normal left ventricular ejection fraction.   Ejection

## 2018-09-28 NOTE — PROGRESS NOTES
CC- sob,aches    Subjective: The patient is awake and alert. Tolerating medications. Reports no side effects. Afebrile. Had surgery this am  10 ROS otherwise negative unless otherwise specified above. Objective:    Vitals:    09/28/18 1230   BP: (!) 121/56   Pulse: 89   Resp: 14   Temp: 97.7 °F (36.5 °C)   SpO2: 100%       General Appearance:    Awake, alert , no acute distress.    HEENT:    Normocephalic,PERRL,neck supple, no JVD, mucosa moist, no thrush   Lungs:     Clear to auscultation bilaterally, no wheeze , crackles   Heart:    Regular rate and rhythm, no murmur     Abdomen:     Soft, non-tender, bowel sounds active all four quadrants,     no masses, no organomegaly   Extremities:   Extremities normal, atraumatic, no cyanosis or edema   Pulses:   2+ and symmetric all extremities   Skin:   Skin color, texture, turgor normal, no rashes or lesions         CBC+dif:  Reviewed and trend followed     Radiology:  ureteral calculus seen in CT abdomen pelvis w/o contrast with hydronephrosis     Microbiology:  BC- klebsiella     Assessment:  · Sepsis likely from complicated pyeloneprhitis with B/L ureteral stone in immunocompromised host with klebsiella bacteremia- s/p Cystoscopy with BL RPGs, BL stent insertion on 9/28  · Breast Cancer currently on chemotherapy last chemo with Taxol 2 weeks ago     Plan:    · Day 4 cefepime, switch to rocephin 2 g q24  · rpt [facility]  · Appreciate Urology help- s/p Cystoscopy with BL RPGs, BL stent insertion on 9/28  · Plan for definitive stone treatment in 2-3 weeks  · Plan for continuation of ciprofloxacin PO as OP until definitive treatment  · Monitor labs  · Will follow with you        Electronically signed by Fletcher Dumont MD on 9/28/2018 at 2:25 PM

## 2018-09-29 LAB
ALBUMIN SERPL-MCNC: 2.6 G/DL (ref 3.5–5.2)
ALP BLD-CCNC: 103 U/L (ref 35–104)
ALT SERPL-CCNC: 30 U/L (ref 0–32)
ANION GAP SERPL CALCULATED.3IONS-SCNC: 16 MMOL/L (ref 7–16)
AST SERPL-CCNC: 22 U/L (ref 0–31)
BASOPHILS ABSOLUTE: 0 E9/L (ref 0–0.2)
BASOPHILS RELATIVE PERCENT: 0.1 % (ref 0–2)
BILIRUB SERPL-MCNC: 0.5 MG/DL (ref 0–1.2)
BUN BLDV-MCNC: 23 MG/DL (ref 8–23)
CALCIUM SERPL-MCNC: 8.4 MG/DL (ref 8.6–10.2)
CHLORIDE BLD-SCNC: 106 MMOL/L (ref 98–107)
CO2: 21 MMOL/L (ref 22–29)
CREAT SERPL-MCNC: 1.2 MG/DL (ref 0.5–1)
EOSINOPHILS ABSOLUTE: 0.07 E9/L (ref 0.05–0.5)
EOSINOPHILS RELATIVE PERCENT: 0.9 % (ref 0–6)
GFR AFRICAN AMERICAN: 54
GFR NON-AFRICAN AMERICAN: 45 ML/MIN/1.73
GLUCOSE BLD-MCNC: 268 MG/DL (ref 74–109)
HCT VFR BLD CALC: 25.6 % (ref 34–48)
HEMOGLOBIN: 8.4 G/DL (ref 11.5–15.5)
LYMPHOCYTES ABSOLUTE: 0.4 E9/L (ref 1.5–4)
LYMPHOCYTES RELATIVE PERCENT: 5.2 % (ref 20–42)
MCH RBC QN AUTO: 30.2 PG (ref 26–35)
MCHC RBC AUTO-ENTMCNC: 32.8 % (ref 32–34.5)
MCV RBC AUTO: 92.1 FL (ref 80–99.9)
METER GLUCOSE: 178 MG/DL (ref 70–110)
METER GLUCOSE: 242 MG/DL (ref 70–110)
METER GLUCOSE: 253 MG/DL (ref 70–110)
METER GLUCOSE: 328 MG/DL (ref 70–110)
MONOCYTES ABSOLUTE: 0.47 E9/L (ref 0.1–0.95)
MONOCYTES RELATIVE PERCENT: 6.1 % (ref 2–12)
NEUTROPHILS ABSOLUTE: 6.95 E9/L (ref 1.8–7.3)
NEUTROPHILS RELATIVE PERCENT: 87.8 % (ref 43–80)
PDW BLD-RTO: 18.4 FL (ref 11.5–15)
PLATELET # BLD: 90 E9/L (ref 130–450)
PLATELET CONFIRMATION: NORMAL
PMV BLD AUTO: 11.1 FL (ref 7–12)
POIKILOCYTES: ABNORMAL
POTASSIUM REFLEX MAGNESIUM: 4.1 MMOL/L (ref 3.5–5)
RBC # BLD: 2.78 E12/L (ref 3.5–5.5)
SODIUM BLD-SCNC: 143 MMOL/L (ref 132–146)
TARGET CELLS: ABNORMAL
TEAR DROP CELLS: ABNORMAL
TOTAL PROTEIN: 5.1 G/DL (ref 6.4–8.3)
WBC # BLD: 7.9 E9/L (ref 4.5–11.5)

## 2018-09-29 PROCEDURE — 94640 AIRWAY INHALATION TREATMENT: CPT

## 2018-09-29 PROCEDURE — 6360000002 HC RX W HCPCS: Performed by: INTERNAL MEDICINE

## 2018-09-29 PROCEDURE — 36415 COLL VENOUS BLD VENIPUNCTURE: CPT

## 2018-09-29 PROCEDURE — 6370000000 HC RX 637 (ALT 250 FOR IP): Performed by: FAMILY MEDICINE

## 2018-09-29 PROCEDURE — 6370000000 HC RX 637 (ALT 250 FOR IP): Performed by: CLINICAL NURSE SPECIALIST

## 2018-09-29 PROCEDURE — 2060000000 HC ICU INTERMEDIATE R&B

## 2018-09-29 PROCEDURE — 2700000000 HC OXYGEN THERAPY PER DAY

## 2018-09-29 PROCEDURE — 80053 COMPREHEN METABOLIC PANEL: CPT

## 2018-09-29 PROCEDURE — 6360000002 HC RX W HCPCS: Performed by: STUDENT IN AN ORGANIZED HEALTH CARE EDUCATION/TRAINING PROGRAM

## 2018-09-29 PROCEDURE — 82962 GLUCOSE BLOOD TEST: CPT

## 2018-09-29 PROCEDURE — 6370000000 HC RX 637 (ALT 250 FOR IP): Performed by: HOSPITALIST

## 2018-09-29 PROCEDURE — 6370000000 HC RX 637 (ALT 250 FOR IP): Performed by: STUDENT IN AN ORGANIZED HEALTH CARE EDUCATION/TRAINING PROGRAM

## 2018-09-29 PROCEDURE — 2580000003 HC RX 258: Performed by: INTERNAL MEDICINE

## 2018-09-29 PROCEDURE — 87040 BLOOD CULTURE FOR BACTERIA: CPT

## 2018-09-29 PROCEDURE — 2580000003 HC RX 258: Performed by: STUDENT IN AN ORGANIZED HEALTH CARE EDUCATION/TRAINING PROGRAM

## 2018-09-29 PROCEDURE — 85025 COMPLETE CBC W/AUTO DIFF WBC: CPT

## 2018-09-29 RX ORDER — FUROSEMIDE 10 MG/ML
40 INJECTION INTRAMUSCULAR; INTRAVENOUS ONCE
Status: COMPLETED | OUTPATIENT
Start: 2018-09-29 | End: 2018-09-29

## 2018-09-29 RX ADMIN — IPRATROPIUM BROMIDE AND ALBUTEROL SULFATE 1 AMPULE: .5; 3 SOLUTION RESPIRATORY (INHALATION) at 12:02

## 2018-09-29 RX ADMIN — Medication 10 ML: at 20:03

## 2018-09-29 RX ADMIN — PANTOPRAZOLE SODIUM 40 MG: 40 TABLET, DELAYED RELEASE ORAL at 10:15

## 2018-09-29 RX ADMIN — CEFEPIME HYDROCHLORIDE 1 G: 1 INJECTION, POWDER, FOR SOLUTION INTRAMUSCULAR; INTRAVENOUS at 01:52

## 2018-09-29 RX ADMIN — Medication 10 ML: at 10:22

## 2018-09-29 RX ADMIN — FUROSEMIDE 40 MG: 10 INJECTION, SOLUTION INTRAMUSCULAR; INTRAVENOUS at 15:27

## 2018-09-29 RX ADMIN — ASPIRIN AND EXTENDED-RELEASE DIPYRIDAMOLE 1 CAPSULE: 25; 200 CAPSULE ORAL at 20:03

## 2018-09-29 RX ADMIN — INSULIN LISPRO 6 UNITS: 100 INJECTION, SOLUTION INTRAVENOUS; SUBCUTANEOUS at 17:47

## 2018-09-29 RX ADMIN — IPRATROPIUM BROMIDE AND ALBUTEROL SULFATE 1 AMPULE: .5; 3 SOLUTION RESPIRATORY (INHALATION) at 15:58

## 2018-09-29 RX ADMIN — ESCITALOPRAM OXALATE 20 MG: 10 TABLET, FILM COATED ORAL at 20:03

## 2018-09-29 RX ADMIN — METOPROLOL SUCCINATE 25 MG: 25 TABLET, FILM COATED, EXTENDED RELEASE ORAL at 10:15

## 2018-09-29 RX ADMIN — IPRATROPIUM BROMIDE AND ALBUTEROL SULFATE 1 AMPULE: .5; 3 SOLUTION RESPIRATORY (INHALATION) at 06:24

## 2018-09-29 RX ADMIN — INSULIN LISPRO 12 UNITS: 100 INJECTION, SOLUTION INTRAVENOUS; SUBCUTANEOUS at 13:11

## 2018-09-29 RX ADMIN — IPRATROPIUM BROMIDE AND ALBUTEROL SULFATE 1 AMPULE: .5; 3 SOLUTION RESPIRATORY (INHALATION) at 21:32

## 2018-09-29 RX ADMIN — SIMVASTATIN 40 MG: 40 TABLET, FILM COATED ORAL at 20:03

## 2018-09-29 RX ADMIN — GABAPENTIN 300 MG: 300 CAPSULE ORAL at 13:11

## 2018-09-29 RX ADMIN — VITAMIN C 2 TABLET: TAB at 10:16

## 2018-09-29 RX ADMIN — GABAPENTIN 300 MG: 300 CAPSULE ORAL at 10:15

## 2018-09-29 RX ADMIN — GABAPENTIN 300 MG: 300 CAPSULE ORAL at 20:03

## 2018-09-29 RX ADMIN — ALPRAZOLAM 0.25 MG: 0.25 TABLET ORAL at 12:42

## 2018-09-29 RX ADMIN — INSULIN LISPRO 9 UNITS: 100 INJECTION, SOLUTION INTRAVENOUS; SUBCUTANEOUS at 10:34

## 2018-09-29 RX ADMIN — ASPIRIN AND EXTENDED-RELEASE DIPYRIDAMOLE 1 CAPSULE: 25; 200 CAPSULE ORAL at 10:34

## 2018-09-29 RX ADMIN — INSULIN LISPRO 2 UNITS: 100 INJECTION, SOLUTION INTRAVENOUS; SUBCUTANEOUS at 20:07

## 2018-09-29 RX ADMIN — WATER 2 G: 1 INJECTION INTRAMUSCULAR; INTRAVENOUS; SUBCUTANEOUS at 13:10

## 2018-09-29 ASSESSMENT — PAIN SCALES - GENERAL
PAINLEVEL_OUTOF10: 0

## 2018-09-29 NOTE — PROGRESS NOTES
[facility] PROGRESS NOTE                                                                       9/29/2018        CHIEF UROLOGIC COMPLAINT: BL hydronephrosis, UTI, ureteral stones    HISTORY OF PRESENT ILLNESS:  Patient without new complaints. Tolerating stent. No fevers/chills.      REVIEW OF SYSTEMS:   CONSTITUTIONAL: negative  HEENT: negative  HEMATOLOGIC: negative  ENDOCRINE: negative  RESPIRATORY: negative  CV: negative  GI: negative  NEURO: negative  ORTHOPEDICS: negative  PSYCHIATRIC: negative  : as above    PAST FAMILY HISTORY:    Family History   Problem Relation Age of Onset    Cancer Mother [de-identified]        breast    Heart Disease Mother     Diabetes Mother     Hypertension Mother     Cancer Sister         lymphoma    Hypertension Father     Stroke Maternal Grandmother     Stroke Paternal Grandmother      PAST SOCIAL HISTORY:    Social History     Social History    Marital status:      Spouse name: N/A    Number of children: N/A    Years of education: N/A     Social History Main Topics    Smoking status: Current Every Day Smoker     Packs/day: 1.50     Years: 50.00     Types: Cigarettes    Smokeless tobacco: Never Used      Comment: smoking x 50 years    Alcohol use No    Drug use: No    Sexual activity: No     Other Topics Concern    Not on file     Social History Narrative    No narrative on file       Scheduled Meds:   metoprolol succinate  25 mg Oral Daily    ipratropium-albuterol  1 ampule Inhalation Q4H WA    cefepime  1 g Intravenous Q12H    sodium chloride flush  10 mL Intravenous 2 times per day    pantoprazole  40 mg Oral Daily    vitamin B and C  2 tablet Oral Daily    dipyridamole-aspirin  1 capsule Oral BID    escitalopram  20 mg Oral QPM    gabapentin  300 mg Oral TID    simvastatin  40 mg Oral Nightly    insulin lispro  0-18 Units

## 2018-09-29 NOTE — PROGRESS NOTES
2750 ml   Net           959.25 ml       Exam:    BP (!) 148/70   Pulse 94   Temp 97.3 °F (36.3 °C) (Temporal)   Resp 18   Ht 5' 2\" (1.575 m)   Wt 170 lb 11.2 oz (77.4 kg)   SpO2 100%   BMI 31.22 kg/m²     General appearance: in distress, appears stated age and cooperative. HEENT: Conjunctivae/corneas clear. Pupils equal and round. No injections noted. Neck: Supple. No lesions, scars or masses. Trachea midline. Respiratory:  tachypenic. Clear to auscultation, bilaterally without Rales/Wheezes/Rhonchi. Cardiovascular: tahcycardic and rhythm with normal S1/S2 without murmurs, rubs or gallops. Abdomen: Soft, non-tender, non-distended with normal bowel sounds. Musculoskeletal: No clubbing, cyanosis or edema bilaterally. Brisk capillary refill. 2+ lower extremity pulses (dorsalis pedis). Skin:  No rashes    Neurologic: awake, alert and following commands       Labs:   Recent Labs      09/27/18   0525  09/28/18   0500  09/29/18   0537   WBC  16.5*  11.4  7.9   HGB  8.8*  7.6*  8.4*   HCT  26.7*  22.8*  25.6*   PLT  103*  97*  90*     Recent Labs      09/27/18   0525  09/28/18   0500  09/29/18   0537   NA  135  137  143   K  3.5  3.1*  4.1   CL  99  101  106   CO2  19*  19*  21*   BUN  37*  32*  23   CREATININE  1.7*  1.5*  1.2*   CALCIUM  8.1*  7.9*  8.4*     Recent Labs      09/27/18   0525  09/28/18   0500  09/29/18   0537   AST  23  31  22   ALT  27  31  30   BILITOT  0.4  0.5  0.5   ALKPHOS  98  102  103     No results for input(s): INR in the last 72 hours. Recent Labs      09/27/18   1215  09/27/18   1615  09/27/18   1820  09/27/18   2200   CKTOTAL   --   61   --    --    TROPONINI  0.50*   --   0.24*  0.16*       Imaging:  XR CHEST PORTABLE   Final Result      Persistent the mild-to-moderate right-sided pleural effusion      Small left side pleural reaction      Cardiomegaly.       No significant change since September 27      FL RETROGRADE PYELOGRAM W WO KUB   Final Result   Intraoperative inflammatory response syndrome) ([facility] [R65.10] 09/24/2018    Breast cancer metastasized to axillary lymph node, right ([facility] [C50.911, C77.3] 05/15/2018     ·     Plan:  Sepsis likely secondary from UTI in immunocompromised host with klebsiella bacteremia  - patient undergoing chemo, at risk for sepsis  - port site - clean, non tender, no signs of discharge.   - follow up cultures  - ID on board  - IV antibiotics- cefepime changed to ceftriaxone. - IV fluids  - Urology consulted for ureteral calculus in CT abdomen pelvis w/o contrast - Stent placement yesterday and definitve stone treatment in 2-3 weeks.     HTN  - monitor BP  - hold home medications until BP is more stable    Sinus Tachycardia and elevated troponin   - EKG, trend tropnin  - repeat CXR as pt recently got 2U PRBC  - metoprolol IV PRN with holding parameters for now  - Can not give ACE/ARB and NSAID due to elevated Cr  - Cardiology input appreciated. - TTE     Hyponatremia  - asymptomatic  - IVFs  - nephrology on board  - monitor labs     ZHAO  - probably due to recent ibuprofen use and hypotension  - monitor labs  - nephrology on board  - IVFs     DM 2 with hyperglycemia  - monitor BGL  - insulin sliding scale high dose        Breast cancer  - follows with Dr. Shiv Camacho  - on chemo, next session on Thursday  - oncology consulted - input appreciated. - palliative consulted     Hx stroke/TIA  - resume home medications  - neuro checks    Body mass index is 31.22 kg/m². · DVT Prophylaxis  · SCDs    · Diet  DIET CARDIAC; Carb Control: 5 carb choices (75 gms)/meal    · Code Status  Full Code    · PT/OT Eval Status  · n/a     · Disposition  · Telemetry          Lamar Kennedy MD  [employer]   Please contact me through perfect serve    NOTE: This report was transcribed using voice recognition software. Every effort was made to ensure accuracy; however, inadvertent computerized transcription errors may be present.

## 2018-09-29 NOTE — PROGRESS NOTES
[facility]  Nephrology Attending Progress Note  Jana Vizcaino.  Roz Herrera MD        SUBJECTIVE:     9/26: with cough and fatigue  9/27: remains fatigued  9/28: for cysto today and stent placement  9/29: feels better, flank pain improved, has sob however and edena      PROBLEM LIST:    Patient Active Problem List   Diagnosis    Malignant neoplasm of central portion of right breast in female, estrogen receptor negative ([facility]    Breast cancer metastasized to axillary lymph node, right ([facility]    Encounter for insertion of venous access port    SIRS (systemic inflammatory response syndrome) (HCC)    Type 2 diabetes mellitus with hyperglycemia, without long-term current use of insulin (HCC)    HTN (hypertension)    History of stroke    Hyponatremia    HZAO (acute kidney injury) ([facility]    Elevated troponin    Other hyperlipidemia    Palliative care by specialist        PAST MEDICAL HISTORY:    Past Medical History:   Diagnosis Date    Acid reflux     Breast cancer ([facility]     right    Hypertension     Insomnia     Seasonal allergies     Stroke [facility]) 2013    tia    Type 2 diabetes mellitus without complication ([facility]        DIET:    DIET CARDIAC; Carb Control: 5 carb choices (75 gms)/meal     PHYSICAL EXAM:     Patient Vitals for the past 24 hrs:   BP Temp Temp src Pulse Resp SpO2 Weight   09/29/18 1145 - - - - - 97 % -   09/29/18 1000 (!) 118/58 97.6 °F (36.4 °C) Temporal 90 16 96 % -   09/29/18 0842 (!) 148/70 97.3 °F (36.3 °C) Temporal 94 18 100 % -   09/29/18 0556 - - - - - - 170 lb 11.2 oz (77.4 kg)   09/28/18 2230 - 97.7 °F (36.5 °C) - - - - -   09/28/18 2000 126/72 97.8 °F (36.6 °C) Oral 87 - 99 % -   09/28/18 1930 130/70 97.7 °F (36.5 °C) Oral 98 22 98 % -   09/28/18 1841 130/67 98.7 °F (37.1 °C) Temporal 99 18 99 % -   09/28/18 1745 (!) 145/77 97.3 °F (36.3 °C) Temporal 120 18 99 % -   09/28/18 1600 128/66 97.4 °F (36.3 °C) Temporal 109 16 100 % -   @      Intake/Output Summary (Last 24 hours) at

## 2018-09-30 LAB
ALBUMIN SERPL-MCNC: 3 G/DL (ref 3.5–5.2)
ALP BLD-CCNC: 109 U/L (ref 35–104)
ALT SERPL-CCNC: 30 U/L (ref 0–32)
ANION GAP SERPL CALCULATED.3IONS-SCNC: 17 MMOL/L (ref 7–16)
ANISOCYTOSIS: ABNORMAL
AST SERPL-CCNC: 23 U/L (ref 0–31)
BASOPHILS ABSOLUTE: 0 E9/L (ref 0–0.2)
BASOPHILS RELATIVE PERCENT: 0.1 % (ref 0–2)
BILIRUB SERPL-MCNC: 0.7 MG/DL (ref 0–1.2)
BUN BLDV-MCNC: 19 MG/DL (ref 8–23)
CALCIUM SERPL-MCNC: 8.9 MG/DL (ref 8.6–10.2)
CHLORIDE BLD-SCNC: 97 MMOL/L (ref 98–107)
CO2: 24 MMOL/L (ref 22–29)
CREAT SERPL-MCNC: 1.2 MG/DL (ref 0.5–1)
EOSINOPHILS ABSOLUTE: 0.2 E9/L (ref 0.05–0.5)
EOSINOPHILS RELATIVE PERCENT: 2.6 % (ref 0–6)
GFR AFRICAN AMERICAN: 54
GFR NON-AFRICAN AMERICAN: 45 ML/MIN/1.73
GLUCOSE BLD-MCNC: 162 MG/DL (ref 74–109)
HCT VFR BLD CALC: 27.1 % (ref 34–48)
HEMOGLOBIN: 9 G/DL (ref 11.5–15.5)
LYMPHOCYTES ABSOLUTE: 0.3 E9/L (ref 1.5–4)
LYMPHOCYTES RELATIVE PERCENT: 3.5 % (ref 20–42)
MAGNESIUM: 1.3 MG/DL (ref 1.6–2.6)
MCH RBC QN AUTO: 30 PG (ref 26–35)
MCHC RBC AUTO-ENTMCNC: 33.2 % (ref 32–34.5)
MCV RBC AUTO: 90.3 FL (ref 80–99.9)
METER GLUCOSE: 163 MG/DL (ref 70–110)
METER GLUCOSE: 189 MG/DL (ref 70–110)
METER GLUCOSE: 205 MG/DL (ref 70–110)
METER GLUCOSE: 231 MG/DL (ref 70–110)
MONOCYTES ABSOLUTE: 0.38 E9/L (ref 0.1–0.95)
MONOCYTES RELATIVE PERCENT: 5.2 % (ref 2–12)
NEUTROPHILS ABSOLUTE: 6.68 E9/L (ref 1.8–7.3)
NEUTROPHILS RELATIVE PERCENT: 88.7 % (ref 43–80)
OVALOCYTES: ABNORMAL
PDW BLD-RTO: 17.8 FL (ref 11.5–15)
PLATELET # BLD: 118 E9/L (ref 130–450)
PMV BLD AUTO: 10.9 FL (ref 7–12)
POIKILOCYTES: ABNORMAL
POTASSIUM REFLEX MAGNESIUM: 3 MMOL/L (ref 3.5–5)
RBC # BLD: 3 E12/L (ref 3.5–5.5)
SODIUM BLD-SCNC: 138 MMOL/L (ref 132–146)
TOTAL PROTEIN: 5.8 G/DL (ref 6.4–8.3)
WBC # BLD: 7.5 E9/L (ref 4.5–11.5)

## 2018-09-30 PROCEDURE — 82962 GLUCOSE BLOOD TEST: CPT

## 2018-09-30 PROCEDURE — 6370000000 HC RX 637 (ALT 250 FOR IP): Performed by: STUDENT IN AN ORGANIZED HEALTH CARE EDUCATION/TRAINING PROGRAM

## 2018-09-30 PROCEDURE — 2580000003 HC RX 258: Performed by: STUDENT IN AN ORGANIZED HEALTH CARE EDUCATION/TRAINING PROGRAM

## 2018-09-30 PROCEDURE — 6360000002 HC RX W HCPCS: Performed by: NURSE PRACTITIONER

## 2018-09-30 PROCEDURE — 6370000000 HC RX 637 (ALT 250 FOR IP): Performed by: INTERNAL MEDICINE

## 2018-09-30 PROCEDURE — 6370000000 HC RX 637 (ALT 250 FOR IP): Performed by: HOSPITALIST

## 2018-09-30 PROCEDURE — 85025 COMPLETE CBC W/AUTO DIFF WBC: CPT

## 2018-09-30 PROCEDURE — 6370000000 HC RX 637 (ALT 250 FOR IP): Performed by: FAMILY MEDICINE

## 2018-09-30 PROCEDURE — 94640 AIRWAY INHALATION TREATMENT: CPT

## 2018-09-30 PROCEDURE — 80053 COMPREHEN METABOLIC PANEL: CPT

## 2018-09-30 PROCEDURE — 83735 ASSAY OF MAGNESIUM: CPT

## 2018-09-30 PROCEDURE — 6360000002 HC RX W HCPCS: Performed by: STUDENT IN AN ORGANIZED HEALTH CARE EDUCATION/TRAINING PROGRAM

## 2018-09-30 PROCEDURE — 6370000000 HC RX 637 (ALT 250 FOR IP): Performed by: CLINICAL NURSE SPECIALIST

## 2018-09-30 PROCEDURE — 36415 COLL VENOUS BLD VENIPUNCTURE: CPT

## 2018-09-30 PROCEDURE — 2700000000 HC OXYGEN THERAPY PER DAY

## 2018-09-30 PROCEDURE — 6360000002 HC RX W HCPCS: Performed by: INTERNAL MEDICINE

## 2018-09-30 PROCEDURE — 2060000000 HC ICU INTERMEDIATE R&B

## 2018-09-30 RX ORDER — MAGNESIUM SULFATE IN WATER 40 MG/ML
2 INJECTION, SOLUTION INTRAVENOUS ONCE
Status: COMPLETED | OUTPATIENT
Start: 2018-09-30 | End: 2018-09-30

## 2018-09-30 RX ORDER — FUROSEMIDE 10 MG/ML
40 INJECTION INTRAMUSCULAR; INTRAVENOUS ONCE
Status: COMPLETED | OUTPATIENT
Start: 2018-09-30 | End: 2018-09-30

## 2018-09-30 RX ORDER — POTASSIUM CHLORIDE 20 MEQ/1
20 TABLET, EXTENDED RELEASE ORAL ONCE
Status: COMPLETED | OUTPATIENT
Start: 2018-09-30 | End: 2018-09-30

## 2018-09-30 RX ADMIN — INSULIN LISPRO 3 UNITS: 100 INJECTION, SOLUTION INTRAVENOUS; SUBCUTANEOUS at 06:20

## 2018-09-30 RX ADMIN — FUROSEMIDE 40 MG: 10 INJECTION, SOLUTION INTRAMUSCULAR; INTRAVENOUS at 14:47

## 2018-09-30 RX ADMIN — SIMVASTATIN 40 MG: 40 TABLET, FILM COATED ORAL at 20:47

## 2018-09-30 RX ADMIN — ASPIRIN AND EXTENDED-RELEASE DIPYRIDAMOLE 1 CAPSULE: 25; 200 CAPSULE ORAL at 20:47

## 2018-09-30 RX ADMIN — GABAPENTIN 300 MG: 300 CAPSULE ORAL at 20:47

## 2018-09-30 RX ADMIN — ESCITALOPRAM OXALATE 20 MG: 10 TABLET, FILM COATED ORAL at 20:47

## 2018-09-30 RX ADMIN — GABAPENTIN 300 MG: 300 CAPSULE ORAL at 10:41

## 2018-09-30 RX ADMIN — INSULIN LISPRO 3 UNITS: 100 INJECTION, SOLUTION INTRAVENOUS; SUBCUTANEOUS at 17:29

## 2018-09-30 RX ADMIN — Medication 10 ML: at 20:47

## 2018-09-30 RX ADMIN — MAGNESIUM SULFATE HEPTAHYDRATE 2 G: 40 INJECTION, SOLUTION INTRAVENOUS at 13:00

## 2018-09-30 RX ADMIN — POTASSIUM CHLORIDE 20 MEQ: 20 TABLET, EXTENDED RELEASE ORAL at 13:00

## 2018-09-30 RX ADMIN — WATER 2 G: 1 INJECTION INTRAMUSCULAR; INTRAVENOUS; SUBCUTANEOUS at 10:56

## 2018-09-30 RX ADMIN — HEPARIN 500 UNITS: 100 SYRINGE at 10:42

## 2018-09-30 RX ADMIN — IPRATROPIUM BROMIDE AND ALBUTEROL SULFATE 1 AMPULE: .5; 3 SOLUTION RESPIRATORY (INHALATION) at 13:33

## 2018-09-30 RX ADMIN — Medication 10 ML: at 10:39

## 2018-09-30 RX ADMIN — VITAMIN C 2 TABLET: TAB at 10:43

## 2018-09-30 RX ADMIN — INSULIN LISPRO 3 UNITS: 100 INJECTION, SOLUTION INTRAVENOUS; SUBCUTANEOUS at 20:51

## 2018-09-30 RX ADMIN — GABAPENTIN 300 MG: 300 CAPSULE ORAL at 14:07

## 2018-09-30 RX ADMIN — METOPROLOL SUCCINATE 25 MG: 25 TABLET, FILM COATED, EXTENDED RELEASE ORAL at 10:41

## 2018-09-30 RX ADMIN — INSULIN LISPRO 6 UNITS: 100 INJECTION, SOLUTION INTRAVENOUS; SUBCUTANEOUS at 13:13

## 2018-09-30 RX ADMIN — ASPIRIN AND EXTENDED-RELEASE DIPYRIDAMOLE 1 CAPSULE: 25; 200 CAPSULE ORAL at 10:41

## 2018-09-30 RX ADMIN — PANTOPRAZOLE SODIUM 40 MG: 40 TABLET, DELAYED RELEASE ORAL at 10:40

## 2018-09-30 ASSESSMENT — PAIN SCALES - GENERAL
PAINLEVEL_OUTOF10: 0

## 2018-09-30 NOTE — PROGRESS NOTES
Exam:    BP (!) 114/55   Pulse 87   Temp 97.9 °F (36.6 °C) (Oral)   Resp 16   Ht 5' 2\" (1.575 m)   Wt 165 lb 11.2 oz (75.2 kg)   SpO2 98%   BMI 30.31 kg/m²     General appearance: in distress, appears stated age and cooperative. HEENT: Conjunctivae/corneas clear. Pupils equal and round. No injections noted. Neck: Supple. No lesions, scars or masses. Trachea midline. Respiratory:  tachypenic. Clear to auscultation, bilaterally without Rales/Wheezes/Rhonchi. Cardiovascular: tahcycardic and rhythm with normal S1/S2 without murmurs, rubs or gallops. Abdomen: Soft, non-tender, non-distended with normal bowel sounds. Musculoskeletal: No clubbing, cyanosis or edema bilaterally. Brisk capillary refill. 2+ lower extremity pulses (dorsalis pedis). Skin:  No rashes    Neurologic: awake, alert and following commands       Labs:   Recent Labs      09/28/18   0500  09/29/18   0537  09/30/18   0550   WBC  11.4  7.9  7.5   HGB  7.6*  8.4*  9.0*   HCT  22.8*  25.6*  27.1*   PLT  97*  90*  118*     Recent Labs      09/28/18   0500  09/29/18   0537  09/30/18   0550   NA  137  143  138   K  3.1*  4.1  3.0*   CL  101  106  97*   CO2  19*  21*  24   BUN  32*  23  19   CREATININE  1.5*  1.2*  1.2*   CALCIUM  7.9*  8.4*  8.9     Recent Labs      09/28/18   0500  09/29/18   0537  09/30/18   0550   AST  31  22  23   ALT  31  30  30   BILITOT  0.5  0.5  0.7   ALKPHOS  102  103  109*     No results for input(s): INR in the last 72 hours. Recent Labs      09/27/18   1215  09/27/18   1615  09/27/18   1820  09/27/18   2200   CKTOTAL   --   61   --    --    TROPONINI  0.50*   --   0.24*  0.16*       Imaging:  XR CHEST PORTABLE   Final Result      Persistent the mild-to-moderate right-sided pleural effusion      Small left side pleural reaction      Cardiomegaly. No significant change since September 27      FL RETROGRADE PYELOGRAM W WO KUB   Final Result   Intraoperative fluoroscopy, as above.                      XR

## 2018-10-01 ENCOUNTER — HOSPITAL ENCOUNTER (OUTPATIENT)
Dept: INFUSION THERAPY | Age: 68
End: 2018-10-01
Payer: MEDICARE

## 2018-10-01 ENCOUNTER — APPOINTMENT (OUTPATIENT)
Dept: ULTRASOUND IMAGING | Age: 68
DRG: 854 | End: 2018-10-01
Attending: FAMILY MEDICINE
Payer: MEDICARE

## 2018-10-01 VITALS
TEMPERATURE: 97.9 F | OXYGEN SATURATION: 99 % | RESPIRATION RATE: 15 BRPM | WEIGHT: 163.4 LBS | DIASTOLIC BLOOD PRESSURE: 78 MMHG | HEIGHT: 62 IN | BODY MASS INDEX: 30.07 KG/M2 | SYSTOLIC BLOOD PRESSURE: 149 MMHG | HEART RATE: 109 BPM

## 2018-10-01 LAB
ALBUMIN SERPL-MCNC: 3.1 G/DL (ref 3.5–5.2)
ALP BLD-CCNC: 96 U/L (ref 35–104)
ALT SERPL-CCNC: 24 U/L (ref 0–32)
ANION GAP SERPL CALCULATED.3IONS-SCNC: 16 MMOL/L (ref 7–16)
AST SERPL-CCNC: 20 U/L (ref 0–31)
BASOPHILS ABSOLUTE: 0.01 E9/L (ref 0–0.2)
BASOPHILS RELATIVE PERCENT: 0.2 % (ref 0–2)
BILIRUB SERPL-MCNC: 0.5 MG/DL (ref 0–1.2)
BLOOD CULTURE, ROUTINE: ABNORMAL
BLOOD CULTURE, ROUTINE: ABNORMAL
BLOOD CULTURE, ROUTINE: NORMAL
BUN BLDV-MCNC: 14 MG/DL (ref 8–23)
CALCIUM SERPL-MCNC: 9.1 MG/DL (ref 8.6–10.2)
CHLORIDE BLD-SCNC: 96 MMOL/L (ref 98–107)
CO2: 28 MMOL/L (ref 22–29)
CREAT SERPL-MCNC: 1.1 MG/DL (ref 0.5–1)
EOSINOPHILS ABSOLUTE: 0.08 E9/L (ref 0.05–0.5)
EOSINOPHILS RELATIVE PERCENT: 1.6 % (ref 0–6)
GFR AFRICAN AMERICAN: 60
GFR NON-AFRICAN AMERICAN: 49 ML/MIN/1.73
GLUCOSE BLD-MCNC: 191 MG/DL (ref 74–109)
HCT VFR BLD CALC: 28.2 % (ref 34–48)
HEMOGLOBIN: 9.3 G/DL (ref 11.5–15.5)
HYPOCHROMIA: ABNORMAL
IMMATURE GRANULOCYTES #: 0.04 E9/L
IMMATURE GRANULOCYTES %: 0.8 % (ref 0–5)
LYMPHOCYTES ABSOLUTE: 0.42 E9/L (ref 1.5–4)
LYMPHOCYTES RELATIVE PERCENT: 8.4 % (ref 20–42)
MAGNESIUM: 1.4 MG/DL (ref 1.6–2.6)
MCH RBC QN AUTO: 29.9 PG (ref 26–35)
MCHC RBC AUTO-ENTMCNC: 33 % (ref 32–34.5)
MCV RBC AUTO: 90.7 FL (ref 80–99.9)
METER GLUCOSE: 214 MG/DL (ref 70–110)
MONOCYTES ABSOLUTE: 0.41 E9/L (ref 0.1–0.95)
MONOCYTES RELATIVE PERCENT: 8.2 % (ref 2–12)
NEUTROPHILS ABSOLUTE: 4.03 E9/L (ref 1.8–7.3)
NEUTROPHILS RELATIVE PERCENT: 80.8 % (ref 43–80)
ORGANISM: ABNORMAL
ORGANISM: ABNORMAL
OVALOCYTES: ABNORMAL
PDW BLD-RTO: 17.2 FL (ref 11.5–15)
PLATELET # BLD: 119 E9/L (ref 130–450)
PMV BLD AUTO: 10.1 FL (ref 7–12)
POIKILOCYTES: ABNORMAL
POLYCHROMASIA: ABNORMAL
POTASSIUM REFLEX MAGNESIUM: 2.8 MMOL/L (ref 3.5–5)
RBC # BLD: 3.11 E12/L (ref 3.5–5.5)
SODIUM BLD-SCNC: 140 MMOL/L (ref 132–146)
TOTAL PROTEIN: 5.7 G/DL (ref 6.4–8.3)
WBC # BLD: 5 E9/L (ref 4.5–11.5)

## 2018-10-01 PROCEDURE — 99233 SBSQ HOSP IP/OBS HIGH 50: CPT | Performed by: INTERNAL MEDICINE

## 2018-10-01 PROCEDURE — 6370000000 HC RX 637 (ALT 250 FOR IP): Performed by: INTERNAL MEDICINE

## 2018-10-01 PROCEDURE — 6370000000 HC RX 637 (ALT 250 FOR IP): Performed by: HOSPITALIST

## 2018-10-01 PROCEDURE — 93970 EXTREMITY STUDY: CPT

## 2018-10-01 PROCEDURE — 6370000000 HC RX 637 (ALT 250 FOR IP): Performed by: STUDENT IN AN ORGANIZED HEALTH CARE EDUCATION/TRAINING PROGRAM

## 2018-10-01 PROCEDURE — 80053 COMPREHEN METABOLIC PANEL: CPT

## 2018-10-01 PROCEDURE — 82962 GLUCOSE BLOOD TEST: CPT

## 2018-10-01 PROCEDURE — 36415 COLL VENOUS BLD VENIPUNCTURE: CPT

## 2018-10-01 PROCEDURE — 94640 AIRWAY INHALATION TREATMENT: CPT

## 2018-10-01 PROCEDURE — 83735 ASSAY OF MAGNESIUM: CPT

## 2018-10-01 PROCEDURE — 6370000000 HC RX 637 (ALT 250 FOR IP): Performed by: FAMILY MEDICINE

## 2018-10-01 PROCEDURE — 85025 COMPLETE CBC W/AUTO DIFF WBC: CPT

## 2018-10-01 PROCEDURE — 6370000000 HC RX 637 (ALT 250 FOR IP): Performed by: CLINICAL NURSE SPECIALIST

## 2018-10-01 RX ORDER — MAGNESIUM GLUCONATE 27 MG(500)
500 TABLET ORAL DAILY
Status: COMPLETED | OUTPATIENT
Start: 2018-10-01 | End: 2018-10-01

## 2018-10-01 RX ORDER — MAGNESIUM SULFATE 1 G/100ML
1 INJECTION INTRAVENOUS PRN
Status: DISCONTINUED | OUTPATIENT
Start: 2018-10-01 | End: 2018-10-01 | Stop reason: HOSPADM

## 2018-10-01 RX ORDER — CEPHALEXIN 500 MG/1
500 CAPSULE ORAL EVERY 8 HOURS SCHEDULED
Status: DISCONTINUED | OUTPATIENT
Start: 2018-10-01 | End: 2018-10-01 | Stop reason: HOSPADM

## 2018-10-01 RX ORDER — POTASSIUM CHLORIDE 20 MEQ/1
40 TABLET, EXTENDED RELEASE ORAL 2 TIMES DAILY WITH MEALS
Status: COMPLETED | OUTPATIENT
Start: 2018-10-01 | End: 2018-10-01

## 2018-10-01 RX ORDER — PETROLATUM 42 G/100G
OINTMENT TOPICAL 2 TIMES DAILY PRN
Status: DISCONTINUED | OUTPATIENT
Start: 2018-10-01 | End: 2018-10-01 | Stop reason: HOSPADM

## 2018-10-01 RX ORDER — VITAMIN E (DL,TOCOPHERYL ACET) 180 MG
CAPSULE ORAL
Qty: 30 CAPSULE | Refills: 0 | Status: SHIPPED | OUTPATIENT
Start: 2018-10-01 | End: 2018-10-04 | Stop reason: ALTCHOICE

## 2018-10-01 RX ORDER — FUROSEMIDE 20 MG/1
20 TABLET ORAL DAILY PRN
Status: DISCONTINUED | OUTPATIENT
Start: 2018-10-01 | End: 2018-10-01 | Stop reason: HOSPADM

## 2018-10-01 RX ORDER — MAGNESIUM SULFATE IN WATER 40 MG/ML
2 INJECTION, SOLUTION INTRAVENOUS ONCE
Status: DISCONTINUED | OUTPATIENT
Start: 2018-10-01 | End: 2018-10-01

## 2018-10-01 RX ORDER — CEPHALEXIN 500 MG/1
500 CAPSULE ORAL 2 TIMES DAILY
Qty: 63 CAPSULE | Refills: 1 | Status: SHIPPED | OUTPATIENT
Start: 2018-10-01 | End: 2018-10-22

## 2018-10-01 RX ADMIN — PANTOPRAZOLE SODIUM 40 MG: 40 TABLET, DELAYED RELEASE ORAL at 08:16

## 2018-10-01 RX ADMIN — INSULIN LISPRO 9 UNITS: 100 INJECTION, SOLUTION INTRAVENOUS; SUBCUTANEOUS at 11:46

## 2018-10-01 RX ADMIN — POTASSIUM CHLORIDE 40 MEQ: 20 TABLET, EXTENDED RELEASE ORAL at 13:11

## 2018-10-01 RX ADMIN — IPRATROPIUM BROMIDE AND ALBUTEROL SULFATE 1 AMPULE: .5; 3 SOLUTION RESPIRATORY (INHALATION) at 09:09

## 2018-10-01 RX ADMIN — METOPROLOL SUCCINATE 25 MG: 25 TABLET, FILM COATED, EXTENDED RELEASE ORAL at 08:16

## 2018-10-01 RX ADMIN — GABAPENTIN 300 MG: 300 CAPSULE ORAL at 08:15

## 2018-10-01 RX ADMIN — VITAMIN C 2 TABLET: TAB at 08:15

## 2018-10-01 RX ADMIN — CEPHALEXIN 500 MG: 500 CAPSULE ORAL at 13:10

## 2018-10-01 RX ADMIN — INSULIN LISPRO 6 UNITS: 100 INJECTION, SOLUTION INTRAVENOUS; SUBCUTANEOUS at 16:54

## 2018-10-01 RX ADMIN — POTASSIUM CHLORIDE 40 MEQ: 20 TABLET, EXTENDED RELEASE ORAL at 16:55

## 2018-10-01 RX ADMIN — IPRATROPIUM BROMIDE AND ALBUTEROL SULFATE 1 AMPULE: .5; 3 SOLUTION RESPIRATORY (INHALATION) at 12:18

## 2018-10-01 RX ADMIN — Medication 500 MG: at 13:11

## 2018-10-01 RX ADMIN — INSULIN LISPRO 6 UNITS: 100 INJECTION, SOLUTION INTRAVENOUS; SUBCUTANEOUS at 06:02

## 2018-10-01 RX ADMIN — IPRATROPIUM BROMIDE AND ALBUTEROL SULFATE 1 AMPULE: .5; 3 SOLUTION RESPIRATORY (INHALATION) at 16:25

## 2018-10-01 RX ADMIN — ASPIRIN AND EXTENDED-RELEASE DIPYRIDAMOLE 1 CAPSULE: 25; 200 CAPSULE ORAL at 08:15

## 2018-10-01 RX ADMIN — GABAPENTIN 300 MG: 300 CAPSULE ORAL at 13:11

## 2018-10-01 RX ADMIN — DIPHENOXYLATE HYDROCHLORIDE AND ATROPINE SULFATE 2 TABLET: 2.5; .025 TABLET ORAL at 14:33

## 2018-10-01 ASSESSMENT — PAIN SCALES - GENERAL
PAINLEVEL_OUTOF10: 0
PAINLEVEL_OUTOF10: 0

## 2018-10-01 NOTE — ANESTHESIA POSTPROCEDURE EVALUATION
Department of Anesthesiology  Postprocedure Note    Patient: Haylee Monk  MRN: [id number]  YOB: 1950  Date of evaluation: 10/1/2018  Time:  7:27 AM     Procedure Summary     Date:  09/28/18 Room / Location:  [facility] OR  / [facility] OR    Anesthesia Start:  1795 Anesthesia Stop:  7701    Procedure:  CYSTOSCOPY BILATERAL STENT INSERTION (Bilateral Perineum) Diagnosis:  (.)    Surgeon:  Annita Amaral MD Responsible Provider:  Raza Golden MD    Anesthesia Type:  MAC ASA Status:  3          Anesthesia Type: MAC    Urmila Phase I: Urmila Score: 9    Urmila Phase II:      Last vitals: Reviewed and per EMR flowsheets.        Anesthesia Post Evaluation    Patient location during evaluation: PACU  Patient participation: complete - patient participated  Level of consciousness: awake and alert  Airway patency: patent  Nausea & Vomiting: no nausea and no vomiting  Complications: no  Cardiovascular status: hemodynamically stable and blood pressure returned to baseline  Respiratory status: acceptable  Hydration status: euvolemic

## 2018-10-01 NOTE — DISCHARGE SUMMARY
obtained with 10 mg of Lasix IV injected 30 minutes in the study. FINDINGS: There was good peak flow in the aorta. There was good peak flow in both kidneys although the peak flow of the left kidney is higher and early the and the peak velocity right kidney. There was prompt uptake of the radionuclide by the kidneys. Activity in the collecting system of the kidneys were seen by the end of 5 minutes. The transverse persistent to about 35 minutes and had a washout following Lasix IV. The renogram curves demonstrate to the a slight delayed peak activity for both kidneys with overall flat to slow descending slope which demonstrate excretion after Lasix administration. Discrete renal function is 51% for the left kidney and 49% for the right kidney. The time of maximum activity for the left kidney was 12 minutes and 17 seconds and for the right kidney was 18 minutes and 17 seconds. Binocular imaging of the overall size of the right kidney smaller than the left kidney, however by the ultrasound measurements kidneys have symmetrical size, within normal range. No evidence for obstructive uropathy. Good response to Lasix diuresis of the kidneys. Fl Retrograde Pyelogram W Wo Kub    Result Date: 2018  Patient MRN:  [id number] : 1950 Age: 80 years Gender: Female Order Date:  2018 11:30 AM EXAM: FL RETROGRADE PYELOGRAM W WO KUB NUMBER OF IMAGES:  31 INDICATION: Patient is a 70-year-old woman with history of hydronephrosis, bilateral ureter calculi. Presents for Stent and retro FLUORO TIME: 1 minute and 45 seconds TECHNIQUE: Intraoperative fluoroscopy and spot views of the abdomen and pelvis COMPARISON: CT abdomen pelvis 2008 FINDINGS: Intraoperative fluoroscopy was provided by the radiology department for cystoscopy with bilateral retrograde pyelograms and bilateral ureter stent placement. 3 0 spot films of the abdomen and pelvis were obtained.  1 minute and 45 seconds of fluoroscopy time was

## 2018-10-02 ENCOUNTER — HOSPITAL ENCOUNTER (OUTPATIENT)
Dept: INFUSION THERAPY | Age: 68
Discharge: HOME | End: 2018-10-02
Payer: MEDICARE

## 2018-10-02 ENCOUNTER — CLINICAL DOCUMENTATION (OUTPATIENT)
Dept: ONCOLOGY | Age: 68
End: 2018-10-02

## 2018-10-02 VITALS
RESPIRATION RATE: 20 BRPM | DIASTOLIC BLOOD PRESSURE: 60 MMHG | SYSTOLIC BLOOD PRESSURE: 125 MMHG | OXYGEN SATURATION: 95 % | HEART RATE: 89 BPM | TEMPERATURE: 98.2 F

## 2018-10-02 DIAGNOSIS — Z17.1 MALIGNANT NEOPLASM OF CENTRAL PORTION OF RIGHT BREAST IN FEMALE, ESTROGEN RECEPTOR NEGATIVE (HCC): Primary | ICD-10-CM

## 2018-10-02 DIAGNOSIS — C77.3 BREAST CANCER METASTASIZED TO AXILLARY LYMPH NODE, RIGHT (HCC): ICD-10-CM

## 2018-10-02 DIAGNOSIS — C50.111 MALIGNANT NEOPLASM OF CENTRAL PORTION OF RIGHT BREAST IN FEMALE, ESTROGEN RECEPTOR NEGATIVE (HCC): Primary | ICD-10-CM

## 2018-10-02 DIAGNOSIS — C50.911 BREAST CANCER METASTASIZED TO AXILLARY LYMPH NODE, RIGHT (HCC): ICD-10-CM

## 2018-10-02 LAB
ALBUMIN SERPL-MCNC: 3.1 G/DL (ref 3.5–5.2)
ALP BLD-CCNC: 84 U/L (ref 35–104)
ALT SERPL-CCNC: 21 U/L (ref 0–32)
ANION GAP SERPL CALCULATED.3IONS-SCNC: 11 MMOL/L (ref 7–16)
ANISOCYTOSIS: ABNORMAL
AST SERPL-CCNC: 23 U/L (ref 0–31)
BASOPHILS ABSOLUTE: 0.01 E9/L (ref 0–0.2)
BASOPHILS RELATIVE PERCENT: 0.3 % (ref 0–2)
BILIRUB SERPL-MCNC: 0.5 MG/DL (ref 0–1.2)
BLOOD CULTURE, ROUTINE: NORMAL
BUN BLDV-MCNC: 10 MG/DL (ref 8–23)
CALCIUM SERPL-MCNC: 8.9 MG/DL (ref 8.6–10.2)
CHLORIDE BLD-SCNC: 98 MMOL/L (ref 98–107)
CO2: 30 MMOL/L (ref 22–29)
CREAT SERPL-MCNC: 1.1 MG/DL (ref 0.5–1)
CULTURE, BLOOD 2: NORMAL
EOSINOPHILS ABSOLUTE: 0.07 E9/L (ref 0.05–0.5)
EOSINOPHILS RELATIVE PERCENT: 1.8 % (ref 0–6)
GFR AFRICAN AMERICAN: 60
GFR NON-AFRICAN AMERICAN: 49 ML/MIN/1.73
GLUCOSE BLD-MCNC: 194 MG/DL (ref 74–109)
HCT VFR BLD CALC: 26.8 % (ref 34–48)
HEMOGLOBIN: 8.5 G/DL (ref 11.5–15.5)
HYPOCHROMIA: ABNORMAL
IMMATURE GRANULOCYTES #: 0.06 E9/L
IMMATURE GRANULOCYTES %: 1.6 % (ref 0–5)
LYMPHOCYTES ABSOLUTE: 0.39 E9/L (ref 1.5–4)
LYMPHOCYTES RELATIVE PERCENT: 10.2 % (ref 20–42)
MAGNESIUM: 1.2 MG/DL (ref 1.6–2.6)
MCH RBC QN AUTO: 29.8 PG (ref 26–35)
MCHC RBC AUTO-ENTMCNC: 31.7 % (ref 32–34.5)
MCV RBC AUTO: 94 FL (ref 80–99.9)
METER GLUCOSE: 216 MG/DL (ref 70–110)
METER GLUCOSE: 252 MG/DL (ref 70–110)
MONOCYTES ABSOLUTE: 0.44 E9/L (ref 0.1–0.95)
MONOCYTES RELATIVE PERCENT: 11.5 % (ref 2–12)
NEUTROPHILS ABSOLUTE: 2.86 E9/L (ref 1.8–7.3)
NEUTROPHILS RELATIVE PERCENT: 74.6 % (ref 43–80)
PDW BLD-RTO: 17.2 FL (ref 11.5–15)
PLATELET # BLD: 105 E9/L (ref 130–450)
PMV BLD AUTO: 10.3 FL (ref 7–12)
POIKILOCYTES: ABNORMAL
POLYCHROMASIA: ABNORMAL
POTASSIUM SERPL-SCNC: 3.8 MMOL/L (ref 3.5–5)
RBC # BLD: 2.85 E12/L (ref 3.5–5.5)
SODIUM BLD-SCNC: 139 MMOL/L (ref 132–146)
TEAR DROP CELLS: ABNORMAL
TOTAL PROTEIN: 5.8 G/DL (ref 6.4–8.3)
WBC # BLD: 3.8 E9/L (ref 4.5–11.5)

## 2018-10-02 PROCEDURE — 96360 HYDRATION IV INFUSION INIT: CPT

## 2018-10-02 PROCEDURE — 83735 ASSAY OF MAGNESIUM: CPT

## 2018-10-02 PROCEDURE — 2580000003 HC RX 258

## 2018-10-02 PROCEDURE — 80053 COMPREHEN METABOLIC PANEL: CPT

## 2018-10-02 PROCEDURE — 36591 DRAW BLOOD OFF VENOUS DEVICE: CPT

## 2018-10-02 PROCEDURE — 96374 THER/PROPH/DIAG INJ IV PUSH: CPT

## 2018-10-02 PROCEDURE — 85025 COMPLETE CBC W/AUTO DIFF WBC: CPT

## 2018-10-02 PROCEDURE — 2580000003 HC RX 258: Performed by: NURSE PRACTITIONER

## 2018-10-02 PROCEDURE — 6360000002 HC RX W HCPCS: Performed by: NURSE PRACTITIONER

## 2018-10-02 RX ORDER — SODIUM CHLORIDE 0.9 % (FLUSH) 0.9 %
SYRINGE (ML) INJECTION
Status: COMPLETED
Start: 2018-10-02 | End: 2018-10-02

## 2018-10-02 RX ORDER — FUROSEMIDE 10 MG/ML
20 INJECTION INTRAMUSCULAR; INTRAVENOUS ONCE
Status: COMPLETED | OUTPATIENT
Start: 2018-10-02 | End: 2018-10-02

## 2018-10-02 RX ADMIN — SODIUM CHLORIDE 1000 ML: 4.5 INJECTION, SOLUTION INTRAVENOUS at 13:32

## 2018-10-02 RX ADMIN — Medication 10 ML: at 13:32

## 2018-10-02 RX ADMIN — FUROSEMIDE 20 MG: 10 INJECTION, SOLUTION INTRAMUSCULAR; INTRAVENOUS at 13:29

## 2018-10-02 NOTE — PROGRESS NOTES
Discharged to home in stable condition, tolerated  Infusion well, port deaccessed, flushed per protocol, good blood return noted. , site asymptomatic.

## 2018-10-03 DIAGNOSIS — C50.111 MALIGNANT NEOPLASM OF CENTRAL PORTION OF RIGHT BREAST IN FEMALE, ESTROGEN RECEPTOR NEGATIVE (HCC): Primary | ICD-10-CM

## 2018-10-03 DIAGNOSIS — Z17.1 MALIGNANT NEOPLASM OF CENTRAL PORTION OF RIGHT BREAST IN FEMALE, ESTROGEN RECEPTOR NEGATIVE (HCC): Primary | ICD-10-CM

## 2018-10-03 RX ORDER — HEPARIN SODIUM (PORCINE) LOCK FLUSH IV SOLN 100 UNIT/ML 100 UNIT/ML
500 SOLUTION INTRAVENOUS PRN
Status: CANCELLED | OUTPATIENT
Start: 2018-10-04

## 2018-10-03 RX ORDER — ALBUTEROL SULFATE 90 UG/1
2 AEROSOL, METERED RESPIRATORY (INHALATION) EVERY 6 HOURS PRN
Qty: 1 INHALER | Refills: 3 | Status: SHIPPED | OUTPATIENT
Start: 2018-10-03 | End: 2019-02-20 | Stop reason: ALTCHOICE

## 2018-10-03 RX ORDER — SODIUM CHLORIDE 0.9 % (FLUSH) 0.9 %
10 SYRINGE (ML) INJECTION PRN
Status: CANCELLED | OUTPATIENT
Start: 2018-10-04

## 2018-10-04 ENCOUNTER — HOSPITAL ENCOUNTER (OUTPATIENT)
Dept: INFUSION THERAPY | Age: 68
Discharge: HOME | End: 2018-10-04
Payer: MEDICARE

## 2018-10-04 VITALS
DIASTOLIC BLOOD PRESSURE: 61 MMHG | TEMPERATURE: 97.4 F | OXYGEN SATURATION: 97 % | SYSTOLIC BLOOD PRESSURE: 128 MMHG | RESPIRATION RATE: 20 BRPM | HEART RATE: 72 BPM

## 2018-10-04 DIAGNOSIS — C50.111 MALIGNANT NEOPLASM OF CENTRAL PORTION OF RIGHT BREAST IN FEMALE, ESTROGEN RECEPTOR NEGATIVE (HCC): ICD-10-CM

## 2018-10-04 DIAGNOSIS — Z17.1 MALIGNANT NEOPLASM OF CENTRAL PORTION OF RIGHT BREAST IN FEMALE, ESTROGEN RECEPTOR NEGATIVE (HCC): ICD-10-CM

## 2018-10-04 LAB
BLOOD CULTURE, ROUTINE: NORMAL
CULTURE, BLOOD 2: NORMAL

## 2018-10-04 PROCEDURE — 96375 TX/PRO/DX INJ NEW DRUG ADDON: CPT

## 2018-10-04 PROCEDURE — 6360000002 HC RX W HCPCS: Performed by: NURSE PRACTITIONER

## 2018-10-04 PROCEDURE — 96366 THER/PROPH/DIAG IV INF ADDON: CPT

## 2018-10-04 PROCEDURE — 36593 DECLOT VASCULAR DEVICE: CPT

## 2018-10-04 PROCEDURE — 2580000003 HC RX 258: Performed by: NURSE PRACTITIONER

## 2018-10-04 PROCEDURE — 96365 THER/PROPH/DIAG IV INF INIT: CPT

## 2018-10-04 RX ORDER — HEPARIN SODIUM (PORCINE) LOCK FLUSH IV SOLN 100 UNIT/ML 100 UNIT/ML
500 SOLUTION INTRAVENOUS PRN
Status: CANCELLED | OUTPATIENT
Start: 2018-10-04

## 2018-10-04 RX ORDER — SODIUM CHLORIDE 0.9 % (FLUSH) 0.9 %
10 SYRINGE (ML) INJECTION PRN
Status: CANCELLED | OUTPATIENT
Start: 2018-10-04

## 2018-10-04 RX ORDER — SODIUM CHLORIDE 0.9 % (FLUSH) 0.9 %
10 SYRINGE (ML) INJECTION PRN
Status: DISCONTINUED | OUTPATIENT
Start: 2018-10-04 | End: 2018-10-05 | Stop reason: HOSPADM

## 2018-10-04 RX ORDER — FUROSEMIDE 10 MG/ML
20 INJECTION INTRAMUSCULAR; INTRAVENOUS ONCE
Status: COMPLETED | OUTPATIENT
Start: 2018-10-04 | End: 2018-10-04

## 2018-10-04 RX ORDER — HEPARIN SODIUM (PORCINE) LOCK FLUSH IV SOLN 100 UNIT/ML 100 UNIT/ML
500 SOLUTION INTRAVENOUS PRN
Status: DISCONTINUED | OUTPATIENT
Start: 2018-10-04 | End: 2018-10-05 | Stop reason: HOSPADM

## 2018-10-04 RX ADMIN — HEPARIN 500 UNITS: 100 SYRINGE at 09:17

## 2018-10-04 RX ADMIN — Medication 10 ML: at 09:25

## 2018-10-04 RX ADMIN — Medication 10 ML: at 09:17

## 2018-10-04 RX ADMIN — Medication 10 ML: at 08:40

## 2018-10-04 RX ADMIN — ALTEPLASE 2 MG: 2.2 INJECTION, POWDER, LYOPHILIZED, FOR SOLUTION INTRAVENOUS at 10:15

## 2018-10-04 RX ADMIN — Medication 10 ML: at 08:25

## 2018-10-04 RX ADMIN — Medication 10 ML: at 09:31

## 2018-10-04 RX ADMIN — HEPARIN 500 UNITS: 100 SYRINGE at 08:40

## 2018-10-04 RX ADMIN — MAGNESIUM SULFATE HEPTAHYDRATE: 500 INJECTION, SOLUTION INTRAMUSCULAR; INTRAVENOUS at 09:37

## 2018-10-04 RX ADMIN — Medication 10 ML: at 09:16

## 2018-10-04 RX ADMIN — HEPARIN 500 UNITS: 100 SYRINGE at 14:46

## 2018-10-04 RX ADMIN — Medication 10 ML: at 14:46

## 2018-10-04 RX ADMIN — ALTEPLASE 2 MG: 2.2 INJECTION, POWDER, LYOPHILIZED, FOR SOLUTION INTRAVENOUS at 13:14

## 2018-10-04 RX ADMIN — FUROSEMIDE 20 MG: 10 INJECTION, SOLUTION INTRAMUSCULAR; INTRAVENOUS at 09:31

## 2018-10-04 RX ADMIN — Medication 10 ML: at 08:30

## 2018-10-04 RX ADMIN — Medication 10 ML: at 08:34

## 2018-10-04 NOTE — PROGRESS NOTES
No blood return noted from VAD after 2 doses of cathflo. Kierra Pepe NP here to evaluate patient. OK to discharge and de-access VAD. Approx 1cc clear fluid aspirated from VAD  and discarded. Flushed per protocol. VAD continues to not flush easily. Patient did state that while she was in the hospital she hit her chest on the table very hard. Kierra Pepe NP re-updated.  Henok Jamison RN

## 2018-10-04 NOTE — PROGRESS NOTES
VAD checked for blood return, none noted at this time. Will continue to monitor.  Artemio De La Cruz RN

## 2018-10-04 NOTE — PROGRESS NOTES
Spoke with Masha while in Oncology Infusion services. The albuterol inhaler is helping with her SOB. She has seasonal asthma sx. She is taking her zyrtec daily and now using the albuterol as a rescue inhaler as needed. She is here today for Magnesium replacement and IV fluids. She is feeling better with the edema less, but feels it will get worse while sitting here for 3 hours. Spoke with Chuyita Hudson, APRN-MARILYN, [facility] in collaboration with Dr. Swathi Rain, and obtained a verbal order for lasix 20 mg IVP once today after iv fluid and electrolyte replacement. Informed Hubert Leong RN, who is taking care of Masha today of above new order for today's treatment plans. Patient had the opportunity to ask questions with all questions being answered to their satisfaction. I told patient to call if there are any questions. The patient expresses understanding and acceptance of instructions.      Electronically signed by DORIAN Cason [place] [facility] on 10/4/2018 at 8:31 AM

## 2018-10-08 ENCOUNTER — OFFICE VISIT (OUTPATIENT)
Dept: ONCOLOGY | Age: 68
End: 2018-10-08
Payer: MEDICARE

## 2018-10-08 ENCOUNTER — HOSPITAL ENCOUNTER (OUTPATIENT)
Dept: INFUSION THERAPY | Age: 68
Discharge: HOME | End: 2018-10-08
Payer: MEDICARE

## 2018-10-08 VITALS
HEART RATE: 66 BPM | HEIGHT: 62 IN | SYSTOLIC BLOOD PRESSURE: 134 MMHG | BODY MASS INDEX: 26.5 KG/M2 | TEMPERATURE: 97.7 F | DIASTOLIC BLOOD PRESSURE: 62 MMHG | RESPIRATION RATE: 20 BRPM | WEIGHT: 144 LBS

## 2018-10-08 VITALS
TEMPERATURE: 97.4 F | RESPIRATION RATE: 20 BRPM | SYSTOLIC BLOOD PRESSURE: 130 MMHG | HEART RATE: 77 BPM | DIASTOLIC BLOOD PRESSURE: 61 MMHG

## 2018-10-08 DIAGNOSIS — Z17.1 MALIGNANT NEOPLASM OF CENTRAL PORTION OF RIGHT BREAST IN FEMALE, ESTROGEN RECEPTOR NEGATIVE (HCC): Primary | ICD-10-CM

## 2018-10-08 DIAGNOSIS — Z17.1 MALIGNANT NEOPLASM OF CENTRAL PORTION OF RIGHT BREAST IN FEMALE, ESTROGEN RECEPTOR NEGATIVE (HCC): ICD-10-CM

## 2018-10-08 DIAGNOSIS — C50.111 MALIGNANT NEOPLASM OF CENTRAL PORTION OF RIGHT BREAST IN FEMALE, ESTROGEN RECEPTOR NEGATIVE (HCC): Primary | ICD-10-CM

## 2018-10-08 DIAGNOSIS — C50.111 MALIGNANT NEOPLASM OF CENTRAL PORTION OF RIGHT BREAST IN FEMALE, ESTROGEN RECEPTOR NEGATIVE (HCC): ICD-10-CM

## 2018-10-08 LAB
ALBUMIN SERPL-MCNC: 3.9 G/DL (ref 3.5–5.2)
ALP BLD-CCNC: 91 U/L (ref 35–104)
ALT SERPL-CCNC: 20 U/L (ref 0–32)
ANION GAP SERPL CALCULATED.3IONS-SCNC: 13 MMOL/L (ref 7–16)
ANISOCYTOSIS: ABNORMAL
AST SERPL-CCNC: 28 U/L (ref 0–31)
BASOPHILS ABSOLUTE: 0.13 E9/L (ref 0–0.2)
BASOPHILS RELATIVE PERCENT: 2.6 % (ref 0–2)
BILIRUB SERPL-MCNC: 0.7 MG/DL (ref 0–1.2)
BUN BLDV-MCNC: 12 MG/DL (ref 8–23)
CALCIUM SERPL-MCNC: 9.6 MG/DL (ref 8.6–10.2)
CHLORIDE BLD-SCNC: 95 MMOL/L (ref 98–107)
CO2: 28 MMOL/L (ref 22–29)
CREAT SERPL-MCNC: 1.4 MG/DL (ref 0.5–1)
EOSINOPHILS ABSOLUTE: 0.09 E9/L (ref 0.05–0.5)
EOSINOPHILS RELATIVE PERCENT: 1.7 % (ref 0–6)
GFR AFRICAN AMERICAN: 45
GFR NON-AFRICAN AMERICAN: 37 ML/MIN/1.73
GLUCOSE BLD-MCNC: 188 MG/DL (ref 74–109)
HCT VFR BLD CALC: 33.5 % (ref 34–48)
HEMOGLOBIN: 10.8 G/DL (ref 11.5–15.5)
HYPOCHROMIA: ABNORMAL
LYMPHOCYTES ABSOLUTE: 0.95 E9/L (ref 1.5–4)
LYMPHOCYTES RELATIVE PERCENT: 19.1 % (ref 20–42)
MAGNESIUM: 1.4 MG/DL (ref 1.6–2.6)
MCH RBC QN AUTO: 29.7 PG (ref 26–35)
MCHC RBC AUTO-ENTMCNC: 32.2 % (ref 32–34.5)
MCV RBC AUTO: 92 FL (ref 80–99.9)
MONOCYTES ABSOLUTE: 0.4 E9/L (ref 0.1–0.95)
MONOCYTES RELATIVE PERCENT: 7.8 % (ref 2–12)
NEUTROPHILS ABSOLUTE: 3.45 E9/L (ref 1.8–7.3)
NEUTROPHILS RELATIVE PERCENT: 68.7 % (ref 43–80)
PDW BLD-RTO: 16.5 FL (ref 11.5–15)
PLATELET # BLD: 162 E9/L (ref 130–450)
PMV BLD AUTO: 10.5 FL (ref 7–12)
POLYCHROMASIA: ABNORMAL
POTASSIUM SERPL-SCNC: 3.6 MMOL/L (ref 3.5–5)
RBC # BLD: 3.64 E12/L (ref 3.5–5.5)
SODIUM BLD-SCNC: 136 MMOL/L (ref 132–146)
TOTAL PROTEIN: 6.9 G/DL (ref 6.4–8.3)
WBC # BLD: 5 E9/L (ref 4.5–11.5)

## 2018-10-08 PROCEDURE — 3017F COLORECTAL CA SCREEN DOC REV: CPT | Performed by: INTERNAL MEDICINE

## 2018-10-08 PROCEDURE — G8419 CALC BMI OUT NRM PARAM NOF/U: HCPCS | Performed by: INTERNAL MEDICINE

## 2018-10-08 PROCEDURE — 6360000002 HC RX W HCPCS: Performed by: NURSE PRACTITIONER

## 2018-10-08 PROCEDURE — 1111F DSCHRG MED/CURRENT MED MERGE: CPT | Performed by: INTERNAL MEDICINE

## 2018-10-08 PROCEDURE — 4004F PT TOBACCO SCREEN RCVD TLK: CPT | Performed by: INTERNAL MEDICINE

## 2018-10-08 PROCEDURE — 80053 COMPREHEN METABOLIC PANEL: CPT

## 2018-10-08 PROCEDURE — 96366 THER/PROPH/DIAG IV INF ADDON: CPT

## 2018-10-08 PROCEDURE — 83735 ASSAY OF MAGNESIUM: CPT

## 2018-10-08 PROCEDURE — 1101F PT FALLS ASSESS-DOCD LE1/YR: CPT | Performed by: INTERNAL MEDICINE

## 2018-10-08 PROCEDURE — 85025 COMPLETE CBC W/AUTO DIFF WBC: CPT

## 2018-10-08 PROCEDURE — 96361 HYDRATE IV INFUSION ADD-ON: CPT

## 2018-10-08 PROCEDURE — 96365 THER/PROPH/DIAG IV INF INIT: CPT

## 2018-10-08 PROCEDURE — 99215 OFFICE O/P EST HI 40 MIN: CPT | Performed by: INTERNAL MEDICINE

## 2018-10-08 PROCEDURE — 1123F ACP DISCUSS/DSCN MKR DOCD: CPT | Performed by: INTERNAL MEDICINE

## 2018-10-08 PROCEDURE — 2580000003 HC RX 258: Performed by: NURSE PRACTITIONER

## 2018-10-08 PROCEDURE — G8400 PT W/DXA NO RESULTS DOC: HCPCS | Performed by: INTERNAL MEDICINE

## 2018-10-08 PROCEDURE — 4040F PNEUMOC VAC/ADMIN/RCVD: CPT | Performed by: INTERNAL MEDICINE

## 2018-10-08 PROCEDURE — G8484 FLU IMMUNIZE NO ADMIN: HCPCS | Performed by: INTERNAL MEDICINE

## 2018-10-08 PROCEDURE — G8427 DOCREV CUR MEDS BY ELIG CLIN: HCPCS | Performed by: INTERNAL MEDICINE

## 2018-10-08 PROCEDURE — 1090F PRES/ABSN URINE INCON ASSESS: CPT | Performed by: INTERNAL MEDICINE

## 2018-10-08 PROCEDURE — 2580000003 HC RX 258

## 2018-10-08 RX ORDER — 0.9 % SODIUM CHLORIDE 0.9 %
1000 INTRAVENOUS SOLUTION INTRAVENOUS ONCE
Status: CANCELLED
Start: 2018-10-08 | End: 2018-10-08

## 2018-10-08 RX ORDER — SODIUM CHLORIDE 0.9 % (FLUSH) 0.9 %
10 SYRINGE (ML) INJECTION PRN
Status: CANCELLED | OUTPATIENT
Start: 2018-10-08

## 2018-10-08 RX ORDER — SODIUM CHLORIDE 9 MG/ML
INJECTION, SOLUTION INTRAVENOUS
Status: COMPLETED
Start: 2018-10-08 | End: 2018-10-08

## 2018-10-08 RX ORDER — 0.9 % SODIUM CHLORIDE 0.9 %
1000 INTRAVENOUS SOLUTION INTRAVENOUS ONCE
Status: COMPLETED | OUTPATIENT
Start: 2018-10-08 | End: 2018-10-08

## 2018-10-08 RX ORDER — SODIUM CHLORIDE 0.9 % (FLUSH) 0.9 %
10 SYRINGE (ML) INJECTION PRN
Status: DISCONTINUED | OUTPATIENT
Start: 2018-10-08 | End: 2018-10-09 | Stop reason: HOSPADM

## 2018-10-08 RX ORDER — HEPARIN SODIUM (PORCINE) LOCK FLUSH IV SOLN 100 UNIT/ML 100 UNIT/ML
500 SOLUTION INTRAVENOUS PRN
Status: CANCELLED | OUTPATIENT
Start: 2018-10-08

## 2018-10-08 RX ADMIN — Medication 1000 ML: at 09:23

## 2018-10-08 RX ADMIN — MAGNESIUM SULFATE HEPTAHYDRATE: 500 INJECTION, SOLUTION INTRAMUSCULAR; INTRAVENOUS at 09:34

## 2018-10-08 RX ADMIN — SODIUM CHLORIDE 1000 ML: 9 INJECTION, SOLUTION INTRAVENOUS at 09:23

## 2018-10-16 ENCOUNTER — HOSPITAL ENCOUNTER (OUTPATIENT)
Dept: MRI IMAGING | Age: 68
Discharge: HOME | End: 2018-10-18
Payer: MEDICARE

## 2018-10-16 DIAGNOSIS — Z17.1 MALIGNANT NEOPLASM OF CENTRAL PORTION OF RIGHT BREAST IN FEMALE, ESTROGEN RECEPTOR NEGATIVE (HCC): ICD-10-CM

## 2018-10-16 DIAGNOSIS — C50.111 MALIGNANT NEOPLASM OF CENTRAL PORTION OF RIGHT BREAST IN FEMALE, ESTROGEN RECEPTOR NEGATIVE (HCC): ICD-10-CM

## 2018-10-16 PROCEDURE — C8908 MRI W/O FOL W/CONT, BREAST,: HCPCS

## 2018-10-16 PROCEDURE — A9577 INJ MULTIHANCE: HCPCS | Performed by: RADIOLOGY

## 2018-10-16 PROCEDURE — 6360000004 HC RX CONTRAST MEDICATION: Performed by: RADIOLOGY

## 2018-10-16 RX ADMIN — GADOBENATE DIMEGLUMINE 20 ML: 529 INJECTION, SOLUTION INTRAVENOUS at 08:54

## 2018-10-25 ENCOUNTER — ANESTHESIA (OUTPATIENT)
Dept: OPERATING ROOM | Age: 68
End: 2018-10-25
Payer: MEDICARE

## 2018-10-25 ENCOUNTER — ANESTHESIA EVENT (OUTPATIENT)
Dept: OPERATING ROOM | Age: 68
End: 2018-10-25
Payer: MEDICARE

## 2018-10-25 ENCOUNTER — APPOINTMENT (OUTPATIENT)
Dept: GENERAL RADIOLOGY | Age: 68
End: 2018-10-25
Attending: UROLOGY
Payer: MEDICARE

## 2018-10-25 ENCOUNTER — HOSPITAL ENCOUNTER (OUTPATIENT)
Age: 68
Setting detail: OUTPATIENT SURGERY
Discharge: HOME | End: 2018-10-25
Attending: UROLOGY | Admitting: UROLOGY
Payer: MEDICARE

## 2018-10-25 VITALS
HEIGHT: 62 IN | HEART RATE: 74 BPM | TEMPERATURE: 98 F | WEIGHT: 145 LBS | DIASTOLIC BLOOD PRESSURE: 58 MMHG | BODY MASS INDEX: 26.68 KG/M2 | SYSTOLIC BLOOD PRESSURE: 129 MMHG | RESPIRATION RATE: 18 BRPM | OXYGEN SATURATION: 95 %

## 2018-10-25 VITALS — SYSTOLIC BLOOD PRESSURE: 108 MMHG | DIASTOLIC BLOOD PRESSURE: 57 MMHG | OXYGEN SATURATION: 84 %

## 2018-10-25 DIAGNOSIS — G89.18 POST-OPERATIVE PAIN: Primary | ICD-10-CM

## 2018-10-25 LAB — METER GLUCOSE: 137 MG/DL (ref 70–110)

## 2018-10-25 PROCEDURE — 6360000002 HC RX W HCPCS: Performed by: UROLOGY

## 2018-10-25 PROCEDURE — C2617 STENT, NON-COR, TEM W/O DEL: HCPCS | Performed by: UROLOGY

## 2018-10-25 PROCEDURE — 2709999900 HC NON-CHARGEABLE SUPPLY: Performed by: UROLOGY

## 2018-10-25 PROCEDURE — C1894 INTRO/SHEATH, NON-LASER: HCPCS | Performed by: UROLOGY

## 2018-10-25 PROCEDURE — 3700000001 HC ADD 15 MINUTES (ANESTHESIA): Performed by: UROLOGY

## 2018-10-25 PROCEDURE — 3600000014 HC SURGERY LEVEL 4 ADDTL 15MIN: Performed by: UROLOGY

## 2018-10-25 PROCEDURE — 6360000002 HC RX W HCPCS: Performed by: NURSE ANESTHETIST, CERTIFIED REGISTERED

## 2018-10-25 PROCEDURE — 3600000004 HC SURGERY LEVEL 4 BASE: Performed by: UROLOGY

## 2018-10-25 PROCEDURE — 2580000003 HC RX 258: Performed by: UROLOGY

## 2018-10-25 PROCEDURE — 7100000010 HC PHASE II RECOVERY - FIRST 15 MIN: Performed by: UROLOGY

## 2018-10-25 PROCEDURE — 2500000003 HC RX 250 WO HCPCS: Performed by: NURSE ANESTHETIST, CERTIFIED REGISTERED

## 2018-10-25 PROCEDURE — 3700000000 HC ANESTHESIA ATTENDED CARE: Performed by: UROLOGY

## 2018-10-25 PROCEDURE — 74420 UROGRAPHY RTRGR +-KUB: CPT

## 2018-10-25 PROCEDURE — C1769 GUIDE WIRE: HCPCS | Performed by: UROLOGY

## 2018-10-25 PROCEDURE — C1758 CATHETER, URETERAL: HCPCS | Performed by: UROLOGY

## 2018-10-25 PROCEDURE — 82962 GLUCOSE BLOOD TEST: CPT

## 2018-10-25 PROCEDURE — 7100000011 HC PHASE II RECOVERY - ADDTL 15 MIN: Performed by: UROLOGY

## 2018-10-25 DEVICE — URETERAL STENT SET
Type: IMPLANTABLE DEVICE | Site: URETER | Status: FUNCTIONAL
Brand: PERCUFLEX™

## 2018-10-25 RX ORDER — CEFDINIR 300 MG/1
300 CAPSULE ORAL 2 TIMES DAILY
Qty: 20 CAPSULE | Refills: 0 | Status: ON HOLD | OUTPATIENT
Start: 2018-10-25 | End: 2018-11-02 | Stop reason: HOSPADM

## 2018-10-25 RX ORDER — ONDANSETRON 2 MG/ML
INJECTION INTRAMUSCULAR; INTRAVENOUS PRN
Status: DISCONTINUED | OUTPATIENT
Start: 2018-10-25 | End: 2018-10-25 | Stop reason: SDUPTHER

## 2018-10-25 RX ORDER — MIDAZOLAM HYDROCHLORIDE 1 MG/ML
INJECTION INTRAMUSCULAR; INTRAVENOUS PRN
Status: DISCONTINUED | OUTPATIENT
Start: 2018-10-25 | End: 2018-10-25 | Stop reason: SDUPTHER

## 2018-10-25 RX ORDER — LIDOCAINE HYDROCHLORIDE 20 MG/ML
INJECTION, SOLUTION INFILTRATION; PERINEURAL PRN
Status: DISCONTINUED | OUTPATIENT
Start: 2018-10-25 | End: 2018-10-25 | Stop reason: SDUPTHER

## 2018-10-25 RX ORDER — SODIUM CHLORIDE 9 MG/ML
INJECTION, SOLUTION INTRAVENOUS CONTINUOUS
Status: DISCONTINUED | OUTPATIENT
Start: 2018-10-25 | End: 2018-10-25 | Stop reason: HOSPADM

## 2018-10-25 RX ORDER — FENTANYL CITRATE 50 UG/ML
25 INJECTION, SOLUTION INTRAMUSCULAR; INTRAVENOUS EVERY 5 MIN PRN
Status: DISCONTINUED | OUTPATIENT
Start: 2018-10-25 | End: 2018-10-25 | Stop reason: HOSPADM

## 2018-10-25 RX ORDER — FENTANYL CITRATE 50 UG/ML
INJECTION, SOLUTION INTRAMUSCULAR; INTRAVENOUS PRN
Status: DISCONTINUED | OUTPATIENT
Start: 2018-10-25 | End: 2018-10-25 | Stop reason: SDUPTHER

## 2018-10-25 RX ORDER — SODIUM CHLORIDE 0.9 % (FLUSH) 0.9 %
10 SYRINGE (ML) INJECTION EVERY 12 HOURS SCHEDULED
Status: DISCONTINUED | OUTPATIENT
Start: 2018-10-25 | End: 2018-10-25 | Stop reason: HOSPADM

## 2018-10-25 RX ORDER — SODIUM CHLORIDE 0.9 % (FLUSH) 0.9 %
10 SYRINGE (ML) INJECTION PRN
Status: DISCONTINUED | OUTPATIENT
Start: 2018-10-25 | End: 2018-10-25 | Stop reason: HOSPADM

## 2018-10-25 RX ORDER — OXYCODONE HYDROCHLORIDE AND ACETAMINOPHEN 5; 325 MG/1; MG/1
1 TABLET ORAL EVERY 6 HOURS PRN
Qty: 20 TABLET | Refills: 0 | Status: ON HOLD | OUTPATIENT
Start: 2018-10-25 | End: 2018-11-02 | Stop reason: HOSPADM

## 2018-10-25 RX ORDER — PROPOFOL 10 MG/ML
INJECTION, EMULSION INTRAVENOUS CONTINUOUS PRN
Status: DISCONTINUED | OUTPATIENT
Start: 2018-10-25 | End: 2018-10-25 | Stop reason: SDUPTHER

## 2018-10-25 RX ORDER — DEXAMETHASONE SODIUM PHOSPHATE 4 MG/ML
INJECTION, SOLUTION INTRA-ARTICULAR; INTRALESIONAL; INTRAMUSCULAR; INTRAVENOUS; SOFT TISSUE PRN
Status: DISCONTINUED | OUTPATIENT
Start: 2018-10-25 | End: 2018-10-25 | Stop reason: SDUPTHER

## 2018-10-25 RX ADMIN — FENTANYL CITRATE 50 MCG: 50 INJECTION, SOLUTION INTRAMUSCULAR; INTRAVENOUS at 07:21

## 2018-10-25 RX ADMIN — SODIUM CHLORIDE: 9 INJECTION, SOLUTION INTRAVENOUS at 06:29

## 2018-10-25 RX ADMIN — ONDANSETRON HYDROCHLORIDE 4 MG: 2 INJECTION, SOLUTION INTRAMUSCULAR; INTRAVENOUS at 07:21

## 2018-10-25 RX ADMIN — FENTANYL CITRATE 50 MCG: 50 INJECTION, SOLUTION INTRAMUSCULAR; INTRAVENOUS at 07:17

## 2018-10-25 RX ADMIN — FENTANYL CITRATE 50 MCG: 50 INJECTION, SOLUTION INTRAMUSCULAR; INTRAVENOUS at 07:48

## 2018-10-25 RX ADMIN — SODIUM CHLORIDE: 9 INJECTION, SOLUTION INTRAVENOUS at 07:07

## 2018-10-25 RX ADMIN — MIDAZOLAM HYDROCHLORIDE 2 MG: 1 INJECTION, SOLUTION INTRAMUSCULAR; INTRAVENOUS at 07:13

## 2018-10-25 RX ADMIN — CEFEPIME HYDROCHLORIDE 2 G: 2 INJECTION, POWDER, FOR SOLUTION INTRAVENOUS at 07:15

## 2018-10-25 RX ADMIN — FENTANYL CITRATE 25 MCG: 50 INJECTION, SOLUTION INTRAMUSCULAR; INTRAVENOUS at 08:00

## 2018-10-25 RX ADMIN — FENTANYL CITRATE 25 MCG: 50 INJECTION, SOLUTION INTRAMUSCULAR; INTRAVENOUS at 07:55

## 2018-10-25 RX ADMIN — DEXAMETHASONE SODIUM PHOSPHATE 10 MG: 4 INJECTION, SOLUTION INTRAMUSCULAR; INTRAVENOUS at 07:24

## 2018-10-25 RX ADMIN — PROPOFOL 75 MCG/KG/MIN: 10 INJECTION, EMULSION INTRAVENOUS at 07:20

## 2018-10-25 RX ADMIN — LIDOCAINE HYDROCHLORIDE 100 MG: 20 INJECTION, SOLUTION INFILTRATION; PERINEURAL at 07:20

## 2018-10-25 ASSESSMENT — PULMONARY FUNCTION TESTS
PIF_VALUE: 0
PIF_VALUE: 1
PIF_VALUE: 0
PIF_VALUE: 1
PIF_VALUE: 0

## 2018-10-25 ASSESSMENT — PAIN SCALES - GENERAL
PAINLEVEL_OUTOF10: 1
PAINLEVEL_OUTOF10: 0

## 2018-10-25 ASSESSMENT — PAIN - FUNCTIONAL ASSESSMENT: PAIN_FUNCTIONAL_ASSESSMENT: 0-10

## 2018-10-25 NOTE — ANESTHESIA PRE PROCEDURE
Date of last liquid consumption: 10/24/18                        Date of last solid food consumption: 10/24/18    BMI:   Wt Readings from Last 3 Encounters:   10/25/18 145 lb (65.8 kg)   10/08/18 144 lb (65.3 kg)   10/01/18 163 lb 6.4 oz (74.1 kg)     Body mass index is 26.52 kg/m². CBC:   Lab Results   Component Value Date    WBC 5.0 10/08/2018    RBC 3.64 10/08/2018    HGB 10.8 10/08/2018    HCT 33.5 10/08/2018    MCV 92.0 10/08/2018    RDW 16.5 10/08/2018     10/08/2018       CMP:   Lab Results   Component Value Date     10/08/2018    K 3.6 10/08/2018    K 2.8 10/01/2018    CL 95 10/08/2018    CO2 28 10/08/2018    BUN 12 10/08/2018    CREATININE 1.4 10/08/2018    GFRAA 45 10/08/2018    LABGLOM 37 10/08/2018    LABGLOM 31 09/24/2018    GLUCOSE 188 10/08/2018    GLUCOSE 432 09/24/2018    PROT 6.9 10/08/2018    CALCIUM 9.6 10/08/2018    BILITOT 0.7 10/08/2018    ALKPHOS 91 10/08/2018    AST 28 10/08/2018    ALT 20 10/08/2018       POC Tests: No results for input(s): POCGLU, POCNA, POCK, POCCL, POCBUN, POCHEMO, POCHCT in the last 72 hours. Coags:   Lab Results   Component Value Date    PROTIME 10.9 09/24/2018    INR 1.0 09/24/2018    APTT 29.5 09/24/2018       HCG (If Applicable): No results found for: PREGTESTUR, PREGSERUM, HCG, HCGQUANT     ABGs:   Lab Results   Component Value Date    PO2ART 138.1 09/28/2018    USU7JTF 33.6 09/28/2018    XWK9KCU 20.2 09/28/2018        Type & Screen (If Applicable):  No results found for: LABABO, [address]    Anesthesia Evaluation  Patient summary reviewed no history of anesthetic complications:   Airway: Mallampati: III  TM distance: >3 FB   Neck ROM: full  Comment: Overbite.   Mouth opening: > = 3 FB Dental:          Pulmonary: breath sounds clear to auscultation                            ROS comment: Hx of pleural effusions   Cardiovascular:    (+) hypertension:,       ECG reviewed  Rhythm: regular  Rate: normal  Echocardiogram reviewed Neuro/Psych:   (+) CVA:, depression/anxiety             GI/Hepatic/Renal:   (+) GERD:, renal disease: CRI,           Endo/Other:    (+) DiabetesType II DM, , blood dyscrasia: anemia, arthritis:., malignancy/cancer (Right breast cancer. ). Abdominal:           Vascular:                                    Anesthesia Plan      MAC     ASA 3     (Pt agrees to [facility] ATHENS and IV sedation.)  Induction: intravenous. Anesthetic plan and risks discussed with patient. Plan discussed with CRNA.     Attending anesthesiologist reviewed and agrees with Pre Eval content            Marguerite Brizuela MD   10/25/2018

## 2018-10-25 NOTE — H&P
Provider, MD        Allergies: Avelox [moxifloxacin]    Social History   Substance Use Topics    Smoking status: Current Every Day Smoker     Packs/day: 1.00     Years: 50.00     Types: Cigarettes    Smokeless tobacco: Never Used      Comment: smoking x 50 years    Alcohol use No        Review of Systems:  Respiratory: negative for cough and hemoptysis  Cardiovascular: negative for chest pain and dyspnea  Gastrointestinal: negative for abdominal pain, diarrhea, nausea and vomiting  Genitourinary: as per HPI, otherwise negative. Derm: negative for rash and skin lesion(s)  Neurological: negative for seizures and tremors  Endocrine: negative for diabetic symptoms including polydipsia and polyuria  Psychiatric:  Denies any current psychiatric changes other then those listed in Medical History    Physical Exam:  Vitals:    10/25/18 0620   BP: (!) 100/51   Pulse: 79   Resp: 20   Temp: 98.6 °F (37 °C)   SpO2: 95%      Skin:  Warm and dry.   No rash or bruises  HEENT:  PERRLA, EOMI  Neck:  No JVD, No thyromegaly  Cardiac:  RRR  Lungs:  No audible wheezes, symmetric respirations, non-labored  Abdomen: Soft, non-tender, non-distended  Extremities:  No clubbing, edema or cyanosis  Neurological:  Moves all extremities, normal DTR    Lab Results   Component Value Date    WBC 5.0 10/08/2018    HGB 10.8 (L) 10/08/2018    HCT 33.5 (L) 10/08/2018    MCV 92.0 10/08/2018     10/08/2018       Lab Results   Component Value Date    CREATININE 1.4 (H) 10/08/2018       No results found for: PSA    Assessment and Plan:  Left and possible Right ureteral calculi with previous setting of hydronephrosis with Klebsiella bacteremia  -Here today to undergo interval ureteroscopy with potential stone intervention.  -Risks and benefits were again reviewed with the patient and her family and they wish to proceed          Electronically signed by Morenita Smith MD on 10/25/2018 at 7:05 AM

## 2018-10-27 ENCOUNTER — HOSPITAL ENCOUNTER (INPATIENT)
Age: 68
LOS: 6 days | Discharge: HOME HEALTH | DRG: 872 | End: 2018-11-02
Attending: INTERNAL MEDICINE | Admitting: INTERNAL MEDICINE
Payer: MEDICARE

## 2018-10-27 PROBLEM — A41.9 SEPSIS (HCC): Status: ACTIVE | Noted: 2018-10-27

## 2018-10-27 PROCEDURE — 2060000000 HC ICU INTERMEDIATE R&B

## 2018-10-27 RX ORDER — DEXTROSE MONOHYDRATE 50 MG/ML
100 INJECTION, SOLUTION INTRAVENOUS PRN
Status: DISCONTINUED | OUTPATIENT
Start: 2018-10-27 | End: 2018-11-02 | Stop reason: HOSPADM

## 2018-10-27 RX ORDER — SODIUM CHLORIDE 0.9 % (FLUSH) 0.9 %
10 SYRINGE (ML) INJECTION PRN
Status: DISCONTINUED | OUTPATIENT
Start: 2018-10-27 | End: 2018-11-02 | Stop reason: HOSPADM

## 2018-10-27 RX ORDER — DEXTROSE MONOHYDRATE 25 G/50ML
12.5 INJECTION, SOLUTION INTRAVENOUS PRN
Status: DISCONTINUED | OUTPATIENT
Start: 2018-10-27 | End: 2018-11-02 | Stop reason: HOSPADM

## 2018-10-27 RX ORDER — SODIUM CHLORIDE 0.9 % (FLUSH) 0.9 %
10 SYRINGE (ML) INJECTION EVERY 12 HOURS SCHEDULED
Status: DISCONTINUED | OUTPATIENT
Start: 2018-10-28 | End: 2018-11-02 | Stop reason: HOSPADM

## 2018-10-27 RX ORDER — NICOTINE POLACRILEX 4 MG
15 LOZENGE BUCCAL PRN
Status: DISCONTINUED | OUTPATIENT
Start: 2018-10-27 | End: 2018-11-02 | Stop reason: HOSPADM

## 2018-10-27 RX ORDER — SODIUM CHLORIDE 9 MG/ML
2000 INJECTION, SOLUTION INTRAVENOUS CONTINUOUS
Status: DISCONTINUED | OUTPATIENT
Start: 2018-10-28 | End: 2018-10-28

## 2018-10-27 RX ORDER — ONDANSETRON 2 MG/ML
4 INJECTION INTRAMUSCULAR; INTRAVENOUS EVERY 6 HOURS PRN
Status: DISCONTINUED | OUTPATIENT
Start: 2018-10-27 | End: 2018-11-02 | Stop reason: HOSPADM

## 2018-10-27 ASSESSMENT — PAIN SCALES - GENERAL: PAINLEVEL_OUTOF10: 0

## 2018-10-27 NOTE — LETTER
1-800- MEDICARE (6-362.762.8776). TTY users should call [phone]. · To find a different doctor, visit Medicare's Physician Compare website, HD[employer], or call 1-800-MEDICARE (043 4898). TTY users should call [phone]. · To find a different skilled nursing facility, visit [facility] website, https://www.[employer]/, or call 1-800-MEDICARE (1- 522.727.5567). TTY users should call [phone]. · To find a different long term care hospital, visit [facility] [address] Compare website, [employer], or call 1-800- MEDICARE (476 0410). TTY users should call [phone]. · To find a different inpatient rehabilitation facility, visit [facility] [address] Compare website, www.medicare.gov/ inpatientrehabilitation facilitycompare, or call 1-800-MEDICARE (8-887.291.3918). TTY users should call 2- [phone]. · To find a different home health agency, visit [address] website, www.medicare.gov/homehealthcompare, or call 1-800-MEDICARE (9-634- 134-0964). TTY users should call [phone].

## 2018-10-28 ENCOUNTER — APPOINTMENT (OUTPATIENT)
Dept: GENERAL RADIOLOGY | Age: 68
DRG: 872 | End: 2018-10-28
Attending: INTERNAL MEDICINE
Payer: MEDICARE

## 2018-10-28 PROBLEM — N12 PYELONEPHRITIS: Status: ACTIVE | Noted: 2018-10-28

## 2018-10-28 LAB
ALBUMIN SERPL-MCNC: 3.2 G/DL (ref 3.5–5.2)
ALBUMIN SERPL-MCNC: 3.3 G/DL (ref 3.5–5.2)
ALP BLD-CCNC: 66 U/L (ref 35–104)
ALP BLD-CCNC: 74 U/L (ref 35–104)
ALT SERPL-CCNC: 24 U/L (ref 0–32)
ALT SERPL-CCNC: 28 U/L (ref 0–32)
ANION GAP SERPL CALCULATED.3IONS-SCNC: 11 MMOL/L (ref 7–16)
ANION GAP SERPL CALCULATED.3IONS-SCNC: 14 MMOL/L (ref 7–16)
ANION GAP SERPL CALCULATED.3IONS-SCNC: 14 MMOL/L (ref 7–16)
AST SERPL-CCNC: 33 U/L (ref 0–31)
AST SERPL-CCNC: 45 U/L (ref 0–31)
BASOPHILS ABSOLUTE: 0 E9/L (ref 0–0.2)
BASOPHILS ABSOLUTE: 0 E9/L (ref 0–0.2)
BASOPHILS ABSOLUTE: 0.02 E9/L (ref 0–0.2)
BASOPHILS RELATIVE PERCENT: 0.2 % (ref 0–2)
BASOPHILS RELATIVE PERCENT: 0.3 % (ref 0–2)
BASOPHILS RELATIVE PERCENT: 0.3 % (ref 0–2)
BILIRUB SERPL-MCNC: 0.6 MG/DL (ref 0–1.2)
BILIRUB SERPL-MCNC: 0.9 MG/DL (ref 0–1.2)
BILIRUBIN DIRECT: 0.4 MG/DL (ref 0–0.3)
BILIRUBIN, INDIRECT: 0.5 MG/DL (ref 0–1)
BUN BLDV-MCNC: 13 MG/DL (ref 8–23)
BUN BLDV-MCNC: 13 MG/DL (ref 8–23)
BUN BLDV-MCNC: 15 MG/DL (ref 8–23)
CALCIUM SERPL-MCNC: 7.9 MG/DL (ref 8.6–10.2)
CALCIUM SERPL-MCNC: 8.1 MG/DL (ref 8.6–10.2)
CALCIUM SERPL-MCNC: 8.2 MG/DL (ref 8.6–10.2)
CHLORIDE BLD-SCNC: 92 MMOL/L (ref 98–107)
CHLORIDE BLD-SCNC: 97 MMOL/L (ref 98–107)
CHLORIDE BLD-SCNC: 98 MMOL/L (ref 98–107)
CK MB: <1 NG/ML (ref 0–4.3)
CO2: 23 MMOL/L (ref 22–29)
CO2: 23 MMOL/L (ref 22–29)
CO2: 24 MMOL/L (ref 22–29)
CREAT SERPL-MCNC: 1.1 MG/DL (ref 0.5–1)
EKG ATRIAL RATE: 126 BPM
EKG P AXIS: 49 DEGREES
EKG P-R INTERVAL: 152 MS
EKG Q-T INTERVAL: 316 MS
EKG QRS DURATION: 74 MS
EKG QTC CALCULATION (BAZETT): 457 MS
EKG R AXIS: 56 DEGREES
EKG T AXIS: 121 DEGREES
EKG VENTRICULAR RATE: 126 BPM
EOSINOPHILS ABSOLUTE: 0 E9/L (ref 0.05–0.5)
EOSINOPHILS ABSOLUTE: 0 E9/L (ref 0.05–0.5)
EOSINOPHILS ABSOLUTE: 0.09 E9/L (ref 0.05–0.5)
EOSINOPHILS RELATIVE PERCENT: 0.2 % (ref 0–6)
EOSINOPHILS RELATIVE PERCENT: 0.3 % (ref 0–6)
EOSINOPHILS RELATIVE PERCENT: 1.5 % (ref 0–6)
GFR AFRICAN AMERICAN: 60
GFR NON-AFRICAN AMERICAN: 49 ML/MIN/1.73
GLUCOSE BLD-MCNC: 134 MG/DL (ref 74–109)
GLUCOSE BLD-MCNC: 169 MG/DL (ref 74–109)
GLUCOSE BLD-MCNC: 258 MG/DL (ref 74–109)
HCT VFR BLD CALC: 29.1 % (ref 34–48)
HCT VFR BLD CALC: 29.5 % (ref 34–48)
HCT VFR BLD CALC: 30.6 % (ref 34–48)
HEMOGLOBIN: 10 G/DL (ref 11.5–15.5)
HEMOGLOBIN: 10.6 G/DL (ref 11.5–15.5)
HEMOGLOBIN: 9.9 G/DL (ref 11.5–15.5)
HYPOCHROMIA: ABNORMAL
IMMATURE GRANULOCYTES #: 0.04 E9/L
IMMATURE GRANULOCYTES %: 0.7 % (ref 0–5)
LACTIC ACID: 0.6 MMOL/L (ref 0.5–2.2)
LACTIC ACID: 0.9 MMOL/L (ref 0.5–2.2)
LACTIC ACID: 1.4 MMOL/L (ref 0.5–2.2)
LACTIC ACID: 2.4 MMOL/L (ref 0.5–2.2)
LYMPHOCYTES ABSOLUTE: 0.17 E9/L (ref 1.5–4)
LYMPHOCYTES ABSOLUTE: 0.22 E9/L (ref 1.5–4)
LYMPHOCYTES ABSOLUTE: 0.3 E9/L (ref 1.5–4)
LYMPHOCYTES RELATIVE PERCENT: 1.7 % (ref 20–42)
LYMPHOCYTES RELATIVE PERCENT: 2.6 % (ref 20–42)
LYMPHOCYTES RELATIVE PERCENT: 4.9 % (ref 20–42)
MAGNESIUM: 1.1 MG/DL (ref 1.6–2.6)
MCH RBC QN AUTO: 29.9 PG (ref 26–35)
MCH RBC QN AUTO: 30.2 PG (ref 26–35)
MCH RBC QN AUTO: 30.5 PG (ref 26–35)
MCHC RBC AUTO-ENTMCNC: 33.6 % (ref 32–34.5)
MCHC RBC AUTO-ENTMCNC: 34.4 % (ref 32–34.5)
MCHC RBC AUTO-ENTMCNC: 34.6 % (ref 32–34.5)
MCV RBC AUTO: 87.1 FL (ref 80–99.9)
MCV RBC AUTO: 88.2 FL (ref 80–99.9)
MCV RBC AUTO: 89.9 FL (ref 80–99.9)
METAMYELOCYTES RELATIVE PERCENT: 0.9 % (ref 0–1)
METER GLUCOSE: 131 MG/DL (ref 70–110)
METER GLUCOSE: 162 MG/DL (ref 70–110)
METER GLUCOSE: 197 MG/DL (ref 70–110)
METER GLUCOSE: 208 MG/DL (ref 70–110)
METER GLUCOSE: 238 MG/DL (ref 70–110)
MONOCYTES ABSOLUTE: 0.08 E9/L (ref 0.1–0.95)
MONOCYTES ABSOLUTE: 0.15 E9/L (ref 0.1–0.95)
MONOCYTES ABSOLUTE: 0.51 E9/L (ref 0.1–0.95)
MONOCYTES RELATIVE PERCENT: 0.9 % (ref 2–12)
MONOCYTES RELATIVE PERCENT: 1.7 % (ref 2–12)
MONOCYTES RELATIVE PERCENT: 8.3 % (ref 2–12)
MYELOCYTE PERCENT: 0.9 % (ref 0–0)
NEUTROPHILS ABSOLUTE: 5.18 E9/L (ref 1.8–7.3)
NEUTROPHILS ABSOLUTE: 7.01 E9/L (ref 1.8–7.3)
NEUTROPHILS ABSOLUTE: 8.13 E9/L (ref 1.8–7.3)
NEUTROPHILS RELATIVE PERCENT: 84.3 % (ref 43–80)
NEUTROPHILS RELATIVE PERCENT: 95.7 % (ref 43–80)
NEUTROPHILS RELATIVE PERCENT: 95.7 % (ref 43–80)
PDW BLD-RTO: 14.2 FL (ref 11.5–15)
PDW BLD-RTO: 14.3 FL (ref 11.5–15)
PDW BLD-RTO: 14.3 FL (ref 11.5–15)
PLATELET # BLD: 122 E9/L (ref 130–450)
PLATELET # BLD: 88 E9/L (ref 130–450)
PLATELET # BLD: 91 E9/L (ref 130–450)
PLATELET CONFIRMATION: NORMAL
PLATELET CONFIRMATION: NORMAL
PMV BLD AUTO: 10.1 FL (ref 7–12)
PMV BLD AUTO: 10.3 FL (ref 7–12)
PMV BLD AUTO: 10.5 FL (ref 7–12)
POLYCHROMASIA: ABNORMAL
POTASSIUM REFLEX MAGNESIUM: 4.2 MMOL/L (ref 3.5–5)
POTASSIUM SERPL-SCNC: 3.1 MMOL/L (ref 3.5–5)
POTASSIUM SERPL-SCNC: 3.6 MMOL/L (ref 3.5–5)
RBC # BLD: 3.28 E12/L (ref 3.5–5.5)
RBC # BLD: 3.34 E12/L (ref 3.5–5.5)
RBC # BLD: 3.47 E12/L (ref 3.5–5.5)
RBC # BLD: NORMAL 10*6/UL
RBC # BLD: NORMAL 10*6/UL
SODIUM BLD-SCNC: 129 MMOL/L (ref 132–146)
SODIUM BLD-SCNC: 131 MMOL/L (ref 132–146)
SODIUM BLD-SCNC: 133 MMOL/L (ref 132–146)
SODIUM BLD-SCNC: 134 MMOL/L (ref 132–146)
TOTAL CK: 53 U/L (ref 20–180)
TOTAL PROTEIN: 6 G/DL (ref 6.4–8.3)
TOTAL PROTEIN: 6 G/DL (ref 6.4–8.3)
TROPONIN: <0.01 NG/ML (ref 0–0.03)
WBC # BLD: 6.1 E9/L (ref 4.5–11.5)
WBC # BLD: 7.3 E9/L (ref 4.5–11.5)
WBC # BLD: 8.3 E9/L (ref 4.5–11.5)

## 2018-10-28 PROCEDURE — 6370000000 HC RX 637 (ALT 250 FOR IP): Performed by: INTERNAL MEDICINE

## 2018-10-28 PROCEDURE — 82553 CREATINE MB FRACTION: CPT

## 2018-10-28 PROCEDURE — 80076 HEPATIC FUNCTION PANEL: CPT

## 2018-10-28 PROCEDURE — 2580000003 HC RX 258: Performed by: INTERNAL MEDICINE

## 2018-10-28 PROCEDURE — 87186 SC STD MICRODIL/AGAR DIL: CPT

## 2018-10-28 PROCEDURE — 6360000002 HC RX W HCPCS: Performed by: INTERNAL MEDICINE

## 2018-10-28 PROCEDURE — 87088 URINE BACTERIA CULTURE: CPT

## 2018-10-28 PROCEDURE — 84484 ASSAY OF TROPONIN QUANT: CPT

## 2018-10-28 PROCEDURE — 87581 M.PNEUMON DNA AMP PROBE: CPT

## 2018-10-28 PROCEDURE — 80048 BASIC METABOLIC PNL TOTAL CA: CPT

## 2018-10-28 PROCEDURE — 82550 ASSAY OF CK (CPK): CPT

## 2018-10-28 PROCEDURE — 87486 CHLMYD PNEUM DNA AMP PROBE: CPT

## 2018-10-28 PROCEDURE — 87040 BLOOD CULTURE FOR BACTERIA: CPT

## 2018-10-28 PROCEDURE — 93005 ELECTROCARDIOGRAM TRACING: CPT | Performed by: INTERNAL MEDICINE

## 2018-10-28 PROCEDURE — 99223 1ST HOSP IP/OBS HIGH 75: CPT | Performed by: INTERNAL MEDICINE

## 2018-10-28 PROCEDURE — 6360000002 HC RX W HCPCS: Performed by: SPECIALIST

## 2018-10-28 PROCEDURE — 74018 RADEX ABDOMEN 1 VIEW: CPT

## 2018-10-28 PROCEDURE — 87077 CULTURE AEROBIC IDENTIFY: CPT

## 2018-10-28 PROCEDURE — 36415 COLL VENOUS BLD VENIPUNCTURE: CPT

## 2018-10-28 PROCEDURE — 2580000003 HC RX 258: Performed by: SPECIALIST

## 2018-10-28 PROCEDURE — 85025 COMPLETE CBC W/AUTO DIFF WBC: CPT

## 2018-10-28 PROCEDURE — 2000000000 HC ICU R&B

## 2018-10-28 PROCEDURE — 83605 ASSAY OF LACTIC ACID: CPT

## 2018-10-28 PROCEDURE — 82962 GLUCOSE BLOOD TEST: CPT

## 2018-10-28 PROCEDURE — 36591 DRAW BLOOD OFF VENOUS DEVICE: CPT

## 2018-10-28 PROCEDURE — 87633 RESP VIRUS 12-25 TARGETS: CPT

## 2018-10-28 PROCEDURE — 80053 COMPREHEN METABOLIC PANEL: CPT

## 2018-10-28 PROCEDURE — 83735 ASSAY OF MAGNESIUM: CPT

## 2018-10-28 PROCEDURE — 84295 ASSAY OF SERUM SODIUM: CPT

## 2018-10-28 PROCEDURE — 87798 DETECT AGENT NOS DNA AMP: CPT

## 2018-10-28 RX ORDER — SODIUM CHLORIDE 9 MG/ML
2000 INJECTION, SOLUTION INTRAVENOUS CONTINUOUS
Status: DISCONTINUED | OUTPATIENT
Start: 2018-10-28 | End: 2018-10-28

## 2018-10-28 RX ORDER — LISINOPRIL 20 MG/1
20 TABLET ORAL EVERY EVENING
Status: DISCONTINUED | OUTPATIENT
Start: 2018-10-28 | End: 2018-11-02 | Stop reason: HOSPADM

## 2018-10-28 RX ORDER — ACETAMINOPHEN 325 MG/1
325 TABLET ORAL ONCE
Status: COMPLETED | OUTPATIENT
Start: 2018-10-28 | End: 2018-10-28

## 2018-10-28 RX ORDER — SODIUM CHLORIDE 9 MG/ML
2000 INJECTION, SOLUTION INTRAVENOUS CONTINUOUS
Status: DISCONTINUED | OUTPATIENT
Start: 2018-10-28 | End: 2018-10-29

## 2018-10-28 RX ORDER — ACETAMINOPHEN 650 MG/1
650 SUPPOSITORY RECTAL EVERY 4 HOURS PRN
Status: DISCONTINUED | OUTPATIENT
Start: 2018-10-28 | End: 2018-10-29

## 2018-10-28 RX ORDER — POTASSIUM CHLORIDE 7.45 MG/ML
10 INJECTION INTRAVENOUS
Status: COMPLETED | OUTPATIENT
Start: 2018-10-28 | End: 2018-10-28

## 2018-10-28 RX ORDER — SIMVASTATIN 40 MG
40 TABLET ORAL DAILY
Status: DISCONTINUED | OUTPATIENT
Start: 2018-10-28 | End: 2018-11-02 | Stop reason: HOSPADM

## 2018-10-28 RX ORDER — ONDANSETRON 2 MG/ML
4 INJECTION INTRAMUSCULAR; INTRAVENOUS ONCE
Status: COMPLETED | OUTPATIENT
Start: 2018-10-28 | End: 2018-10-28

## 2018-10-28 RX ORDER — FAMOTIDINE 20 MG/1
20 TABLET, FILM COATED ORAL DAILY
Status: DISCONTINUED | OUTPATIENT
Start: 2018-10-28 | End: 2018-11-02 | Stop reason: HOSPADM

## 2018-10-28 RX ORDER — ACETAMINOPHEN 650 MG/1
650 SUPPOSITORY RECTAL EVERY 4 HOURS PRN
Status: DISCONTINUED | OUTPATIENT
Start: 2018-10-28 | End: 2018-10-28

## 2018-10-28 RX ORDER — 0.9 % SODIUM CHLORIDE 0.9 %
250 INTRAVENOUS SOLUTION INTRAVENOUS ONCE
Status: COMPLETED | OUTPATIENT
Start: 2018-10-28 | End: 2018-10-28

## 2018-10-28 RX ORDER — HEPARIN SODIUM 10000 [USP'U]/ML
5000 INJECTION, SOLUTION INTRAVENOUS; SUBCUTANEOUS EVERY 8 HOURS
Status: DISCONTINUED | OUTPATIENT
Start: 2018-10-28 | End: 2018-10-28

## 2018-10-28 RX ORDER — MAGNESIUM SULFATE IN WATER 40 MG/ML
2 INJECTION, SOLUTION INTRAVENOUS ONCE
Status: COMPLETED | OUTPATIENT
Start: 2018-10-28 | End: 2018-10-28

## 2018-10-28 RX ORDER — ACETAMINOPHEN 650 MG/1
1300 SUPPOSITORY RECTAL EVERY 4 HOURS PRN
Status: DISCONTINUED | OUTPATIENT
Start: 2018-10-28 | End: 2018-10-28

## 2018-10-28 RX ORDER — ALBUTEROL SULFATE 90 UG/1
2 AEROSOL, METERED RESPIRATORY (INHALATION) EVERY 6 HOURS PRN
Status: DISCONTINUED | OUTPATIENT
Start: 2018-10-28 | End: 2018-11-02 | Stop reason: HOSPADM

## 2018-10-28 RX ORDER — KETOROLAC TROMETHAMINE 30 MG/ML
30 INJECTION, SOLUTION INTRAMUSCULAR; INTRAVENOUS ONCE
Status: COMPLETED | OUTPATIENT
Start: 2018-10-28 | End: 2018-10-28

## 2018-10-28 RX ADMIN — SODIUM CHLORIDE 2000 ML: 9 INJECTION, SOLUTION INTRAVENOUS at 03:06

## 2018-10-28 RX ADMIN — LISINOPRIL 20 MG: 20 TABLET ORAL at 17:31

## 2018-10-28 RX ADMIN — PIPERACILLIN SODIUM, TAZOBACTAM SODIUM 3.38 G: 3; .375 INJECTION, POWDER, LYOPHILIZED, FOR SOLUTION INTRAVENOUS at 01:11

## 2018-10-28 RX ADMIN — ONDANSETRON 4 MG: 2 INJECTION INTRAMUSCULAR; INTRAVENOUS at 16:41

## 2018-10-28 RX ADMIN — SODIUM CHLORIDE 2000 ML: 9 INJECTION, SOLUTION INTRAVENOUS at 10:50

## 2018-10-28 RX ADMIN — POTASSIUM CHLORIDE 10 MEQ: 7.46 INJECTION, SOLUTION INTRAVENOUS at 04:09

## 2018-10-28 RX ADMIN — POTASSIUM CHLORIDE 10 MEQ: 7.46 INJECTION, SOLUTION INTRAVENOUS at 06:53

## 2018-10-28 RX ADMIN — INSULIN LISPRO 2 UNITS: 100 INJECTION, SOLUTION INTRAVENOUS; SUBCUTANEOUS at 20:07

## 2018-10-28 RX ADMIN — ACETAMINOPHEN 325 MG: 325 TABLET, FILM COATED ORAL at 11:02

## 2018-10-28 RX ADMIN — INSULIN LISPRO 2 UNITS: 100 INJECTION, SOLUTION INTRAVENOUS; SUBCUTANEOUS at 11:40

## 2018-10-28 RX ADMIN — SODIUM CHLORIDE 2000 ML: 9 INJECTION, SOLUTION INTRAVENOUS at 18:50

## 2018-10-28 RX ADMIN — KETOROLAC TROMETHAMINE 30 MG: 30 INJECTION, SOLUTION INTRAMUSCULAR at 16:41

## 2018-10-28 RX ADMIN — ONDANSETRON 4 MG: 2 INJECTION INTRAMUSCULAR; INTRAVENOUS at 23:42

## 2018-10-28 RX ADMIN — POTASSIUM CHLORIDE 10 MEQ: 7.46 INJECTION, SOLUTION INTRAVENOUS at 03:02

## 2018-10-28 RX ADMIN — ACETAMINOPHEN 650 MG: 650 SUPPOSITORY RECTAL at 09:04

## 2018-10-28 RX ADMIN — SODIUM CHLORIDE 250 ML: 9 INJECTION, SOLUTION INTRAVENOUS at 03:00

## 2018-10-28 RX ADMIN — POTASSIUM CHLORIDE 10 MEQ: 7.46 INJECTION, SOLUTION INTRAVENOUS at 05:24

## 2018-10-28 RX ADMIN — ACETAMINOPHEN 650 MG: 650 SUPPOSITORY RECTAL at 02:22

## 2018-10-28 RX ADMIN — ONDANSETRON 4 MG: 2 INJECTION INTRAMUSCULAR; INTRAVENOUS at 02:27

## 2018-10-28 RX ADMIN — CEFTRIAXONE SODIUM 1 G: 1 INJECTION, POWDER, FOR SOLUTION INTRAMUSCULAR; INTRAVENOUS at 14:52

## 2018-10-28 RX ADMIN — SODIUM CHLORIDE 2000 ML: 9 INJECTION, SOLUTION INTRAVENOUS at 01:10

## 2018-10-28 RX ADMIN — Medication 10 ML: at 20:21

## 2018-10-28 RX ADMIN — VANCOMYCIN HYDROCHLORIDE 750 MG: 10 INJECTION, POWDER, LYOPHILIZED, FOR SOLUTION INTRAVENOUS at 01:13

## 2018-10-28 RX ADMIN — ACETAMINOPHEN 650 MG: 650 SUPPOSITORY RECTAL at 15:24

## 2018-10-28 RX ADMIN — ACETAMINOPHEN 650 MG: 650 SUPPOSITORY RECTAL at 23:46

## 2018-10-28 RX ADMIN — PIPERACILLIN SODIUM, TAZOBACTAM SODIUM 3.38 G: 3; .375 INJECTION, POWDER, LYOPHILIZED, FOR SOLUTION INTRAVENOUS at 09:14

## 2018-10-28 RX ADMIN — MAGNESIUM SULFATE HEPTAHYDRATE 2 G: 40 INJECTION, SOLUTION INTRAVENOUS at 03:02

## 2018-10-28 ASSESSMENT — PAIN SCALES - GENERAL
PAINLEVEL_OUTOF10: 0

## 2018-10-28 NOTE — H&P
&INDWELL STENT INSRT Bilateral 9/28/2018    CYSTOSCOPY BILATERAL STENT INSERTION performed by Jose Agarwal MD at [address] W/LITHOTRIPSY &INDWELL STENT INSRT Bilateral 10/25/2018    CYSTOSCOPY RETROGRADE PYELOGRAM URETEROSCOPY J STENT LASER LITHOTRIPSY BILATERAL performed by Jose Agarwal MD at [address] [facility] VAD W/SUBQ PORT AGE 5 YR/> N/A 6/12/2018    PORT INSERTION performed by [place] MD at [address] Right     TUBAL LIGATION      TUNNELED CENTRAL VENOUS CATHETER W/ SUBCUTANEOUS PORT  06/2018    left chest       Medications Prior to Admission:      Prior to Admission medications    Medication Sig Start Date End Date Taking? Authorizing Provider   oxyCODONE-acetaminophen (PERCOCET) 5-325 MG per tablet Take 1 tablet by mouth every 6 hours as needed for Pain for up to 5 days. . 10/25/18 10/30/18 Yes Jose Agarwal MD   cefdinir (OMNICEF) 300 MG capsule Take 1 capsule by mouth 2 times daily for 10 days 10/25/18 11/4/18 Yes Jose Agarwal MD   albuterol sulfate HFA (PROAIR HFA) 108 (90 Base) MCG/ACT inhaler Inhale 2 puffs into the lungs every 6 hours as needed for Wheezing 10/3/18  Yes GIULIANA Steward CNP   ranitidine (ZANTAC) 150 MG tablet Take 150 mg by mouth nightly as needed for Heartburn (severe heartburn)   Yes Historical Provider, MD   loperamide (IMODIUM) 2 MG capsule Take 2 mg by mouth 4 times daily as needed for Diarrhea   Yes Historical Provider, MD   B-Complex TABS Take 2 tablets by mouth daily 8/30/18  Yes Gabriela Richards MD   gabapentin (NEURONTIN) 300 MG capsule Take 1 capsule by mouth 3 times daily for 90 days. Start with 1 capsule at bedtime for 1 week and increase every week until reach 3xday.  8/30/18 11/28/18 Yes Gabriela Richards MD   ondansetron (ZOFRAN) 8 MG tablet Take 1 tablet by mouth every 8 hours as needed for Nausea or Vomiting 6/20/18  Yes GIULIANA Steward CNP   prochlorperazine (COMPAZINE) 10 MG tablet Take 1

## 2018-10-28 NOTE — CONSULTS
10/28/2018 9:30 AM  Service: Urology  Group: [facility] (Jasvir/Gui/Evelyn)    Nadege Corrigan  [id number]     Chief Complaint: Bilateral hydronephrosis    History of Present Illness: The patient is a 80 y.o. female patient who presents with UTI  The patient is know to our practice  She did have bilateral ureteroscopy last week  She did have bilateral stent exchange  She presented to [facility]  She was transferred to [facility]  She does remain with the stents  She did not have any stones  She does have breast cancer  She has completed chemo  She did have a fever    Past Medical History:   Diagnosis Date    Acid reflux     Breast cancer ([facility] [address] 06/2018    right    H/O renal calculi     Hyperlipidemia     Hypertension     Insomnia     Seasonal allergies     Stroke [facility]) 2013    tia    Type 2 diabetes mellitus without complication ([facility] [address]        Past Surgical History:   Procedure Laterality Date    BREAST BIOPSY      CHOLECYSTECTOMY      HYSTERECTOMY, TOTAL ABDOMINAL      MD CYSTO/URETERO W/LITHOTRIPSY &INDWELL STENT INSRT Bilateral 9/28/2018    CYSTOSCOPY BILATERAL STENT INSERTION performed by Gabriela Torres MD at [facility] &INDWELL STENT INSRT Bilateral 10/25/2018    CYSTOSCOPY RETROGRADE PYELOGRAM URETEROSCOPY J STENT LASER LITHOTRIPSY BILATERAL performed by Gabriela Torres MD at [address] [place] [address] CTR VAD W/SUBQ PORT AGE 5 YR/> N/A 6/12/2018    PORT INSERTION performed by Jeronimo Toney MD at [place] [address] Right     TUBAL LIGATION      TUNNELED CENTRAL VENOUS CATHETER W/ SUBCUTANEOUS PORT  06/2018    left chest       Medications Prior to Admission:    Prescriptions Prior to Admission: oxyCODONE-acetaminophen (PERCOCET) 5-325 MG per tablet, Take 1 tablet by mouth every 6 hours as needed for Pain for up to 5 days. .  cefdinir (OMNICEF) 300 MG capsule, Take 1 capsule by mouth 2 times daily for 10 days  albuterol sulfate HFA (PROAIR HFA) 108 (90 Base) MCG/ACT inhaler, Inhale 2 puffs into the lungs every 6 hours as needed for Wheezing  ranitidine (ZANTAC) 150 MG tablet, Take 150 mg by mouth nightly as needed for Heartburn (severe heartburn)  loperamide (IMODIUM) 2 MG capsule, Take 2 mg by mouth 4 times daily as needed for Diarrhea  B-Complex TABS, Take 2 tablets by mouth daily  gabapentin (NEURONTIN) 300 MG capsule, Take 1 capsule by mouth 3 times daily for 90 days. Start with 1 capsule at bedtime for 1 week and increase every week until reach 3xday. ondansetron (ZOFRAN) 8 MG tablet, Take 1 tablet by mouth every 8 hours as needed for Nausea or Vomiting  prochlorperazine (COMPAZINE) 10 MG tablet, Take 1 tablet by mouth every 6 hours as needed (Nausea)  diphenoxylate-atropine (DIPHENATOL) 2.5-0.025 MG per tablet, Take 2 tablets by mouth 4 times daily as needed for Diarrhea. .  escitalopram (LEXAPRO) 20 MG tablet, Take 20 mg by mouth every evening   simvastatin (ZOCOR) 40 MG tablet, Take 40 mg by mouth daily   dipyridamole-aspirin (AGGRENOX)  MG per extended release capsule, Take 1 capsule by mouth 2 times daily To verify if needs to be held w Dr. Georgette Urena  metFORMIN (GLUCOPHAGE) 500 MG tablet, Take 1,000 mg by mouth 2 times daily (with meals)   Dulaglutide (TRULICITY) 0.91 WE/6.0IK SOPN, Inject into the skin every 7 days saturday  linagliptin (TRADJENTA) 5 MG tablet, Take 5 mg by mouth daily  atenolol-chlorthalidone (TENORETIC) 50-25 MG per tablet, Take 1 tablet by mouth daily  lisinopril (PRINIVIL;ZESTRIL) 20 MG tablet, Take 20 mg by mouth every evening   cetirizine (ZYRTEC ALLERGY) 10 MG tablet, Take 10 mg by mouth daily  melatonin 3 MG TABS tablet, Take 3 mg by mouth daily Instructed to hold 5 days pre-op    Allergies: Avelox [moxifloxacin]    Social History:    reports that she has been smoking Cigarettes. She has a 50.00 pack-year smoking history.  She has never used smokeless tobacco. She reports that she does not drink alcohol or use

## 2018-10-28 NOTE — CONSULTS
Bilateral 9/28/2018    CYSTOSCOPY BILATERAL STENT INSERTION performed by Hubert Hansen MD at [address] [facility] W/LITHOTRIPSY &INDWELL STENT INSRT Bilateral 10/25/2018    CYSTOSCOPY RETROGRADE PYELOGRAM URETEROSCOPY J STENT LASER LITHOTRIPSY BILATERAL performed by Hubert Hansen MD at [facility] W/SUBQ PORT AGE 5 YR/> N/A 6/12/2018    PORT INSERTION performed by Lavell Will MD at [address] [facility] Right     TUBAL LIGATION      TUNNELED CENTRAL VENOUS CATHETER W/ SUBCUTANEOUS PORT  06/2018    left chest     Current Medications:   Scheduled Meds:   lisinopril  20 mg Oral QPM    famotidine  20 mg Oral Daily    simvastatin  40 mg Oral Daily    vancomycin  750 mg Intravenous Q12H    piperacillin-tazobactam  3.375 g Intravenous Q8H    sodium chloride flush  10 mL Intravenous 2 times per day    insulin lispro  0-10 Units Subcutaneous 4x Daily WC     Continuous Infusions:   sodium chloride 2,000 mL (10/28/18 1050)    dextrose       PRN Meds:acetaminophen, albuterol sulfate HFA, sodium chloride flush, magnesium hydroxide, ondansetron, glucose, dextrose, glucagon (rDNA), dextrose    Allergies:   Avelox [moxifloxacin]    Social History:   Social History     Social History    Marital status:      Spouse name: N/A    Number of children: N/A    Years of education: N/A     Social History Main Topics    Smoking status: Current Every Day Smoker     Packs/day: 1.00     Years: 50.00     Types: Cigarettes    Smokeless tobacco: Never Used      Comment: smoking x 50 years    Alcohol use No    Drug use: No    Sexual activity: Not Asked     Other Topics Concern    None     Social History Narrative    None     Tobacco: +  Alcohol: No  Pets: No  Travel: No    Family History:   Family History   Problem Relation Age of Onset    Cancer Mother [de-identified]        breast    Heart Disease Mother     Diabetes Mother     Hypertension Mother     Cancer Sister 8. 3   HGB  10.0*  10.6*   HCT  29.1*  30.6*   MCV  87.1  88.2   PLT  91*  122*   NEUTROABS  7.01  8. 13*     Lab Results   Component Value Date    CRP 25.90 (H) 09/24/2018     No results found for: CRPHS  No results found for: SEDRATE  Lab Results   Component Value Date    ALT 24 10/28/2018    AST 33 (H) 10/28/2018    ALKPHOS 66 10/28/2018    BILITOT 0.9 10/28/2018     Lab Results   Component Value Date     10/28/2018    K 4.2 10/28/2018    CL 97 10/28/2018    CO2 23 10/28/2018    BUN 13 10/28/2018    CREATININE 1.1 10/28/2018    GFRAA 60 10/28/2018    LABGLOM 49 10/28/2018    LABGLOM 31 09/24/2018    GLUCOSE 169 10/28/2018    GLUCOSE 432 09/24/2018    PROT 6.0 10/28/2018    LABALBU 3.3 10/28/2018    LABALBU 2.9 09/24/2018    CALCIUM 8.1 10/28/2018    BILITOT 0.9 10/28/2018    ALKPHOS 66 10/28/2018    AST 33 10/28/2018    ALT 24 10/28/2018       Lab Results   Component Value Date    PROTIME 10.9 09/24/2018    INR 1.0 09/24/2018       Lab Results   Component Value Date    TSH 0.505 09/25/2018       Lab Results   Component Value Date    COLORU Yellow 09/25/2018    PHUR 5.5 09/25/2018    WBCUA 2-5 09/25/2018    RBCUA NONE 09/25/2018    BACTERIA FEW 09/25/2018    CLARITYU SLCLOUDY 09/25/2018    SPECGRAV <=1.005 09/25/2018    LEUKOCYTESUR Negative 09/25/2018    UROBILINOGEN 0.2 09/25/2018    BILIRUBINUR Negative 09/25/2018    BLOODU MODERATE 09/25/2018    GLUCOSEU Negative 09/25/2018    AMORPHOUS RARE 09/25/2018       Lab Results   Component Value Date    FCY5DPM 20.2 09/28/2018    ZGA1HGV 33.6 09/28/2018    PO2ART 138.1 09/28/2018     Radiology:  XR ABDOMEN (KUB) (SINGLE AP VIEW)    (Results Pending)       Microbiology:  Pending  No results for input(s): BC in the last 72 hours. No results for input(s): ORG in the last 72 hours. No results for input(s): Isabel Marus in the last 72 hours. No results for input(s): STREPNEUMAGU in the last 72 hours. No results for input(s): LP1UAG in the last 72 hours.   No results for input(s): ASO in the last 72 hours. No results for input(s): CULTRESP in the last 72 hours. Assessment:  · Fever post bilateral stent exchange            Check for position of stents  · Pyelonephritis   Plan:    · Cont ceftriaxone  · Check cultures  · Baseline ESR, CRP  · Monitor labs  · Will follow with you    Thank you for having us see this patient in consultation. I will be discussing this case with the treating physicians.       Electronically signed by Jessa Andersen MD on 10/28/2018 at 1:30 PM

## 2018-10-28 NOTE — CONSULTS
BIOPSY      CHOLECYSTECTOMY      HYSTERECTOMY, TOTAL ABDOMINAL      MN CYSTO/URETERO W/LITHOTRIPSY &INDWELL STENT INSRT Bilateral 9/28/2018    CYSTOSCOPY BILATERAL STENT INSERTION performed by Morenita Smith MD at [address] 9 W/LITHOTRIPSY &INDWELL STENT INSRT Bilateral 10/25/2018    CYSTOSCOPY RETROGRADE PYELOGRAM URETEROSCOPY J STENT LASER LITHOTRIPSY BILATERAL performed by Morenita Smith MD at [address] [facility] V[facility] W/SUBQ PORT AGE 5 YR/> N/A 6/12/2018    PORT INSERTION performed by Janene Ponce MD at [facility] Right     TUBAL LIGATION      TUNNELED CENTRAL VENOUS CATHETER W/ SUBCUTANEOUS PORT  06/2018    left chest       Prior to Admission medications    Medication Sig Start Date End Date Taking? Authorizing Provider   oxyCODONE-acetaminophen (PERCOCET) 5-325 MG per tablet Take 1 tablet by mouth every 6 hours as needed for Pain for up to 5 days. . 10/25/18 10/30/18 Yes Morenita Smith MD   cefdinir (OMNICEF) 300 MG capsule Take 1 capsule by mouth 2 times daily for 10 days 10/25/18 11/4/18 Yes Morenita Smith MD   albuterol sulfate HFA (PROAIR HFA) 108 (90 Base) MCG/ACT inhaler Inhale 2 puffs into the lungs every 6 hours as needed for Wheezing 10/3/18  Yes GIULIANA Seymour - CNP   ranitidine (ZANTAC) 150 MG tablet Take 150 mg by mouth nightly as needed for Heartburn (severe heartburn)   Yes Historical Provider, MD   loperamide (IMODIUM) 2 MG capsule Take 2 mg by mouth 4 times daily as needed for Diarrhea   Yes Historical Provider, MD   B-Complex TABS Take 2 tablets by mouth daily 8/30/18  Yes Danielle Tolliver MD   gabapentin (NEURONTIN) 300 MG capsule Take 1 capsule by mouth 3 times daily for 90 days. Start with 1 capsule at bedtime for 1 week and increase every week until reach 3xday.  8/30/18 11/28/18 Yes Danielle Tolliver MD   ondansetron (ZOFRAN) 8 MG tablet Take 1 tablet by mouth every 8 hours as needed for Nausea or Vomiting 6/20/18  Yes Chuyita PERLA tenderness. ROM normal in all joints of extremities.    · Neurologic: Mental status: Alert, oriented, thought content appropriate    Labs     CBC:   Lab Results   Component Value Date    WBC 8.3 10/28/2018    RBC 3.47 10/28/2018    HGB 10.6 10/28/2018    HCT 30.6 10/28/2018     10/28/2018    MCV 88.2 10/28/2018     BMP:    Lab Results   Component Value Date     10/28/2018    K 4.2 10/28/2018    CL 97 10/28/2018    CO2 23 10/28/2018    BUN 13 10/28/2018    CREATININE 1.1 10/28/2018    GLUCOSE 169 10/28/2018    GLUCOSE 432 09/24/2018    CALCIUM 8.1 10/28/2018     Hepatic Function Panel:    Lab Results   Component Value Date    ALKPHOS 66 10/28/2018    AST 33 10/28/2018    ALT 24 10/28/2018    PROT 6.0 10/28/2018    LABALBU 3.3 10/28/2018    LABALBU 2.9 09/24/2018    BILITOT 0.9 10/28/2018     Cardiac Enzymes:   Lab Results   Component Value Date    CKTOTAL 53 10/28/2018    CKTOTAL 61 09/27/2018    CKTOTAL 321 (H) 09/25/2018    CKMB <1.0 10/28/2018    CKMB 6.3 (H) 09/27/2018    TROPONINI <0.01 10/28/2018    TROPONINI 0.16 (H) 09/27/2018    TROPONINI 0.24 (H) 09/27/2018     PT/INR:    Lab Results   Component Value Date    PROTIME 10.9 09/24/2018    INR 1.0 09/24/2018     ABG:    Lab Results   Component Value Date    AAP5DGH 33.6 09/28/2018    PO2ART 138.1 09/28/2018    KYP2CTF 20.2 09/28/2018     TSH:    Lab Results   Component Value Date    TSH 0.505 09/25/2018        Notable Cultures:       Culture  Date sent  Result   Respiratory panel 10/28/2018     Sputum      Body Fluid      Urine Strep pneumonia Ag        Urine    10/28/2018     Blood  10/28/2018      Urine  10/28/2018        Antibiotic  Days  Day started     Rocephin   1  10/28/2018                               Lines:  Site  Day  Date inserted     TLC              PICC              Arterial line              Peripheral line             Implanted venous port  X       6/21/2018       DVT Prophylaxis      Heparin         Enoxaparin        PCDs   X Dr. Sonny Clemente    I personally saw, examined and provided care for the patient. Radiographs, labs and medication list were reviewed by me independently. I spoke with bedside nursing, therapists and consultants. Critical care services and times documented are independent of procedures and multidisciplinary rounds with Residents. Additionally comprehensive, multidisciplinary rounds were conducted with the MICU team. The case was discussed in detail and plans for care were established. Review of Residents documentation was conducted and revisions were made as appropriate. I agree with the above documented exam, problem list and plan of care. Sepsis sec UTI post stent ,Kleb  ? Pending culture  Work up for other sources  Breast cancer as OP   Thrombocytopenia ,?  Had it multiple time in the past  Check Viral panel  ID following  Had insertion on 9/28  Doubt fungal infection ,but could happen with stents and post urology procedure   I prefer Cefepime but ID on board  Thelma Swartz

## 2018-10-28 NOTE — PLAN OF CARE
Problem: Discharge Planning:  Goal: Discharged to appropriate level of care  Discharged to appropriate level of care  Outcome: Not Met This Shift      Problem: Infection, Septic Shock:  Goal: Will show no infection signs and symptoms  Will show no infection signs and symptoms  Outcome: Met This Shift      Problem: Tissue Perfusion, Altered:  Goal: Circulatory function within specified parameters  Circulatory function within specified parameters  Outcome: Met This Shift  Plan of care discussed with patient / family.

## 2018-10-29 ENCOUNTER — APPOINTMENT (OUTPATIENT)
Dept: GENERAL RADIOLOGY | Age: 68
DRG: 872 | End: 2018-10-29
Attending: INTERNAL MEDICINE
Payer: MEDICARE

## 2018-10-29 LAB
ALBUMIN SERPL-MCNC: 3 G/DL (ref 3.5–5.2)
ALP BLD-CCNC: 98 U/L (ref 35–104)
ALT SERPL-CCNC: 33 U/L (ref 0–32)
ANION GAP SERPL CALCULATED.3IONS-SCNC: 13 MMOL/L (ref 7–16)
ANISOCYTOSIS: ABNORMAL
AST SERPL-CCNC: 44 U/L (ref 0–31)
BASOPHILS ABSOLUTE: 0.02 E9/L (ref 0–0.2)
BASOPHILS RELATIVE PERCENT: 0.4 % (ref 0–2)
BILIRUB SERPL-MCNC: 0.4 MG/DL (ref 0–1.2)
BUN BLDV-MCNC: 15 MG/DL (ref 8–23)
CALCIUM SERPL-MCNC: 8.1 MG/DL (ref 8.6–10.2)
CHLORIDE BLD-SCNC: 102 MMOL/L (ref 98–107)
CO2: 22 MMOL/L (ref 22–29)
CREAT SERPL-MCNC: 1.1 MG/DL (ref 0.5–1)
EOSINOPHILS ABSOLUTE: 0.11 E9/L (ref 0.05–0.5)
EOSINOPHILS RELATIVE PERCENT: 2.3 % (ref 0–6)
FILM ARRAY ADENOVIRUS: NORMAL
FILM ARRAY BORDETELLA PERTUSSIS: NORMAL
FILM ARRAY CHLAMYDOPHILIA PNEUMONIAE: NORMAL
FILM ARRAY CORONAVIRUS 229E: NORMAL
FILM ARRAY CORONAVIRUS HKU1: NORMAL
FILM ARRAY CORONAVIRUS NL63: NORMAL
FILM ARRAY CORONAVIRUS OC43: NORMAL
FILM ARRAY INFLUENZA A VIRUS 09H1: NORMAL
FILM ARRAY INFLUENZA A VIRUS H1: NORMAL
FILM ARRAY INFLUENZA A VIRUS H3: NORMAL
FILM ARRAY INFLUENZA A VIRUS: NORMAL
FILM ARRAY INFLUENZA B: NORMAL
FILM ARRAY METAPNEUMOVIRUS: NORMAL
FILM ARRAY MYCOPLASMA PNEUMONIAE: NORMAL
FILM ARRAY PARAINFLUENZA VIRUS 1: NORMAL
FILM ARRAY PARAINFLUENZA VIRUS 2: NORMAL
FILM ARRAY PARAINFLUENZA VIRUS 3: NORMAL
FILM ARRAY PARAINFLUENZA VIRUS 4: NORMAL
FILM ARRAY RESPIRATORY SYNCITIAL VIRUS: NORMAL
FILM ARRAY RHINOVIRUS/ENTEROVIRUS: NORMAL
GFR AFRICAN AMERICAN: 60
GFR NON-AFRICAN AMERICAN: 49 ML/MIN/1.73
GLUCOSE BLD-MCNC: 150 MG/DL (ref 74–109)
HCT VFR BLD CALC: 28.6 % (ref 34–48)
HEMOGLOBIN: 9.4 G/DL (ref 11.5–15.5)
IMMATURE GRANULOCYTES #: 0.03 E9/L
IMMATURE GRANULOCYTES %: 0.6 % (ref 0–5)
LYMPHOCYTES ABSOLUTE: 0.26 E9/L (ref 1.5–4)
LYMPHOCYTES RELATIVE PERCENT: 5.5 % (ref 20–42)
MCH RBC QN AUTO: 29.8 PG (ref 26–35)
MCHC RBC AUTO-ENTMCNC: 32.9 % (ref 32–34.5)
MCV RBC AUTO: 90.8 FL (ref 80–99.9)
METER GLUCOSE: 110 MG/DL (ref 70–110)
METER GLUCOSE: 140 MG/DL (ref 70–110)
METER GLUCOSE: 173 MG/DL (ref 70–110)
METER GLUCOSE: 179 MG/DL (ref 70–110)
MONOCYTES ABSOLUTE: 0.48 E9/L (ref 0.1–0.95)
MONOCYTES RELATIVE PERCENT: 10.1 % (ref 2–12)
NEUTROPHILS ABSOLUTE: 3.87 E9/L (ref 1.8–7.3)
NEUTROPHILS RELATIVE PERCENT: 81.1 % (ref 43–80)
PDW BLD-RTO: 14.2 FL (ref 11.5–15)
PLATELET # BLD: 86 E9/L (ref 130–450)
PLATELET CONFIRMATION: NORMAL
PMV BLD AUTO: 10 FL (ref 7–12)
POLYCHROMASIA: ABNORMAL
POTASSIUM SERPL-SCNC: 3.4 MMOL/L (ref 3.5–5)
RBC # BLD: 3.15 E12/L (ref 3.5–5.5)
SODIUM BLD-SCNC: 136 MMOL/L (ref 132–146)
SODIUM BLD-SCNC: 137 MMOL/L (ref 132–146)
TOTAL PROTEIN: 5.6 G/DL (ref 6.4–8.3)
VANCOMYCIN TROUGH: <4 MCG/ML (ref 5–16)
WBC # BLD: 4.8 E9/L (ref 4.5–11.5)

## 2018-10-29 PROCEDURE — 6360000002 HC RX W HCPCS: Performed by: SPECIALIST

## 2018-10-29 PROCEDURE — 2060000000 HC ICU INTERMEDIATE R&B

## 2018-10-29 PROCEDURE — 84295 ASSAY OF SERUM SODIUM: CPT

## 2018-10-29 PROCEDURE — 71045 X-RAY EXAM CHEST 1 VIEW: CPT

## 2018-10-29 PROCEDURE — 85025 COMPLETE CBC W/AUTO DIFF WBC: CPT

## 2018-10-29 PROCEDURE — 99231 SBSQ HOSP IP/OBS SF/LOW 25: CPT | Performed by: INTERNAL MEDICINE

## 2018-10-29 PROCEDURE — 2580000003 HC RX 258: Performed by: INTERNAL MEDICINE

## 2018-10-29 PROCEDURE — 93010 ELECTROCARDIOGRAM REPORT: CPT | Performed by: INTERNAL MEDICINE

## 2018-10-29 PROCEDURE — 80053 COMPREHEN METABOLIC PANEL: CPT

## 2018-10-29 PROCEDURE — 2580000003 HC RX 258: Performed by: SPECIALIST

## 2018-10-29 PROCEDURE — 6360000002 HC RX W HCPCS: Performed by: INTERNAL MEDICINE

## 2018-10-29 PROCEDURE — 82962 GLUCOSE BLOOD TEST: CPT

## 2018-10-29 PROCEDURE — 6370000000 HC RX 637 (ALT 250 FOR IP): Performed by: INTERNAL MEDICINE

## 2018-10-29 PROCEDURE — 80202 ASSAY OF VANCOMYCIN: CPT

## 2018-10-29 PROCEDURE — 36591 DRAW BLOOD OFF VENOUS DEVICE: CPT

## 2018-10-29 PROCEDURE — 2700000000 HC OXYGEN THERAPY PER DAY

## 2018-10-29 PROCEDURE — 36415 COLL VENOUS BLD VENIPUNCTURE: CPT

## 2018-10-29 RX ORDER — SODIUM CHLORIDE 9 MG/ML
INJECTION, SOLUTION INTRAVENOUS CONTINUOUS
Status: DISCONTINUED | OUTPATIENT
Start: 2018-10-29 | End: 2018-10-30

## 2018-10-29 RX ORDER — POTASSIUM CHLORIDE 29.8 MG/ML
40 INJECTION INTRAVENOUS ONCE
Status: COMPLETED | OUTPATIENT
Start: 2018-10-29 | End: 2018-10-29

## 2018-10-29 RX ORDER — ACETAMINOPHEN 325 MG/1
650 TABLET ORAL EVERY 4 HOURS PRN
Status: DISCONTINUED | OUTPATIENT
Start: 2018-10-29 | End: 2018-11-02 | Stop reason: HOSPADM

## 2018-10-29 RX ADMIN — SODIUM CHLORIDE: 9 INJECTION, SOLUTION INTRAVENOUS at 03:51

## 2018-10-29 RX ADMIN — LISINOPRIL 20 MG: 20 TABLET ORAL at 17:51

## 2018-10-29 RX ADMIN — ENOXAPARIN SODIUM 40 MG: 40 INJECTION SUBCUTANEOUS at 08:51

## 2018-10-29 RX ADMIN — ACETAMINOPHEN 650 MG: 325 TABLET, FILM COATED ORAL at 19:58

## 2018-10-29 RX ADMIN — ONDANSETRON 4 MG: 2 INJECTION INTRAMUSCULAR; INTRAVENOUS at 13:39

## 2018-10-29 RX ADMIN — POTASSIUM CHLORIDE 40 MEQ: 29.8 INJECTION, SOLUTION INTRAVENOUS at 06:50

## 2018-10-29 RX ADMIN — INSULIN LISPRO 2 UNITS: 100 INJECTION, SOLUTION INTRAVENOUS; SUBCUTANEOUS at 08:38

## 2018-10-29 RX ADMIN — FAMOTIDINE 20 MG: 20 TABLET ORAL at 08:51

## 2018-10-29 RX ADMIN — SIMVASTATIN 40 MG: 40 TABLET, FILM COATED ORAL at 08:51

## 2018-10-29 RX ADMIN — INSULIN LISPRO 2 UNITS: 100 INJECTION, SOLUTION INTRAVENOUS; SUBCUTANEOUS at 11:51

## 2018-10-29 RX ADMIN — ONDANSETRON 4 MG: 2 INJECTION INTRAMUSCULAR; INTRAVENOUS at 19:57

## 2018-10-29 RX ADMIN — CEFTRIAXONE SODIUM 1 G: 1 INJECTION, POWDER, FOR SOLUTION INTRAMUSCULAR; INTRAVENOUS at 16:23

## 2018-10-29 RX ADMIN — Medication 10 ML: at 19:57

## 2018-10-29 RX ADMIN — ACETAMINOPHEN 650 MG: 325 TABLET, FILM COATED ORAL at 13:31

## 2018-10-29 RX ADMIN — ACETAMINOPHEN 650 MG: 325 TABLET, FILM COATED ORAL at 23:59

## 2018-10-29 ASSESSMENT — PAIN SCALES - GENERAL
PAINLEVEL_OUTOF10: 0

## 2018-10-29 NOTE — PROGRESS NOTES
Hospitalist Progress Note      PCP: Rickey Duron MD    Date of Admission: 10/27/2018  Days in the hospital: 2    Chief Complaint: Not feeling well/confusion    Hospital Course:     Transferred from [facility] for pyelonephritis with recent ureteral stent placement by Dr. Roge Singh. Started on vancomycin and zosyn. Given IV fluids. Despite these measures the patient remained febrile and had worsening lactic acidosis. Infectious disease and urology were consulted. A discussion was had with the intensivist and we felt that the patient should be transferred for closer monitoring. Subjective  Patient seen and examined at bedside. Much more alert today. No complaints at this time other than continued fever. Denies any chest pain, shortness of breath nausea or vomiting. Medications:  Reviewed    Infusion Medications    sodium chloride 75 mL/hr at 10/29/18 0351    dextrose       Scheduled Medications    enoxaparin  40 mg Subcutaneous Daily    lisinopril  20 mg Oral QPM    famotidine  20 mg Oral Daily    simvastatin  40 mg Oral Daily    cefTRIAXone (ROCEPHIN) IV  1 g Intravenous Q24H    sodium chloride flush  10 mL Intravenous 2 times per day    insulin lispro  0-10 Units Subcutaneous 4x Daily WC     PRN Meds: acetaminophen, albuterol sulfate HFA, sodium chloride flush, magnesium hydroxide, ondansetron, glucose, dextrose, glucagon (rDNA), dextrose      Intake/Output Summary (Last 24 hours) at 10/29/18 1256  Last data filed at 10/29/18 0945   Gross per 24 hour   Intake             1310 ml   Output              350 ml   Net              960 ml       Exam:    /65   Pulse 72   Temp 100.6 °F (38.1 °C) (Rectal)   Resp 30   Ht 5' 2\" (1.575 m)   Wt 150 lb 4.8 oz (68.2 kg)   SpO2 97%   BMI 27.49 kg/m²     General appearance: Acutely ill looking, febrile, warm to the touch, appears stated age and cooperative. HEENT: Normal cephalic, atraumatic without obvious deformity. . Conjunctivae/corneas clear.   Neck: Supple, No jugular venous distention. Respiratory:  Normal respiratory effort. Clear to auscultation, bilaterally without Rales/Wheezes/Rhonchi. Cardiovascular: Regular rate and rhythm with normal D9/O7 with 1/6 systolic murmur,  no rubs or gallops. Abdomen: Soft, non-tender, non-distended with normal bowel sounds. Musculoskeletal: No  edema bilaterally. Skin:  No rashes or lesions. Neurologic:  Cranial nerves: II-XII intact, grossly non-focal.  Psychiatric: Alert and oriented. Normal mood and affect      Labs:   Recent Labs      10/28/18   0700  10/28/18   1545  10/29/18   0506   WBC  8.3  6.1  4.8   HGB  10.6*  9.9*  9.4*   HCT  30.6*  29.5*  28.6*   PLT  122*  88*  86*     Recent Labs      10/28/18   0700   10/28/18   1545  10/29/18   0010  10/29/18   0506   NA  134   < >  133  136  137   K  4.2   --   3.6   --   3.4*   CL  97*   --   98   --   102   CO2  23   --   24   --   22   BUN  13   --   13   --   15   CREATININE  1.1*   --   1.1*   --   1.1*   CALCIUM  8.1*   --   8.2*   --   8.1*    < > = values in this interval not displayed. Recent Labs      10/28/18   0210  10/28/18   1545  10/29/18   0506   AST  33*  45*  44*   ALT  24  28  33*   BILIDIR  0.4*   --    --    BILITOT  0.9  0.6  0.4   ALKPHOS  66  74  98     No results for input(s): INR in the last 72 hours. Recent Labs      10/28/18   0210   CKTOTAL  48   TROPONINI  <0.01       Imaging:  XR CHEST PORTABLE   Final Result      1. Borderline heart size associated with small bilateral pleural   effusions and perihilar vascular congestion. 2. Airspace opacities of the lower lungs more pronounced on the right   laterally. XR ABDOMEN (KUB) (SINGLE AP VIEW)   Final Result   As above.             Assessment/Plan:  Active Hospital Problems    Diagnosis Date Noted    Pyelonephritis [N12] 10/28/2018     Priority: High    Sepsis ([address] [A41.9] 10/27/2018     Priority: High    Type 2 diabetes mellitus with hyperglycemia, without

## 2018-10-29 NOTE — PROGRESS NOTES
Infectious Disease  Progress Note  [facility]    Chief Complaint: FEVER POST OP    Subjective:  Patient is awake alert no acute distress. There's been +fevers + chills or night. No drainage from his eyes ears nose and throat; no nausea or vomiting,  diarrhea hematemesis or hematochezia. No chest pain or palpitations. No abdominal pain. No frequency burning or hematuria. No SOB, cough, productive sputum or hemoptysis. No focal motor sensory loss or psychiatric problems. Negative for new endocrine issues and no blood dyscrasias. No hematemesis or hematochezia. No rash.   STILL HAS ALMOST CONTINUAL FEVER  Scheduled Meds:   enoxaparin  40 mg Subcutaneous Daily    lisinopril  20 mg Oral QPM    famotidine  20 mg Oral Daily    simvastatin  40 mg Oral Daily    cefTRIAXone (ROCEPHIN) IV  1 g Intravenous Q24H    sodium chloride flush  10 mL Intravenous 2 times per day    insulin lispro  0-10 Units Subcutaneous 4x Daily WC     Continuous Infusions:   sodium chloride 75 mL/hr at 10/29/18 0351    dextrose       PRN Meds:acetaminophen, albuterol sulfate HFA, sodium chloride flush, magnesium hydroxide, ondansetron, glucose, dextrose, glucagon (rDNA), dextrose    Patient Vitals for the past 24 hrs:   BP Temp Temp src Pulse Resp SpO2 Weight   10/29/18 1000 - 100.6 °F (38.1 °C) Rectal - - - -   10/29/18 0945 - 100.7 °F (38.2 °C) Rectal - - - -   10/29/18 0900 130/65 - - 72 30 97 % -   10/29/18 0800 134/64 - - 96 (!) 32 96 % -   10/29/18 0700 (!) 143/61 102.1 °F (38.9 °C) Temporal 110 (!) 40 96 % -   10/29/18 0600 (!) 135/45 - - 108 (!) 37 (!) 85 % 150 lb 4.8 oz (68.2 kg)   10/29/18 0500 130/62 - - 80 30 (!) 86 % -   10/29/18 0400 (!) 132/54 102.6 °F (39.2 °C) Oral 79 25 (!) 87 % -   10/29/18 0300 (!) 108/51 - - 63 10 92 % -   10/29/18 0200 (!) 101/48 - - 74 25 91 % -   10/29/18 0100 (!) 121/59 - - 93 20 93 % -   10/29/18 0000 108/64 101.6 °F (38.7 °C) Rectal 126 30 94 % -   10/28/18 2300 134/86 102.6 °F (39.2 °C) Rectal 77

## 2018-10-30 LAB
ALBUMIN SERPL-MCNC: 2.6 G/DL (ref 3.5–5.2)
ALP BLD-CCNC: 99 U/L (ref 35–104)
ALT SERPL-CCNC: 30 U/L (ref 0–32)
ANION GAP SERPL CALCULATED.3IONS-SCNC: 10 MMOL/L (ref 7–16)
ANISOCYTOSIS: ABNORMAL
AST SERPL-CCNC: 32 U/L (ref 0–31)
BASOPHILS ABSOLUTE: 0.01 E9/L (ref 0–0.2)
BASOPHILS RELATIVE PERCENT: 0.2 % (ref 0–2)
BILIRUB SERPL-MCNC: 0.3 MG/DL (ref 0–1.2)
BUN BLDV-MCNC: 14 MG/DL (ref 8–23)
CALCIUM SERPL-MCNC: 8.7 MG/DL (ref 8.6–10.2)
CHLORIDE BLD-SCNC: 104 MMOL/L (ref 98–107)
CO2: 24 MMOL/L (ref 22–29)
CREAT SERPL-MCNC: 0.9 MG/DL (ref 0.5–1)
EOSINOPHILS ABSOLUTE: 0.06 E9/L (ref 0.05–0.5)
EOSINOPHILS RELATIVE PERCENT: 1.3 % (ref 0–6)
GFR AFRICAN AMERICAN: >60
GFR NON-AFRICAN AMERICAN: >60 ML/MIN/1.73
GLUCOSE BLD-MCNC: 131 MG/DL (ref 74–109)
HCT VFR BLD CALC: 28.8 % (ref 34–48)
HEMOGLOBIN: 9.1 G/DL (ref 11.5–15.5)
IMMATURE GRANULOCYTES #: 0.04 E9/L
IMMATURE GRANULOCYTES %: 0.8 % (ref 0–5)
LYMPHOCYTES ABSOLUTE: 0.35 E9/L (ref 1.5–4)
LYMPHOCYTES RELATIVE PERCENT: 7.4 % (ref 20–42)
MCH RBC QN AUTO: 29.2 PG (ref 26–35)
MCHC RBC AUTO-ENTMCNC: 31.6 % (ref 32–34.5)
MCV RBC AUTO: 92.3 FL (ref 80–99.9)
METER GLUCOSE: 129 MG/DL (ref 70–110)
METER GLUCOSE: 142 MG/DL (ref 70–110)
METER GLUCOSE: 187 MG/DL (ref 70–110)
METER GLUCOSE: 200 MG/DL (ref 70–110)
METER GLUCOSE: 271 MG/DL (ref 70–110)
MONOCYTES ABSOLUTE: 0.57 E9/L (ref 0.1–0.95)
MONOCYTES RELATIVE PERCENT: 12 % (ref 2–12)
NEUTROPHILS ABSOLUTE: 3.72 E9/L (ref 1.8–7.3)
NEUTROPHILS RELATIVE PERCENT: 78.3 % (ref 43–80)
PDW BLD-RTO: 14.6 FL (ref 11.5–15)
PLATELET # BLD: 92 E9/L (ref 130–450)
PLATELET CONFIRMATION: NORMAL
PMV BLD AUTO: 10.6 FL (ref 7–12)
POLYCHROMASIA: ABNORMAL
POTASSIUM SERPL-SCNC: 3.5 MMOL/L (ref 3.5–5)
RBC # BLD: 3.12 E12/L (ref 3.5–5.5)
SODIUM BLD-SCNC: 138 MMOL/L (ref 132–146)
TOTAL PROTEIN: 5.7 G/DL (ref 6.4–8.3)
WBC # BLD: 4.8 E9/L (ref 4.5–11.5)

## 2018-10-30 PROCEDURE — G8978 MOBILITY CURRENT STATUS: HCPCS

## 2018-10-30 PROCEDURE — 82962 GLUCOSE BLOOD TEST: CPT

## 2018-10-30 PROCEDURE — 6370000000 HC RX 637 (ALT 250 FOR IP): Performed by: INTERNAL MEDICINE

## 2018-10-30 PROCEDURE — 36415 COLL VENOUS BLD VENIPUNCTURE: CPT

## 2018-10-30 PROCEDURE — 97162 PT EVAL MOD COMPLEX 30 MIN: CPT

## 2018-10-30 PROCEDURE — 6360000002 HC RX W HCPCS: Performed by: INTERNAL MEDICINE

## 2018-10-30 PROCEDURE — 2700000000 HC OXYGEN THERAPY PER DAY

## 2018-10-30 PROCEDURE — 6360000002 HC RX W HCPCS: Performed by: SPECIALIST

## 2018-10-30 PROCEDURE — 2580000003 HC RX 258: Performed by: INTERNAL MEDICINE

## 2018-10-30 PROCEDURE — 2060000000 HC ICU INTERMEDIATE R&B

## 2018-10-30 PROCEDURE — 2580000003 HC RX 258: Performed by: SPECIALIST

## 2018-10-30 PROCEDURE — 6370000000 HC RX 637 (ALT 250 FOR IP): Performed by: HOSPITALIST

## 2018-10-30 PROCEDURE — 87045 FECES CULTURE AEROBIC BACT: CPT

## 2018-10-30 PROCEDURE — G8979 MOBILITY GOAL STATUS: HCPCS

## 2018-10-30 PROCEDURE — 87324 CLOSTRIDIUM AG IA: CPT

## 2018-10-30 PROCEDURE — 80053 COMPREHEN METABOLIC PANEL: CPT

## 2018-10-30 PROCEDURE — 85025 COMPLETE CBC W/AUTO DIFF WBC: CPT

## 2018-10-30 RX ORDER — ACYCLOVIR 200 MG/1
400 CAPSULE ORAL DAILY
Status: DISCONTINUED | OUTPATIENT
Start: 2018-10-30 | End: 2018-11-02 | Stop reason: HOSPADM

## 2018-10-30 RX ADMIN — ACETAMINOPHEN 650 MG: 325 TABLET, FILM COATED ORAL at 15:05

## 2018-10-30 RX ADMIN — Medication 10 ML: at 08:31

## 2018-10-30 RX ADMIN — ENOXAPARIN SODIUM 40 MG: 40 INJECTION SUBCUTANEOUS at 08:31

## 2018-10-30 RX ADMIN — FAMOTIDINE 20 MG: 20 TABLET ORAL at 10:35

## 2018-10-30 RX ADMIN — INSULIN LISPRO 6 UNITS: 100 INJECTION, SOLUTION INTRAVENOUS; SUBCUTANEOUS at 11:37

## 2018-10-30 RX ADMIN — SODIUM CHLORIDE: 9 INJECTION, SOLUTION INTRAVENOUS at 06:18

## 2018-10-30 RX ADMIN — ONDANSETRON 4 MG: 2 INJECTION INTRAMUSCULAR; INTRAVENOUS at 23:47

## 2018-10-30 RX ADMIN — Medication 10 ML: at 21:45

## 2018-10-30 RX ADMIN — ACETAMINOPHEN 650 MG: 325 TABLET, FILM COATED ORAL at 05:20

## 2018-10-30 RX ADMIN — INSULIN LISPRO 2 UNITS: 100 INJECTION, SOLUTION INTRAVENOUS; SUBCUTANEOUS at 17:01

## 2018-10-30 RX ADMIN — INSULIN LISPRO 2 UNITS: 100 INJECTION, SOLUTION INTRAVENOUS; SUBCUTANEOUS at 21:46

## 2018-10-30 RX ADMIN — ONDANSETRON 4 MG: 2 INJECTION INTRAMUSCULAR; INTRAVENOUS at 13:07

## 2018-10-30 RX ADMIN — CEFTRIAXONE SODIUM 1 G: 1 INJECTION, POWDER, FOR SOLUTION INTRAMUSCULAR; INTRAVENOUS at 13:08

## 2018-10-30 RX ADMIN — SIMVASTATIN 40 MG: 40 TABLET, FILM COATED ORAL at 11:34

## 2018-10-30 RX ADMIN — ACYCLOVIR 400 MG: 200 CAPSULE ORAL at 21:46

## 2018-10-30 RX ADMIN — ACETAMINOPHEN 650 MG: 325 TABLET, FILM COATED ORAL at 23:46

## 2018-10-30 ASSESSMENT — PAIN SCALES - GENERAL
PAINLEVEL_OUTOF10: 3
PAINLEVEL_OUTOF10: 0
PAINLEVEL_OUTOF10: 3

## 2018-10-30 ASSESSMENT — PAIN DESCRIPTION - LOCATION: LOCATION: GENERALIZED

## 2018-10-30 NOTE — CONSULTS
[facility]  Pulmonary Progress note     Chief Complaint:    Fever and AMS  Had episodes of Hypoxia   H/O breast cancer   Platelets coming down     Subjective      Patient has been doing slightly better  Mild SOB   Multiple urinary stents   Albin fever 101.8  Grow Kleb blood 9/25    Physical Exam       Physical Exam:  · Vitals: /63   Pulse 68   Temp 101.8 °F (38.8 °C)   Resp 12   Ht 5' 2\" (1.575 m)   Wt 150 lb 4.8 oz (68.2 kg)   SpO2 97%   BMI 27.49 kg/m²     · I & O - 24hr: No intake/output data recorded. · General Appearance: alert, appears stated age and cooperative  · HEENT:  Head: Normocephalic, no lesions, without obvious abnormality. · Neck: no adenopathy, no carotid bruit, no JVD, supple, symmetrical, trachea midline and thyroid not enlarged, symmetric, no tenderness/mass/nodules  · Lung: clear to auscultation bilaterally  · Heart: regular rate and rhythm, S1, S2 normal, no murmur, click, rub or gallop  · Abdomen: soft, non-tender; bowel sounds normal; no masses,  no organomegaly  · Extremities:  extremities normal, atraumatic, no cyanosis or edema  · Musculokeletal: No joint swelling, no muscle tenderness. ROM normal in all joints of extremities.    · Neurologic: Mental status: Alert, oriented, thought content appropriate    Labs     CBC:   Lab Results   Component Value Date    WBC 4.8 10/29/2018    RBC 3.15 10/29/2018    HGB 9.4 10/29/2018    HCT 28.6 10/29/2018    PLT 86 10/29/2018    MCV 90.8 10/29/2018     BMP:    Lab Results   Component Value Date     10/29/2018    K 3.4 10/29/2018    K 4.2 10/28/2018     10/29/2018    CO2 22 10/29/2018    BUN 15 10/29/2018    CREATININE 1.1 10/29/2018    GLUCOSE 150 10/29/2018    GLUCOSE 432 09/24/2018    CALCIUM 8.1 10/29/2018     Hepatic Function Panel:    Lab Results   Component Value Date    ALKPHOS 98 10/29/2018    AST 44 10/29/2018    ALT 33 10/29/2018    PROT 5.6 10/29/2018    LABALBU 3.0 10/29/2018

## 2018-10-31 ENCOUNTER — APPOINTMENT (OUTPATIENT)
Dept: CT IMAGING | Age: 68
DRG: 872 | End: 2018-10-31
Attending: INTERNAL MEDICINE
Payer: MEDICARE

## 2018-10-31 LAB
ALBUMIN SERPL-MCNC: 2.7 G/DL (ref 3.5–5.2)
ALP BLD-CCNC: 112 U/L (ref 35–104)
ALT SERPL-CCNC: 25 U/L (ref 0–32)
ANION GAP SERPL CALCULATED.3IONS-SCNC: 13 MMOL/L (ref 7–16)
AST SERPL-CCNC: 21 U/L (ref 0–31)
BASOPHILS ABSOLUTE: 0.01 E9/L (ref 0–0.2)
BASOPHILS RELATIVE PERCENT: 0.3 % (ref 0–2)
BILIRUB SERPL-MCNC: 0.3 MG/DL (ref 0–1.2)
BUN BLDV-MCNC: 15 MG/DL (ref 8–23)
CALCIUM SERPL-MCNC: 8.5 MG/DL (ref 8.6–10.2)
CHLORIDE BLD-SCNC: 104 MMOL/L (ref 98–107)
CO2: 24 MMOL/L (ref 22–29)
CREAT SERPL-MCNC: 1 MG/DL (ref 0.5–1)
EOSINOPHILS ABSOLUTE: 0.33 E9/L (ref 0.05–0.5)
EOSINOPHILS RELATIVE PERCENT: 8.7 % (ref 0–6)
GFR AFRICAN AMERICAN: >60
GFR NON-AFRICAN AMERICAN: 55 ML/MIN/1.73
GLUCOSE BLD-MCNC: 143 MG/DL (ref 74–109)
HCT VFR BLD CALC: 26.3 % (ref 34–48)
HEMOGLOBIN: 8.3 G/DL (ref 11.5–15.5)
HYPOCHROMIA: ABNORMAL
IMMATURE GRANULOCYTES #: 0.03 E9/L
IMMATURE GRANULOCYTES %: 0.8 % (ref 0–5)
LYMPHOCYTES ABSOLUTE: 0.36 E9/L (ref 1.5–4)
LYMPHOCYTES RELATIVE PERCENT: 9.4 % (ref 20–42)
MAGNESIUM: 1.6 MG/DL (ref 1.6–2.6)
MCH RBC QN AUTO: 28.6 PG (ref 26–35)
MCHC RBC AUTO-ENTMCNC: 31.6 % (ref 32–34.5)
MCV RBC AUTO: 90.7 FL (ref 80–99.9)
METER GLUCOSE: 159 MG/DL (ref 70–110)
METER GLUCOSE: 200 MG/DL (ref 70–110)
METER GLUCOSE: 212 MG/DL (ref 70–110)
MONOCYTES ABSOLUTE: 0.45 E9/L (ref 0.1–0.95)
MONOCYTES RELATIVE PERCENT: 11.8 % (ref 2–12)
NEUTROPHILS ABSOLUTE: 2.63 E9/L (ref 1.8–7.3)
NEUTROPHILS RELATIVE PERCENT: 69 % (ref 43–80)
PDW BLD-RTO: 14.4 FL (ref 11.5–15)
PHOSPHORUS: 2.4 MG/DL (ref 2.5–4.5)
PLATELET # BLD: 93 E9/L (ref 130–450)
PLATELET CONFIRMATION: NORMAL
PMV BLD AUTO: 10.6 FL (ref 7–12)
POTASSIUM SERPL-SCNC: 2.9 MMOL/L (ref 3.5–5)
RBC # BLD: 2.9 E12/L (ref 3.5–5.5)
SODIUM BLD-SCNC: 141 MMOL/L (ref 132–146)
TOTAL PROTEIN: 5.5 G/DL (ref 6.4–8.3)
WBC # BLD: 3.8 E9/L (ref 4.5–11.5)

## 2018-10-31 PROCEDURE — 84100 ASSAY OF PHOSPHORUS: CPT

## 2018-10-31 PROCEDURE — 6370000000 HC RX 637 (ALT 250 FOR IP): Performed by: INTERNAL MEDICINE

## 2018-10-31 PROCEDURE — 85025 COMPLETE CBC W/AUTO DIFF WBC: CPT

## 2018-10-31 PROCEDURE — 2580000003 HC RX 258: Performed by: INTERNAL MEDICINE

## 2018-10-31 PROCEDURE — 74178 CT ABD&PLV WO CNTR FLWD CNTR: CPT

## 2018-10-31 PROCEDURE — 82962 GLUCOSE BLOOD TEST: CPT

## 2018-10-31 PROCEDURE — 6360000002 HC RX W HCPCS: Performed by: HOSPITALIST

## 2018-10-31 PROCEDURE — 6360000002 HC RX W HCPCS: Performed by: INTERNAL MEDICINE

## 2018-10-31 PROCEDURE — 2060000000 HC ICU INTERMEDIATE R&B

## 2018-10-31 PROCEDURE — 6360000004 HC RX CONTRAST MEDICATION: Performed by: RADIOLOGY

## 2018-10-31 PROCEDURE — 6360000002 HC RX W HCPCS: Performed by: SPECIALIST

## 2018-10-31 PROCEDURE — 2580000003 HC RX 258: Performed by: SPECIALIST

## 2018-10-31 PROCEDURE — 2700000000 HC OXYGEN THERAPY PER DAY

## 2018-10-31 PROCEDURE — 6370000000 HC RX 637 (ALT 250 FOR IP): Performed by: SPECIALIST

## 2018-10-31 PROCEDURE — 83735 ASSAY OF MAGNESIUM: CPT

## 2018-10-31 PROCEDURE — 36415 COLL VENOUS BLD VENIPUNCTURE: CPT

## 2018-10-31 PROCEDURE — 80053 COMPREHEN METABOLIC PANEL: CPT

## 2018-10-31 PROCEDURE — 2500000003 HC RX 250 WO HCPCS: Performed by: HOSPITALIST

## 2018-10-31 PROCEDURE — 2580000003 HC RX 258: Performed by: HOSPITALIST

## 2018-10-31 PROCEDURE — 6370000000 HC RX 637 (ALT 250 FOR IP): Performed by: HOSPITALIST

## 2018-10-31 RX ORDER — POTASSIUM CHLORIDE 20 MEQ/1
60 TABLET, EXTENDED RELEASE ORAL ONCE
Status: COMPLETED | OUTPATIENT
Start: 2018-10-31 | End: 2018-10-31

## 2018-10-31 RX ORDER — POTASSIUM CHLORIDE 20 MEQ/1
40 TABLET, EXTENDED RELEASE ORAL ONCE
Status: COMPLETED | OUTPATIENT
Start: 2018-10-31 | End: 2018-10-31

## 2018-10-31 RX ORDER — LOPERAMIDE HYDROCHLORIDE 2 MG/1
2 CAPSULE ORAL 4 TIMES DAILY PRN
Status: DISCONTINUED | OUTPATIENT
Start: 2018-10-31 | End: 2018-11-02 | Stop reason: HOSPADM

## 2018-10-31 RX ORDER — MAGNESIUM SULFATE IN WATER 40 MG/ML
2 INJECTION, SOLUTION INTRAVENOUS ONCE
Status: COMPLETED | OUTPATIENT
Start: 2018-10-31 | End: 2018-10-31

## 2018-10-31 RX ADMIN — FAMOTIDINE 20 MG: 20 TABLET ORAL at 09:43

## 2018-10-31 RX ADMIN — POTASSIUM PHOSPHATE, MONOBASIC AND POTASSIUM PHOSPHATE, DIBASIC 30 MMOL: 224; 236 INJECTION, SOLUTION INTRAVENOUS at 09:57

## 2018-10-31 RX ADMIN — SIMVASTATIN 40 MG: 40 TABLET, FILM COATED ORAL at 09:43

## 2018-10-31 RX ADMIN — IOHEXOL 50 ML: 240 INJECTION, SOLUTION INTRATHECAL; INTRAVASCULAR; INTRAVENOUS; ORAL at 15:07

## 2018-10-31 RX ADMIN — Medication 10 ML: at 05:49

## 2018-10-31 RX ADMIN — LOPERAMIDE HYDROCHLORIDE 2 MG: 2 CAPSULE ORAL at 17:56

## 2018-10-31 RX ADMIN — ACYCLOVIR 400 MG: 200 CAPSULE ORAL at 09:43

## 2018-10-31 RX ADMIN — ENOXAPARIN SODIUM 40 MG: 40 INJECTION SUBCUTANEOUS at 09:57

## 2018-10-31 RX ADMIN — POTASSIUM CHLORIDE 40 MEQ: 20 TABLET, EXTENDED RELEASE ORAL at 17:09

## 2018-10-31 RX ADMIN — Medication 10 ML: at 21:14

## 2018-10-31 RX ADMIN — LISINOPRIL 20 MG: 20 TABLET ORAL at 17:56

## 2018-10-31 RX ADMIN — POTASSIUM CHLORIDE 60 MEQ: 20 TABLET, EXTENDED RELEASE ORAL at 09:44

## 2018-10-31 RX ADMIN — INSULIN LISPRO 2 UNITS: 100 INJECTION, SOLUTION INTRAVENOUS; SUBCUTANEOUS at 21:16

## 2018-10-31 RX ADMIN — CEFTRIAXONE SODIUM 1 G: 1 INJECTION, POWDER, FOR SOLUTION INTRAMUSCULAR; INTRAVENOUS at 16:37

## 2018-10-31 RX ADMIN — INSULIN LISPRO 4 UNITS: 100 INJECTION, SOLUTION INTRAVENOUS; SUBCUTANEOUS at 17:56

## 2018-10-31 RX ADMIN — ONDANSETRON 4 MG: 2 INJECTION INTRAMUSCULAR; INTRAVENOUS at 05:49

## 2018-10-31 RX ADMIN — ONDANSETRON 4 MG: 2 INJECTION INTRAMUSCULAR; INTRAVENOUS at 12:17

## 2018-10-31 RX ADMIN — ACETAMINOPHEN 650 MG: 325 TABLET, FILM COATED ORAL at 05:37

## 2018-10-31 RX ADMIN — MAGNESIUM SULFATE HEPTAHYDRATE 2 G: 40 INJECTION, SOLUTION INTRAVENOUS at 11:24

## 2018-10-31 RX ADMIN — IOPAMIDOL 110 ML: 755 INJECTION, SOLUTION INTRAVENOUS at 15:07

## 2018-10-31 RX ADMIN — Medication 10 ML: at 09:44

## 2018-10-31 ASSESSMENT — PAIN SCALES - GENERAL
PAINLEVEL_OUTOF10: 3
PAINLEVEL_OUTOF10: 0
PAINLEVEL_OUTOF10: 3
PAINLEVEL_OUTOF10: 0
PAINLEVEL_OUTOF10: 0

## 2018-10-31 NOTE — CARE COORDINATION
[address] Transitions Interview     10/31/2018    Patient: Cherie Husbands Patient : 1950   MRN: [id number]  Reason for Admission: There are no discharge diagnoses documented for the most recent discharge. RARS: Readmission Risk Score: 29       Spoke with: Avis      Readmission Risk  Patient Active Problem List   Diagnosis    Malignant neoplasm of central portion of right breast in female, estrogen receptor negative ([facility]    Breast cancer metastasized to axillary lymph node, right ([facility]    Encounter for insertion of venous access port    SIRS (systemic inflammatory response syndrome) (HCC)    Type 2 diabetes mellitus with hyperglycemia, without long-term current use of insulin (HCC)    HTN (hypertension)    History of stroke    Hyponatremia    ZHAO (acute kidney injury) ([facility]    Other hyperlipidemia    Palliative care by specialist    Post-operative pain    Sepsis ([facility]    Pyelonephritis       Inpatient Assessment  Care Transitions Summary    Care Transitions Inpatient Review  Medication Review  Housing Review  Social Support  Durable Medical Equipment  Functional Review  Hearing and Vision  Care Transitions Interventions         Follow Up:  CTC met with Masha @ bedside. She looks much stronger today and states feels a little better, c/o weakness. Explained to Masha the [employer] program and the letter I mailed. She voices her understanding. States I just missed her , he is main cg. She plans to return home and not decided if wants [facility] [employer] [facility]. or not. I presented her with the results of the nH predict tool with explanation. She states her approval and understanding, states she will continue to consider, go over copy with her . No future appointments. Health Maintenance  There are no preventive care reminders to display for this patient.     Billy Hanson, RN

## 2018-10-31 NOTE — PROGRESS NOTES
10/31/2018    HGB 9.1 10/30/2018    HGB 9.4 10/29/2018    HGB 9.9 10/28/2018    HGB 10.6 10/28/2018    HCT 26.3 10/31/2018    HCT 28.8 10/30/2018    HCT 28.6 10/29/2018    HCT 29.5 10/28/2018    HCT 30.6 10/28/2018    PLT 93 10/31/2018    PLT 92 10/30/2018    PLT 86 10/29/2018    PLT 88 10/28/2018     10/28/2018    MCV 90.7 10/31/2018    MCV 92.3 10/30/2018    MCV 90.8 10/29/2018    MCV 89.9 10/28/2018    MCV 88.2 10/28/2018    MCH 28.6 10/31/2018    MCH 29.2 10/30/2018    MCH 29.8 10/29/2018    MCH 30.2 10/28/2018    MCH 30.5 10/28/2018    MCHC 31.6 10/31/2018    MCHC 31.6 10/30/2018    MCHC 32.9 10/29/2018    MCHC 33.6 10/28/2018    MCHC 34.6 10/28/2018    RDW 14.4 10/31/2018    RDW 14.6 10/30/2018    RDW 14.2 10/29/2018    RDW 14.2 10/28/2018    RDW 14.3 10/28/2018    NRBC 0.2 09/24/2018    NRBC 0.0 09/12/2018    NRBC 2.7 09/07/2018    NRBC 0.0 08/29/2018    NRBC 3.0 08/15/2018    METASPCT 0.9 10/28/2018    METASPCT 2 09/24/2018    METASPCT 5.3 09/07/2018    METASPCT 4.0 08/15/2018    METASPCT 2.0 08/01/2018    METASPCT 4.4 07/19/2018    LYMPHOPCT 9.4 10/31/2018    LYMPHOPCT 7.4 10/30/2018    LYMPHOPCT 5.5 10/29/2018    LYMPHOPCT 4.9 10/28/2018    LYMPHOPCT 1.7 10/28/2018    LYMPHOPCT 17.0 06/10/2018    LYMPHOPCT 25.6 03/27/2017    PROMYELOPCT 1.8 09/07/2018    MONOPCT 11.8 10/31/2018    MONOPCT 12.0 10/30/2018    MONOPCT 10.1 10/29/2018    MONOPCT 8.3 10/28/2018    MONOPCT 0.9 10/28/2018    MYELOPCT 0.9 10/28/2018    MYELOPCT 2.0 08/01/2018    MYELOPCT 4.4 07/19/2018    MYELOPCT 2.0 07/04/2018    BASOPCT 0.3 10/31/2018    BASOPCT 0.2 10/30/2018    BASOPCT 0.4 10/29/2018    BASOPCT 0.3 10/28/2018    BASOPCT 0.2 10/28/2018    MONOSABS 0.45 10/31/2018    MONOSABS 0.57 10/30/2018    MONOSABS 0.48 10/29/2018    MONOSABS 0.51 10/28/2018    MONOSABS 0.08 10/28/2018    LYMPHSABS 0.36 10/31/2018    LYMPHSABS 0.35 10/30/2018    LYMPHSABS 0.26 10/29/2018    LYMPHSABS 0.30 10/28/2018    LYMPHSABS 0.17 10/28/2018 the right and left kidneys concerning for infectious inflammatory   process like multifocal pyelonephritis. Renal infarct and malignancy   are in the differential but less likely. There is mild   pyelocaliectasis without stones . Surveillance is recommended. small   amount of inflammatory fluid is identified in the deep pelvis. Infiltrates and pleural effusion in the right lung base concerning for   pneumonia. Alert. Abnormal report            XR CHEST PORTABLE   Final Result      1. Borderline heart size associated with small bilateral pleural   effusions and perihilar vascular congestion. 2. Airspace opacities of the lower lungs more pronounced on the right   laterally. XR ABDOMEN (KUB) (SINGLE AP VIEW)   Final Result   As above.           Assessment  FEVER ALMOST CONTINUAL C SUGGEST NOT AN UNTREATED BACTERIA OR RESISTANT ORGANISM BUT     OBSTRUCTION ; AFTER CT SCAN LOOKS LIKE PT HAS A NEPHRONIA       Plan  CONTINUE CEFTRIAXONE- MAY HAVE TO GO ON IV FOR AWHILE THEN PO  LOOSE BM - IMMODIUM  CT IV AND PO CONTRAST      R/O HYDRO      Electronically signed by Sandy Hall MD on 10/31/2018 at 4:42 PM

## 2018-10-31 NOTE — PROGRESS NOTES
non-focal.  Psychiatric: Alert and oriented. Normal mood and affect    Labs:   Recent Labs      10/29/18   0506  10/30/18   0430  10/31/18   0535   WBC  4.8  4.8  3.8*   HGB  9.4*  9.1*  8.3*   HCT  28.6*  28.8*  26.3*   PLT  86*  92*  93*     Recent Labs      10/29/18   0506  10/30/18   0430  10/31/18   0535   NA  137  138  141   K  3.4*  3.5  2.9*   CL  102  104  104   CO2  22  24  24   BUN  15  14  15   CREATININE  1.1*  0.9  1.0   CALCIUM  8.1*  8.7  8.5*   PHOS   --    --   2.4*     Recent Labs      10/29/18   0506  10/30/18   0430  10/31/18   0535   AST  44*  32*  21   ALT  33*  30  25   BILITOT  0.4  0.3  0.3   ALKPHOS  98  99  112*     No results for input(s): INR in the last 72 hours. No results for input(s): Amedeo Keith in the last 72 hours. Urinalysis:      Lab Results   Component Value Date    NITRU Negative 09/25/2018    WBCUA 2-5 09/25/2018    BACTERIA FEW 09/25/2018    RBCUA NONE 09/25/2018    BLOODU MODERATE 09/25/2018    SPECGRAV <=1.005 09/25/2018    GLUCOSEU Negative 09/25/2018       Radiology:  XR CHEST PORTABLE   Final Result      1. Borderline heart size associated with small bilateral pleural   effusions and perihilar vascular congestion. 2. Airspace opacities of the lower lungs more pronounced on the right   laterally. XR ABDOMEN (KUB) (SINGLE AP VIEW)   Final Result   As above.       CT ABDOMEN PELVIS W WO CONTRAST Additional Contrast? Oral    (Results Pending)           Assessment/Plan:    Active Hospital Problems    Diagnosis Date Noted    Pyelonephritis [N12] 10/28/2018    Sepsis ([address] [A41.9] 10/27/2018    Type 2 diabetes mellitus with hyperglycemia, without long-term current use of insulin ([facility] [E11.65] 09/25/2018    HTN (hypertension) [I10] 09/25/2018    Hyponatremia [E87.1] 09/25/2018    Malignant neoplasm of central portion of right breast in female, estrogen receptor negative [facility] [C50.111, Z17.1] 05/15/2018     80year-old woman with a history of hyperlipidemia, hypertension, CVA, diabetes mellitus, GERD, breast cancer status post chemotherapy, who was recently discharged after admission for pole pyelonephritis status post cystoscopy status is stent, Klebsiella bacteremia over the last 3 days she has felt extremely tired progressively worsening chills and fever.  also reports altered mental status slight confusion and lethargy. She was transferred from [facility] to here for further management. Labs show hypokalemia mild AK I with a creatinine of 1.1 magnesium of 1.1, normal lactic acid, mild anemia, no leukocytosis. She reports ongoing chills and Reiger. She denies  Denies fever, chills, nausea, vomiting, diarrhea, constipation, chest pain, palpitations, LOC,syncope, orthopnea, PND, leg swelling, cough. She had recently seen her urologist Dr. Georgette Urena who performed U rectoscopy and stent exchange. The plan was a follow-up in 3 days. the head showed no acute process, chest showed no acute process KUB of the abdomen. showed the inferior end of the right ureteral stent extends beyond that location. Left ureteral stent appears well positioned. Bowel gas pattern is normal.CT of the abdomen further showed, perinephric standing suggesting underlying infection. FEVER  Sepsis s/p BL URS for prior sepsis with concern for small distal stones and hydronephrosis with Klebsiella bacteremia in the setting of immunocompromised state  stents were temporary postop and on a string -KUB shows both stents are out  HTN  Thrombocytopenia, likely due to chemotherapy, baseline arount 100 k  GERD  Breast cancer, stage III, on neoadjuvant chemotherapy and last dose of Taxol 1 month ago  HLP  Hypokalemia/hypophos/mg  Normocytic anemia, likely due to chemotherapy, baseline 9.5-10.5, currently stable at baseline  DIARRHEA R/O C. DIFF      Cont iv abx  ctap w/con r/o hydro  Replace lytes  C.diff pending      DVT Prophylaxis: scd- LOVENOX  Diet: Diet NPO, After

## 2018-10-31 NOTE — PLAN OF CARE
Problem: Falls - Risk of:  Goal: Will remain free from falls  Will remain free from falls   Outcome: Met This Shift      Problem: Infection, Septic Shock:  Goal: Will show no infection signs and symptoms  Will show no infection signs and symptoms   Outcome: Not Met This Shift

## 2018-11-01 LAB
ALBUMIN SERPL-MCNC: 3.2 G/DL (ref 3.5–5.2)
ALP BLD-CCNC: 140 U/L (ref 35–104)
ALT SERPL-CCNC: 26 U/L (ref 0–32)
ANION GAP SERPL CALCULATED.3IONS-SCNC: 11 MMOL/L (ref 7–16)
AST SERPL-CCNC: 24 U/L (ref 0–31)
BACTERIA: ABNORMAL /HPF
BASOPHILS ABSOLUTE: 0.01 E9/L (ref 0–0.2)
BASOPHILS RELATIVE PERCENT: 0.2 % (ref 0–2)
BILIRUB SERPL-MCNC: 0.3 MG/DL (ref 0–1.2)
BILIRUBIN URINE: NEGATIVE
BLOOD, URINE: ABNORMAL
BUN BLDV-MCNC: 12 MG/DL (ref 8–23)
C DIFFICILE TOXIN, EIA: NORMAL
CALCIUM SERPL-MCNC: 9.4 MG/DL (ref 8.6–10.2)
CHLORIDE BLD-SCNC: 99 MMOL/L (ref 98–107)
CLARITY: CLEAR
CO2: 30 MMOL/L (ref 22–29)
COLOR: YELLOW
CREAT SERPL-MCNC: 1.1 MG/DL (ref 0.5–1)
EOSINOPHILS ABSOLUTE: 0.47 E9/L (ref 0.05–0.5)
EOSINOPHILS RELATIVE PERCENT: 11.4 % (ref 0–6)
GFR AFRICAN AMERICAN: 60
GFR NON-AFRICAN AMERICAN: 49 ML/MIN/1.73
GLUCOSE BLD-MCNC: 138 MG/DL (ref 74–109)
GLUCOSE URINE: 100 MG/DL
HCT VFR BLD CALC: 29.3 % (ref 34–48)
HEMOGLOBIN: 9.6 G/DL (ref 11.5–15.5)
IMMATURE GRANULOCYTES #: 0.03 E9/L
IMMATURE GRANULOCYTES %: 0.7 % (ref 0–5)
KETONES, URINE: NEGATIVE MG/DL
LEUKOCYTE ESTERASE, URINE: ABNORMAL
LYMPHOCYTES ABSOLUTE: 0.43 E9/L (ref 1.5–4)
LYMPHOCYTES RELATIVE PERCENT: 10.4 % (ref 20–42)
MAGNESIUM: 1.6 MG/DL (ref 1.6–2.6)
MCH RBC QN AUTO: 30.2 PG (ref 26–35)
MCHC RBC AUTO-ENTMCNC: 32.8 % (ref 32–34.5)
MCV RBC AUTO: 92.1 FL (ref 80–99.9)
METER GLUCOSE: 140 MG/DL (ref 70–110)
METER GLUCOSE: 172 MG/DL (ref 70–110)
METER GLUCOSE: 231 MG/DL (ref 70–110)
METER GLUCOSE: 231 MG/DL (ref 70–110)
MONOCYTES ABSOLUTE: 0.45 E9/L (ref 0.1–0.95)
MONOCYTES RELATIVE PERCENT: 10.9 % (ref 2–12)
NEUTROPHILS ABSOLUTE: 2.74 E9/L (ref 1.8–7.3)
NEUTROPHILS RELATIVE PERCENT: 66.4 % (ref 43–80)
NITRITE, URINE: NEGATIVE
ORGANISM: ABNORMAL
ORGANISM: ABNORMAL
PDW BLD-RTO: 14.5 FL (ref 11.5–15)
PH UA: 5.5 (ref 5–9)
PHOSPHORUS: 2.6 MG/DL (ref 2.5–4.5)
PLATELET # BLD: 120 E9/L (ref 130–450)
PMV BLD AUTO: 9.8 FL (ref 7–12)
POTASSIUM SERPL-SCNC: 3.6 MMOL/L (ref 3.5–5)
PROTEIN UA: ABNORMAL MG/DL
RBC # BLD: 3.18 E12/L (ref 3.5–5.5)
RBC UA: ABNORMAL /HPF (ref 0–2)
SODIUM BLD-SCNC: 140 MMOL/L (ref 132–146)
SPECIFIC GRAVITY UA: 1.01 (ref 1–1.03)
TOTAL PROTEIN: 6.6 G/DL (ref 6.4–8.3)
URINE CULTURE, ROUTINE: ABNORMAL
UROBILINOGEN, URINE: 0.2 E.U./DL
WBC # BLD: 4.1 E9/L (ref 4.5–11.5)
WBC UA: ABNORMAL /HPF (ref 0–5)

## 2018-11-01 PROCEDURE — 2580000003 HC RX 258: Performed by: INTERNAL MEDICINE

## 2018-11-01 PROCEDURE — 2700000000 HC OXYGEN THERAPY PER DAY

## 2018-11-01 PROCEDURE — 6370000000 HC RX 637 (ALT 250 FOR IP): Performed by: SPECIALIST

## 2018-11-01 PROCEDURE — G8987 SELF CARE CURRENT STATUS: HCPCS

## 2018-11-01 PROCEDURE — 6370000000 HC RX 637 (ALT 250 FOR IP): Performed by: INTERNAL MEDICINE

## 2018-11-01 PROCEDURE — 6370000000 HC RX 637 (ALT 250 FOR IP): Performed by: FAMILY MEDICINE

## 2018-11-01 PROCEDURE — 6360000002 HC RX W HCPCS: Performed by: SPECIALIST

## 2018-11-01 PROCEDURE — 84100 ASSAY OF PHOSPHORUS: CPT

## 2018-11-01 PROCEDURE — 83735 ASSAY OF MAGNESIUM: CPT

## 2018-11-01 PROCEDURE — 36415 COLL VENOUS BLD VENIPUNCTURE: CPT

## 2018-11-01 PROCEDURE — 82962 GLUCOSE BLOOD TEST: CPT

## 2018-11-01 PROCEDURE — 2580000003 HC RX 258: Performed by: SPECIALIST

## 2018-11-01 PROCEDURE — 80053 COMPREHEN METABOLIC PANEL: CPT

## 2018-11-01 PROCEDURE — 6360000002 HC RX W HCPCS: Performed by: INTERNAL MEDICINE

## 2018-11-01 PROCEDURE — G8988 SELF CARE GOAL STATUS: HCPCS

## 2018-11-01 PROCEDURE — 87088 URINE BACTERIA CULTURE: CPT

## 2018-11-01 PROCEDURE — 85025 COMPLETE CBC W/AUTO DIFF WBC: CPT

## 2018-11-01 PROCEDURE — 97535 SELF CARE MNGMENT TRAINING: CPT

## 2018-11-01 PROCEDURE — 6370000000 HC RX 637 (ALT 250 FOR IP): Performed by: HOSPITALIST

## 2018-11-01 PROCEDURE — 97165 OT EVAL LOW COMPLEX 30 MIN: CPT

## 2018-11-01 PROCEDURE — 81001 URINALYSIS AUTO W/SCOPE: CPT

## 2018-11-01 PROCEDURE — 2060000000 HC ICU INTERMEDIATE R&B

## 2018-11-01 PROCEDURE — 97530 THERAPEUTIC ACTIVITIES: CPT

## 2018-11-01 RX ORDER — LINEZOLID 600 MG/1
600 TABLET, FILM COATED ORAL EVERY 12 HOURS SCHEDULED
Status: DISCONTINUED | OUTPATIENT
Start: 2018-11-01 | End: 2018-11-02 | Stop reason: HOSPADM

## 2018-11-01 RX ORDER — ESCITALOPRAM OXALATE 10 MG/1
20 TABLET ORAL EVERY EVENING
Status: DISCONTINUED | OUTPATIENT
Start: 2018-11-01 | End: 2018-11-02 | Stop reason: HOSPADM

## 2018-11-01 RX ORDER — FLUCONAZOLE 100 MG/1
200 TABLET ORAL DAILY
Status: DISCONTINUED | OUTPATIENT
Start: 2018-11-01 | End: 2018-11-02 | Stop reason: HOSPADM

## 2018-11-01 RX ORDER — LANOLIN ALCOHOL/MO/W.PET/CERES
3 CREAM (GRAM) TOPICAL NIGHTLY PRN
Status: DISCONTINUED | OUTPATIENT
Start: 2018-11-01 | End: 2018-11-02 | Stop reason: HOSPADM

## 2018-11-01 RX ADMIN — Medication 3 MG: at 20:15

## 2018-11-01 RX ADMIN — Medication 10 ML: at 13:41

## 2018-11-01 RX ADMIN — ESCITALOPRAM OXALATE 20 MG: 10 TABLET, FILM COATED ORAL at 20:15

## 2018-11-01 RX ADMIN — Medication 10 ML: at 20:15

## 2018-11-01 RX ADMIN — CEFTRIAXONE SODIUM 1 G: 1 INJECTION, POWDER, FOR SOLUTION INTRAMUSCULAR; INTRAVENOUS at 13:40

## 2018-11-01 RX ADMIN — INSULIN LISPRO 4 UNITS: 100 INJECTION, SOLUTION INTRAVENOUS; SUBCUTANEOUS at 12:27

## 2018-11-01 RX ADMIN — ACETAMINOPHEN 650 MG: 325 TABLET, FILM COATED ORAL at 01:21

## 2018-11-01 RX ADMIN — INSULIN LISPRO 4 UNITS: 100 INJECTION, SOLUTION INTRAVENOUS; SUBCUTANEOUS at 20:22

## 2018-11-01 RX ADMIN — ENOXAPARIN SODIUM 40 MG: 40 INJECTION SUBCUTANEOUS at 08:19

## 2018-11-01 RX ADMIN — FAMOTIDINE 20 MG: 20 TABLET ORAL at 08:19

## 2018-11-01 RX ADMIN — ACYCLOVIR 400 MG: 200 CAPSULE ORAL at 08:19

## 2018-11-01 RX ADMIN — LINEZOLID 600 MG: 600 TABLET, FILM COATED ORAL at 20:20

## 2018-11-01 RX ADMIN — FLUCONAZOLE 200 MG: 100 TABLET ORAL at 17:01

## 2018-11-01 RX ADMIN — INSULIN LISPRO 2 UNITS: 100 INJECTION, SOLUTION INTRAVENOUS; SUBCUTANEOUS at 17:32

## 2018-11-01 RX ADMIN — Medication 3 MG: at 01:19

## 2018-11-01 RX ADMIN — ONDANSETRON 4 MG: 2 INJECTION INTRAMUSCULAR; INTRAVENOUS at 17:32

## 2018-11-01 RX ADMIN — LISINOPRIL 20 MG: 20 TABLET ORAL at 20:15

## 2018-11-01 RX ADMIN — SIMVASTATIN 40 MG: 40 TABLET, FILM COATED ORAL at 08:19

## 2018-11-01 RX ADMIN — ESCITALOPRAM OXALATE 20 MG: 10 TABLET, FILM COATED ORAL at 01:20

## 2018-11-01 ASSESSMENT — PAIN SCALES - GENERAL
PAINLEVEL_OUTOF10: 0

## 2018-11-01 NOTE — PROGRESS NOTES
Hospitalist Progress Note      PCP: Jane Blackwell MD    Date of Admission: 10/27/2018      Subjective:     Feels better  No gi c/os, diarrhea stopped after diarrhea meds/  Not toxic  No pulmo c/o's      Medications:  Reviewed    Infusion Medications    dextrose       Scheduled Medications    escitalopram  20 mg Oral QPM    acyclovir  400 mg Oral Daily    enoxaparin  40 mg Subcutaneous Daily    lisinopril  20 mg Oral QPM    famotidine  20 mg Oral Daily    simvastatin  40 mg Oral Daily    cefTRIAXone (ROCEPHIN) IV  1 g Intravenous Q24H    sodium chloride flush  10 mL Intravenous 2 times per day    insulin lispro  0-10 Units Subcutaneous 4x Daily WC     PRN Meds: melatonin, loperamide, acetaminophen, albuterol sulfate HFA, sodium chloride flush, magnesium hydroxide, ondansetron, glucose, dextrose, glucagon (rDNA), dextrose      Intake/Output Summary (Last 24 hours) at 11/01/18 1239  Last data filed at 11/01/18 1142   Gross per 24 hour   Intake              480 ml   Output                0 ml   Net              480 ml       Physical Exam Performed:    BP (!) 151/70   Pulse 86   Temp 97.6 °F (36.4 °C) (Oral)   Resp 18   Ht 5' 2\" (1.575 m)   Wt 150 lb 4.8 oz (68.2 kg)   SpO2 97%   BMI 27.49 kg/m²     General appearance: ill looking, febrile, warm to the touch, appears stated age and cooperative. HEENT: Normal cephalic, atraumatic without obvious deformity. . Conjunctivae/corneas clear. Neck: Supple, No jugular venous distention. Respiratory:  Normal respiratory effort. Clear to auscultation, bilaterally without Rales/Wheezes/Rhonchi. Cardiovascular: Regular rate and rhythm with normal M6/Q7 with 1/6 systolic murmur,  no rubs or gallops. Abdomen: Soft, non-tender, non-distended with normal bowel sounds. Musculoskeletal: No  edema bilaterally. Skin:  No rashes or lesions. Neurologic:  Cranial nerves: II-XII intact, grossly non-focal.  Psychiatric: Alert and oriented.  Normal mood and

## 2018-11-01 NOTE — CARE COORDINATION
SW spoke with the patient at the bedside. Discussed options if patient is discharged to home with IV antibiotics as is indicated as a possibility per ID. Patient offered [facility] vs home with [facility]. Patient and  agreeable with [facility] and would chose [facility] if needed for IV antibiotics. Await final recommendations from ID for antibiotics for transition of care planning. If patient is to be discharged on IV antibiotics, referral will need to be called to [facility] and [facility] orders will need to be placed. Will continue to follow.      Silvana Haddad.

## 2018-11-01 NOTE — PLAN OF CARE
Problem: Infection, Septic Shock:  Goal: Will show no infection signs and symptoms  Will show no infection signs and symptoms   Outcome: Not Met This Shift      Problem: Risk for Impaired Skin Integrity  Goal: Tissue integrity - skin and mucous membranes  Structural intactness and normal physiological function of skin and  mucous membranes.    Outcome: Met This Shift

## 2018-11-01 NOTE — PROGRESS NOTES
Goal: return home    Short term goals  Time Frame: 3-5 days  STG 1 :  Pt will complete LB self-care tasks with mod I  STG 2 :  Pt will complete functional transfers with mod I  STG 3 :  Pt will demonstrate F+ activity tolerance while completing ADL task. STG 4 :  Pt will complete functional ambulation / item retrieval task with mod I   Pt participated in establishment of the following goals      Patient and/or family understands diagnosis, prognosis and plan of care: yes    Pt educated on safety and OT POC. Patient  verbalized fair understanding of education, requires additional education     [] Malnutrition indicators have been identified and nursing has been notified to ensure a dietitian consult is ordered.      AM-PAC Daily Activity Inpatient   How much help for putting on and taking off regular lower body clothing?: A Little  How much help for Bathing?: A Little  How much help for Toileting?: A Little  How much help for putting on and taking off regular upper body clothing?: None  How much help for taking care of personal grooming?: A Little  How much help for eating meals?: None  AM-PAC Inpatient Daily Activity Raw Score: 20  AM-PAC Inpatient ADL T-Scale Score : 42.03  ADL Inpatient CMS 0-100% Score: 38.32  ADL Inpatient CMS G-Code Modifier : CJ     low Evaluation completed +  Timed Treatment: 12 minutes  Tx Time in: 1328  TxTime out: Jesenia Jama OTR/L #[id number]

## 2018-11-01 NOTE — PROGRESS NOTES
[facility]  PROGRESS NOTE  Chief Complaint:  H/O Bilateral hydronephrosis and ureteral calculiUTI/Sepsis/Bilateral nephronia  HPI:  She feels much better. She denies any flank or abdominal pain. She is voiding comfortably. She denies hematuria, dysuria. Vitals:    11/01/18 1545   BP: 136/80   Pulse: 75   Resp: 18   Temp: 97.5 °F (36.4 °C)   SpO2: 96%       Allergies:  Avelox [moxifloxacin]    PAST MEDICAL HISTORY:   Past Medical History:   Diagnosis Date    Acid reflux     Breast cancer ([facility] [address] 06/2018    right    H/O renal calculi     Hyperlipidemia     Hypertension     Insomnia     Seasonal allergies     Stroke [facility]) 2013    tia    [facility]        PAST SURGICAL HISTORY:   Past Surgical History:   Procedure Laterality Date    BREAST BIOPSY      CHOLECYSTECTOMY      HYSTERECTOMY, TOTAL ABDOMINAL      MO CYSTO/URETERO W/LITHOTRIPSY &INDWELL STENT INSRT Bilateral 9/28/2018    CYSTOSCOPY BILATERAL STENT INSERTION performed by Chucho Celestin MD at [facility] CYSTO/URETERO W/LITHOTRIPSY &INDWELL STENT INSRT Bilateral 10/25/2018    CYSTOSCOPY RETROGRADE PYELOGRAM URETEROSCOPY J STENT LASER LITHOTRIPSY BILATERAL performed by Chucho Celestin MD at [facility] OR    MO INSJ PRPH CTR VAD W/SUBQ PORT AGE 5 YR/> N/A 6/12/2018    PORT INSERTION performed by Natasha Willoughby MD at [address] [place] [address] Right     TUBAL LIGATION      TUNNELED CENTRAL VENOUS CATHETER W/ SUBCUTANEOUS PORT  06/2018    left chest        PAST FAMILY HISTORY:    Family History   Problem Relation Age of Onset    Cancer Mother [de-identified]        breast    Heart Disease Mother     Diabetes Mother     Hypertension Mother     Cancer Sister         lymphoma    Hypertension Father     Stroke Maternal Grandmother     Stroke Paternal Grandmother        PAST SOCIAL HISTORY:    Social History     Social History    Marital status:      Spouse name: N/A    Number of children: N/A    Years

## 2018-11-02 VITALS
WEIGHT: 150.3 LBS | OXYGEN SATURATION: 98 % | TEMPERATURE: 98 F | HEIGHT: 62 IN | DIASTOLIC BLOOD PRESSURE: 88 MMHG | HEART RATE: 71 BPM | SYSTOLIC BLOOD PRESSURE: 170 MMHG | BODY MASS INDEX: 27.66 KG/M2 | RESPIRATION RATE: 18 BRPM

## 2018-11-02 LAB
ALBUMIN SERPL-MCNC: 2.7 G/DL (ref 3.5–5.2)
ALP BLD-CCNC: 136 U/L (ref 35–104)
ALT SERPL-CCNC: 23 U/L (ref 0–32)
ANION GAP SERPL CALCULATED.3IONS-SCNC: 12 MMOL/L (ref 7–16)
ANISOCYTOSIS: ABNORMAL
AST SERPL-CCNC: 24 U/L (ref 0–31)
BASOPHILS ABSOLUTE: 0.04 E9/L (ref 0–0.2)
BASOPHILS RELATIVE PERCENT: 0.9 % (ref 0–2)
BILIRUB SERPL-MCNC: 0.4 MG/DL (ref 0–1.2)
BLOOD CULTURE, ROUTINE: NORMAL
BUN BLDV-MCNC: 10 MG/DL (ref 8–23)
CALCIUM SERPL-MCNC: 8.9 MG/DL (ref 8.6–10.2)
CHLORIDE BLD-SCNC: 97 MMOL/L (ref 98–107)
CO2: 28 MMOL/L (ref 22–29)
CREAT SERPL-MCNC: 1.1 MG/DL (ref 0.5–1)
CULTURE, BLOOD 2: NORMAL
CULTURE, STOOL: NORMAL
EOSINOPHILS ABSOLUTE: 0.51 E9/L (ref 0.05–0.5)
EOSINOPHILS RELATIVE PERCENT: 12.2 % (ref 0–6)
GFR AFRICAN AMERICAN: 60
GFR NON-AFRICAN AMERICAN: 49 ML/MIN/1.73
GLUCOSE BLD-MCNC: 130 MG/DL (ref 74–109)
HCT VFR BLD CALC: 27.6 % (ref 34–48)
HEMOGLOBIN: 8.9 G/DL (ref 11.5–15.5)
LYMPHOCYTES ABSOLUTE: 0.42 E9/L (ref 1.5–4)
LYMPHOCYTES RELATIVE PERCENT: 10.4 % (ref 20–42)
MAGNESIUM: 1.5 MG/DL (ref 1.6–2.6)
MCH RBC QN AUTO: 29.5 PG (ref 26–35)
MCHC RBC AUTO-ENTMCNC: 32.2 % (ref 32–34.5)
MCV RBC AUTO: 91.4 FL (ref 80–99.9)
METER GLUCOSE: 113 MG/DL (ref 70–110)
METER GLUCOSE: 138 MG/DL (ref 70–110)
METER GLUCOSE: 236 MG/DL (ref 70–110)
METER GLUCOSE: 250 MG/DL (ref 70–110)
MONOCYTES ABSOLUTE: 0.29 E9/L (ref 0.1–0.95)
MONOCYTES RELATIVE PERCENT: 7 % (ref 2–12)
MYELOCYTE PERCENT: 0.9 % (ref 0–0)
NEUTROPHILS ABSOLUTE: 2.94 E9/L (ref 1.8–7.3)
NEUTROPHILS RELATIVE PERCENT: 68.7 % (ref 43–80)
PDW BLD-RTO: 14.6 FL (ref 11.5–15)
PHOSPHORUS: 3.6 MG/DL (ref 2.5–4.5)
PLATELET # BLD: 122 E9/L (ref 130–450)
PMV BLD AUTO: 11 FL (ref 7–12)
POTASSIUM SERPL-SCNC: 3.9 MMOL/L (ref 3.5–5)
RBC # BLD: 3.02 E12/L (ref 3.5–5.5)
SODIUM BLD-SCNC: 137 MMOL/L (ref 132–146)
TOTAL PROTEIN: 6.1 G/DL (ref 6.4–8.3)
WBC # BLD: 4.2 E9/L (ref 4.5–11.5)

## 2018-11-02 PROCEDURE — 2580000003 HC RX 258: Performed by: HOSPITALIST

## 2018-11-02 PROCEDURE — 6370000000 HC RX 637 (ALT 250 FOR IP): Performed by: FAMILY MEDICINE

## 2018-11-02 PROCEDURE — 36415 COLL VENOUS BLD VENIPUNCTURE: CPT

## 2018-11-02 PROCEDURE — 76937 US GUIDE VASCULAR ACCESS: CPT

## 2018-11-02 PROCEDURE — 6360000002 HC RX W HCPCS: Performed by: HOSPITALIST

## 2018-11-02 PROCEDURE — 6370000000 HC RX 637 (ALT 250 FOR IP): Performed by: SPECIALIST

## 2018-11-02 PROCEDURE — 80053 COMPREHEN METABOLIC PANEL: CPT

## 2018-11-02 PROCEDURE — 96523 IRRIG DRUG DELIVERY DEVICE: CPT

## 2018-11-02 PROCEDURE — 85025 COMPLETE CBC W/AUTO DIFF WBC: CPT

## 2018-11-02 PROCEDURE — 02HV33Z INSERTION OF INFUSION DEVICE INTO SUPERIOR VENA CAVA, PERCUTANEOUS APPROACH: ICD-10-PCS | Performed by: HOSPITALIST

## 2018-11-02 PROCEDURE — 6370000000 HC RX 637 (ALT 250 FOR IP): Performed by: INTERNAL MEDICINE

## 2018-11-02 PROCEDURE — 2580000003 HC RX 258: Performed by: INTERNAL MEDICINE

## 2018-11-02 PROCEDURE — 2720000010 HC SURG SUPPLY STERILE

## 2018-11-02 PROCEDURE — 82962 GLUCOSE BLOOD TEST: CPT

## 2018-11-02 PROCEDURE — 6360000002 HC RX W HCPCS: Performed by: SPECIALIST

## 2018-11-02 PROCEDURE — 6360000002 HC RX W HCPCS: Performed by: INTERNAL MEDICINE

## 2018-11-02 PROCEDURE — 83735 ASSAY OF MAGNESIUM: CPT

## 2018-11-02 PROCEDURE — 36593 DECLOT VASCULAR DEVICE: CPT

## 2018-11-02 PROCEDURE — 36569 INSJ PICC 5 YR+ W/O IMAGING: CPT

## 2018-11-02 PROCEDURE — 84100 ASSAY OF PHOSPHORUS: CPT

## 2018-11-02 PROCEDURE — 6370000000 HC RX 637 (ALT 250 FOR IP): Performed by: HOSPITALIST

## 2018-11-02 PROCEDURE — 2580000003 HC RX 258: Performed by: SPECIALIST

## 2018-11-02 PROCEDURE — C1751 CATH, INF, PER/CENT/MIDLINE: HCPCS

## 2018-11-02 RX ORDER — HEPARIN SODIUM (PORCINE) LOCK FLUSH IV SOLN 100 UNIT/ML 100 UNIT/ML
3 SOLUTION INTRAVENOUS PRN
Status: DISCONTINUED | OUTPATIENT
Start: 2018-11-02 | End: 2018-11-02 | Stop reason: HOSPADM

## 2018-11-02 RX ORDER — MAGNESIUM SULFATE IN WATER 40 MG/ML
4 INJECTION, SOLUTION INTRAVENOUS ONCE
Status: COMPLETED | OUTPATIENT
Start: 2018-11-02 | End: 2018-11-02

## 2018-11-02 RX ORDER — LINEZOLID 600 MG/1
600 TABLET, FILM COATED ORAL EVERY 12 HOURS SCHEDULED
Qty: 60 TABLET | Refills: 0 | Status: SHIPPED | OUTPATIENT
Start: 2018-11-02 | End: 2018-12-02

## 2018-11-02 RX ORDER — FLUCONAZOLE 200 MG/1
200 TABLET ORAL DAILY
Qty: 37 TABLET | Refills: 0 | Status: SHIPPED | OUTPATIENT
Start: 2018-11-03 | End: 2018-12-10

## 2018-11-02 RX ORDER — LIDOCAINE HYDROCHLORIDE 10 MG/ML
5 INJECTION, SOLUTION EPIDURAL; INFILTRATION; INTRACAUDAL; PERINEURAL ONCE
Status: DISCONTINUED | OUTPATIENT
Start: 2018-11-02 | End: 2018-11-02 | Stop reason: HOSPADM

## 2018-11-02 RX ORDER — HEPARIN SODIUM (PORCINE) LOCK FLUSH IV SOLN 100 UNIT/ML 100 UNIT/ML
3 SOLUTION INTRAVENOUS EVERY 12 HOURS SCHEDULED
Status: DISCONTINUED | OUTPATIENT
Start: 2018-11-02 | End: 2018-11-02 | Stop reason: HOSPADM

## 2018-11-02 RX ORDER — SODIUM CHLORIDE 0.9 % (FLUSH) 0.9 %
10 SYRINGE (ML) INJECTION PRN
Status: DISCONTINUED | OUTPATIENT
Start: 2018-11-02 | End: 2018-11-02 | Stop reason: HOSPADM

## 2018-11-02 RX ADMIN — LISINOPRIL 20 MG: 20 TABLET ORAL at 16:58

## 2018-11-02 RX ADMIN — LINEZOLID 600 MG: 600 TABLET, FILM COATED ORAL at 10:45

## 2018-11-02 RX ADMIN — SIMVASTATIN 40 MG: 40 TABLET, FILM COATED ORAL at 10:45

## 2018-11-02 RX ADMIN — FLUCONAZOLE 200 MG: 100 TABLET ORAL at 10:44

## 2018-11-02 RX ADMIN — HEPARIN 300 UNITS: 100 SYRINGE at 20:33

## 2018-11-02 RX ADMIN — LINEZOLID 600 MG: 600 TABLET, FILM COATED ORAL at 20:34

## 2018-11-02 RX ADMIN — ALTEPLASE 2 MG: 2.2 INJECTION, POWDER, LYOPHILIZED, FOR SOLUTION INTRAVENOUS at 11:17

## 2018-11-02 RX ADMIN — INSULIN LISPRO 4 UNITS: 100 INJECTION, SOLUTION INTRAVENOUS; SUBCUTANEOUS at 20:36

## 2018-11-02 RX ADMIN — FAMOTIDINE 20 MG: 20 TABLET ORAL at 10:44

## 2018-11-02 RX ADMIN — Medication 10 ML: at 16:44

## 2018-11-02 RX ADMIN — CEFTRIAXONE SODIUM 1 G: 1 INJECTION, POWDER, FOR SOLUTION INTRAMUSCULAR; INTRAVENOUS at 17:11

## 2018-11-02 RX ADMIN — ONDANSETRON 4 MG: 2 INJECTION INTRAMUSCULAR; INTRAVENOUS at 16:44

## 2018-11-02 RX ADMIN — INSULIN LISPRO 4 UNITS: 100 INJECTION, SOLUTION INTRAVENOUS; SUBCUTANEOUS at 13:43

## 2018-11-02 RX ADMIN — Medication 10 ML: at 20:34

## 2018-11-02 RX ADMIN — MAGNESIUM SULFATE HEPTAHYDRATE 4 G: 40 INJECTION, SOLUTION INTRAVENOUS at 17:11

## 2018-11-02 RX ADMIN — ACYCLOVIR 400 MG: 200 CAPSULE ORAL at 10:45

## 2018-11-02 ASSESSMENT — PAIN SCALES - GENERAL
PAINLEVEL_OUTOF10: 0

## 2018-11-02 NOTE — PROGRESS NOTES
Patient c/o discomfort with flush to mediport. Patient requested if needle could be changed. Removed Reid needle, attempted to access mediport, no blood return noted and unable to flush.  Will notify IV team.

## 2018-11-02 NOTE — PLAN OF CARE
Problem: Falls - Risk of:  Goal: Will remain free from falls  Will remain free from falls   Outcome: Met This Shift      Problem: Infection, Septic Shock:  Goal: Will show no infection signs and symptoms  Will show no infection signs and symptoms   Outcome: Not Met This Shift      Problem: Pain:  Goal: Pain level will decrease  Pain level will decrease   Outcome: Met This Shift

## 2018-11-02 NOTE — PROGRESS NOTES
Left chest port assessed. Dressing removed. Site cleaned with chlorprep. Pt stated slight soreness from previous access attempt. A 20 gauge 1\" power needle was used to access port without difficulty. No venous return. Slight resistance noted with flushing.  (2) 2mg doses of activase given for total of 4 mg per order. Will reassess patency.

## 2018-11-02 NOTE — DISCHARGE SUMMARY
Hospital Medicine Discharge Summary    Patient ID: Erika Jackson      Patient's PCP: Shante Bob MD    Admit Date: 10/27/2018     Discharge Date:   11-2-18    Admitting Physician: Heather Leon MD     Discharge Physician: Delois Aase, MD     Discharge Diagnoses: Active Hospital Problems    Diagnosis Date Noted    Pyelonephritis [N12] 10/28/2018    Sepsis ([facility] [address] [A41.9] 10/27/2018    Type 2 diabetes mellitus with hyperglycemia, without long-term current use of insulin ([facility] [E11.65] 09/25/2018    HTN (hypertension) [I10] 09/25/2018    Hyponatremia [E87.1] 09/25/2018    Malignant ne[facility] of central portion of right breast in female, [facility] receptor [facility] [C50.111, Z17.1] 05/15/2018       The patient was seen and examined on day of discharge and this discharge summary is in conjunction with any daily progress note from day of discharge. Hospital Course:     60-year-old woman with a history of hyperlipidemia, hypertension, CVA, diabetes mellitus, GERD, breast cancer status post chemotherapy, who was recently discharged after admission for pole pyelonephritis status post cystoscopy status is stent, Klebsiella bacteremia over the last 3 days she has felt extremely tired progressively worsening chills and fever.  also reports altered mental status slight confusion and lethargy. [facility] to here for further management. Labs show hypokalemia mild AK I with a creatinine of 1.1 magnesium of 1.1, normal lactic acid, mild anemia, no leukocytosis. She reports ongoing chills and Reiger. She denies  Denies fever, chills, nausea, vomiting, diarrhea, constipation, chest pain, palpitations, LOC,syncope, orthopnea, PND, leg swelling, cough.  She had recently seen her urologist Dr. Sharad Arango who performed U rectoscopy and stent exchange.  The plan was a follow-up in 3 days.   the head showed no acute process, chest showed no acute process KUB of the at follow-up the following most recent labs are provided:      CBC:    Lab Results   Component Value Date    WBC 4.2 11/02/2018    HGB 8.9 11/02/2018    HCT 27.6 11/02/2018     11/02/2018       Renal:    Lab Results   Component Value Date     11/02/2018    K 3.9 11/02/2018    K 4.2 10/28/2018    CL 97 11/02/2018    CO2 28 11/02/2018    BUN 10 11/02/2018    CREATININE 1.1 11/02/2018    CALCIUM 8.9 11/02/2018    PHOS 3.6 11/02/2018         Significant Diagnostic Studies    Radiology:   CT ABDOMEN PELVIS W WO CONTRAST Additional Contrast? Oral   Final Result   Multiple  wedge-shaped areas of decreased enhancement and attenuation   in the right and left kidneys concerning for infectious inflammatory   process like multifocal pyelonephritis. Renal infarct and malignancy   are in the differential but less likely. There is mild   pyelocaliectasis without stones . Surveillance is recommended. small   amount of inflammatory fluid is identified in the deep pelvis. Infiltrates and pleural effusion in the right lung base concerning for   pneumonia. Alert. Abnormal report            XR CHEST PORTABLE   Final Result      1. Borderline heart size associated with small bilateral pleural   effusions and perihilar vascular congestion. 2. Airspace opacities of the lower lungs more pronounced on the right   laterally. XR ABDOMEN (KUB) (SINGLE AP VIEW)   Final Result   As above.              Consults:     IP CONSULT TO UROLOGY  IP CONSULT TO INFECTIOUS DISEASES  IP CONSULT TO SOCIAL WORK  IP CONSULT TO HOME CARE NEEDS    Disposition:  home    Condition at Discharge: stable    Discharge Instructions/Follow-up:  Pcp/urology/ID    Code Status:  Full Code     Activity: activity as tolerated    Diet: ada/cardiac      Discharge Medications:     Current Discharge Medication List           Details   cefTRIAXone (ROCEPHIN) infusion Infuse 1,000 mg intravenously every 24 hours Compound per protocol  Qty: 29 g, Refills: 0              Details   cefdinir (OMNICEF) 300 MG capsule Take 1 capsule by mouth 2 times daily for 10 days  Qty: 20 capsule, Refills: 0      albuterol sulfate HFA (PROAIR HFA) 108 (90 Base) MCG/ACT inhaler Inhale 2 puffs into the lungs every 6 hours as needed for Wheezing  Qty: 1 Inhaler, Refills: 3      ranitidine (ZANTAC) 150 MG tablet Take 150 mg by mouth nightly as needed for Heartburn (severe heartburn)      loperamide (IMODIUM) 2 MG capsule Take 2 mg by mouth 4 times daily as needed for Diarrhea      B-Complex TABS Take 2 tablets by mouth daily  Qty: 60 tablet, Refills: 0    Associated Diagnoses: Chemotherapy-induced neuropathy (HCC)      gabapentin (NEURONTIN) 300 MG capsule Take 1 capsule by mouth 3 times daily for 90 days. Start with 1 capsule at bedtime for 1 week and increase every week until reach 3xday. Qty: 90 capsule, Refills: 1    Associated Diagnoses: Chemotherapy-induced neuropathy (HCC)      ondansetron (ZOFRAN) 8 MG tablet Take 1 tablet by mouth every 8 hours as needed for Nausea or Vomiting  Qty: 30 tablet, Refills: 1    Associated Diagnoses: Malignant neoplasm of central portion of right breast in female, estrogen receptor negative (HCC)      prochlorperazine (COMPAZINE) 10 MG tablet Take 1 tablet by mouth every 6 hours as needed (Nausea)  Qty: 120 tablet, Refills: 3    Associated Diagnoses: Malignant neoplasm of central portion of right breast in female, estrogen receptor negative (HCC)      diphenoxylate-atropine (DIPHENATOL) 2.5-0.025 MG per tablet Take 2 tablets by mouth 4 times daily as needed for Diarrhea. Binta Footman: 120 tablet, Refills: 3    Associated Diagnoses: Malignant neoplasm of central portion of right breast in female, estrogen receptor negative (HCC)      escitalopram (LEXAPRO) 20 MG tablet Take 20 mg by mouth every evening       simvastatin (ZOCOR) 40 MG tablet Take 40 mg by mouth daily       dipyridamole-aspirin (AGGRENOX)  MG per extended release capsule Take

## 2018-11-03 ENCOUNTER — CARE COORDINATION (OUTPATIENT)
Dept: CASE MANAGEMENT | Age: 68
End: 2018-11-03

## 2018-11-03 DIAGNOSIS — N12 PYELONEPHRITIS: Primary | ICD-10-CM

## 2018-11-03 LAB — URINE CULTURE, ROUTINE: NORMAL

## 2018-11-03 NOTE — CARE COORDINATION
[facility] Transitions Initial Follow Up Call    Call within 2 business days of discharge: Yes    Patient: Linda Tapia Patient : 1950   MRN: [id number]  Reason for Admission: Pyelonephritis  Discharge Date: 18 RARS: Readmission Risk Score: 28     Spoke with: [address] [place] [address] Will: [employer]    Non-face-to-face services provided:  Obtained and reviewed discharge summary and/or continuity of care documents  Assessment and support for treatment adherence and medication management-med rec 1111f    Care Transitions 24 Hour Call    Do you have any ongoing symptoms?:  No  Do you have a copy of your discharge instructions?:  Yes  Do you have all of your prescriptions and are they filled?:  Yes  Have you been contacted by a [employer] [address]?:  No  Have you scheduled your follow up appointment?:  No (Comment: Pt agreed to call Monday to schedule for next week.)  Were you discharged with any Home Care or Post Acute Services:  Yes  Post Acute Services:  Home Health (Comment: [facility])  Do you feel like you have everything you need to keep you well at home?:  Yes  Care Transitions Interventions     Care Transition post hospital f/u call to pt. She said she is \"fine\". Denies pain or fever. She did take compazine this morning for some nausea. Denies any urinary problems. She said the nurse from [facility]  was out this morning and showed her and spouse how to adm IV antibiotic through her PICC. No problems with the PICC. Pt has all new meds as noted on AVS. Med rec 1111f completed. Reviewed d/madeline meds. START taking:  cefTRIAXone (ROCEPHIN)  fluconazole (DIFLUCAN)  Start taking on: 11/3/2018  linezolid (ZYVOX) STOP taking:  cefdinir 300 MG capsule (OMNICEF)  oxyCODONE-acetaminophen 5-325 MG per tablet (PERCOCET  Pt agreed to call PCP on Monday for a f/u appt next week. Pt is BPCI. Pt agreeable to further calls.    Follow Up  Future Appointments  Date Time Provider Braxton Jimenez   2018

## 2018-11-05 ENCOUNTER — HOSPITAL ENCOUNTER (OUTPATIENT)
Age: 68
Discharge: HOME | End: 2018-11-07
Payer: MEDICARE

## 2018-11-05 LAB
ALBUMIN SERPL-MCNC: 3.3 G/DL (ref 3.5–5.2)
ALP BLD-CCNC: 108 U/L (ref 35–104)
ALT SERPL-CCNC: 15 U/L (ref 0–32)
ANION GAP SERPL CALCULATED.3IONS-SCNC: 14 MMOL/L (ref 7–16)
AST SERPL-CCNC: 19 U/L (ref 0–31)
BASOPHILS ABSOLUTE: 0.03 E9/L (ref 0–0.2)
BASOPHILS RELATIVE PERCENT: 0.6 % (ref 0–2)
BILIRUB SERPL-MCNC: 0.4 MG/DL (ref 0–1.2)
BUN BLDV-MCNC: 9 MG/DL (ref 8–23)
C-REACTIVE PROTEIN: 6.6 MG/DL (ref 0–0.4)
CALCIUM SERPL-MCNC: 9.3 MG/DL (ref 8.6–10.2)
CHLORIDE BLD-SCNC: 94 MMOL/L (ref 98–107)
CO2: 28 MMOL/L (ref 22–29)
CREAT SERPL-MCNC: 1.2 MG/DL (ref 0.5–1)
EOSINOPHILS ABSOLUTE: 0.35 E9/L (ref 0.05–0.5)
EOSINOPHILS RELATIVE PERCENT: 7.3 % (ref 0–6)
GFR AFRICAN AMERICAN: 54
GFR NON-AFRICAN AMERICAN: 45 ML/MIN/1.73
GLUCOSE BLD-MCNC: 98 MG/DL (ref 74–99)
HCT VFR BLD CALC: 26.2 % (ref 34–48)
HEMOGLOBIN: 8 G/DL (ref 11.5–15.5)
IMMATURE GRANULOCYTES #: 0.02 E9/L
IMMATURE GRANULOCYTES %: 0.4 % (ref 0–5)
LYMPHOCYTES ABSOLUTE: 0.52 E9/L (ref 1.5–4)
LYMPHOCYTES RELATIVE PERCENT: 10.9 % (ref 20–42)
MCH RBC QN AUTO: 29 PG (ref 26–35)
MCHC RBC AUTO-ENTMCNC: 30.5 % (ref 32–34.5)
MCV RBC AUTO: 94.9 FL (ref 80–99.9)
MONOCYTES ABSOLUTE: 0.54 E9/L (ref 0.1–0.95)
MONOCYTES RELATIVE PERCENT: 11.3 % (ref 2–12)
NEUTROPHILS ABSOLUTE: 3.33 E9/L (ref 1.8–7.3)
NEUTROPHILS RELATIVE PERCENT: 69.5 % (ref 43–80)
PDW BLD-RTO: 14.8 FL (ref 11.5–15)
PLATELET # BLD: 148 E9/L (ref 130–450)
PMV BLD AUTO: 10.9 FL (ref 7–12)
POTASSIUM SERPL-SCNC: 4 MMOL/L (ref 3.5–5)
RBC # BLD: 2.76 E12/L (ref 3.5–5.5)
SEDIMENTATION RATE, ERYTHROCYTE: 61 MM/HR (ref 0–20)
SODIUM BLD-SCNC: 136 MMOL/L (ref 132–146)
TOTAL PROTEIN: 6.5 G/DL (ref 6.4–8.3)
WBC # BLD: 4.8 E9/L (ref 4.5–11.5)

## 2018-11-05 PROCEDURE — 85025 COMPLETE CBC W/AUTO DIFF WBC: CPT

## 2018-11-05 PROCEDURE — 85651 RBC SED RATE NONAUTOMATED: CPT

## 2018-11-05 PROCEDURE — 80053 COMPREHEN METABOLIC PANEL: CPT

## 2018-11-05 PROCEDURE — 86140 C-REACTIVE PROTEIN: CPT

## 2018-11-12 ENCOUNTER — HOSPITAL ENCOUNTER (OUTPATIENT)
Age: 68
Discharge: HOME | End: 2018-11-14
Payer: MEDICARE

## 2018-11-12 LAB
ALBUMIN SERPL-MCNC: 3.9 G/DL (ref 3.5–5.2)
ALP BLD-CCNC: 91 U/L (ref 35–104)
ALT SERPL-CCNC: 13 U/L (ref 0–32)
ANION GAP SERPL CALCULATED.3IONS-SCNC: 18 MMOL/L (ref 7–16)
AST SERPL-CCNC: 17 U/L (ref 0–31)
BASOPHILS ABSOLUTE: 0.04 E9/L (ref 0–0.2)
BASOPHILS RELATIVE PERCENT: 0.9 % (ref 0–2)
BILIRUB SERPL-MCNC: 0.3 MG/DL (ref 0–1.2)
BUN BLDV-MCNC: 15 MG/DL (ref 8–23)
C-REACTIVE PROTEIN: 2.7 MG/DL (ref 0–0.4)
CALCIUM SERPL-MCNC: 10.1 MG/DL (ref 8.6–10.2)
CHLORIDE BLD-SCNC: 96 MMOL/L (ref 98–107)
CO2: 26 MMOL/L (ref 22–29)
CREAT SERPL-MCNC: 1.5 MG/DL (ref 0.5–1)
EOSINOPHILS ABSOLUTE: 0.34 E9/L (ref 0.05–0.5)
EOSINOPHILS RELATIVE PERCENT: 7.7 % (ref 0–6)
GFR AFRICAN AMERICAN: 42
GFR NON-AFRICAN AMERICAN: 35 ML/MIN/1.73
GLUCOSE BLD-MCNC: 110 MG/DL (ref 74–99)
HCT VFR BLD CALC: 31.3 % (ref 34–48)
HEMOGLOBIN: 9.9 G/DL (ref 11.5–15.5)
IMMATURE GRANULOCYTES #: 0.02 E9/L
IMMATURE GRANULOCYTES %: 0.5 % (ref 0–5)
LYMPHOCYTES ABSOLUTE: 0.71 E9/L (ref 1.5–4)
LYMPHOCYTES RELATIVE PERCENT: 16.1 % (ref 20–42)
MCH RBC QN AUTO: 28.9 PG (ref 26–35)
MCHC RBC AUTO-ENTMCNC: 31.6 % (ref 32–34.5)
MCV RBC AUTO: 91.5 FL (ref 80–99.9)
MONOCYTES ABSOLUTE: 0.39 E9/L (ref 0.1–0.95)
MONOCYTES RELATIVE PERCENT: 8.9 % (ref 2–12)
NEUTROPHILS ABSOLUTE: 2.9 E9/L (ref 1.8–7.3)
NEUTROPHILS RELATIVE PERCENT: 65.9 % (ref 43–80)
PDW BLD-RTO: 14.5 FL (ref 11.5–15)
PLATELET # BLD: 124 E9/L (ref 130–450)
PMV BLD AUTO: 10.3 FL (ref 7–12)
POTASSIUM SERPL-SCNC: 3.7 MMOL/L (ref 3.5–5)
RBC # BLD: 3.42 E12/L (ref 3.5–5.5)
SODIUM BLD-SCNC: 140 MMOL/L (ref 132–146)
TOTAL PROTEIN: 7.3 G/DL (ref 6.4–8.3)
WBC # BLD: 4.4 E9/L (ref 4.5–11.5)

## 2018-11-12 PROCEDURE — 86140 C-REACTIVE PROTEIN: CPT

## 2018-11-12 PROCEDURE — 80053 COMPREHEN METABOLIC PANEL: CPT

## 2018-11-12 PROCEDURE — 36415 COLL VENOUS BLD VENIPUNCTURE: CPT

## 2018-11-12 PROCEDURE — 85025 COMPLETE CBC W/AUTO DIFF WBC: CPT

## 2018-11-12 PROCEDURE — 85651 RBC SED RATE NONAUTOMATED: CPT

## 2018-11-13 LAB — SEDIMENTATION RATE, ERYTHROCYTE: 57 MM/HR (ref 0–20)

## 2018-11-13 NOTE — PROGRESS NOTES
Hypertension Mother     Cancer Sister         lymphoma    Hypertension Father     Stroke Maternal Grandmother     Stroke Paternal Grandmother        Social History     Social History    Marital status:      Spouse name: N/A    Number of children: N/A    Years of education: N/A     Occupational History    Not on file. Social History Main Topics    Smoking status: Current Every Day Smoker     Packs/day: 1.00     Years: 50.00     Types: Cigarettes    Smokeless tobacco: Never Used      Comment: smoking x 50 years    Alcohol use No    Drug use: No    Sexual activity: Not on file     Other Topics Concern    Not on file     Social History Narrative    No narrative on file       Occupation: Retired. No heavy lifting activities. Cares for grandchildren. Has a great grandson. Review of Systems   HENT: Positive for trouble swallowing (one year). Respiratory: Positive for choking. Musculoskeletal: Positive for back pain (Mid-right) and neck pain (chronic). CONSTITUTIONAL: No fever, chills. Good appetite and energy level. ENMT: Eyes: No diplopia; Nose: No epistaxis. Mouth: No sore throat. RESPIRATORY: No hemoptysis, shortness of breath, cough. CARDIOVASCULAR: No chest pain, pressure, or palpitations. GASTROINTESTINAL: No nausea/vomiting, abdominal pain, diarrhea/constipation. No blood in the stools. GENITOURINARY: No dysuria, urinary frequency, hematuria. NEURO: No syncope, presyncope, headache. Remainder:  ROS NEGATIVE      Patient denies previous history of DVT/PE. Objective:   Physical Exam   Constitutional: She is oriented to person, place, and time. She appears well-developed and well-nourished. No distress. HENT:   Head: Normocephalic and atraumatic. Eyes: Conjunctivae and EOM are normal. Pupils are equal, round, and reactive to light. Neck: Normal range of motion. Neck supple. No tracheal deviation present. No thyromegaly present.    Cardiovascular: multiple areas of concern will proceed with a PET scan. After that, may order a swallowing study although difficulty swallowing may be due to thyroid nodule. ENT consultation likely. Will refer to oncology for assessment. In light of the fact that patient lives in [place] will attempt to consolidate her appointments. Review of Systems    Objective:   Physical Exam    Assessment:    80year old woman who has completed neoadjuvant therapy for treatment of right breast cancer. 10/16/18  MRI Bilateral Breast         Impression       1. Near complete response to therapy on the right with 3 residual   right axillary lymph nodes demonstrating mild eccentric cortical   thickening suggestive of neoplasm.       2. No evidence of neoplasm on the left.       RECOMMENDATION:   Continued surgical and oncologic consultation is advised.       BIRADS 6: Known neoplasm                     Plan:               1) She would likely be a candidate for  2)       I personally and independently saw and examined patient and reviewed all pertinent laboratory data and imaging studies. I have reviewed and agree with the CNP history and review of systems as documented in the above note. Kayla Cruz MD [facility]  November 19, 2018.

## 2018-11-19 ENCOUNTER — OFFICE VISIT (OUTPATIENT)
Dept: BREAST CENTER | Age: 68
End: 2018-11-19
Payer: MEDICARE

## 2018-11-19 ENCOUNTER — HOSPITAL ENCOUNTER (OUTPATIENT)
Dept: INFUSION THERAPY | Age: 68
Discharge: HOME | End: 2018-11-19
Payer: MEDICARE

## 2018-11-19 ENCOUNTER — CARE COORDINATION (OUTPATIENT)
Dept: CASE MANAGEMENT | Age: 68
End: 2018-11-19

## 2018-11-19 ENCOUNTER — OFFICE VISIT (OUTPATIENT)
Dept: ONCOLOGY | Age: 68
End: 2018-11-19
Payer: MEDICARE

## 2018-11-19 VITALS
DIASTOLIC BLOOD PRESSURE: 68 MMHG | HEIGHT: 62 IN | HEART RATE: 109 BPM | RESPIRATION RATE: 16 BRPM | WEIGHT: 128.5 LBS | SYSTOLIC BLOOD PRESSURE: 140 MMHG | OXYGEN SATURATION: 98 % | TEMPERATURE: 97.9 F | BODY MASS INDEX: 23.65 KG/M2

## 2018-11-19 VITALS
WEIGHT: 128.3 LBS | TEMPERATURE: 97.6 F | DIASTOLIC BLOOD PRESSURE: 62 MMHG | RESPIRATION RATE: 20 BRPM | HEART RATE: 95 BPM | BODY MASS INDEX: 23.61 KG/M2 | SYSTOLIC BLOOD PRESSURE: 119 MMHG | HEIGHT: 62 IN

## 2018-11-19 DIAGNOSIS — Z85.3 PERSONAL HISTORY OF BREAST CANCER: Primary | ICD-10-CM

## 2018-11-19 DIAGNOSIS — C77.3 BREAST CANCER METASTASIZED TO AXILLARY LYMPH NODE, RIGHT (HCC): ICD-10-CM

## 2018-11-19 DIAGNOSIS — C50.111 MALIGNANT NEOPLASM OF CENTRAL PORTION OF RIGHT BREAST IN FEMALE, ESTROGEN RECEPTOR NEGATIVE (HCC): ICD-10-CM

## 2018-11-19 DIAGNOSIS — Z17.1 MALIGNANT NEOPLASM OF CENTRAL PORTION OF RIGHT BREAST IN FEMALE, ESTROGEN RECEPTOR NEGATIVE (HCC): ICD-10-CM

## 2018-11-19 DIAGNOSIS — C50.911 BREAST CANCER METASTASIZED TO AXILLARY LYMPH NODE, RIGHT (HCC): ICD-10-CM

## 2018-11-19 LAB
ALBUMIN SERPL-MCNC: 4.1 G/DL (ref 3.5–5.2)
ALP BLD-CCNC: 80 U/L (ref 35–104)
ALT SERPL-CCNC: 13 U/L (ref 0–32)
ANION GAP SERPL CALCULATED.3IONS-SCNC: 16 MMOL/L (ref 7–16)
ANISOCYTOSIS: ABNORMAL
AST SERPL-CCNC: 19 U/L (ref 0–31)
ATYPICAL LYMPHOCYTE RELATIVE PERCENT: 1.7 % (ref 0–4)
BASOPHILS ABSOLUTE: 0.04 E9/L (ref 0–0.2)
BASOPHILS RELATIVE PERCENT: 0.9 % (ref 0–2)
BILIRUB SERPL-MCNC: 0.4 MG/DL (ref 0–1.2)
BUN BLDV-MCNC: 22 MG/DL (ref 8–23)
CALCIUM SERPL-MCNC: 9.8 MG/DL (ref 8.6–10.2)
CHLORIDE BLD-SCNC: 97 MMOL/L (ref 98–107)
CO2: 25 MMOL/L (ref 22–29)
CREAT SERPL-MCNC: 1.8 MG/DL (ref 0.5–1)
EOSINOPHILS ABSOLUTE: 0.3 E9/L (ref 0.05–0.5)
EOSINOPHILS RELATIVE PERCENT: 7.8 % (ref 0–6)
GFR AFRICAN AMERICAN: 34
GFR NON-AFRICAN AMERICAN: 28 ML/MIN/1.73
GLUCOSE BLD-MCNC: 119 MG/DL (ref 74–99)
HCT VFR BLD CALC: 27.7 % (ref 34–48)
HEMOGLOBIN: 9.2 G/DL (ref 11.5–15.5)
LYMPHOCYTES ABSOLUTE: 0.51 E9/L (ref 1.5–4)
LYMPHOCYTES RELATIVE PERCENT: 11.3 % (ref 20–42)
MAGNESIUM: 1.4 MG/DL (ref 1.6–2.6)
MCH RBC QN AUTO: 28.7 PG (ref 26–35)
MCHC RBC AUTO-ENTMCNC: 33.2 % (ref 32–34.5)
MCV RBC AUTO: 86.3 FL (ref 80–99.9)
MONOCYTES ABSOLUTE: 0.2 E9/L (ref 0.1–0.95)
MONOCYTES RELATIVE PERCENT: 5.2 % (ref 2–12)
NEUTROPHILS ABSOLUTE: 2.85 E9/L (ref 1.8–7.3)
NEUTROPHILS RELATIVE PERCENT: 73 % (ref 43–80)
NUCLEATED RED BLOOD CELLS: 0.9 /100 WBC
PDW BLD-RTO: 14.2 FL (ref 11.5–15)
PLATELET # BLD: 65 E9/L (ref 130–450)
PLATELET CONFIRMATION: NORMAL
PMV BLD AUTO: 10 FL (ref 7–12)
POLYCHROMASIA: ABNORMAL
POTASSIUM SERPL-SCNC: 4 MMOL/L (ref 3.5–5)
RBC # BLD: 3.21 E12/L (ref 3.5–5.5)
SODIUM BLD-SCNC: 138 MMOL/L (ref 132–146)
TOTAL PROTEIN: 7.3 G/DL (ref 6.4–8.3)
WBC # BLD: 3.9 E9/L (ref 4.5–11.5)

## 2018-11-19 PROCEDURE — 36591 DRAW BLOOD OFF VENOUS DEVICE: CPT

## 2018-11-19 PROCEDURE — 1101F PT FALLS ASSESS-DOCD LE1/YR: CPT | Performed by: SURGERY

## 2018-11-19 PROCEDURE — G8428 CUR MEDS NOT DOCUMENT: HCPCS | Performed by: SURGERY

## 2018-11-19 PROCEDURE — G8420 CALC BMI NORM PARAMETERS: HCPCS | Performed by: SURGERY

## 2018-11-19 PROCEDURE — 4004F PT TOBACCO SCREEN RCVD TLK: CPT | Performed by: SURGERY

## 2018-11-19 PROCEDURE — 4040F PNEUMOC VAC/ADMIN/RCVD: CPT | Performed by: INTERNAL MEDICINE

## 2018-11-19 PROCEDURE — 99214 OFFICE O/P EST MOD 30 MIN: CPT | Performed by: SURGERY

## 2018-11-19 PROCEDURE — 1111F DSCHRG MED/CURRENT MED MERGE: CPT | Performed by: INTERNAL MEDICINE

## 2018-11-19 PROCEDURE — 1090F PRES/ABSN URINE INCON ASSESS: CPT | Performed by: SURGERY

## 2018-11-19 PROCEDURE — 1101F PT FALLS ASSESS-DOCD LE1/YR: CPT | Performed by: INTERNAL MEDICINE

## 2018-11-19 PROCEDURE — 85025 COMPLETE CBC W/AUTO DIFF WBC: CPT

## 2018-11-19 PROCEDURE — 99214 OFFICE O/P EST MOD 30 MIN: CPT

## 2018-11-19 PROCEDURE — 2580000003 HC RX 258

## 2018-11-19 PROCEDURE — G8420 CALC BMI NORM PARAMETERS: HCPCS | Performed by: INTERNAL MEDICINE

## 2018-11-19 PROCEDURE — 99214 OFFICE O/P EST MOD 30 MIN: CPT | Performed by: INTERNAL MEDICINE

## 2018-11-19 PROCEDURE — 1111F DSCHRG MED/CURRENT MED MERGE: CPT | Performed by: SURGERY

## 2018-11-19 PROCEDURE — 4004F PT TOBACCO SCREEN RCVD TLK: CPT | Performed by: INTERNAL MEDICINE

## 2018-11-19 PROCEDURE — 3017F COLORECTAL CA SCREEN DOC REV: CPT | Performed by: SURGERY

## 2018-11-19 PROCEDURE — 1123F ACP DISCUSS/DSCN MKR DOCD: CPT | Performed by: INTERNAL MEDICINE

## 2018-11-19 PROCEDURE — 36415 COLL VENOUS BLD VENIPUNCTURE: CPT

## 2018-11-19 PROCEDURE — 3017F COLORECTAL CA SCREEN DOC REV: CPT | Performed by: INTERNAL MEDICINE

## 2018-11-19 PROCEDURE — 1090F PRES/ABSN URINE INCON ASSESS: CPT | Performed by: INTERNAL MEDICINE

## 2018-11-19 PROCEDURE — 1123F ACP DISCUSS/DSCN MKR DOCD: CPT | Performed by: SURGERY

## 2018-11-19 PROCEDURE — 6360000002 HC RX W HCPCS

## 2018-11-19 PROCEDURE — 99213 OFFICE O/P EST LOW 20 MIN: CPT | Performed by: SURGERY

## 2018-11-19 PROCEDURE — G8484 FLU IMMUNIZE NO ADMIN: HCPCS | Performed by: SURGERY

## 2018-11-19 PROCEDURE — 80053 COMPREHEN METABOLIC PANEL: CPT

## 2018-11-19 PROCEDURE — 4040F PNEUMOC VAC/ADMIN/RCVD: CPT | Performed by: SURGERY

## 2018-11-19 PROCEDURE — G8484 FLU IMMUNIZE NO ADMIN: HCPCS | Performed by: INTERNAL MEDICINE

## 2018-11-19 PROCEDURE — G8400 PT W/DXA NO RESULTS DOC: HCPCS | Performed by: INTERNAL MEDICINE

## 2018-11-19 PROCEDURE — G8427 DOCREV CUR MEDS BY ELIG CLIN: HCPCS | Performed by: INTERNAL MEDICINE

## 2018-11-19 PROCEDURE — G8400 PT W/DXA NO RESULTS DOC: HCPCS | Performed by: SURGERY

## 2018-11-19 PROCEDURE — 83735 ASSAY OF MAGNESIUM: CPT

## 2018-11-19 RX ORDER — SODIUM CHLORIDE 0.9 % (FLUSH) 0.9 %
SYRINGE (ML) INJECTION
Status: COMPLETED
Start: 2018-11-19 | End: 2018-11-19

## 2018-11-19 RX ORDER — HEPARIN SODIUM (PORCINE) LOCK FLUSH IV SOLN 100 UNIT/ML 100 UNIT/ML
SOLUTION INTRAVENOUS
Status: COMPLETED
Start: 2018-11-19 | End: 2018-11-19

## 2018-11-19 RX ORDER — ACYCLOVIR 400 MG/1
400 TABLET ORAL 2 TIMES DAILY PRN
COMMUNITY
End: 2019-03-26 | Stop reason: ALTCHOICE

## 2018-11-19 RX ADMIN — Medication 10 ML: at 15:12

## 2018-11-19 RX ADMIN — HEPARIN 500 UNITS: 100 SYRINGE at 15:12

## 2018-11-19 ASSESSMENT — ENCOUNTER SYMPTOMS
WHEEZING: 0
BACK PAIN: 0
CHOKING: 0
CONSTIPATION: 0
VOMITING: 0
DIARRHEA: 1
SHORTNESS OF BREATH: 0
NAUSEA: 1
SINUS PRESSURE: 0
ABDOMINAL DISTENTION: 0
ABDOMINAL PAIN: 0
SORE THROAT: 0
EYE ITCHING: 0
CHEST TIGHTNESS: 0
COUGH: 0
VOICE CHANGE: 0
EYE DISCHARGE: 0
TROUBLE SWALLOWING: 0
BLOOD IN STOOL: 0
SINUS PAIN: 0
RHINORRHEA: 0

## 2018-11-19 NOTE — PROGRESS NOTES
laterally.              10/25/18  Cysto with bilateral retrograde pyelograms, bilateral ureteroscopy, and bilateral stent exchange.           2/22/18, Bilateral screening mammogram, [facility]:       FINDINGS:  The breasts demonstrate scattered fibroglandular tissue        without architectural distortion, skin thickening, microcalcification        clusters or nipple retraction. In the 12:00 position of the right        breast there is a suggestion of a spiculated 15 mm soft tissue mass.                 CONCLUSION/FINAL ASSESSMENT:  Probable 15 mm thick soft tissue mass in        the 12:00 position of the right breast. Would recommend spot        compression views and any other views deemed necessary by the        reviewing radiologist. If this proves to be real then ultrasound is        recommended for tissue characterization.                          BI-RADS:  0- Incomplete:Need Additional Imaging Evaluation.                  Breast Density:  Scattered fibroglandular tissue                 RECOMMENDATION:  Additional imaging right breast.                  3/8/18, Right breast ultrasound,[facility]:           FINDINGS: In the 12:00 position there is an irregular, hypoechoic, 18        x 9 x 15 mm nodule with some posterior acoustic shadowing.        In the axilla there is a 3.1 x 1.7 x 1.7 cm lymph node.  A hilus is        identified and it is hypoechoic.                          IMPRESSION: 18 mm mass in the 12:00 position of the right breast        consistent with carcinoma until proven otherwise.        Enlarged right axillary lymph node.        See today's mammogram report.                     3/8/18, Right diagnostic mammogram,[facility]:           FINDINGS:  Compression views do demonstrate a spiculated 15 mm soft        tissue nodule in the 12:00 position of the right breast.                 CONCLUSION/FINAL ASSESSMENT:  Correlating with today's ultrasound,        there is an 18 mm mass in the

## 2018-11-19 NOTE — PROGRESS NOTES
Department of [facility] Oncology  Attending Clinic Note    Reason for Visit: Follow-up on a patient with Right Breast Cancer    PCP:  Edelmira Choudhury MD    History of Present Illness: The mass was located in the 12 o'clock position of the right breast.     Breast cancer risk factors include age, gender, mother with breast cancer. Age of menarche was 16. Total hysterectomy at age 53. Patient was on hormonal therapy, patient unsure of how long. Patient is . Age of first live birth was 33. Patient did not breast feed.     Bilateral screening mammogram on 2018:  Probable 15 mm thick soft tissue mass in the 12:00 position of the right breast.     Right Diagnostic Mammogram on 2018:  there is an 18 mm mass in the 12:00 position of the right breast consistent with carcinoma until proven otherwise. There is also an abnormal right axillary lymph node. Right Breast U/S on 2018:  18 mm mass in the 12:00 position of the right breast consistent with carcinoma until proven otherwise. Enlarged right axillary lymph node. On 04/10/2018:  A. Right breast, core biopsy, slides for review ( A): Poorly differentiated ductal carcinoma, Campo score 3+3+2 = 8.  B. 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 Side effects of AC-T reviewed with patient. She agreed to proceed. Mediport was placed. 2018 2D-Echo: EF 60%. Cycle # 1 Dose-Dense AC was on 2018. Cycle # 2 Dose-Dense AC was on 2018. Cycle # 3 Dose-Dense AC was on 2018.     Cycle # 4 Dose-Dense AC was on 08/02/2018. Cycle # 1 Dose-Dense Taxol was on 08/16/2018. Cycle # 2 Dose-Dense Taxol was on 08/30/2018. Cycle # 3 Dose Dense Taxol was on 09/13/2018. On 09/25/2018 Patient was admitted to the hospital for sepsis (due to complicated pyelonephritis with Klebsiella bacteremia), ZHAO bilateral ureteral stone and hydronephrosis s/p cysto/stent insertions 09/28/2018. Discharged on 10/01/2018 On Abx (cephalexin) for Klebsiella Bacteremia by ID team.   MRI Bilateral Breast 10/16/2018 noted Near complete response to therapy on the right with 3 residual right axillary lymph nodes demonstrating mild eccentric cortical thickening suggestive of neoplasm. Review of Systems;  CONSTITUTIONAL: No fever, chills. Fair appetite and energy level  ENMT: Eyes: No diplopia; Nose: No epistaxis. Mouth: No sore throat. RESPIRATORY: No hemoptysis. Shortness of breath on exertion. CARDIOVASCULAR: No chest pain, palpitations. GASTROINTESTINAL: No nausea/vomiting or abdominal pain. GENITOURINARY: No dysuria, urinary frequency, hematuria. NEURO: No syncope, presyncope, headache. Peripheral neuropathy. Remainder:  ROS NEGATIVE    Past Medical History:      Diagnosis Date    Acid reflux     Breast cancer ([facility] [address] 06/2018    right    H/O renal calculi     Hyperlipidemia     Hypertension     Insomnia     Seasonal allergies     Stroke [facility]) 2013    tia    Type 2 diabetes mellitus without complication (HCC)      Medications:  Reviewed and reconciled. Allergies: Allergies   Allergen Reactions    Avelox [Moxifloxacin] Hives     Physical Exam:  /62 (Site: Right Upper Arm, Position: Sitting, Cuff Size: Medium Adult)   Pulse 95   Temp 97.6 °F (36.4 °C) (Temporal)   Resp 20   Ht 5' 2\" (1.575 m)   Wt 128 lb 4.8 oz (58.2 kg)   BMI 23.47 kg/m²   GENERAL: Alert, oriented x 3, not in acute distress  HEENT: PERRLA; EOMI. Oropharynx clear. NECK: Supple. No palpable LN  LUNGS: Good air entry bilaterally.  No wheezing, crackles or ronchi. CARDIOVASCULAR: Regular rate. No murmurs, rubs or gallops. ABDOMEN: Soft. Non-tender, non-distended. Positive bowel sounds. EXTREMITIES: Without clubbing, cyanosis, or edema. NEUROLOGIC: No focal deficits. ECOG PS 1    Impression/Plan:  81 y/o female with Right Breast Cancer: On 04/10/2018:  A. Right breast, core biopsy: Poorly differentiated ductal carcinoma, Madison score 3+3+2 = 8.  B. Right lymph node, core biopsy:  Metastatic poorly differentiated carcinoma    Comment: Per report from [facility] laboratory:  Estrogen receptor: Negative  Progesterone receptor: Negative  HER-2/jocelyn status: Negative (0)  Enclosed keratin immunohistochemical stains are positive    CT chest 05/11/2018:  Nodule just above the thyroid measures 2 x 2.7 cm. Thyroid nodules range up to 10 mm. Right axillary lymph nodes range up to 1.9 x 3.1 cm. No significant mediastinal or hilar LN. 2.4 mm groundglass nodule in the lingula     CT abdomen/pelvis 05/11/2018 noted no evidence of metastatic disease. Bone scan 05/11/2018 noted no convincing evidence of metastatic disease. PET/CT scan 05/23/2018:  Multiple enlarged hypermetabolic lymph nodes at level of the right axilla measuring up to 33 x 22 mm with hypermetabolic activity and SUV measuring up to 5.6. Enlarged lymph node located adjacent to posterior aspect of the right parotid gland which measures 12 x 10 mm with SUV of 4.8. Nodule associated with thyroid isthmus measuring 22 x 27 mm. This nodule is solid; however no FDG uptake within this lesion. Otherwise negative. U/S guided fine needle aspiration of a right parotid tail on 05/30/2018:   Cytology: Negative for malignant cells. Sheets of benign-appearing oncocytic cells present in a background of mature lymphocytes. Pattern best fits with Warthin tumor. U/S guided fine needle aspiration of thyroid isthmus on 05/30/2018:  Negative for malignant cells.  Benign-appearing follicular cells, foam

## 2018-11-20 ENCOUNTER — HOSPITAL ENCOUNTER (OUTPATIENT)
Age: 68
Discharge: HOME | End: 2018-11-22
Payer: MEDICARE

## 2018-11-20 LAB
ALBUMIN SERPL-MCNC: 3.9 G/DL (ref 3.5–5.2)
ALP BLD-CCNC: 72 U/L (ref 35–104)
ALT SERPL-CCNC: 13 U/L (ref 0–32)
ANION GAP SERPL CALCULATED.3IONS-SCNC: 16 MMOL/L (ref 7–16)
ANISOCYTOSIS: ABNORMAL
AST SERPL-CCNC: 23 U/L (ref 0–31)
BASOPHILS ABSOLUTE: 0.03 E9/L (ref 0–0.2)
BASOPHILS RELATIVE PERCENT: 1.2 % (ref 0–2)
BILIRUB SERPL-MCNC: 0.3 MG/DL (ref 0–1.2)
BUN BLDV-MCNC: 15 MG/DL (ref 8–23)
C-REACTIVE PROTEIN: 1 MG/DL (ref 0–0.4)
CALCIUM SERPL-MCNC: 9.7 MG/DL (ref 8.6–10.2)
CHLORIDE BLD-SCNC: 93 MMOL/L (ref 98–107)
CO2: 26 MMOL/L (ref 22–29)
CREAT SERPL-MCNC: 1.3 MG/DL (ref 0.5–1)
EOSINOPHILS ABSOLUTE: 0.25 E9/L (ref 0.05–0.5)
EOSINOPHILS RELATIVE PERCENT: 10 % (ref 0–6)
GFR AFRICAN AMERICAN: 49
GFR NON-AFRICAN AMERICAN: 41 ML/MIN/1.73
GLUCOSE BLD-MCNC: 137 MG/DL (ref 74–99)
HCT VFR BLD CALC: 25.8 % (ref 34–48)
HEMOGLOBIN: 8.3 G/DL (ref 11.5–15.5)
HYPOCHROMIA: ABNORMAL
IMMATURE GRANULOCYTES #: 0.01 E9/L
IMMATURE GRANULOCYTES %: 0.4 % (ref 0–5)
LYMPHOCYTES ABSOLUTE: 0.44 E9/L (ref 1.5–4)
LYMPHOCYTES RELATIVE PERCENT: 17.6 % (ref 20–42)
MCH RBC QN AUTO: 28.2 PG (ref 26–35)
MCHC RBC AUTO-ENTMCNC: 32.2 % (ref 32–34.5)
MCV RBC AUTO: 87.8 FL (ref 80–99.9)
MONOCYTES ABSOLUTE: 0.31 E9/L (ref 0.1–0.95)
MONOCYTES RELATIVE PERCENT: 12.4 % (ref 2–12)
NEUTROPHILS ABSOLUTE: 1.46 E9/L (ref 1.8–7.3)
NEUTROPHILS RELATIVE PERCENT: 58.4 % (ref 43–80)
OVALOCYTES: ABNORMAL
PDW BLD-RTO: 14.5 FL (ref 11.5–15)
PLATELET # BLD: 56 E9/L (ref 130–450)
PLATELET CONFIRMATION: NORMAL
PMV BLD AUTO: 11 FL (ref 7–12)
POTASSIUM SERPL-SCNC: 3.5 MMOL/L (ref 3.5–5)
RBC # BLD: 2.94 E12/L (ref 3.5–5.5)
SEDIMENTATION RATE, ERYTHROCYTE: 65 MM/HR (ref 0–20)
SODIUM BLD-SCNC: 135 MMOL/L (ref 132–146)
TOTAL PROTEIN: 6.8 G/DL (ref 6.4–8.3)
WBC # BLD: 2.5 E9/L (ref 4.5–11.5)

## 2018-11-20 PROCEDURE — 85025 COMPLETE CBC W/AUTO DIFF WBC: CPT

## 2018-11-20 PROCEDURE — 86140 C-REACTIVE PROTEIN: CPT

## 2018-11-20 PROCEDURE — 85651 RBC SED RATE NONAUTOMATED: CPT

## 2018-11-20 PROCEDURE — 80053 COMPREHEN METABOLIC PANEL: CPT

## 2018-11-20 PROCEDURE — 36415 COLL VENOUS BLD VENIPUNCTURE: CPT

## 2018-11-26 ENCOUNTER — HOSPITAL ENCOUNTER (OUTPATIENT)
Age: 68
Discharge: HOME | End: 2018-11-28
Payer: MEDICARE

## 2018-11-26 LAB
ALBUMIN SERPL-MCNC: 3.9 G/DL (ref 3.5–5.2)
ALP BLD-CCNC: 71 U/L (ref 35–104)
ALT SERPL-CCNC: 10 U/L (ref 0–32)
ANION GAP SERPL CALCULATED.3IONS-SCNC: 17 MMOL/L (ref 7–16)
AST SERPL-CCNC: 15 U/L (ref 0–31)
BASOPHILS ABSOLUTE: 0.02 E9/L (ref 0–0.2)
BASOPHILS RELATIVE PERCENT: 0.5 % (ref 0–2)
BILIRUB SERPL-MCNC: 0.2 MG/DL (ref 0–1.2)
BUN BLDV-MCNC: 13 MG/DL (ref 8–23)
C-REACTIVE PROTEIN: 0.2 MG/DL (ref 0–0.4)
CALCIUM SERPL-MCNC: 8.7 MG/DL (ref 8.6–10.2)
CHLORIDE BLD-SCNC: 101 MMOL/L (ref 98–107)
CO2: 23 MMOL/L (ref 22–29)
CREAT SERPL-MCNC: 1.3 MG/DL (ref 0.5–1)
EOSINOPHILS ABSOLUTE: 0.33 E9/L (ref 0.05–0.5)
EOSINOPHILS RELATIVE PERCENT: 8.9 % (ref 0–6)
GFR AFRICAN AMERICAN: 49
GFR NON-AFRICAN AMERICAN: 41 ML/MIN/1.73
GLUCOSE BLD-MCNC: 116 MG/DL (ref 74–99)
HCT VFR BLD CALC: 26.1 % (ref 34–48)
HEMOGLOBIN: 8.4 G/DL (ref 11.5–15.5)
IMMATURE GRANULOCYTES #: 0.06 E9/L
IMMATURE GRANULOCYTES %: 1.6 % (ref 0–5)
LYMPHOCYTES ABSOLUTE: 0.85 E9/L (ref 1.5–4)
LYMPHOCYTES RELATIVE PERCENT: 22.8 % (ref 20–42)
MCH RBC QN AUTO: 28.8 PG (ref 26–35)
MCHC RBC AUTO-ENTMCNC: 32.2 % (ref 32–34.5)
MCV RBC AUTO: 89.4 FL (ref 80–99.9)
MONOCYTES ABSOLUTE: 0.54 E9/L (ref 0.1–0.95)
MONOCYTES RELATIVE PERCENT: 14.5 % (ref 2–12)
NEUTROPHILS ABSOLUTE: 1.92 E9/L (ref 1.8–7.3)
NEUTROPHILS RELATIVE PERCENT: 51.7 % (ref 43–80)
PDW BLD-RTO: 16.2 FL (ref 11.5–15)
PLATELET # BLD: 121 E9/L (ref 130–450)
PMV BLD AUTO: 11.3 FL (ref 7–12)
POTASSIUM SERPL-SCNC: 3.7 MMOL/L (ref 3.5–5)
RBC # BLD: 2.92 E12/L (ref 3.5–5.5)
SEDIMENTATION RATE, ERYTHROCYTE: 50 MM/HR (ref 0–20)
SODIUM BLD-SCNC: 141 MMOL/L (ref 132–146)
TOTAL PROTEIN: 6.5 G/DL (ref 6.4–8.3)
WBC # BLD: 3.7 E9/L (ref 4.5–11.5)

## 2018-11-26 PROCEDURE — 85651 RBC SED RATE NONAUTOMATED: CPT

## 2018-11-26 PROCEDURE — 85025 COMPLETE CBC W/AUTO DIFF WBC: CPT

## 2018-11-26 PROCEDURE — 36415 COLL VENOUS BLD VENIPUNCTURE: CPT

## 2018-11-26 PROCEDURE — 80053 COMPREHEN METABOLIC PANEL: CPT

## 2018-11-26 PROCEDURE — 86140 C-REACTIVE PROTEIN: CPT

## 2018-11-28 ENCOUNTER — TELEPHONE (OUTPATIENT)
Dept: BREAST CENTER | Age: 68
End: 2018-11-28

## 2018-12-03 ENCOUNTER — HOSPITAL ENCOUNTER (OUTPATIENT)
Age: 68
Discharge: HOME | End: 2018-12-05
Payer: MEDICARE

## 2018-12-03 ENCOUNTER — TELEPHONE (OUTPATIENT)
Dept: SURGERY | Age: 68
End: 2018-12-03

## 2018-12-03 LAB
ALBUMIN SERPL-MCNC: 4.1 G/DL (ref 3.5–5.2)
ALP BLD-CCNC: 71 U/L (ref 35–104)
ALT SERPL-CCNC: 17 U/L (ref 0–32)
ANION GAP SERPL CALCULATED.3IONS-SCNC: 15 MMOL/L (ref 7–16)
AST SERPL-CCNC: 24 U/L (ref 0–31)
BASOPHILS ABSOLUTE: 0.04 E9/L (ref 0–0.2)
BASOPHILS RELATIVE PERCENT: 1 % (ref 0–2)
BILIRUB SERPL-MCNC: 0.2 MG/DL (ref 0–1.2)
BUN BLDV-MCNC: 15 MG/DL (ref 8–23)
C-REACTIVE PROTEIN: 0.3 MG/DL (ref 0–0.4)
CALCIUM SERPL-MCNC: 9.5 MG/DL (ref 8.6–10.2)
CHLORIDE BLD-SCNC: 103 MMOL/L (ref 98–107)
CO2: 25 MMOL/L (ref 22–29)
CREAT SERPL-MCNC: 1.2 MG/DL (ref 0.5–1)
EOSINOPHILS ABSOLUTE: 0.32 E9/L (ref 0.05–0.5)
EOSINOPHILS RELATIVE PERCENT: 7.7 % (ref 0–6)
GFR AFRICAN AMERICAN: 54
GFR NON-AFRICAN AMERICAN: 45 ML/MIN/1.73
GLUCOSE BLD-MCNC: 115 MG/DL (ref 74–99)
HCT VFR BLD CALC: 28.7 % (ref 34–48)
HEMOGLOBIN: 9.1 G/DL (ref 11.5–15.5)
IMMATURE GRANULOCYTES #: 0.01 E9/L
IMMATURE GRANULOCYTES %: 0.2 % (ref 0–5)
LYMPHOCYTES ABSOLUTE: 0.71 E9/L (ref 1.5–4)
LYMPHOCYTES RELATIVE PERCENT: 17 % (ref 20–42)
MCH RBC QN AUTO: 29.3 PG (ref 26–35)
MCHC RBC AUTO-ENTMCNC: 31.7 % (ref 32–34.5)
MCV RBC AUTO: 92.3 FL (ref 80–99.9)
MONOCYTES ABSOLUTE: 0.46 E9/L (ref 0.1–0.95)
MONOCYTES RELATIVE PERCENT: 11 % (ref 2–12)
NEUTROPHILS ABSOLUTE: 2.63 E9/L (ref 1.8–7.3)
NEUTROPHILS RELATIVE PERCENT: 63.1 % (ref 43–80)
PDW BLD-RTO: 18.2 FL (ref 11.5–15)
PLATELET # BLD: 159 E9/L (ref 130–450)
PMV BLD AUTO: 10.1 FL (ref 7–12)
POTASSIUM SERPL-SCNC: 4 MMOL/L (ref 3.5–5)
RBC # BLD: 3.11 E12/L (ref 3.5–5.5)
SEDIMENTATION RATE, ERYTHROCYTE: 68 MM/HR (ref 0–20)
SODIUM BLD-SCNC: 143 MMOL/L (ref 132–146)
TOTAL PROTEIN: 6.7 G/DL (ref 6.4–8.3)
WBC # BLD: 4.2 E9/L (ref 4.5–11.5)

## 2018-12-03 PROCEDURE — 86140 C-REACTIVE PROTEIN: CPT

## 2018-12-03 PROCEDURE — 85025 COMPLETE CBC W/AUTO DIFF WBC: CPT

## 2018-12-03 PROCEDURE — 85651 RBC SED RATE NONAUTOMATED: CPT

## 2018-12-03 PROCEDURE — 80053 COMPREHEN METABOLIC PANEL: CPT

## 2018-12-12 ENCOUNTER — TELEPHONE (OUTPATIENT)
Dept: BREAST CENTER | Age: 68
End: 2018-12-12

## 2018-12-12 ENCOUNTER — PREP FOR PROCEDURE (OUTPATIENT)
Dept: SURGERY | Age: 68
End: 2018-12-12

## 2018-12-12 RX ORDER — SODIUM CHLORIDE 0.9 % (FLUSH) 0.9 %
10 SYRINGE (ML) INJECTION PRN
Status: CANCELLED | OUTPATIENT
Start: 2018-12-12

## 2018-12-12 RX ORDER — SODIUM CHLORIDE, SODIUM LACTATE, POTASSIUM CHLORIDE, CALCIUM CHLORIDE 600; 310; 30; 20 MG/100ML; MG/100ML; MG/100ML; MG/100ML
INJECTION, SOLUTION INTRAVENOUS CONTINUOUS
Status: CANCELLED | OUTPATIENT
Start: 2018-12-12

## 2018-12-12 RX ORDER — SODIUM CHLORIDE 0.9 % (FLUSH) 0.9 %
10 SYRINGE (ML) INJECTION EVERY 12 HOURS SCHEDULED
Status: CANCELLED | OUTPATIENT
Start: 2018-12-12

## 2018-12-17 ENCOUNTER — OFFICE VISIT (OUTPATIENT)
Dept: ONCOLOGY | Age: 68
End: 2018-12-17
Payer: MEDICARE

## 2018-12-17 VITALS
TEMPERATURE: 96.7 F | SYSTOLIC BLOOD PRESSURE: 170 MMHG | DIASTOLIC BLOOD PRESSURE: 74 MMHG | BODY MASS INDEX: 24.11 KG/M2 | HEART RATE: 71 BPM | HEIGHT: 62 IN | WEIGHT: 131 LBS

## 2018-12-17 DIAGNOSIS — Z85.3 PERSONAL HISTORY OF BREAST CANCER: Primary | ICD-10-CM

## 2018-12-17 PROCEDURE — G8427 DOCREV CUR MEDS BY ELIG CLIN: HCPCS | Performed by: INTERNAL MEDICINE

## 2018-12-17 PROCEDURE — G8484 FLU IMMUNIZE NO ADMIN: HCPCS | Performed by: INTERNAL MEDICINE

## 2018-12-17 PROCEDURE — G8420 CALC BMI NORM PARAMETERS: HCPCS | Performed by: INTERNAL MEDICINE

## 2018-12-17 PROCEDURE — G8400 PT W/DXA NO RESULTS DOC: HCPCS | Performed by: INTERNAL MEDICINE

## 2018-12-17 PROCEDURE — 99212 OFFICE O/P EST SF 10 MIN: CPT

## 2018-12-17 PROCEDURE — 99214 OFFICE O/P EST MOD 30 MIN: CPT | Performed by: INTERNAL MEDICINE

## 2018-12-17 PROCEDURE — 4040F PNEUMOC VAC/ADMIN/RCVD: CPT | Performed by: INTERNAL MEDICINE

## 2018-12-17 PROCEDURE — 1123F ACP DISCUSS/DSCN MKR DOCD: CPT | Performed by: INTERNAL MEDICINE

## 2018-12-17 PROCEDURE — 1101F PT FALLS ASSESS-DOCD LE1/YR: CPT | Performed by: INTERNAL MEDICINE

## 2018-12-17 PROCEDURE — 3017F COLORECTAL CA SCREEN DOC REV: CPT | Performed by: INTERNAL MEDICINE

## 2018-12-17 PROCEDURE — 4004F PT TOBACCO SCREEN RCVD TLK: CPT | Performed by: INTERNAL MEDICINE

## 2018-12-17 PROCEDURE — 1090F PRES/ABSN URINE INCON ASSESS: CPT | Performed by: INTERNAL MEDICINE

## 2018-12-18 ENCOUNTER — TELEPHONE (OUTPATIENT)
Dept: BREAST CENTER | Age: 68
End: 2018-12-18

## 2018-12-18 ENCOUNTER — TELEPHONE (OUTPATIENT)
Dept: ONCOLOGY | Age: 68
End: 2018-12-18

## 2018-12-18 DIAGNOSIS — Z85.3 PERSONAL HISTORY OF BREAST CANCER: Primary | ICD-10-CM

## 2018-12-18 NOTE — TELEPHONE ENCOUNTER
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 Dr. Nafisa Reddy office contacted, patient was to be on HERRERA KELLE for 2 weeks following PICC line removal after Rocephin was completed. Patient called, stated she did not not  the HERRERA KELLE because it was an antibiotic and she \"couldn't be on an antibiotic to have her surgery. \"  Explained to her that her course of therapy needed to be finished with Infectious Disease practice before she could have her surgery. Instructed to follow up with infectious disease and her surgery would be rescheduled, verbalized understanding,  and patient. Also encouraged to follow up on all DrCharlie's scheduled appointments and testing.

## 2018-12-31 ENCOUNTER — CARE COORDINATION (OUTPATIENT)
Dept: CARE COORDINATION | Age: 68
End: 2018-12-31

## 2019-01-03 ENCOUNTER — TELEPHONE (OUTPATIENT)
Dept: BREAST CENTER | Age: 69
End: 2019-01-03

## 2019-01-15 ENCOUNTER — HOSPITAL ENCOUNTER (OUTPATIENT)
Dept: CT IMAGING | Age: 69
Discharge: HOME | End: 2019-01-17
Payer: MEDICARE

## 2019-01-15 DIAGNOSIS — R78.81 BACTEREMIA: ICD-10-CM

## 2019-01-15 DIAGNOSIS — N15.1 RENAL ABSCESS: ICD-10-CM

## 2019-01-15 PROCEDURE — 6360000004 HC RX CONTRAST MEDICATION: Performed by: RADIOLOGY

## 2019-01-15 PROCEDURE — 74178 CT ABD&PLV WO CNTR FLWD CNTR: CPT

## 2019-01-15 RX ADMIN — IOPAMIDOL 110 ML: 755 INJECTION, SOLUTION INTRAVENOUS at 15:36

## 2019-01-16 ENCOUNTER — CARE COORDINATION (OUTPATIENT)
Dept: CARE COORDINATION | Age: 69
End: 2019-01-16

## 2019-01-17 ENCOUNTER — TELEPHONE (OUTPATIENT)
Dept: BREAST CENTER | Age: 69
End: 2019-01-17

## 2019-01-18 ENCOUNTER — TELEPHONE (OUTPATIENT)
Dept: BREAST CENTER | Age: 69
End: 2019-01-18

## 2019-01-22 ENCOUNTER — TELEPHONE (OUTPATIENT)
Dept: BREAST CENTER | Age: 69
End: 2019-01-22

## 2019-01-23 DIAGNOSIS — Z87.898 HISTORY OF BACTEREMIA: ICD-10-CM

## 2019-01-23 DIAGNOSIS — N15.1 RENAL ABSCESS: Primary | ICD-10-CM

## 2019-01-25 ENCOUNTER — HOSPITAL ENCOUNTER (OUTPATIENT)
Age: 69
Discharge: HOME | End: 2019-01-25
Payer: MEDICARE

## 2019-01-25 DIAGNOSIS — Z17.1 MALIGNANT NEOPLASM OF CENTRAL PORTION OF RIGHT BREAST IN FEMALE, ESTROGEN RECEPTOR NEGATIVE (HCC): ICD-10-CM

## 2019-01-25 DIAGNOSIS — C50.111 MALIGNANT NEOPLASM OF CENTRAL PORTION OF RIGHT BREAST IN FEMALE, ESTROGEN RECEPTOR NEGATIVE (HCC): ICD-10-CM

## 2019-01-25 DIAGNOSIS — C77.3 BREAST CANCER METASTASIZED TO AXILLARY LYMPH NODE, RIGHT (HCC): ICD-10-CM

## 2019-01-25 DIAGNOSIS — Z87.898 HISTORY OF BACTEREMIA: ICD-10-CM

## 2019-01-25 DIAGNOSIS — C50.911 BREAST CANCER METASTASIZED TO AXILLARY LYMPH NODE, RIGHT (HCC): ICD-10-CM

## 2019-01-25 DIAGNOSIS — N15.1 RENAL ABSCESS: ICD-10-CM

## 2019-01-25 DIAGNOSIS — R78.81 BACTEREMIA: ICD-10-CM

## 2019-01-25 LAB
ALBUMIN SERPL-MCNC: 4.4 G/DL (ref 3.5–5.2)
ALBUMIN SERPL-MCNC: 4.5 G/DL (ref 3.5–5.2)
ALP BLD-CCNC: 80 U/L (ref 35–104)
ALP BLD-CCNC: 82 U/L (ref 35–104)
ALT SERPL-CCNC: 17 U/L (ref 0–32)
ALT SERPL-CCNC: 17 U/L (ref 0–32)
ANION GAP SERPL CALCULATED.3IONS-SCNC: 12 MMOL/L (ref 7–16)
ANION GAP SERPL CALCULATED.3IONS-SCNC: 14 MMOL/L (ref 7–16)
AST SERPL-CCNC: 17 U/L (ref 0–31)
AST SERPL-CCNC: 17 U/L (ref 0–31)
BASOPHILS ABSOLUTE: 0.06 E9/L (ref 0–0.2)
BASOPHILS RELATIVE PERCENT: 1.1 % (ref 0–2)
BILIRUB SERPL-MCNC: 0.3 MG/DL (ref 0–1.2)
BILIRUB SERPL-MCNC: 0.3 MG/DL (ref 0–1.2)
BILIRUBIN URINE: NEGATIVE
BLOOD, URINE: NEGATIVE
BUN BLDV-MCNC: 16 MG/DL (ref 8–23)
BUN BLDV-MCNC: 16 MG/DL (ref 8–23)
CALCIUM SERPL-MCNC: 9.4 MG/DL (ref 8.6–10.2)
CALCIUM SERPL-MCNC: 9.7 MG/DL (ref 8.6–10.2)
CHLORIDE BLD-SCNC: 98 MMOL/L (ref 98–107)
CHLORIDE BLD-SCNC: 98 MMOL/L (ref 98–107)
CLARITY: CLEAR
CO2: 25 MMOL/L (ref 22–29)
CO2: 27 MMOL/L (ref 22–29)
COLOR: YELLOW
CREAT SERPL-MCNC: 1.1 MG/DL (ref 0.5–1)
CREAT SERPL-MCNC: 1.1 MG/DL (ref 0.5–1)
EOSINOPHILS ABSOLUTE: 0.2 E9/L (ref 0.05–0.5)
EOSINOPHILS RELATIVE PERCENT: 3.5 % (ref 0–6)
GFR AFRICAN AMERICAN: 60
GFR AFRICAN AMERICAN: 60
GFR NON-AFRICAN AMERICAN: 49 ML/MIN/1.73
GFR NON-AFRICAN AMERICAN: 49 ML/MIN/1.73
GLUCOSE BLD-MCNC: 96 MG/DL (ref 74–99)
GLUCOSE BLD-MCNC: 97 MG/DL (ref 74–99)
GLUCOSE URINE: NEGATIVE MG/DL
HCT VFR BLD CALC: 37.8 % (ref 34–48)
HEMOGLOBIN: 12.4 G/DL (ref 11.5–15.5)
IMMATURE GRANULOCYTES #: 0.03 E9/L
IMMATURE GRANULOCYTES %: 0.5 % (ref 0–5)
KETONES, URINE: NEGATIVE MG/DL
LEUKOCYTE ESTERASE, URINE: NEGATIVE
LYMPHOCYTES ABSOLUTE: 1.12 E9/L (ref 1.5–4)
LYMPHOCYTES RELATIVE PERCENT: 19.8 % (ref 20–42)
MAGNESIUM: 1.5 MG/DL (ref 1.6–2.6)
MCH RBC QN AUTO: 29.9 PG (ref 26–35)
MCHC RBC AUTO-ENTMCNC: 32.8 % (ref 32–34.5)
MCV RBC AUTO: 91.1 FL (ref 80–99.9)
MONOCYTES ABSOLUTE: 0.64 E9/L (ref 0.1–0.95)
MONOCYTES RELATIVE PERCENT: 11.3 % (ref 2–12)
NEUTROPHILS ABSOLUTE: 3.6 E9/L (ref 1.8–7.3)
NEUTROPHILS RELATIVE PERCENT: 63.8 % (ref 43–80)
NITRITE, URINE: NEGATIVE
PDW BLD-RTO: 13.9 FL (ref 11.5–15)
PH UA: 5.5 (ref 5–9)
PLATELET # BLD: 208 E9/L (ref 130–450)
PMV BLD AUTO: 10 FL (ref 7–12)
POTASSIUM SERPL-SCNC: 3.7 MMOL/L (ref 3.5–5)
POTASSIUM SERPL-SCNC: 3.7 MMOL/L (ref 3.5–5)
PROTEIN UA: NEGATIVE MG/DL
RBC # BLD: 4.15 E12/L (ref 3.5–5.5)
SEDIMENTATION RATE, ERYTHROCYTE: 45 MM/HR (ref 0–20)
SODIUM BLD-SCNC: 137 MMOL/L (ref 132–146)
SODIUM BLD-SCNC: 137 MMOL/L (ref 132–146)
SPECIFIC GRAVITY UA: 1.01 (ref 1–1.03)
TOTAL PROTEIN: 7.3 G/DL (ref 6.4–8.3)
TOTAL PROTEIN: 7.4 G/DL (ref 6.4–8.3)
UROBILINOGEN, URINE: 0.2 E.U./DL
WBC # BLD: 5.7 E9/L (ref 4.5–11.5)

## 2019-01-25 PROCEDURE — 80053 COMPREHEN METABOLIC PANEL: CPT

## 2019-01-25 PROCEDURE — 85025 COMPLETE CBC W/AUTO DIFF WBC: CPT

## 2019-01-25 PROCEDURE — 83735 ASSAY OF MAGNESIUM: CPT

## 2019-01-25 PROCEDURE — 85651 RBC SED RATE NONAUTOMATED: CPT

## 2019-01-25 PROCEDURE — 36415 COLL VENOUS BLD VENIPUNCTURE: CPT

## 2019-01-25 PROCEDURE — 87088 URINE BACTERIA CULTURE: CPT

## 2019-01-25 PROCEDURE — 81003 URINALYSIS AUTO W/O SCOPE: CPT

## 2019-01-27 LAB — URINE CULTURE, ROUTINE: NORMAL

## 2019-01-30 ENCOUNTER — TELEPHONE (OUTPATIENT)
Dept: BREAST CENTER | Age: 69
End: 2019-01-30

## 2019-02-03 RX ORDER — SODIUM CHLORIDE 0.9 % (FLUSH) 0.9 %
10 SYRINGE (ML) INJECTION EVERY 12 HOURS SCHEDULED
Status: CANCELLED | OUTPATIENT
Start: 2019-02-03

## 2019-02-03 RX ORDER — SODIUM CHLORIDE 0.9 % (FLUSH) 0.9 %
10 SYRINGE (ML) INJECTION PRN
Status: CANCELLED | OUTPATIENT
Start: 2019-02-03

## 2019-02-03 RX ORDER — SODIUM CHLORIDE, SODIUM LACTATE, POTASSIUM CHLORIDE, CALCIUM CHLORIDE 600; 310; 30; 20 MG/100ML; MG/100ML; MG/100ML; MG/100ML
INJECTION, SOLUTION INTRAVENOUS CONTINUOUS
Status: CANCELLED | OUTPATIENT
Start: 2019-02-03

## 2019-02-27 ENCOUNTER — HOSPITAL ENCOUNTER (OUTPATIENT)
Age: 69
Setting detail: OUTPATIENT SURGERY
Discharge: HOME | End: 2019-02-27
Attending: SURGERY | Admitting: SURGERY
Payer: MEDICARE

## 2019-02-27 ENCOUNTER — ANESTHESIA EVENT (OUTPATIENT)
Dept: OPERATING ROOM | Age: 69
End: 2019-02-27
Payer: MEDICARE

## 2019-02-27 ENCOUNTER — ANESTHESIA (OUTPATIENT)
Dept: OPERATING ROOM | Age: 69
End: 2019-02-27
Payer: MEDICARE

## 2019-02-27 VITALS
TEMPERATURE: 97.1 F | BODY MASS INDEX: 24.48 KG/M2 | HEART RATE: 69 BPM | RESPIRATION RATE: 16 BRPM | WEIGHT: 133 LBS | SYSTOLIC BLOOD PRESSURE: 154 MMHG | DIASTOLIC BLOOD PRESSURE: 85 MMHG | HEIGHT: 62 IN | OXYGEN SATURATION: 99 %

## 2019-02-27 VITALS
SYSTOLIC BLOOD PRESSURE: 109 MMHG | RESPIRATION RATE: 62 BRPM | OXYGEN SATURATION: 100 % | TEMPERATURE: 96.4 F | DIASTOLIC BLOOD PRESSURE: 97 MMHG

## 2019-02-27 DIAGNOSIS — Z01.812 PRE-OPERATIVE LABORATORY EXAMINATION: Primary | ICD-10-CM

## 2019-02-27 DIAGNOSIS — G89.18 POST-OPERATIVE PAIN: ICD-10-CM

## 2019-02-27 LAB
HCT VFR BLD CALC: 39 % (ref 34–48)
HEMOGLOBIN: 13 G/DL (ref 11.5–15.5)
MCH RBC QN AUTO: 29.7 PG (ref 26–35)
MCHC RBC AUTO-ENTMCNC: 33.3 % (ref 32–34.5)
MCV RBC AUTO: 89 FL (ref 80–99.9)
METER GLUCOSE: 160 MG/DL (ref 74–99)
PDW BLD-RTO: 13 FL (ref 11.5–15)
PLATELET # BLD: 182 E9/L (ref 130–450)
PMV BLD AUTO: 10.6 FL (ref 7–12)
RBC # BLD: 4.38 E12/L (ref 3.5–5.5)
WBC # BLD: 5 E9/L (ref 4.5–11.5)

## 2019-02-27 PROCEDURE — 6360000002 HC RX W HCPCS: Performed by: ANESTHESIOLOGY

## 2019-02-27 PROCEDURE — 3600000003 HC SURGERY LEVEL 3 BASE: Performed by: SURGERY

## 2019-02-27 PROCEDURE — 2580000003 HC RX 258

## 2019-02-27 PROCEDURE — 19307 MAST MOD RAD: CPT | Performed by: SURGERY

## 2019-02-27 PROCEDURE — 2709999900 HC NON-CHARGEABLE SUPPLY: Performed by: SURGERY

## 2019-02-27 PROCEDURE — 7100000001 HC PACU RECOVERY - ADDTL 15 MIN: Performed by: SURGERY

## 2019-02-27 PROCEDURE — 6360000002 HC RX W HCPCS

## 2019-02-27 PROCEDURE — 82962 GLUCOSE BLOOD TEST: CPT

## 2019-02-27 PROCEDURE — 2500000003 HC RX 250 WO HCPCS

## 2019-02-27 PROCEDURE — 6360000002 HC RX W HCPCS: Performed by: SURGERY

## 2019-02-27 PROCEDURE — 85027 COMPLETE CBC AUTOMATED: CPT

## 2019-02-27 PROCEDURE — 38900 IO MAP OF SENT LYMPH NODE: CPT | Performed by: SURGERY

## 2019-02-27 PROCEDURE — 2720000010 HC SURG SUPPLY STERILE: Performed by: SURGERY

## 2019-02-27 PROCEDURE — 7100000011 HC PHASE II RECOVERY - ADDTL 15 MIN: Performed by: SURGERY

## 2019-02-27 PROCEDURE — 3700000000 HC ANESTHESIA ATTENDED CARE: Performed by: SURGERY

## 2019-02-27 PROCEDURE — 36590 REMOVAL TUNNELED CV CATH: CPT | Performed by: SURGERY

## 2019-02-27 PROCEDURE — 3600000013 HC SURGERY LEVEL 3 ADDTL 15MIN: Performed by: SURGERY

## 2019-02-27 PROCEDURE — 7100000000 HC PACU RECOVERY - FIRST 15 MIN: Performed by: SURGERY

## 2019-02-27 PROCEDURE — 36415 COLL VENOUS BLD VENIPUNCTURE: CPT

## 2019-02-27 PROCEDURE — 7100000010 HC PHASE II RECOVERY - FIRST 15 MIN: Performed by: SURGERY

## 2019-02-27 PROCEDURE — 88307 TISSUE EXAM BY PATHOLOGIST: CPT

## 2019-02-27 PROCEDURE — 88342 IMHCHEM/IMCYTCHM 1ST ANTB: CPT

## 2019-02-27 PROCEDURE — 88304 TISSUE EXAM BY PATHOLOGIST: CPT

## 2019-02-27 PROCEDURE — 2500000003 HC RX 250 WO HCPCS: Performed by: SURGERY

## 2019-02-27 PROCEDURE — 6370000000 HC RX 637 (ALT 250 FOR IP): Performed by: STUDENT IN AN ORGANIZED HEALTH CARE EDUCATION/TRAINING PROGRAM

## 2019-02-27 PROCEDURE — 3700000001 HC ADD 15 MINUTES (ANESTHESIA): Performed by: SURGERY

## 2019-02-27 PROCEDURE — 88341 IMHCHEM/IMCYTCHM EA ADD ANTB: CPT

## 2019-02-27 RX ORDER — PROMETHAZINE HYDROCHLORIDE 25 MG/ML
6.25 INJECTION, SOLUTION INTRAMUSCULAR; INTRAVENOUS
Status: DISCONTINUED | OUTPATIENT
Start: 2019-02-27 | End: 2019-02-27 | Stop reason: HOSPADM

## 2019-02-27 RX ORDER — DEXAMETHASONE SODIUM PHOSPHATE 10 MG/ML
INJECTION INTRAMUSCULAR; INTRAVENOUS PRN
Status: DISCONTINUED | OUTPATIENT
Start: 2019-02-27 | End: 2019-02-27 | Stop reason: SDUPTHER

## 2019-02-27 RX ORDER — FENTANYL CITRATE 50 UG/ML
INJECTION, SOLUTION INTRAMUSCULAR; INTRAVENOUS PRN
Status: DISCONTINUED | OUTPATIENT
Start: 2019-02-27 | End: 2019-02-27 | Stop reason: SDUPTHER

## 2019-02-27 RX ORDER — SODIUM CHLORIDE 0.9 % (FLUSH) 0.9 %
10 SYRINGE (ML) INJECTION PRN
Status: DISCONTINUED | OUTPATIENT
Start: 2019-02-27 | End: 2019-02-27 | Stop reason: HOSPADM

## 2019-02-27 RX ORDER — MIDAZOLAM HYDROCHLORIDE 1 MG/ML
INJECTION INTRAMUSCULAR; INTRAVENOUS PRN
Status: DISCONTINUED | OUTPATIENT
Start: 2019-02-27 | End: 2019-02-27 | Stop reason: SDUPTHER

## 2019-02-27 RX ORDER — SODIUM CHLORIDE 9 MG/ML
INJECTION, SOLUTION INTRAVENOUS CONTINUOUS PRN
Status: DISCONTINUED | OUTPATIENT
Start: 2019-02-27 | End: 2019-02-27 | Stop reason: SDUPTHER

## 2019-02-27 RX ORDER — METHYLENE BLUE 10 MG/ML
INJECTION INTRAVENOUS PRN
Status: DISCONTINUED | OUTPATIENT
Start: 2019-02-27 | End: 2019-02-27 | Stop reason: ALTCHOICE

## 2019-02-27 RX ORDER — SODIUM CHLORIDE, SODIUM LACTATE, POTASSIUM CHLORIDE, CALCIUM CHLORIDE 600; 310; 30; 20 MG/100ML; MG/100ML; MG/100ML; MG/100ML
INJECTION, SOLUTION INTRAVENOUS CONTINUOUS
Status: DISCONTINUED | OUTPATIENT
Start: 2019-02-27 | End: 2019-02-27 | Stop reason: HOSPADM

## 2019-02-27 RX ORDER — ONDANSETRON 2 MG/ML
INJECTION INTRAMUSCULAR; INTRAVENOUS PRN
Status: DISCONTINUED | OUTPATIENT
Start: 2019-02-27 | End: 2019-02-27 | Stop reason: SDUPTHER

## 2019-02-27 RX ORDER — HYDROCODONE BITARTRATE AND ACETAMINOPHEN 5; 325 MG/1; MG/1
1 TABLET ORAL EVERY 4 HOURS PRN
Qty: 18 TABLET | Refills: 0 | Status: SHIPPED | OUTPATIENT
Start: 2019-02-27 | End: 2019-03-06

## 2019-02-27 RX ORDER — EPHEDRINE SULFATE 50 MG/ML
INJECTION INTRAVENOUS PRN
Status: DISCONTINUED | OUTPATIENT
Start: 2019-02-27 | End: 2019-02-27 | Stop reason: SDUPTHER

## 2019-02-27 RX ORDER — OXYCODONE HYDROCHLORIDE AND ACETAMINOPHEN 5; 325 MG/1; MG/1
1 TABLET ORAL ONCE
Status: COMPLETED | OUTPATIENT
Start: 2019-02-27 | End: 2019-02-27

## 2019-02-27 RX ORDER — BUPIVACAINE HYDROCHLORIDE AND EPINEPHRINE 2.5; 5 MG/ML; UG/ML
INJECTION, SOLUTION EPIDURAL; INFILTRATION; INTRACAUDAL; PERINEURAL PRN
Status: DISCONTINUED | OUTPATIENT
Start: 2019-02-27 | End: 2019-02-27 | Stop reason: ALTCHOICE

## 2019-02-27 RX ORDER — CEFAZOLIN SODIUM 2 G/50ML
2 SOLUTION INTRAVENOUS
Status: COMPLETED | OUTPATIENT
Start: 2019-02-27 | End: 2019-02-27

## 2019-02-27 RX ORDER — GLYCOPYRROLATE 1 MG/5 ML
SYRINGE (ML) INTRAVENOUS PRN
Status: DISCONTINUED | OUTPATIENT
Start: 2019-02-27 | End: 2019-02-27 | Stop reason: SDUPTHER

## 2019-02-27 RX ORDER — PROPOFOL 10 MG/ML
INJECTION, EMULSION INTRAVENOUS PRN
Status: DISCONTINUED | OUTPATIENT
Start: 2019-02-27 | End: 2019-02-27 | Stop reason: SDUPTHER

## 2019-02-27 RX ORDER — NEOSTIGMINE METHYLSULFATE 0.5 MG/ML
INJECTION, SOLUTION INTRAVENOUS PRN
Status: DISCONTINUED | OUTPATIENT
Start: 2019-02-27 | End: 2019-02-27 | Stop reason: SDUPTHER

## 2019-02-27 RX ORDER — SODIUM CHLORIDE 0.9 % (FLUSH) 0.9 %
10 SYRINGE (ML) INJECTION EVERY 12 HOURS SCHEDULED
Status: DISCONTINUED | OUTPATIENT
Start: 2019-02-27 | End: 2019-02-27 | Stop reason: HOSPADM

## 2019-02-27 RX ORDER — ROCURONIUM BROMIDE 10 MG/ML
INJECTION, SOLUTION INTRAVENOUS PRN
Status: DISCONTINUED | OUTPATIENT
Start: 2019-02-27 | End: 2019-02-27 | Stop reason: SDUPTHER

## 2019-02-27 RX ADMIN — DEXAMETHASONE SODIUM PHOSPHATE 10 MG: 10 INJECTION INTRAMUSCULAR; INTRAVENOUS at 11:39

## 2019-02-27 RX ADMIN — FENTANYL CITRATE 50 MCG: 50 INJECTION, SOLUTION INTRAMUSCULAR; INTRAVENOUS at 12:20

## 2019-02-27 RX ADMIN — EPHEDRINE SULFATE 10 MG: 50 INJECTION, SOLUTION INTRAVENOUS at 13:27

## 2019-02-27 RX ADMIN — Medication 0.6 MG: at 13:38

## 2019-02-27 RX ADMIN — ROCURONIUM BROMIDE 10 MG: 10 INJECTION, SOLUTION INTRAVENOUS at 12:46

## 2019-02-27 RX ADMIN — FENTANYL CITRATE 100 MCG: 50 INJECTION, SOLUTION INTRAMUSCULAR; INTRAVENOUS at 11:39

## 2019-02-27 RX ADMIN — OXYCODONE AND ACETAMINOPHEN 1 TABLET: 5; 325 TABLET ORAL at 15:40

## 2019-02-27 RX ADMIN — HYDROMORPHONE HYDROCHLORIDE 0.5 MG: 1 INJECTION, SOLUTION INTRAMUSCULAR; INTRAVENOUS; SUBCUTANEOUS at 14:21

## 2019-02-27 RX ADMIN — ONDANSETRON 4 MG: 2 INJECTION INTRAMUSCULAR; INTRAVENOUS at 13:36

## 2019-02-27 RX ADMIN — FENTANYL CITRATE 50 MCG: 50 INJECTION, SOLUTION INTRAMUSCULAR; INTRAVENOUS at 12:41

## 2019-02-27 RX ADMIN — LIDOCAINE HYDROCHLORIDE 80 MG: 20 INJECTION, SOLUTION INTRAVENOUS at 11:39

## 2019-02-27 RX ADMIN — CEFAZOLIN SODIUM 2 G: 2 SOLUTION INTRAVENOUS at 11:50

## 2019-02-27 RX ADMIN — NEOSTIGMINE METHYLSULFATE 3 MG: 0.5 INJECTION INTRAVENOUS at 13:36

## 2019-02-27 RX ADMIN — SODIUM CHLORIDE: 9 INJECTION, SOLUTION INTRAVENOUS at 11:39

## 2019-02-27 RX ADMIN — PROPOFOL 100 MG: 10 INJECTION, EMULSION INTRAVENOUS at 11:39

## 2019-02-27 RX ADMIN — PHENYLEPHRINE HYDROCHLORIDE 100 MCG: 10 INJECTION INTRAVENOUS at 11:58

## 2019-02-27 RX ADMIN — MIDAZOLAM 2 MG: 1 INJECTION INTRAMUSCULAR; INTRAVENOUS at 11:34

## 2019-02-27 RX ADMIN — FENTANYL CITRATE 50 MCG: 50 INJECTION, SOLUTION INTRAMUSCULAR; INTRAVENOUS at 12:06

## 2019-02-27 RX ADMIN — ROCURONIUM BROMIDE 40 MG: 10 INJECTION, SOLUTION INTRAVENOUS at 11:39

## 2019-02-27 ASSESSMENT — PULMONARY FUNCTION TESTS
PIF_VALUE: 34
PIF_VALUE: 33
PIF_VALUE: 26
PIF_VALUE: 1
PIF_VALUE: 36
PIF_VALUE: 31
PIF_VALUE: 32
PIF_VALUE: 30
PIF_VALUE: 27
PIF_VALUE: 34
PIF_VALUE: 25
PIF_VALUE: 34
PIF_VALUE: 29
PIF_VALUE: 25
PIF_VALUE: 28
PIF_VALUE: 34
PIF_VALUE: 29
PIF_VALUE: 29
PIF_VALUE: 26
PIF_VALUE: 29
PIF_VALUE: 24
PIF_VALUE: 34
PIF_VALUE: 31
PIF_VALUE: 0
PIF_VALUE: 31
PIF_VALUE: 21
PIF_VALUE: 20
PIF_VALUE: 8
PIF_VALUE: 23
PIF_VALUE: 33
PIF_VALUE: 35
PIF_VALUE: 22
PIF_VALUE: 35
PIF_VALUE: 20
PIF_VALUE: 35
PIF_VALUE: 34
PIF_VALUE: 30
PIF_VALUE: 31
PIF_VALUE: 34
PIF_VALUE: 28
PIF_VALUE: 1
PIF_VALUE: 33
PIF_VALUE: 31
PIF_VALUE: 34
PIF_VALUE: 35
PIF_VALUE: 33
PIF_VALUE: 30
PIF_VALUE: 16
PIF_VALUE: 1
PIF_VALUE: 15
PIF_VALUE: 0
PIF_VALUE: 33
PIF_VALUE: 1
PIF_VALUE: 35
PIF_VALUE: 0
PIF_VALUE: 28
PIF_VALUE: 24
PIF_VALUE: 26
PIF_VALUE: 34
PIF_VALUE: 0
PIF_VALUE: 28
PIF_VALUE: 29
PIF_VALUE: 33
PIF_VALUE: 35
PIF_VALUE: 24
PIF_VALUE: 35
PIF_VALUE: 27
PIF_VALUE: 27
PIF_VALUE: 30
PIF_VALUE: 35
PIF_VALUE: 33
PIF_VALUE: 24
PIF_VALUE: 18
PIF_VALUE: 28
PIF_VALUE: 33
PIF_VALUE: 22
PIF_VALUE: 28
PIF_VALUE: 34
PIF_VALUE: 1
PIF_VALUE: 30
PIF_VALUE: 28
PIF_VALUE: 33
PIF_VALUE: 24
PIF_VALUE: 33
PIF_VALUE: 28
PIF_VALUE: 0
PIF_VALUE: 22
PIF_VALUE: 33
PIF_VALUE: 29
PIF_VALUE: 25
PIF_VALUE: 23
PIF_VALUE: 28
PIF_VALUE: 34
PIF_VALUE: 24
PIF_VALUE: 25
PIF_VALUE: 33
PIF_VALUE: 24
PIF_VALUE: 24
PIF_VALUE: 2
PIF_VALUE: 29
PIF_VALUE: 26
PIF_VALUE: 31
PIF_VALUE: 34
PIF_VALUE: 28
PIF_VALUE: 27
PIF_VALUE: 25
PIF_VALUE: 27
PIF_VALUE: 34
PIF_VALUE: 28
PIF_VALUE: 34
PIF_VALUE: 33
PIF_VALUE: 28
PIF_VALUE: 25
PIF_VALUE: 28
PIF_VALUE: 18
PIF_VALUE: 5
PIF_VALUE: 31
PIF_VALUE: 34
PIF_VALUE: 35
PIF_VALUE: 27
PIF_VALUE: 29
PIF_VALUE: 31
PIF_VALUE: 25
PIF_VALUE: 2
PIF_VALUE: 33
PIF_VALUE: 35
PIF_VALUE: 30
PIF_VALUE: 1
PIF_VALUE: 33
PIF_VALUE: 34
PIF_VALUE: 29
PIF_VALUE: 32
PIF_VALUE: 28

## 2019-02-27 ASSESSMENT — PAIN DESCRIPTION - DESCRIPTORS
DESCRIPTORS: DISCOMFORT;PRESSURE
DESCRIPTORS: DISCOMFORT
DESCRIPTORS: ACHING;BURNING;DISCOMFORT
DESCRIPTORS: DISCOMFORT

## 2019-02-27 ASSESSMENT — PAIN SCALES - GENERAL
PAINLEVEL_OUTOF10: 0
PAINLEVEL_OUTOF10: 5
PAINLEVEL_OUTOF10: 8
PAINLEVEL_OUTOF10: 4
PAINLEVEL_OUTOF10: 0
PAINLEVEL_OUTOF10: 4

## 2019-02-27 ASSESSMENT — PAIN DESCRIPTION - LOCATION
LOCATION: BREAST

## 2019-02-27 ASSESSMENT — PAIN DESCRIPTION - ORIENTATION
ORIENTATION: RIGHT

## 2019-02-27 ASSESSMENT — PAIN DESCRIPTION - PAIN TYPE
TYPE: SURGICAL PAIN

## 2019-02-27 ASSESSMENT — PAIN DESCRIPTION - PROGRESSION
CLINICAL_PROGRESSION: NOT CHANGED
CLINICAL_PROGRESSION: GRADUALLY WORSENING
CLINICAL_PROGRESSION: RAPIDLY IMPROVING
CLINICAL_PROGRESSION: NOT CHANGED

## 2019-02-27 ASSESSMENT — PAIN DESCRIPTION - FREQUENCY
FREQUENCY: INTERMITTENT
FREQUENCY: INTERMITTENT
FREQUENCY: CONTINUOUS
FREQUENCY: CONTINUOUS

## 2019-02-27 ASSESSMENT — PAIN DESCRIPTION - ONSET
ONSET: ON-GOING
ONSET: PROGRESSIVE

## 2019-02-27 ASSESSMENT — PAIN - FUNCTIONAL ASSESSMENT
PAIN_FUNCTIONAL_ASSESSMENT: 0-10
PAIN_FUNCTIONAL_ASSESSMENT: ACTIVITIES ARE NOT PREVENTED

## 2019-02-28 ENCOUNTER — TELEPHONE (OUTPATIENT)
Dept: SURGERY | Age: 69
End: 2019-02-28

## 2019-03-05 ENCOUNTER — TELEPHONE (OUTPATIENT)
Dept: BREAST CENTER | Age: 69
End: 2019-03-05

## 2019-03-05 ENCOUNTER — TELEPHONE (OUTPATIENT)
Dept: SURGERY | Age: 69
End: 2019-03-05

## 2019-03-19 ENCOUNTER — OFFICE VISIT (OUTPATIENT)
Dept: BREAST CENTER | Age: 69
End: 2019-03-19
Payer: MEDICARE

## 2019-03-19 VITALS
WEIGHT: 136.7 LBS | HEART RATE: 68 BPM | OXYGEN SATURATION: 98 % | DIASTOLIC BLOOD PRESSURE: 62 MMHG | TEMPERATURE: 98.2 F | SYSTOLIC BLOOD PRESSURE: 118 MMHG | BODY MASS INDEX: 25.16 KG/M2 | HEIGHT: 62 IN | RESPIRATION RATE: 16 BRPM

## 2019-03-19 DIAGNOSIS — Z12.31 ENCOUNTER FOR SCREENING MAMMOGRAM FOR MALIGNANT NEOPLASM OF BREAST: ICD-10-CM

## 2019-03-19 DIAGNOSIS — C77.3 BREAST CANCER METASTASIZED TO AXILLARY LYMPH NODE, RIGHT (HCC): Primary | ICD-10-CM

## 2019-03-19 DIAGNOSIS — C50.911 BREAST CANCER METASTASIZED TO AXILLARY LYMPH NODE, RIGHT (HCC): Primary | ICD-10-CM

## 2019-03-19 PROCEDURE — 99024 POSTOP FOLLOW-UP VISIT: CPT | Performed by: SURGERY

## 2019-03-21 ENCOUNTER — HOSPITAL ENCOUNTER (OUTPATIENT)
Age: 69
Discharge: HOME | End: 2019-03-21
Payer: MEDICARE

## 2019-03-21 ENCOUNTER — HOSPITAL ENCOUNTER (OUTPATIENT)
Dept: INFUSION THERAPY | Age: 69
Discharge: HOME | End: 2019-03-21
Payer: MEDICARE

## 2019-03-21 ENCOUNTER — OFFICE VISIT (OUTPATIENT)
Dept: ONCOLOGY | Age: 69
End: 2019-03-21
Payer: MEDICARE

## 2019-03-21 VITALS
HEART RATE: 64 BPM | DIASTOLIC BLOOD PRESSURE: 65 MMHG | TEMPERATURE: 97.4 F | HEIGHT: 62 IN | SYSTOLIC BLOOD PRESSURE: 132 MMHG | RESPIRATION RATE: 12 BRPM | OXYGEN SATURATION: 98 % | BODY MASS INDEX: 24.73 KG/M2 | WEIGHT: 134.4 LBS

## 2019-03-21 DIAGNOSIS — Z85.3 PERSONAL HISTORY OF BREAST CANCER: ICD-10-CM

## 2019-03-21 DIAGNOSIS — C50.911 BREAST CANCER METASTASIZED TO AXILLARY LYMPH NODE, RIGHT (HCC): ICD-10-CM

## 2019-03-21 DIAGNOSIS — C77.3 BREAST CANCER METASTASIZED TO AXILLARY LYMPH NODE, RIGHT (HCC): ICD-10-CM

## 2019-03-21 DIAGNOSIS — Z85.3 PERSONAL HISTORY OF BREAST CANCER: Primary | ICD-10-CM

## 2019-03-21 LAB
ALBUMIN SERPL-MCNC: 4.8 G/DL (ref 3.5–5.2)
ALP BLD-CCNC: 79 U/L (ref 35–104)
ALT SERPL-CCNC: 12 U/L (ref 0–32)
ANION GAP SERPL CALCULATED.3IONS-SCNC: 15 MMOL/L (ref 7–16)
AST SERPL-CCNC: 17 U/L (ref 0–31)
BASOPHILS ABSOLUTE: 0.04 E9/L (ref 0–0.2)
BASOPHILS RELATIVE PERCENT: 0.7 % (ref 0–2)
BILIRUB SERPL-MCNC: 0.4 MG/DL (ref 0–1.2)
BUN BLDV-MCNC: 21 MG/DL (ref 8–23)
CALCIUM SERPL-MCNC: 9.9 MG/DL (ref 8.6–10.2)
CHLORIDE BLD-SCNC: 100 MMOL/L (ref 98–107)
CO2: 28 MMOL/L (ref 22–29)
CREAT SERPL-MCNC: 1.1 MG/DL (ref 0.5–1)
EOSINOPHILS ABSOLUTE: 0.23 E9/L (ref 0.05–0.5)
EOSINOPHILS RELATIVE PERCENT: 3.8 % (ref 0–6)
GFR AFRICAN AMERICAN: 60
GFR NON-AFRICAN AMERICAN: 49 ML/MIN/1.73
GLUCOSE BLD-MCNC: 120 MG/DL (ref 74–99)
HCT VFR BLD CALC: 43 % (ref 34–48)
HEMOGLOBIN: 14.8 G/DL (ref 11.5–15.5)
IMMATURE GRANULOCYTES #: 0.02 E9/L
IMMATURE GRANULOCYTES %: 0.3 % (ref 0–5)
LYMPHOCYTES ABSOLUTE: 0.72 E9/L (ref 1.5–4)
LYMPHOCYTES RELATIVE PERCENT: 12 % (ref 20–42)
MCH RBC QN AUTO: 29.6 PG (ref 26–35)
MCHC RBC AUTO-ENTMCNC: 34.4 % (ref 32–34.5)
MCV RBC AUTO: 86 FL (ref 80–99.9)
MONOCYTES ABSOLUTE: 0.51 E9/L (ref 0.1–0.95)
MONOCYTES RELATIVE PERCENT: 8.5 % (ref 2–12)
NEUTROPHILS ABSOLUTE: 4.47 E9/L (ref 1.8–7.3)
NEUTROPHILS RELATIVE PERCENT: 74.7 % (ref 43–80)
PDW BLD-RTO: 12.8 FL (ref 11.5–15)
PLATELET # BLD: 204 E9/L (ref 130–450)
PMV BLD AUTO: 10.3 FL (ref 7–12)
POTASSIUM SERPL-SCNC: 3.3 MMOL/L (ref 3.5–5)
RBC # BLD: 5 E12/L (ref 3.5–5.5)
SODIUM BLD-SCNC: 143 MMOL/L (ref 132–146)
TOTAL PROTEIN: 8 G/DL (ref 6.4–8.3)
WBC # BLD: 6 E9/L (ref 4.5–11.5)

## 2019-03-21 PROCEDURE — 1090F PRES/ABSN URINE INCON ASSESS: CPT | Performed by: INTERNAL MEDICINE

## 2019-03-21 PROCEDURE — G8484 FLU IMMUNIZE NO ADMIN: HCPCS | Performed by: INTERNAL MEDICINE

## 2019-03-21 PROCEDURE — 1123F ACP DISCUSS/DSCN MKR DOCD: CPT | Performed by: INTERNAL MEDICINE

## 2019-03-21 PROCEDURE — 80053 COMPREHEN METABOLIC PANEL: CPT

## 2019-03-21 PROCEDURE — 99214 OFFICE O/P EST MOD 30 MIN: CPT | Performed by: INTERNAL MEDICINE

## 2019-03-21 PROCEDURE — 4004F PT TOBACCO SCREEN RCVD TLK: CPT | Performed by: INTERNAL MEDICINE

## 2019-03-21 PROCEDURE — G8400 PT W/DXA NO RESULTS DOC: HCPCS | Performed by: INTERNAL MEDICINE

## 2019-03-21 PROCEDURE — 36415 COLL VENOUS BLD VENIPUNCTURE: CPT

## 2019-03-21 PROCEDURE — 85025 COMPLETE CBC W/AUTO DIFF WBC: CPT

## 2019-03-21 PROCEDURE — G8427 DOCREV CUR MEDS BY ELIG CLIN: HCPCS | Performed by: INTERNAL MEDICINE

## 2019-03-21 PROCEDURE — 99212 OFFICE O/P EST SF 10 MIN: CPT | Performed by: INTERNAL MEDICINE

## 2019-03-21 PROCEDURE — 1101F PT FALLS ASSESS-DOCD LE1/YR: CPT | Performed by: INTERNAL MEDICINE

## 2019-03-21 PROCEDURE — G8420 CALC BMI NORM PARAMETERS: HCPCS | Performed by: INTERNAL MEDICINE

## 2019-03-21 PROCEDURE — 3017F COLORECTAL CA SCREEN DOC REV: CPT | Performed by: INTERNAL MEDICINE

## 2019-03-21 PROCEDURE — 4040F PNEUMOC VAC/ADMIN/RCVD: CPT | Performed by: INTERNAL MEDICINE

## 2019-03-26 ENCOUNTER — HOSPITAL ENCOUNTER (OUTPATIENT)
Dept: RADIATION ONCOLOGY | Age: 69
Discharge: HOME | End: 2019-03-26
Payer: MEDICARE

## 2019-03-26 VITALS
WEIGHT: 135 LBS | RESPIRATION RATE: 16 BRPM | DIASTOLIC BLOOD PRESSURE: 78 MMHG | HEIGHT: 62 IN | SYSTOLIC BLOOD PRESSURE: 132 MMHG | BODY MASS INDEX: 24.84 KG/M2 | TEMPERATURE: 97.8 F | HEART RATE: 78 BPM

## 2019-03-26 DIAGNOSIS — C50.411 MALIGNANT NEOPLASM OF UPPER-OUTER QUADRANT OF RIGHT BREAST IN FEMALE, ESTROGEN RECEPTOR NEGATIVE (HCC): Primary | ICD-10-CM

## 2019-03-26 DIAGNOSIS — Z17.1 MALIGNANT NEOPLASM OF UPPER-OUTER QUADRANT OF RIGHT BREAST IN FEMALE, ESTROGEN RECEPTOR NEGATIVE (HCC): Primary | ICD-10-CM

## 2019-03-26 PROCEDURE — 99205 OFFICE O/P NEW HI 60 MIN: CPT

## 2019-03-26 PROCEDURE — 99205 OFFICE O/P NEW HI 60 MIN: CPT | Performed by: RADIOLOGY

## 2019-03-26 SDOH — ECONOMIC STABILITY: HOUSING INSECURITY: PLEASE ASSESS YOUR PATIENT'S LEVEL OF DISTRESS CONCERNING HOUSING (SCALE FROM 1-10): 0 (NONE)

## 2019-04-01 ENCOUNTER — HOSPITAL ENCOUNTER (OUTPATIENT)
Dept: RADIATION ONCOLOGY | Age: 69
Discharge: HOME | End: 2019-04-01
Payer: MEDICARE

## 2019-04-04 ENCOUNTER — HOSPITAL ENCOUNTER (OUTPATIENT)
Dept: RADIATION ONCOLOGY | Age: 69
Discharge: HOME | End: 2019-04-04
Attending: RADIOLOGY
Payer: MEDICARE

## 2019-04-04 ENCOUNTER — HOSPITAL ENCOUNTER (OUTPATIENT)
Dept: CT IMAGING | Age: 69
Discharge: HOME | End: 2019-04-06
Payer: MEDICARE

## 2019-04-04 PROCEDURE — 77290 THER RAD SIMULAJ FIELD CPLX: CPT | Performed by: RADIOLOGY

## 2019-04-04 PROCEDURE — 77334 RADIATION TREATMENT AID(S): CPT | Performed by: RADIOLOGY

## 2019-04-11 ENCOUNTER — APPOINTMENT (OUTPATIENT)
Dept: RADIATION ONCOLOGY | Age: 69
End: 2019-04-11
Attending: RADIOLOGY
Payer: MEDICARE

## 2019-04-11 DIAGNOSIS — Z17.1 MALIGNANT NEOPLASM OF UPPER-OUTER QUADRANT OF RIGHT BREAST IN FEMALE, ESTROGEN RECEPTOR NEGATIVE (HCC): Primary | ICD-10-CM

## 2019-04-11 DIAGNOSIS — C50.411 MALIGNANT NEOPLASM OF UPPER-OUTER QUADRANT OF RIGHT BREAST IN FEMALE, ESTROGEN RECEPTOR NEGATIVE (HCC): Primary | ICD-10-CM

## 2019-04-11 DIAGNOSIS — I10 ESSENTIAL (PRIMARY) HYPERTENSION: ICD-10-CM

## 2019-04-12 ENCOUNTER — TELEPHONE (OUTPATIENT)
Dept: CASE MANAGEMENT | Age: 69
End: 2019-04-12

## 2019-04-12 ENCOUNTER — APPOINTMENT (OUTPATIENT)
Dept: RADIATION ONCOLOGY | Age: 69
End: 2019-04-12
Attending: RADIOLOGY
Payer: MEDICARE

## 2019-04-12 NOTE — TELEPHONE ENCOUNTER
Per Dr. Gissell Castaneda request, I called patient's house and spoke with her  to advise of needed TSH prior to starting radiation treatment on 4/16/19.  requests lab order be faxed to [facility] and patient can go today to have this done. I called [facility] and spoke with Ruddy Nails to obtain their fax number of [phone] and order was faxed.

## 2019-04-15 ENCOUNTER — APPOINTMENT (OUTPATIENT)
Dept: RADIATION ONCOLOGY | Age: 69
End: 2019-04-15
Attending: RADIOLOGY
Payer: MEDICARE

## 2019-04-15 PROCEDURE — 77334 RADIATION TREATMENT AID(S): CPT | Performed by: RADIOLOGY

## 2019-04-15 PROCEDURE — 77295 3-D RADIOTHERAPY PLAN: CPT | Performed by: RADIOLOGY

## 2019-04-15 PROCEDURE — 77300 RADIATION THERAPY DOSE PLAN: CPT | Performed by: RADIOLOGY

## 2019-04-16 ENCOUNTER — HOSPITAL ENCOUNTER (OUTPATIENT)
Dept: RADIATION ONCOLOGY | Age: 69
Discharge: HOME | End: 2019-04-16
Attending: RADIOLOGY
Payer: MEDICARE

## 2019-04-16 ENCOUNTER — HOSPITAL ENCOUNTER (OUTPATIENT)
Dept: RADIATION ONCOLOGY | Age: 69
Discharge: HOME | End: 2019-04-16
Payer: MEDICARE

## 2019-04-16 VITALS
DIASTOLIC BLOOD PRESSURE: 80 MMHG | HEART RATE: 75 BPM | RESPIRATION RATE: 16 BRPM | BODY MASS INDEX: 24.69 KG/M2 | SYSTOLIC BLOOD PRESSURE: 130 MMHG | WEIGHT: 135 LBS | TEMPERATURE: 98 F

## 2019-04-16 PROCEDURE — 77417 THER RADIOLOGY PORT IMAGE(S): CPT | Performed by: RADIOLOGY

## 2019-04-16 PROCEDURE — 77412 RADIATION TX DELIVERY LVL 3: CPT | Performed by: RADIOLOGY

## 2019-04-16 NOTE — PROGRESS NOTES
Avis Abel  4/16/2019  Wt Readings from Last 3 Encounters:   04/16/19 135 lb (61.2 kg)   03/26/19 135 lb (61.2 kg)   03/21/19 134 lb 6.4 oz (61 kg)     Body mass index is 24.69 kg/m². Treatment Area:chest wall sc, axilla    Patient was seen today for weekly visit. Comfort Alteration  KPS:90%  Fatigue: None    Ventilation Alterations  Cough: No  Hemoptysis: No  Mucus Color: NA  Dyspnea: No  O2 Sat: 98%    Nutritional Alteration  Anorexia: No  Nausea: No   Vomiting: No     Skin Alteration   Sensation:NA    Radiation Dermatitis:  NA    Mucous Membrane Alteration  Voice Changes/ Stridor/Larynx: no  Pharynx & Esophagus: NA    Elimination Alterations  Constipation: no  Diarrhea:  no      Emotional  Coping: effective      Injury, potential bleeding or infection: NA    Other:NA    Lab Results   Component Value Date    WBC 6.0 03/21/2019     03/21/2019         /80   Pulse 75   Temp 98 °F (36.7 °C)   Resp 16   Wt 135 lb (61.2 kg)   BMI 24.69 kg/m²   BP within normal range?  yes   -if no, manually recheck in 5-10 min        Assessment/Plan: skin care, self care reviewed prior to day 1 treatment  Patient has no further questions    Vilma Montoya

## 2019-04-16 NOTE — PROGRESS NOTES
Avis Abel  4/16/2019  3:18 PM        Wt Readings from Last 3 Encounters:   03/26/19 135 lb (61.2 kg)   03/21/19 134 lb 6.4 oz (61 kg)   03/19/19 136 lb 11.2 oz (62 kg)       Subjective: @ 1.8 Gy to R cx wall, SCLAV/ Axilla/ uppr 3 IMNs, doing well, nop issues      Objective: R R cx wall, SCLAV/ Axilla/ uppr 3 IMNs,:  No reaction  LN:  No adenopathy      Assessment: merary Rt in the adj setting      Plan: cont current mgt, skin care instructions given      Electronically signed by Elida Davila MD on 4/16/19 at 3:18 PM

## 2019-04-17 ENCOUNTER — HOSPITAL ENCOUNTER (OUTPATIENT)
Dept: RADIATION ONCOLOGY | Age: 69
Discharge: HOME | End: 2019-04-17
Attending: RADIOLOGY
Payer: MEDICARE

## 2019-04-17 PROCEDURE — 77412 RADIATION TX DELIVERY LVL 3: CPT | Performed by: RADIOLOGY

## 2019-04-18 ENCOUNTER — APPOINTMENT (OUTPATIENT)
Dept: RADIATION ONCOLOGY | Age: 69
End: 2019-04-18
Attending: RADIOLOGY
Payer: MEDICARE

## 2019-04-18 ENCOUNTER — TELEPHONE (OUTPATIENT)
Dept: CASE MANAGEMENT | Age: 69
End: 2019-04-18

## 2019-04-18 PROCEDURE — 77412 RADIATION TX DELIVERY LVL 3: CPT | Performed by: RADIOLOGY

## 2019-04-19 ENCOUNTER — APPOINTMENT (OUTPATIENT)
Dept: RADIATION ONCOLOGY | Age: 69
End: 2019-04-19
Attending: RADIOLOGY
Payer: MEDICARE

## 2019-04-22 ENCOUNTER — APPOINTMENT (OUTPATIENT)
Dept: RADIATION ONCOLOGY | Age: 69
End: 2019-04-22
Attending: RADIOLOGY
Payer: MEDICARE

## 2019-04-22 PROCEDURE — 77412 RADIATION TX DELIVERY LVL 3: CPT | Performed by: RADIOLOGY

## 2019-04-23 ENCOUNTER — HOSPITAL ENCOUNTER (OUTPATIENT)
Dept: RADIATION ONCOLOGY | Age: 69
Discharge: HOME | End: 2019-04-23
Attending: RADIOLOGY
Payer: MEDICARE

## 2019-04-23 ENCOUNTER — HOSPITAL ENCOUNTER (OUTPATIENT)
Dept: RADIATION ONCOLOGY | Age: 69
Discharge: HOME | End: 2019-04-23
Payer: MEDICARE

## 2019-04-23 VITALS
RESPIRATION RATE: 16 BRPM | BODY MASS INDEX: 24.51 KG/M2 | DIASTOLIC BLOOD PRESSURE: 75 MMHG | HEART RATE: 70 BPM | SYSTOLIC BLOOD PRESSURE: 128 MMHG | WEIGHT: 134 LBS

## 2019-04-23 PROCEDURE — 77412 RADIATION TX DELIVERY LVL 3: CPT | Performed by: RADIOLOGY

## 2019-04-23 PROCEDURE — 77336 RADIATION PHYSICS CONSULT: CPT | Performed by: RADIOLOGY

## 2019-04-23 NOTE — PROGRESS NOTES
Tatiana Pang  4/23/2019  Wt Readings from Last 3 Encounters:   04/23/19 134 lb (60.8 kg)   04/16/19 135 lb (61.2 kg)   03/26/19 135 lb (61.2 kg)     Body mass index is 24.51 kg/m². Treatment Area:chest wall, axilla    Patient was seen today for weekly visit. Comfort Alteration  KPS:90%  Fatigue: None    Ventilation Alterations  Cough: Yes  Hemoptysis: No  Mucus Color: NA  Dyspnea: No  O2 Sat: 94%    Nutritional Alteration  Anorexia: No  Nausea: No   Vomiting: No     Skin Alteration   Sensation:na    Radiation Dermatitis:  Na    Mucous Membrane Alteration  Voice Changes/ Stridor/Larynx: no  Pharynx & Esophagus: NA    Elimination Alterations  Constipation: no  Diarrhea:  no      Emotional  Coping: effective      Injury, potential bleeding or infection: NA    Other:NA    Lab Results   Component Value Date    WBC 6.0 03/21/2019     03/21/2019         /75   Pulse 70   Resp 16   Wt 134 lb (60.8 kg)   BMI 24.51 kg/m²   BP within normal range? yes   -if no, manually recheck in 5-10 min        Assessment/Plan:5 fractions at 900 cGy  Skin care reviewed.     Cherie Link

## 2019-04-23 NOTE — PROGRESS NOTES
Lou Albarado  4/23/2019  12:21 PM        Wt Readings from Last 3 Encounters:   04/23/19 134 lb (60.8 kg)   04/16/19 135 lb (61.2 kg)   03/26/19 135 lb (61.2 kg)       Subjective: doing well, c/o mild sore throat,   Using the gel frequently      Objective:   R Cx wall, R sclav/ axilla R upper scapula;' mild pinkish ryhtema,  No DD  LN no adenopathy      Assessment: merary rt well in the adj settign and doing well Ports approved      Plan: cont current mgt, pt advised that her sore throat is NOT from Rt      Electronically signed by Dheeraj Riggs MD on 4/23/19 at 12:21 PM

## 2019-04-24 ENCOUNTER — HOSPITAL ENCOUNTER (OUTPATIENT)
Dept: RADIATION ONCOLOGY | Age: 69
Discharge: HOME | End: 2019-04-24
Attending: RADIOLOGY
Payer: MEDICARE

## 2019-04-24 PROCEDURE — 77417 THER RADIOLOGY PORT IMAGE(S): CPT | Performed by: RADIOLOGY

## 2019-04-24 PROCEDURE — 77412 RADIATION TX DELIVERY LVL 3: CPT | Performed by: RADIOLOGY

## 2019-04-25 ENCOUNTER — HOSPITAL ENCOUNTER (OUTPATIENT)
Dept: RADIATION ONCOLOGY | Age: 69
Discharge: HOME | End: 2019-04-25
Attending: RADIOLOGY
Payer: MEDICARE

## 2019-04-25 PROCEDURE — 77412 RADIATION TX DELIVERY LVL 3: CPT | Performed by: RADIOLOGY

## 2019-04-26 ENCOUNTER — HOSPITAL ENCOUNTER (OUTPATIENT)
Dept: RADIATION ONCOLOGY | Age: 69
Discharge: HOME | End: 2019-04-26
Attending: RADIOLOGY
Payer: MEDICARE

## 2019-04-26 PROCEDURE — 77412 RADIATION TX DELIVERY LVL 3: CPT | Performed by: RADIOLOGY

## 2019-04-29 ENCOUNTER — HOSPITAL ENCOUNTER (OUTPATIENT)
Dept: RADIATION ONCOLOGY | Age: 69
Discharge: HOME | End: 2019-04-29
Attending: RADIOLOGY
Payer: MEDICARE

## 2019-04-29 PROCEDURE — 77412 RADIATION TX DELIVERY LVL 3: CPT | Performed by: RADIOLOGY

## 2019-04-30 ENCOUNTER — HOSPITAL ENCOUNTER (OUTPATIENT)
Dept: RADIATION ONCOLOGY | Age: 69
Discharge: HOME | End: 2019-04-30
Attending: RADIOLOGY
Payer: MEDICARE

## 2019-04-30 ENCOUNTER — HOSPITAL ENCOUNTER (OUTPATIENT)
Dept: RADIATION ONCOLOGY | Age: 69
Discharge: HOME | End: 2019-04-30
Payer: MEDICARE

## 2019-04-30 VITALS
WEIGHT: 138 LBS | SYSTOLIC BLOOD PRESSURE: 133 MMHG | DIASTOLIC BLOOD PRESSURE: 79 MMHG | BODY MASS INDEX: 25.24 KG/M2 | HEART RATE: 74 BPM | RESPIRATION RATE: 16 BRPM

## 2019-04-30 PROCEDURE — 77412 RADIATION TX DELIVERY LVL 3: CPT | Performed by: RADIOLOGY

## 2019-04-30 PROCEDURE — 77336 RADIATION PHYSICS CONSULT: CPT | Performed by: RADIOLOGY

## 2019-04-30 NOTE — PROGRESS NOTES
Avis CASEY Page  4/30/2019  12:06 PM        Wt Readings from Last 3 Encounters:   04/30/19 138 lb (62.6 kg)   04/23/19 134 lb (60.8 kg)   04/16/19 135 lb (61.2 kg)       Subjective: @18 Gy to the R Cx wall/ axiilla, IMN upper 3, Sclav, doing well, + fatigue,  + skin irritation, using Aquaphor      Objective:   RT portal:  Developing pinkish erythema along the R sclav and scapula and to a lesser extent R cx wall  LN;  No adenopahty        Assessment: Britni Rt well in the adj setting        Plan: cont.  Rt increase aquaphor application      Electronically signed by Nany Evans MD on 4/30/19 at 12:06 PM

## 2019-04-30 NOTE — PROGRESS NOTES
Lopez Christiana  4/30/2019  Wt Readings from Last 3 Encounters:   04/30/19 138 lb (62.6 kg)   04/23/19 134 lb (60.8 kg)   04/16/19 135 lb (61.2 kg)     Body mass index is 25.24 kg/m². Treatment Area: c wall r axilla    Patient was seen today for weekly visit. Comfort Alteration  KPS:90%  Fatigue: None    Nutritional Alteration  Anorexia: No   Nausea: No   Vomiting: No     Skin Alteration   Sensation:NA    Radiation Dermatitis:  mild    Mucous Membrane Alteration  Drainage: No  Lymphedema: No    Emotional  Coping: effective    Sexuality Alteration  absent            Lab Results   Component Value Date    WBC 6.0 03/21/2019     03/21/2019         /79   Pulse 74   Resp 16   Wt 138 lb (62.6 kg)   BMI 25.24 kg/m²   BP within normal range?  yes   -if no, manually recheck in 5-10 min        Assessment/Plan:10 fr 1800 cGy  Skin care reviewed    Kelsey Cantrell

## 2019-05-01 ENCOUNTER — HOSPITAL ENCOUNTER (OUTPATIENT)
Dept: RADIATION ONCOLOGY | Age: 69
Discharge: HOME | End: 2019-05-01
Attending: RADIOLOGY
Payer: MEDICARE

## 2019-05-01 PROCEDURE — 77412 RADIATION TX DELIVERY LVL 3: CPT | Performed by: RADIOLOGY

## 2019-05-01 PROCEDURE — 77417 THER RADIOLOGY PORT IMAGE(S): CPT | Performed by: RADIOLOGY

## 2019-05-02 ENCOUNTER — HOSPITAL ENCOUNTER (OUTPATIENT)
Dept: RADIATION ONCOLOGY | Age: 69
Discharge: HOME | End: 2019-05-02
Attending: RADIOLOGY
Payer: MEDICARE

## 2019-05-02 PROCEDURE — 77412 RADIATION TX DELIVERY LVL 3: CPT | Performed by: RADIOLOGY

## 2019-05-03 ENCOUNTER — HOSPITAL ENCOUNTER (OUTPATIENT)
Dept: RADIATION ONCOLOGY | Age: 69
Discharge: HOME | End: 2019-05-03
Attending: RADIOLOGY
Payer: MEDICARE

## 2019-05-03 PROCEDURE — 77412 RADIATION TX DELIVERY LVL 3: CPT | Performed by: RADIOLOGY

## 2019-05-06 ENCOUNTER — HOSPITAL ENCOUNTER (OUTPATIENT)
Dept: RADIATION ONCOLOGY | Age: 69
Discharge: HOME | End: 2019-05-06
Attending: RADIOLOGY
Payer: MEDICARE

## 2019-05-06 DIAGNOSIS — C77.3 BREAST CANCER METASTASIZED TO AXILLARY LYMPH NODE, RIGHT (HCC): Primary | ICD-10-CM

## 2019-05-06 DIAGNOSIS — C50.911 BREAST CANCER METASTASIZED TO AXILLARY LYMPH NODE, RIGHT (HCC): Primary | ICD-10-CM

## 2019-05-06 PROCEDURE — 77412 RADIATION TX DELIVERY LVL 3: CPT | Performed by: RADIOLOGY

## 2019-05-07 ENCOUNTER — HOSPITAL ENCOUNTER (OUTPATIENT)
Dept: RADIATION ONCOLOGY | Age: 69
Discharge: HOME | End: 2019-05-07
Attending: RADIOLOGY
Payer: MEDICARE

## 2019-05-07 VITALS
WEIGHT: 138.5 LBS | SYSTOLIC BLOOD PRESSURE: 130 MMHG | HEART RATE: 76 BPM | DIASTOLIC BLOOD PRESSURE: 86 MMHG | RESPIRATION RATE: 16 BRPM | BODY MASS INDEX: 25.33 KG/M2

## 2019-05-07 PROCEDURE — 77412 RADIATION TX DELIVERY LVL 3: CPT | Performed by: RADIOLOGY

## 2019-05-07 PROCEDURE — 77336 RADIATION PHYSICS CONSULT: CPT | Performed by: RADIOLOGY

## 2019-05-07 NOTE — PROGRESS NOTES
Avis Stafforddominic  5/7/2019  12:14 PM        Wt Readings from Last 3 Encounters:   05/07/19 138 lb 8 oz (62.8 kg)   04/30/19 138 lb (62.6 kg)   04/23/19 134 lb (60.8 kg)       Subjective: @27 great to the right chest wall axilla supraclavicular fossa upper 3 IMN, doing well continuing to apply the gel mild fatigue, reports intermittent queasiness and nausea which on further questioning it appears that does not happen over the weekends to more specifically happens a few hours after daily radiation      Objective:   Right chest wall/right supraclavicular fossa/right axilla: Increased erythema pinkish in nature with some element of dry desquamation, no moist estimation  HEENT: Clear oral cavity oropharynx with a pink and moist mucosa no candidiasis      Assessment: Tolerating radiation therapy in the adjuvant setting well, questionable radiation-induced nausea in the late afternoon, not necessarily related to RT but possibly      Plan: Patient has some Zofran left over from her chemo days and have suggested that she prophylax resolve with 8 mg Zofran tablet 1 hour before daily radiation therapy. She should continue the gel application.  We will be discontinuing the chest wall bolus shortly      Electronically signed by Deann Walker MD on 5/7/19 at 12:14 PM

## 2019-05-07 NOTE — PROGRESS NOTES
Teddy Brinkney  5/7/2019  Wt Readings from Last 3 Encounters:   05/07/19 138 lb 8 oz (62.8 kg)   04/30/19 138 lb (62.6 kg)   04/23/19 134 lb (60.8 kg)     Body mass index is 25.33 kg/m². Treatment Area:c wall axilla     Patient was seen today for weekly visit.       Comfort Alteration  KPS:90%  Fatigue: Mild    Ventilation Alterations  Cough: No  Hemoptysis: No  Mucus Color: NA  Dyspnea: No  O2 Sat: 94%    Nutritional Alteration  Anorexia: No  Nausea: No   Vomiting: No     Skin Alteration   Sensation:na    Radiation Dermatitis:  mild    Mucous Membrane Alteration  Voice Changes/ Stridor/Larynx: no  Pharynx & Esophagus: na    Elimination Alterations  Constipation: no  Diarrhea:  no      Emotional  Coping: effective      Injury, potential bleeding or infection: na    Other:na    Lab Results   Component Value Date    WBC 6.0 03/21/2019     03/21/2019         /86   Pulse 76   Resp 16   Wt 138 lb 8 oz (62.8 kg)   BMI 25.33 kg/m²   BP within normal range? no   -if no, manually recheck in 5-10 min         Assessment/Plan:15 fractions 2700 cGy  Using aquaphor bid  No new complaints      Sherrin Lesches

## 2019-05-08 ENCOUNTER — HOSPITAL ENCOUNTER (OUTPATIENT)
Dept: RADIATION ONCOLOGY | Age: 69
Discharge: HOME | End: 2019-05-08
Attending: RADIOLOGY
Payer: MEDICARE

## 2019-05-08 PROCEDURE — 77417 THER RADIOLOGY PORT IMAGE(S): CPT | Performed by: RADIOLOGY

## 2019-05-08 PROCEDURE — 77412 RADIATION TX DELIVERY LVL 3: CPT | Performed by: RADIOLOGY

## 2019-05-09 ENCOUNTER — HOSPITAL ENCOUNTER (OUTPATIENT)
Dept: RADIATION ONCOLOGY | Age: 69
Discharge: HOME | End: 2019-05-09
Attending: RADIOLOGY
Payer: MEDICARE

## 2019-05-09 PROCEDURE — 77412 RADIATION TX DELIVERY LVL 3: CPT | Performed by: RADIOLOGY

## 2019-05-10 ENCOUNTER — HOSPITAL ENCOUNTER (OUTPATIENT)
Dept: RADIATION ONCOLOGY | Age: 69
Discharge: HOME | End: 2019-05-10
Attending: RADIOLOGY
Payer: MEDICARE

## 2019-05-10 PROCEDURE — 77412 RADIATION TX DELIVERY LVL 3: CPT | Performed by: RADIOLOGY

## 2019-05-13 ENCOUNTER — HOSPITAL ENCOUNTER (OUTPATIENT)
Dept: RADIATION ONCOLOGY | Age: 69
Discharge: HOME | End: 2019-05-13
Attending: RADIOLOGY
Payer: MEDICARE

## 2019-05-13 PROCEDURE — 77412 RADIATION TX DELIVERY LVL 3: CPT | Performed by: RADIOLOGY

## 2019-05-14 ENCOUNTER — HOSPITAL ENCOUNTER (OUTPATIENT)
Dept: RADIATION ONCOLOGY | Age: 69
Discharge: HOME | End: 2019-05-14
Attending: RADIOLOGY
Payer: MEDICARE

## 2019-05-14 VITALS
WEIGHT: 139.8 LBS | RESPIRATION RATE: 16 BRPM | SYSTOLIC BLOOD PRESSURE: 130 MMHG | HEART RATE: 70 BPM | DIASTOLIC BLOOD PRESSURE: 74 MMHG | BODY MASS INDEX: 25.57 KG/M2

## 2019-05-14 PROCEDURE — 77336 RADIATION PHYSICS CONSULT: CPT | Performed by: RADIOLOGY

## 2019-05-14 PROCEDURE — 77412 RADIATION TX DELIVERY LVL 3: CPT | Performed by: RADIOLOGY

## 2019-05-14 NOTE — PROGRESS NOTES
Avis JACINTO Page  5/14/2019  1:09 PM        Wt Readings from Last 3 Encounters:   05/14/19 139 lb 12.8 oz (63.4 kg)   05/07/19 138 lb 8 oz (62.8 kg)   04/30/19 138 lb (62.6 kg)       Subjective: At 37 gray to the right chest wall supraclavicular fossa upper 3 vitamins and right axilla complains of increased fatigue is using Aquaphor twice a day denies any pain or discomfort no odynophagia      Objective:   Right chest wall supraclavicular axilla upper eye ointments: Increased brisk bordering on dusky erythema without any obvious dry desquamation with some lesser degree of pinkish erythema on the right superior scapular area, no evidence of recurrent disease  Lymph nodes: No palpable axillary lymphadenopathy is appreciated      Assessment: Tolerating adjuvant radiation therapy moderately well with increase dermatitis      Plan: I advised the patient and her  to increase application of Aquaphor to 3 times a day, explained the fatigue is induced by radiation treatment and that she should be sleep adequately engage in least and some 20 minutes minimum level of daily physical activity.       Electronically signed by Nany Evans MD on 5/14/19 at 1:09 PM

## 2019-05-14 NOTE — PROGRESS NOTES
Cherl Moulding  5/14/2019  Wt Readings from Last 3 Encounters:   05/14/19 139 lb 12.8 oz (63.4 kg)   05/07/19 138 lb 8 oz (62.8 kg)   04/30/19 138 lb (62.6 kg)     Body mass index is 25.57 kg/m². Treatment Area: C wall axilla    Patient was seen today for weekly visit. Comfort Alteration  KPS:90%  Fatigue: Moderate    Ventilation Alterations  Cough: Yes  Hemoptysis: No  Mucus Color: na  Dyspnea: No  O2 Sat: 97%    Nutritional Alteration  Anorexia: No  Nausea: No   Vomiting: Yes and No     Skin Alteration   Sensation:na    Radiation Dermatitis:  Brisk erythema    Mucous Membrane Alteration  Voice Changes/ Stridor/Larynx: no  Pharynx & Esophagus: NA    Elimination Alterations  Constipation: no  Diarrhea:  no      Emotional  Coping: effective      Injury, potential bleeding or infection: na    Other:na    Lab Results   Component Value Date    WBC 6.0 03/21/2019     03/21/2019         /74   Pulse 70   Resp 16   Wt 139 lb 12.8 oz (63.4 kg)   BMI 25.57 kg/m²   BP within normal range?  yes   -if no, manually recheck in 5-10 min        Assessment/Plan: 20 fractions 3600 cGy  Fatigue, erythema, itchy skin  Skin care reviewed    Carri Ruggiero

## 2019-05-15 ENCOUNTER — HOSPITAL ENCOUNTER (OUTPATIENT)
Dept: RADIATION ONCOLOGY | Age: 69
Discharge: HOME | End: 2019-05-15
Attending: RADIOLOGY
Payer: MEDICARE

## 2019-05-15 PROCEDURE — 77417 THER RADIOLOGY PORT IMAGE(S): CPT | Performed by: RADIOLOGY

## 2019-05-15 PROCEDURE — 77412 RADIATION TX DELIVERY LVL 3: CPT | Performed by: RADIOLOGY

## 2019-05-16 ENCOUNTER — HOSPITAL ENCOUNTER (OUTPATIENT)
Dept: RADIATION ONCOLOGY | Age: 69
Discharge: HOME | End: 2019-05-16
Attending: RADIOLOGY
Payer: MEDICARE

## 2019-05-16 PROCEDURE — 77412 RADIATION TX DELIVERY LVL 3: CPT | Performed by: RADIOLOGY

## 2019-05-16 NOTE — PROGRESS NOTES
RADIATION ONCOLOGY    Avis Abel        5/16/2019     [id number]     Breast cancer    Symptoms: Pruritus right chest wall    Exam: Erythema plus dry desquamation    Plan: Rx betamethasone valerate cream        Gary Olsen MD

## 2019-05-17 ENCOUNTER — HOSPITAL ENCOUNTER (OUTPATIENT)
Dept: RADIATION ONCOLOGY | Age: 69
Discharge: HOME | End: 2019-05-17
Attending: RADIOLOGY
Payer: MEDICARE

## 2019-05-17 PROCEDURE — 77412 RADIATION TX DELIVERY LVL 3: CPT | Performed by: RADIOLOGY

## 2019-05-20 ENCOUNTER — HOSPITAL ENCOUNTER (OUTPATIENT)
Dept: RADIATION ONCOLOGY | Age: 69
Discharge: HOME | End: 2019-05-20
Attending: RADIOLOGY
Payer: MEDICARE

## 2019-05-20 PROCEDURE — 77412 RADIATION TX DELIVERY LVL 3: CPT | Performed by: RADIOLOGY

## 2019-05-21 ENCOUNTER — HOSPITAL ENCOUNTER (OUTPATIENT)
Dept: RADIATION ONCOLOGY | Age: 69
Discharge: HOME | End: 2019-05-21
Attending: RADIOLOGY
Payer: MEDICARE

## 2019-05-21 VITALS
SYSTOLIC BLOOD PRESSURE: 122 MMHG | DIASTOLIC BLOOD PRESSURE: 78 MMHG | HEART RATE: 80 BPM | BODY MASS INDEX: 25.41 KG/M2 | RESPIRATION RATE: 14 BRPM | WEIGHT: 138.9 LBS

## 2019-05-21 DIAGNOSIS — C50.111 MALIGNANT NEOPLASM OF CENTRAL PORTION OF RIGHT BREAST IN FEMALE, ESTROGEN RECEPTOR NEGATIVE (HCC): Primary | ICD-10-CM

## 2019-05-21 DIAGNOSIS — Z17.1 MALIGNANT NEOPLASM OF CENTRAL PORTION OF RIGHT BREAST IN FEMALE, ESTROGEN RECEPTOR NEGATIVE (HCC): Primary | ICD-10-CM

## 2019-05-21 PROCEDURE — 77412 RADIATION TX DELIVERY LVL 3: CPT | Performed by: RADIOLOGY

## 2019-05-21 PROCEDURE — 77336 RADIATION PHYSICS CONSULT: CPT | Performed by: RADIOLOGY

## 2019-05-21 NOTE — PROGRESS NOTES
Avis JACINTO Page  5/21/2019  12:15 PM        Wt Readings from Last 3 Encounters:   05/21/19 138 lb 14.4 oz (63 kg)   05/14/19 139 lb 12.8 oz (63.4 kg)   05/07/19 138 lb 8 oz (62.8 kg)       Subjective: @ 45 Gy to the R cx wall/sclav/Upper 3 IMN//axilla, continued pruritis , but less intense with aquaphor and HC 1% OTC      Objective:   Rt dereje; Worsening dusky G II erythema w papular quality in the UIQ and the R scla/scapular area      Assessment: merary RT well in the adj setting      Plan: cont current mgt, will complete RT in 3 days.  Hytone 2.5 % cream will be prescribed to be used BID      Electronically signed by Napoleon Smith MD on 5/21/19 at 12:15 PM

## 2019-05-21 NOTE — PROGRESS NOTES
Raymon Aus  5/21/2019  Wt Readings from Last 3 Encounters:   05/21/19 138 lb 14.4 oz (63 kg)   05/14/19 139 lb 12.8 oz (63.4 kg)   05/07/19 138 lb 8 oz (62.8 kg)     Body mass index is 25.41 kg/m². Treatment Area:chest wall    Patient was seen today for weekly visit. Comfort Alteration  KPS:90%  Fatigue: Mild    Nutritional Alteration  Anorexia: No   Nausea: No   Vomiting: No     Skin Alteration   Sensation:YES    Radiation Dermatitis:  Brisk erythma    Mucous Membrane Alteration  Drainage: No  Lymphedema: No    Emotional  Coping: effective    Sexuality Alteration  absent    Injury, potential bleeding or infection: NA        Lab Results   Component Value Date    WBC 6.0 03/21/2019     03/21/2019         /78   Pulse 80   Resp 14   Wt 138 lb 14.4 oz (63 kg)   BMI 25.41 kg/m²   BP within normal range?  yes   -if no, manually recheck in 5-10 min        Assessment/Plan:25 fractions/ 4500cGy    Judson Garcia

## 2019-05-22 ENCOUNTER — HOSPITAL ENCOUNTER (OUTPATIENT)
Dept: RADIATION ONCOLOGY | Age: 69
Discharge: HOME | End: 2019-05-22
Attending: RADIOLOGY
Payer: MEDICARE

## 2019-05-22 PROCEDURE — 77412 RADIATION TX DELIVERY LVL 3: CPT | Performed by: RADIOLOGY

## 2019-05-22 PROCEDURE — 77417 THER RADIOLOGY PORT IMAGE(S): CPT | Performed by: RADIOLOGY

## 2019-05-23 ENCOUNTER — TELEPHONE (OUTPATIENT)
Dept: CASE MANAGEMENT | Age: 69
End: 2019-05-23

## 2019-05-23 ENCOUNTER — HOSPITAL ENCOUNTER (OUTPATIENT)
Dept: RADIATION ONCOLOGY | Age: 69
Discharge: HOME | End: 2019-05-23
Attending: RADIOLOGY
Payer: MEDICARE

## 2019-05-23 ENCOUNTER — HOSPITAL ENCOUNTER (OUTPATIENT)
Dept: INFUSION THERAPY | Age: 69
Discharge: HOME | End: 2019-05-23
Payer: MEDICARE

## 2019-05-23 ENCOUNTER — OFFICE VISIT (OUTPATIENT)
Dept: ONCOLOGY | Age: 69
End: 2019-05-23
Payer: MEDICARE

## 2019-05-23 VITALS
HEIGHT: 62 IN | TEMPERATURE: 98.1 F | WEIGHT: 141.6 LBS | HEART RATE: 68 BPM | DIASTOLIC BLOOD PRESSURE: 58 MMHG | BODY MASS INDEX: 26.06 KG/M2 | SYSTOLIC BLOOD PRESSURE: 122 MMHG

## 2019-05-23 DIAGNOSIS — C77.3 BREAST CANCER METASTASIZED TO AXILLARY LYMPH NODE, RIGHT (HCC): ICD-10-CM

## 2019-05-23 DIAGNOSIS — Z09 FOLLOW UP: Primary | ICD-10-CM

## 2019-05-23 DIAGNOSIS — C50.911 BREAST CANCER METASTASIZED TO AXILLARY LYMPH NODE, RIGHT (HCC): ICD-10-CM

## 2019-05-23 LAB
ALBUMIN SERPL-MCNC: 4.6 G/DL (ref 3.5–5.2)
ALP BLD-CCNC: 69 U/L (ref 35–104)
ALT SERPL-CCNC: 15 U/L (ref 0–32)
ANION GAP SERPL CALCULATED.3IONS-SCNC: 11 MMOL/L (ref 7–16)
ANISOCYTOSIS: ABNORMAL
AST SERPL-CCNC: 15 U/L (ref 0–31)
BASOPHILS ABSOLUTE: 0.02 E9/L (ref 0–0.2)
BASOPHILS RELATIVE PERCENT: 0.4 % (ref 0–2)
BILIRUB SERPL-MCNC: 0.4 MG/DL (ref 0–1.2)
BUN BLDV-MCNC: 22 MG/DL (ref 8–23)
CALCIUM SERPL-MCNC: 9.6 MG/DL (ref 8.6–10.2)
CHLORIDE BLD-SCNC: 98 MMOL/L (ref 98–107)
CO2: 31 MMOL/L (ref 22–29)
CREAT SERPL-MCNC: 1.2 MG/DL (ref 0.5–1)
EOSINOPHILS ABSOLUTE: 0.19 E9/L (ref 0.05–0.5)
EOSINOPHILS RELATIVE PERCENT: 4.1 % (ref 0–6)
GFR AFRICAN AMERICAN: 54
GFR NON-AFRICAN AMERICAN: 45 ML/MIN/1.73
GLUCOSE BLD-MCNC: 164 MG/DL (ref 74–99)
HCT VFR BLD CALC: 37.8 % (ref 34–48)
HEMOGLOBIN: 12.7 G/DL (ref 11.5–15.5)
IMMATURE GRANULOCYTES #: 0.04 E9/L
IMMATURE GRANULOCYTES %: 0.9 % (ref 0–5)
LYMPHOCYTES ABSOLUTE: 0.35 E9/L (ref 1.5–4)
LYMPHOCYTES RELATIVE PERCENT: 7.6 % (ref 20–42)
MCH RBC QN AUTO: 30 PG (ref 26–35)
MCHC RBC AUTO-ENTMCNC: 33.6 % (ref 32–34.5)
MCV RBC AUTO: 89.2 FL (ref 80–99.9)
MONOCYTES ABSOLUTE: 0.59 E9/L (ref 0.1–0.95)
MONOCYTES RELATIVE PERCENT: 12.9 % (ref 2–12)
NEUTROPHILS ABSOLUTE: 3.4 E9/L (ref 1.8–7.3)
NEUTROPHILS RELATIVE PERCENT: 74.1 % (ref 43–80)
PDW BLD-RTO: 14 FL (ref 11.5–15)
PLATELET # BLD: 145 E9/L (ref 130–450)
PMV BLD AUTO: 10.3 FL (ref 7–12)
POLYCHROMASIA: ABNORMAL
POTASSIUM SERPL-SCNC: 4.3 MMOL/L (ref 3.5–5)
RBC # BLD: 4.24 E12/L (ref 3.5–5.5)
SODIUM BLD-SCNC: 140 MMOL/L (ref 132–146)
TOTAL PROTEIN: 7.5 G/DL (ref 6.4–8.3)
WBC # BLD: 4.6 E9/L (ref 4.5–11.5)

## 2019-05-23 PROCEDURE — 77412 RADIATION TX DELIVERY LVL 3: CPT | Performed by: RADIOLOGY

## 2019-05-23 PROCEDURE — G8419 CALC BMI OUT NRM PARAM NOF/U: HCPCS | Performed by: INTERNAL MEDICINE

## 2019-05-23 PROCEDURE — G8427 DOCREV CUR MEDS BY ELIG CLIN: HCPCS | Performed by: INTERNAL MEDICINE

## 2019-05-23 PROCEDURE — 99215 OFFICE O/P EST HI 40 MIN: CPT | Performed by: INTERNAL MEDICINE

## 2019-05-23 PROCEDURE — 99212 OFFICE O/P EST SF 10 MIN: CPT

## 2019-05-23 PROCEDURE — 36415 COLL VENOUS BLD VENIPUNCTURE: CPT

## 2019-05-23 PROCEDURE — 80053 COMPREHEN METABOLIC PANEL: CPT

## 2019-05-23 PROCEDURE — 4004F PT TOBACCO SCREEN RCVD TLK: CPT | Performed by: INTERNAL MEDICINE

## 2019-05-23 PROCEDURE — 1123F ACP DISCUSS/DSCN MKR DOCD: CPT | Performed by: INTERNAL MEDICINE

## 2019-05-23 PROCEDURE — 85025 COMPLETE CBC W/AUTO DIFF WBC: CPT

## 2019-05-23 PROCEDURE — 3017F COLORECTAL CA SCREEN DOC REV: CPT | Performed by: INTERNAL MEDICINE

## 2019-05-23 PROCEDURE — 1090F PRES/ABSN URINE INCON ASSESS: CPT | Performed by: INTERNAL MEDICINE

## 2019-05-23 PROCEDURE — 4040F PNEUMOC VAC/ADMIN/RCVD: CPT | Performed by: INTERNAL MEDICINE

## 2019-05-23 PROCEDURE — G8400 PT W/DXA NO RESULTS DOC: HCPCS | Performed by: INTERNAL MEDICINE

## 2019-05-23 RX ORDER — CAPECITABINE 500 MG/1
1500 TABLET, FILM COATED ORAL 2 TIMES DAILY
Qty: 84 TABLET | Refills: 0 | Status: SHIPPED | OUTPATIENT
Start: 2019-05-23 | End: 2019-06-10 | Stop reason: SDUPTHER

## 2019-05-23 NOTE — PROGRESS NOTES
Department of [facility] Oncology  Attending Clinic Note    Reason for Visit: Follow-up on a patient with Right Breast Cancer    PCP:  Larissa Kitchen MD    History of Present Illness: The mass was located in the 12 o'clock position of the right breast.     Breast cancer risk factors include age, gender, mother with breast cancer. Age of menarche was 16. Total hysterectomy at age 53. Patient was on hormonal therapy, patient unsure of how long. Patient is . Age of first live birth was 33. Patient did not breast feed.     Bilateral screening mammogram on 2018:  Probable 15 mm thick soft tissue mass in the 12:00 position of the right breast.     Right Diagnostic Mammogram on 2018:  there is an 18 mm mass in the 12:00 position of the right breast consistent with carcinoma until proven otherwise. There is also an abnormal right axillary lymph node. Right Breast U/S on 2018:  18 mm mass in the 12:00 position of the right breast consistent with carcinoma until proven otherwise. Enlarged right axillary lymph node. On 04/10/2018:  A. Right breast, core biopsy, slides for review ( A): Poorly differentiated ductal carcinoma, [place] score 3+3+2 = 8.  B. 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 Side effects of AC-T reviewed with patient. She agreed to proceed. Mediport was placed. 2018 2D-Echo: EF 60%. Cycle # 1 Dose-Dense AC was on 2018. Cycle # 2 Dose-Dense AC was on 2018. Cycle # 3 Dose-Dense AC was on 2018.     Cycle # 4 Dose-Dense AC was on GENITOURINARY: No dysuria, urinary frequency, hematuria. NEURO: No syncope, presyncope, headache. Peripheral neuropathy. Remainder:  ROS NEGATIVE    Past Medical History:      Diagnosis Date    Acid reflux     Breast cancer ([facility] [address] 06/2018    right    H/O renal calculi     Hyperlipidemia     Hypertension     Insomnia     Seasonal allergies     Stroke [facility]) 2013    tia    Type 2 diabetes mellitus without complication ([facility]     UTI (urinary tract infection) 2018     Medications:  Reviewed and reconciled. Allergies: Allergies   Allergen Reactions    Avelox [Moxifloxacin] Hives     Physical Exam:  BP (!) 122/58 (Site: Left Upper Arm, Position: Sitting, Cuff Size: Medium Adult)   Pulse 68   Temp 98.1 °F (36.7 °C) (Temporal)   Ht 5' 2\" (1.575 m)   Wt 141 lb 9.6 oz (64.2 kg)   BMI 25.90 kg/m²   GENERAL: Alert, oriented x 3, not in acute distress  HEENT: PERRLA; EOMI. Oropharynx clear. NECK: Supple. No palpable LN  LUNGS: Good air entry bilaterally. No wheezing, crackles or ronchi. Chest: Left chest radiation dermatitis  CARDIOVASCULAR: Regular rate. No murmurs, rubs or gallops. ABDOMEN: Soft. Non-tender, non-distended. Positive bowel sounds. EXTREMITIES: Without clubbing, cyanosis, or edema. NEUROLOGIC: No focal deficits. ECOG PS 1    Impression/Plan:  76 y/o female with Right Breast Cancer: On 04/10/2018:  A. Right breast, core biopsy: Poorly differentiated ductal carcinoma, Angel score 3+3+2 = 8.  B. Right lymph node, core biopsy:  Metastatic poorly differentiated carcinoma    Comment: Per report from [email or web address] laboratory:  Estrogen receptor: Negative  Progesterone receptor: Negative  HER-2/jocelyn status: Negative (0)  Enclosed keratin immunohistochemical stains are positive    CT chest 05/11/2018:  Nodule just above the thyroid measures 2 x 2.7 cm. Thyroid nodules range up to 10 mm. Right axillary lymph nodes range up to 1.9 x 3.1 cm.    No significant mediastinal or hilar LN.  2.4 mm groundglass nodule in the lingula     CT abdomen/pelvis 05/11/2018 noted no evidence of metastatic disease. Bone scan 05/11/2018 noted no convincing evidence of metastatic disease. PET/CT scan 05/23/2018:  Multiple enlarged hypermetabolic lymph nodes at level of the right axilla measuring up to 33 x 22 mm with hypermetabolic activity and SUV measuring up to 5.6. Enlarged lymph node located adjacent to posterior aspect of the right parotid gland which measures 12 x 10 mm with SUV of 4.8. Nodule associated with thyroid isthmus measuring 22 x 27 mm. This nodule is solid; however no FDG uptake within this lesion. Otherwise negative. U/S guided fine needle aspiration of a right parotid tail on 05/30/2018:   Cytology: Negative for malignant cells. Sheets of benign-appearing oncocytic cells present in a background of mature lymphocytes. Pattern best fits with Warthin tumor. U/S guided fine needle aspiration of thyroid isthmus on 05/30/2018:  Negative for malignant cells. Benign-appearing follicular cells, foam cells, abundant colloid and blood present. These findings would be compatible with colloid nodule/nodular goiter. 06/10/2018 CT Soft Tissue: 2.3 x 1.7 cm solid lesion within the superficial tail of the right parotid gland. Question a focal solid neoplasm such as a Warthin's tumor. 2.5 x 2.0 cm hyperdense structure within the midline visceral space just inferior to the cricoid cartilage  This may represent a solid lesion arising from the thyroid gland. ENT team consulted. 06/05/2018 MRI Breast: 2.5 x 0.7 x 1.4 cm Irregular, enhancing mass in the right breast 12:00 with associated artifact from a metallic clip. At least 15 abnormal right axillary lymph nodes consistent with known metastatic disease. No evidence of neoplasm on the left. Stage IIIA T2 N2 M0 IDC Grade 3 Triple Negative Right Breast Cancer:   We recommended neoadjuvant chemotherapy consisting of Dose-Dense AC-T. Side effects of AC-T reviewed with patient. She agreed to proceed. Mediport was placed. 06/16/2018  2D-Echo: EF 60%. Cycle # 1 Dose-Dense AC was on 06/21/2018. Cycle # 4 Dose-Dense AC was on 08/02/2018. Cycle # 1 Dose-Dense Taxol was on 08/16/2018. Cycle # 2 Dose-Dense Taxol was on 08/30/2018. Cycle # 3 Dose Dense Taxol was on 09/13/2018. On 09/25/2018 Patient was admitted to the hospital for sepsis (due to complicated pyelonephritis with Klebsiella bacteremia), ZHAO bilateral ureteral stone and hydronephrosis s/p cysto/stent insertions 09/28/2018. Discharged on 10/01/2018 On Abx (cephalexin) for Klebsiella Bacteremia by ID team.   Due to the above and worsening peripheral neuropathy, we omitted last cycle of Taxol. MRI Bilateral Breast 10/16/2018 noted Near complete response to therapy on the right with 3 residual right axillary lymph nodes demonstrating mild eccentric cortical thickening suggestive of neoplasm. Readmitted to the hospital on 10/27/2018 with thrombocytopenia, electrolyte disturbances, chills, fever, syncope, nausea and vomiting. Completed Abx    Right simple mastectomy - blue dye inj - right axillary dissection) was on 12/19/2018  A.  Mediport capsule and cord: Device identified. Benign fibroadipose tissue. B.  Right breast, mastectomy: Biopsy site; negative for residual carcinoma. Skin with seborrheic keratosis. Size of largest metastatic deposit-1.3 cm. C.  Right axillary contents, regional resection: 5 of 7 lymph nodes positive for metastatic poorly differentiated mammary carcinoma (grade 3).    Comment: The tumor cells in part C are immunoreactive with pankeratin.  The cells are negative for LCA.  Intradepartmental  consultation is obtained.     TNM classification (AJCC eighth edition)-  ypT0 N2a M0 TNBC   Pathology report discussed. RT will be completed on 05/24/2019. We recommended adjuvant Xeloda bid 2 weeks on one week off for 6 cycles.  Side effects of Xeloda reviewed with patient. She agreed to proceed. [facility] RTC 3 weeks for Xeloda toxicity check.     Louie Klein MD  Medical Oncologist  Board Certified, Medical Oncology  Board Certified, Internal Medicine  May 23, 2019

## 2019-05-24 ENCOUNTER — HOSPITAL ENCOUNTER (OUTPATIENT)
Dept: RADIATION ONCOLOGY | Age: 69
Discharge: HOME | End: 2019-05-24
Attending: RADIOLOGY
Payer: MEDICARE

## 2019-05-24 PROCEDURE — 77412 RADIATION TX DELIVERY LVL 3: CPT | Performed by: RADIOLOGY

## 2019-05-24 PROCEDURE — 77336 RADIATION PHYSICS CONSULT: CPT | Performed by: RADIOLOGY

## 2019-06-03 ENCOUNTER — TELEPHONE (OUTPATIENT)
Dept: ONCOLOGY | Age: 69
End: 2019-06-03

## 2019-06-04 ENCOUNTER — TELEPHONE (OUTPATIENT)
Dept: ONCOLOGY | Age: 69
End: 2019-06-04

## 2019-06-04 NOTE — TELEPHONE ENCOUNTER
LATE ENTRY:  Pt called to clarify medication dose xeloda. Pt has called several times and also spoke to the doctor.

## 2019-06-06 NOTE — ONCOLOGY
Teddy Pierson  1950                  77 y.o.                  5/24/19  4:13 PM          Treatment Start Date:  4/16/2019  Treatment Completion Date: 5/24/2019    Treatment area #1: right chest wall/upper 3 intercostal spaces/lower axilla  Site dose: 5040 cGy in 28 divided fractions at 180 cGy per fraction over course of 5 and half weeks, delivered utilizing 15 MV photons and tangential field arrangement with 0.8 cm tissue equivalent bolus material on the chest wall for the first 18 fractions  Treatment area #2: Right supraclavicular fossa/upper axilla  Site dose: 50.4 gray in 28 divided fraction and 100 sent to gray per fraction over course of 5 and half weeks, delivered utilizing 15 MV photons and [place] PA beam arrangement    We also had tentative plans for possibly boosting the axillary region. However due to hotspots and concerns about brachial plexopathy and lack of evidence of extracapsular extension at the time of the axillary lymph node dissection, after discussion with our radiation oncology colleagues, I decided to forego the idea. Teddy Pierson tolerated his region treatment moderately well.patient did develop the usually expected generalized fatigue which was not debilitating. She also did develop pretty significant dusky grade 2 erythema and dry desquamation with papular quality in the upper inner quadrant of her chest wall and the right supraclavicular and superior scapular region. This was all initially managed really well with utilization of aqua 4 which by the end of treatment patient was using at least 3 times a day. However in the the above described areas of papular rash she also was experiencing relatively significant pruritus and we manage this with prescription strength Hytone 2.5% hydrocortisone cream.  As it in the treatment, Mrs. Dylan Hernandez was instructed to continue application of Aquaphor and hydrocortisone for approximately 5 days beyond the point of resolution of the pruritus.   Patient has tentatively been scheduled to see our radiation oncology nurse practitioner, Jeannette Clark CNP approximately 6 weeks post completion of radiation treatment. I thank you again for allow me to participate in the care of this patient.   Sincerely,      Anahi Colmenares

## 2019-06-10 ENCOUNTER — HOSPITAL ENCOUNTER (OUTPATIENT)
Dept: INFUSION THERAPY | Age: 69
Discharge: HOME | End: 2019-06-10
Payer: MEDICARE

## 2019-06-10 ENCOUNTER — OFFICE VISIT (OUTPATIENT)
Dept: ONCOLOGY | Age: 69
End: 2019-06-10
Payer: MEDICARE

## 2019-06-10 VITALS
BODY MASS INDEX: 25.95 KG/M2 | HEART RATE: 85 BPM | DIASTOLIC BLOOD PRESSURE: 60 MMHG | SYSTOLIC BLOOD PRESSURE: 108 MMHG | WEIGHT: 141 LBS | HEIGHT: 62 IN | TEMPERATURE: 97.2 F

## 2019-06-10 DIAGNOSIS — Z09 FOLLOW UP: Primary | ICD-10-CM

## 2019-06-10 DIAGNOSIS — Z17.1 MALIGNANT NEOPLASM OF CENTRAL PORTION OF RIGHT BREAST IN FEMALE, ESTROGEN RECEPTOR NEGATIVE (HCC): Primary | ICD-10-CM

## 2019-06-10 DIAGNOSIS — C50.111 MALIGNANT NEOPLASM OF CENTRAL PORTION OF RIGHT BREAST IN FEMALE, ESTROGEN RECEPTOR NEGATIVE (HCC): Primary | ICD-10-CM

## 2019-06-10 LAB
ALBUMIN SERPL-MCNC: 4.5 G/DL (ref 3.5–5.2)
ALP BLD-CCNC: 68 U/L (ref 35–104)
ALT SERPL-CCNC: 15 U/L (ref 0–32)
ANION GAP SERPL CALCULATED.3IONS-SCNC: 12 MMOL/L (ref 7–16)
ANISOCYTOSIS: ABNORMAL
AST SERPL-CCNC: 18 U/L (ref 0–31)
BASOPHILS ABSOLUTE: 0 E9/L (ref 0–0.2)
BASOPHILS RELATIVE PERCENT: 0.7 % (ref 0–2)
BILIRUB SERPL-MCNC: 0.3 MG/DL (ref 0–1.2)
BUN BLDV-MCNC: 19 MG/DL (ref 8–23)
CALCIUM SERPL-MCNC: 9.7 MG/DL (ref 8.6–10.2)
CHLORIDE BLD-SCNC: 97 MMOL/L (ref 98–107)
CO2: 31 MMOL/L (ref 22–29)
CREAT SERPL-MCNC: 1.2 MG/DL (ref 0.5–1)
EOSINOPHILS ABSOLUTE: 0.16 E9/L (ref 0.05–0.5)
EOSINOPHILS RELATIVE PERCENT: 3.5 % (ref 0–6)
GFR AFRICAN AMERICAN: 54
GFR NON-AFRICAN AMERICAN: 45 ML/MIN/1.73
GLUCOSE BLD-MCNC: 157 MG/DL (ref 74–99)
HCT VFR BLD CALC: 37.8 % (ref 34–48)
HEMOGLOBIN: 12.8 G/DL (ref 11.5–15.5)
LYMPHOCYTES ABSOLUTE: 0.36 E9/L (ref 1.5–4)
LYMPHOCYTES RELATIVE PERCENT: 7.8 % (ref 20–42)
MCH RBC QN AUTO: 30.3 PG (ref 26–35)
MCHC RBC AUTO-ENTMCNC: 33.9 % (ref 32–34.5)
MCV RBC AUTO: 89.6 FL (ref 80–99.9)
MONOCYTES ABSOLUTE: 0.36 E9/L (ref 0.1–0.95)
MONOCYTES RELATIVE PERCENT: 7.8 % (ref 2–12)
NEUTROPHILS ABSOLUTE: 3.65 E9/L (ref 1.8–7.3)
NEUTROPHILS RELATIVE PERCENT: 80.9 % (ref 43–80)
PDW BLD-RTO: 14.1 FL (ref 11.5–15)
PLATELET # BLD: 168 E9/L (ref 130–450)
PMV BLD AUTO: 10 FL (ref 7–12)
POTASSIUM SERPL-SCNC: 4 MMOL/L (ref 3.5–5)
RBC # BLD: 4.22 E12/L (ref 3.5–5.5)
SODIUM BLD-SCNC: 140 MMOL/L (ref 132–146)
TOTAL PROTEIN: 7.4 G/DL (ref 6.4–8.3)
WBC # BLD: 4.5 E9/L (ref 4.5–11.5)

## 2019-06-10 PROCEDURE — G8427 DOCREV CUR MEDS BY ELIG CLIN: HCPCS | Performed by: INTERNAL MEDICINE

## 2019-06-10 PROCEDURE — 1090F PRES/ABSN URINE INCON ASSESS: CPT | Performed by: INTERNAL MEDICINE

## 2019-06-10 PROCEDURE — 85025 COMPLETE CBC W/AUTO DIFF WBC: CPT

## 2019-06-10 PROCEDURE — 99212 OFFICE O/P EST SF 10 MIN: CPT

## 2019-06-10 PROCEDURE — 1123F ACP DISCUSS/DSCN MKR DOCD: CPT | Performed by: INTERNAL MEDICINE

## 2019-06-10 PROCEDURE — G8419 CALC BMI OUT NRM PARAM NOF/U: HCPCS | Performed by: INTERNAL MEDICINE

## 2019-06-10 PROCEDURE — 4004F PT TOBACCO SCREEN RCVD TLK: CPT | Performed by: INTERNAL MEDICINE

## 2019-06-10 PROCEDURE — 4040F PNEUMOC VAC/ADMIN/RCVD: CPT | Performed by: INTERNAL MEDICINE

## 2019-06-10 PROCEDURE — 3017F COLORECTAL CA SCREEN DOC REV: CPT | Performed by: INTERNAL MEDICINE

## 2019-06-10 PROCEDURE — 36415 COLL VENOUS BLD VENIPUNCTURE: CPT

## 2019-06-10 PROCEDURE — 80053 COMPREHEN METABOLIC PANEL: CPT

## 2019-06-10 PROCEDURE — 99214 OFFICE O/P EST MOD 30 MIN: CPT | Performed by: INTERNAL MEDICINE

## 2019-06-10 PROCEDURE — G8400 PT W/DXA NO RESULTS DOC: HCPCS | Performed by: INTERNAL MEDICINE

## 2019-06-10 RX ORDER — CAPECITABINE 500 MG/1
1500 TABLET, FILM COATED ORAL 2 TIMES DAILY
Qty: 84 TABLET | Refills: 4 | Status: SHIPPED | OUTPATIENT
Start: 2019-06-10 | End: 2019-06-20 | Stop reason: SDUPTHER

## 2019-06-10 NOTE — PROGRESS NOTES
on 08/02/2018. Cycle # 1 Dose-Dense Taxol was on 08/16/2018. Cycle # 2 Dose-Dense Taxol was on 08/30/2018. Cycle # 3 Dose Dense Taxol was on 09/13/2018. On 09/25/2018 Patient was admitted to the hospital for sepsis (due to complicated pyelonephritis with Klebsiella bacteremia), ZHAO bilateral ureteral stone and hydronephrosis s/p cysto/stent insertions 09/28/2018. Discharged on 10/01/2018 On Abx (cephalexin) for Klebsiella Bacteremia by ID team.     MRI Bilateral Breast 10/16/2018 noted Near complete response to therapy on the right with 3 residual right axillary lymph nodes demonstrating mild eccentric cortical thickening suggestive of neoplasm. Readmitted to the hospital on 10/27/2018 with thrombocytopenia, electrolyte disturbances, chills, fever, syncope, nausea and vomiting. Completed Abx    She underwent a removal of mediport, right breast simple mastectomy, blue dye injection, right axillary dissection on February 27, 2019. Pathological evaluation completed at [facility]):  A.  Mediport capsule and cord: Device identified. Benign fibroadipose tissue. B.  Right breast, mastectomy: Biopsy site; negative for residual carcinoma. Skin with seborrheic keratosis. Size of largest metastatic deposit-1.3 cm. C.  Right axillary contents, regional resection: 5 of 7 lymph nodes positive for metastatic poorly differentiated mammary carcinoma (grade 3).    Comment: The tumor cells in part C are immunoreactive with pankeratin.  The cells are negative for LCA.  Intradepartmental  consultation is obtained.     TNM classification (AJCC eighth edition)-  ypT0 N2a M0 TNBC   RT was completed on 05/24/2019. Adjuvant Xeloda was started on 06/04/2019 with fair tolerance so far. Fatigue, Intermittent nausea and loose stools. Review of Systems;  CONSTITUTIONAL: No fever, chills. Fair appetite. Fatigue  ENMT: Eyes: No diplopia; Nose: No epistaxis. Mouth: No sore throat.   RESPIRATORY: No hemoptysis, shortness of breath. CARDIOVASCULAR: No chest pain, palpitations. GASTROINTESTINAL: No vomiting or abdominal pain. Intermittent nausea and loose stools. GENITOURINARY: No dysuria, urinary frequency, hematuria. NEURO: No syncope, presyncope, headache. Peripheral neuropathy. Remainder:  ROS NEGATIVE    Past Medical History:      Diagnosis Date    Acid reflux     Breast cancer ([facility] 06/2018    right    H/O renal calculi     Hyperlipidemia     Hypertension     Insomnia     Seasonal allergies     Stroke [facility]) 2013    tia    Type 2 diabetes mellitus without complication ([facility]     UTI (urinary tract infection) 2018     Medications:  Reviewed and reconciled. Allergies: Allergies   Allergen Reactions    Avelox [Moxifloxacin] Hives     Physical Exam:  /60 (Site: Left Upper Arm, Position: Sitting, Cuff Size: Medium Adult)   Pulse 85   Temp 97.2 °F (36.2 °C) (Temporal)   Ht 5' 2\" (1.575 m)   Wt 141 lb (64 kg)   BMI 25.79 kg/m²   GENERAL: Alert, oriented x 3, not in acute distress  HEENT: PERRLA; EOMI. Oropharynx clear. NECK: Supple. No palpable LN  LUNGS: Good air entry bilaterally. No wheezing, crackles or ronchi. CARDIOVASCULAR: Regular rate. No murmurs, rubs or gallops. ABDOMEN: Soft. Non-tender, non-distended. Positive bowel sounds. EXTREMITIES: Without clubbing, cyanosis, or edema. NEUROLOGIC: No focal deficits. ECOG PS 1    Impression/Plan:  78 y/o female with Right Breast Cancer: On 04/10/2018:  A. Right breast, core biopsy: Poorly differentiated ductal carcinoma, Angel score 3+3+2 = 8.  B. Right lymph node, core biopsy:  Metastatic poorly differentiated carcinoma    Comment: Per report from [facility] laboratory:  Estrogen receptor: Negative  Progesterone receptor: Negative  HER-2/jocelyn status: Negative (0)  Enclosed keratin immunohistochemical stains are positive    CT chest 05/11/2018:  Nodule just above the thyroid measures 2 x 2.7 cm. Thyroid nodules range up to 10 mm.    Right axillary lymph nodes range up to 1.9 x 3.1 cm. No significant mediastinal or hilar LN. 2.4 mm groundglass nodule in the lingula     CT abdomen/pelvis 05/11/2018 noted no evidence of metastatic disease. Bone scan 05/11/2018 noted no convincing evidence of metastatic disease. PET/CT scan 05/23/2018:  Multiple enlarged hypermetabolic lymph nodes at level of the right axilla measuring up to 33 x 22 mm with hypermetabolic activity and SUV measuring up to 5.6. Enlarged lymph node located adjacent to posterior aspect of the right parotid gland which measures 12 x 10 mm with SUV of 4.8. Nodule associated with thyroid isthmus measuring 22 x 27 mm. This nodule is solid; however no FDG uptake within this lesion. Otherwise negative. U/S guided fine needle aspiration of a right parotid tail on 05/30/2018:   Cytology: Negative for malignant cells. Sheets of benign-appearing oncocytic cells present in a background of mature lymphocytes. Pattern best fits with Warthin tumor. U/S guided fine needle aspiration of thyroid isthmus on 05/30/2018:  Negative for malignant cells. Benign-appearing follicular cells, foam cells, abundant colloid and blood present. These findings would be compatible with colloid nodule/nodular goiter. 06/10/2018 CT Soft Tissue: 2.3 x 1.7 cm solid lesion within the superficial tail of the right parotid gland. Question a focal solid neoplasm such as a Warthin's tumor. 2.5 x 2.0 cm hyperdense structure within the midline visceral space just inferior to the cricoid cartilage  This may represent a solid lesion arising from the thyroid gland. ENT team consulted. 06/05/2018 MRI Breast: 2.5 x 0.7 x 1.4 cm Irregular, enhancing mass in the right breast 12:00 with associated artifact from a metallic clip. At least 15 abnormal right axillary lymph nodes consistent with known metastatic disease. No evidence of neoplasm on the left.     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 Side effects of AC-T reviewed with patient. She agreed to proceed. Mediport was placed. 06/16/2018  2D-Echo: EF 60%. Cycle # 1 Dose-Dense AC was on 06/21/2018. Cycle # 4 Dose-Dense AC was on 08/02/2018. Cycle # 1 Dose-Dense Taxol was on 08/16/2018. Cycle # 2 Dose-Dense Taxol was on 08/30/2018. Cycle # 3 Dose Dense Taxol was on 09/13/2018. On 09/25/2018 Patient was admitted to the hospital for sepsis (due to complicated pyelonephritis with Klebsiella bacteremia), ZHAO bilateral ureteral stone and hydronephrosis s/p cysto/stent insertions 09/28/2018. Discharged on 10/01/2018 On Abx (cephalexin) for Klebsiella Bacteremia by ID team.   Due to the above and worsening peripheral neuropathy, we omitted last cycle of Taxol. MRI Bilateral Breast 10/16/2018 noted Near complete response to therapy on the right with 3 residual right axillary lymph nodes demonstrating mild eccentric cortical thickening suggestive of neoplasm. Readmitted to the hospital on 10/27/2018 with thrombocytopenia, electrolyte disturbances, chills, fever, syncope, nausea and vomiting. Completed Abx    Right simple mastectomy - blue dye inj - right axillary dissection) was on 12/19/2018  A.  Mediport capsule and cord: Device identified. Benign fibroadipose tissue. B.  Right breast, mastectomy: Biopsy site; negative for residual carcinoma. Skin with seborrheic keratosis. Size of largest metastatic deposit-1.3 cm. C.  Right axillary contents, regional resection: 5 of 7 lymph nodes positive for metastatic poorly differentiated mammary carcinoma (grade 3).    Comment: The tumor cells in part C are immunoreactive with pankeratin.  The cells are negative for LCA.  Intradepartmental  consultation is obtained.     TNM classification (AJCC eighth edition)-  ypT0 N2a M0 TNBC   Pathology report discussed. RT was completed on 05/24/2019.   We recommended adjuvant Xeloda bid 2 weeks on one week off for 6 cycles. Adjuvant Xeloda was started on 06/04/2019 with fair tolerance so far. Fatigue, intermittent nausea and loose stools. Continue Xeloda per protocol. RTC 2 weeks.     Matt Carbajal MD  Medical Oncologist  Board Certified, Medical Oncology  Board Certified, Internal Medicine  Anayeli 10, 2019

## 2019-06-20 DIAGNOSIS — Z17.1 MALIGNANT NEOPLASM OF CENTRAL PORTION OF RIGHT BREAST IN FEMALE, ESTROGEN RECEPTOR NEGATIVE (HCC): ICD-10-CM

## 2019-06-20 DIAGNOSIS — C50.111 MALIGNANT NEOPLASM OF CENTRAL PORTION OF RIGHT BREAST IN FEMALE, ESTROGEN RECEPTOR NEGATIVE (HCC): ICD-10-CM

## 2019-06-20 RX ORDER — CAPECITABINE 500 MG/1
1500 TABLET, FILM COATED ORAL 2 TIMES DAILY
Qty: 84 TABLET | Refills: 4 | Status: SHIPPED | OUTPATIENT
Start: 2019-06-20 | End: 2019-06-24 | Stop reason: SDUPTHER

## 2019-06-20 NOTE — PROGRESS NOTES
Patient was not aware of [facility] closure. She is due for a refill for Cycle 2 to start on 6-25-19.   RX sent to [facility] and sent urgent request.    Electronically signed by Hyun Casanova, [facility] on 6/20/2019 at 4:01 PM

## 2019-06-24 ENCOUNTER — OFFICE VISIT (OUTPATIENT)
Dept: ONCOLOGY | Age: 69
End: 2019-06-24
Payer: MEDICARE

## 2019-06-24 ENCOUNTER — HOSPITAL ENCOUNTER (OUTPATIENT)
Dept: INFUSION THERAPY | Age: 69
Discharge: HOME | End: 2019-06-24
Payer: MEDICARE

## 2019-06-24 ENCOUNTER — TELEPHONE (OUTPATIENT)
Dept: CASE MANAGEMENT | Age: 69
End: 2019-06-24

## 2019-06-24 VITALS
BODY MASS INDEX: 25.86 KG/M2 | WEIGHT: 140.5 LBS | DIASTOLIC BLOOD PRESSURE: 61 MMHG | TEMPERATURE: 98 F | SYSTOLIC BLOOD PRESSURE: 135 MMHG | HEIGHT: 62 IN | HEART RATE: 69 BPM

## 2019-06-24 DIAGNOSIS — C77.3 BREAST CANCER METASTASIZED TO AXILLARY LYMPH NODE, RIGHT (HCC): ICD-10-CM

## 2019-06-24 DIAGNOSIS — F19.982 INSOMNIA DUE TO DRUG (HCC): ICD-10-CM

## 2019-06-24 DIAGNOSIS — C50.111 MALIGNANT NEOPLASM OF CENTRAL PORTION OF RIGHT BREAST IN FEMALE, ESTROGEN RECEPTOR NEGATIVE (HCC): ICD-10-CM

## 2019-06-24 DIAGNOSIS — C50.911 BREAST CANCER METASTASIZED TO AXILLARY LYMPH NODE, RIGHT (HCC): Primary | ICD-10-CM

## 2019-06-24 DIAGNOSIS — T45.1X5A CHEMOTHERAPY INDUCED NAUSEA AND VOMITING: ICD-10-CM

## 2019-06-24 DIAGNOSIS — C77.3 BREAST CANCER METASTASIZED TO AXILLARY LYMPH NODE, RIGHT (HCC): Primary | ICD-10-CM

## 2019-06-24 DIAGNOSIS — C50.911 BREAST CANCER METASTASIZED TO AXILLARY LYMPH NODE, RIGHT (HCC): ICD-10-CM

## 2019-06-24 DIAGNOSIS — R11.2 CHEMOTHERAPY INDUCED NAUSEA AND VOMITING: ICD-10-CM

## 2019-06-24 DIAGNOSIS — Z17.1 MALIGNANT NEOPLASM OF CENTRAL PORTION OF RIGHT BREAST IN FEMALE, ESTROGEN RECEPTOR NEGATIVE (HCC): ICD-10-CM

## 2019-06-24 LAB
ALBUMIN SERPL-MCNC: 4.6 G/DL (ref 3.5–5.2)
ALP BLD-CCNC: 88 U/L (ref 35–104)
ALT SERPL-CCNC: 28 U/L (ref 0–32)
ANION GAP SERPL CALCULATED.3IONS-SCNC: 12 MMOL/L (ref 7–16)
ANISOCYTOSIS: ABNORMAL
AST SERPL-CCNC: 24 U/L (ref 0–31)
BASOPHILS ABSOLUTE: 0.03 E9/L (ref 0–0.2)
BASOPHILS RELATIVE PERCENT: 0.6 % (ref 0–2)
BILIRUB SERPL-MCNC: 0.7 MG/DL (ref 0–1.2)
BUN BLDV-MCNC: 13 MG/DL (ref 8–23)
CALCIUM SERPL-MCNC: 9 MG/DL (ref 8.6–10.2)
CHLORIDE BLD-SCNC: 96 MMOL/L (ref 98–107)
CO2: 30 MMOL/L (ref 22–29)
CREAT SERPL-MCNC: 1.3 MG/DL (ref 0.5–1)
EOSINOPHILS ABSOLUTE: 0.19 E9/L (ref 0.05–0.5)
EOSINOPHILS RELATIVE PERCENT: 3.5 % (ref 0–6)
GFR AFRICAN AMERICAN: 49
GFR NON-AFRICAN AMERICAN: 41 ML/MIN/1.73
GLUCOSE BLD-MCNC: 159 MG/DL (ref 74–99)
HCT VFR BLD CALC: 33.7 % (ref 34–48)
HEMOGLOBIN: 11.7 G/DL (ref 11.5–15.5)
IMMATURE GRANULOCYTES #: 0.06 E9/L
IMMATURE GRANULOCYTES %: 1.1 % (ref 0–5)
LYMPHOCYTES ABSOLUTE: 0.57 E9/L (ref 1.5–4)
LYMPHOCYTES RELATIVE PERCENT: 10.5 % (ref 20–42)
MCH RBC QN AUTO: 31.3 PG (ref 26–35)
MCHC RBC AUTO-ENTMCNC: 34.7 % (ref 32–34.5)
MCV RBC AUTO: 90.1 FL (ref 80–99.9)
MONOCYTES ABSOLUTE: 0.68 E9/L (ref 0.1–0.95)
MONOCYTES RELATIVE PERCENT: 12.5 % (ref 2–12)
NEUTROPHILS ABSOLUTE: 3.89 E9/L (ref 1.8–7.3)
NEUTROPHILS RELATIVE PERCENT: 71.8 % (ref 43–80)
OVALOCYTES: ABNORMAL
PDW BLD-RTO: 14.7 FL (ref 11.5–15)
PLATELET # BLD: 156 E9/L (ref 130–450)
PMV BLD AUTO: 9.5 FL (ref 7–12)
POIKILOCYTES: ABNORMAL
POLYCHROMASIA: ABNORMAL
POTASSIUM SERPL-SCNC: 3.6 MMOL/L (ref 3.5–5)
RBC # BLD: 3.74 E12/L (ref 3.5–5.5)
SODIUM BLD-SCNC: 138 MMOL/L (ref 132–146)
TOTAL PROTEIN: 7.4 G/DL (ref 6.4–8.3)
WBC # BLD: 5.4 E9/L (ref 4.5–11.5)

## 2019-06-24 PROCEDURE — 1090F PRES/ABSN URINE INCON ASSESS: CPT | Performed by: INTERNAL MEDICINE

## 2019-06-24 PROCEDURE — 4040F PNEUMOC VAC/ADMIN/RCVD: CPT | Performed by: INTERNAL MEDICINE

## 2019-06-24 PROCEDURE — 80053 COMPREHEN METABOLIC PANEL: CPT

## 2019-06-24 PROCEDURE — 1123F ACP DISCUSS/DSCN MKR DOCD: CPT | Performed by: INTERNAL MEDICINE

## 2019-06-24 PROCEDURE — G8419 CALC BMI OUT NRM PARAM NOF/U: HCPCS | Performed by: INTERNAL MEDICINE

## 2019-06-24 PROCEDURE — G8400 PT W/DXA NO RESULTS DOC: HCPCS | Performed by: INTERNAL MEDICINE

## 2019-06-24 PROCEDURE — 4004F PT TOBACCO SCREEN RCVD TLK: CPT | Performed by: INTERNAL MEDICINE

## 2019-06-24 PROCEDURE — 36415 COLL VENOUS BLD VENIPUNCTURE: CPT

## 2019-06-24 PROCEDURE — G8427 DOCREV CUR MEDS BY ELIG CLIN: HCPCS | Performed by: INTERNAL MEDICINE

## 2019-06-24 PROCEDURE — 99212 OFFICE O/P EST SF 10 MIN: CPT

## 2019-06-24 PROCEDURE — 85025 COMPLETE CBC W/AUTO DIFF WBC: CPT

## 2019-06-24 PROCEDURE — 99214 OFFICE O/P EST MOD 30 MIN: CPT | Performed by: INTERNAL MEDICINE

## 2019-06-24 PROCEDURE — 3017F COLORECTAL CA SCREEN DOC REV: CPT | Performed by: INTERNAL MEDICINE

## 2019-06-24 RX ORDER — RAMELTEON 8 MG/1
8 TABLET ORAL NIGHTLY PRN
Qty: 14 TABLET | Refills: 0 | Status: SHIPPED | OUTPATIENT
Start: 2019-06-24 | End: 2019-07-29 | Stop reason: SDUPTHER

## 2019-06-24 RX ORDER — ONDANSETRON 4 MG/1
4 TABLET, FILM COATED ORAL EVERY 8 HOURS PRN
Qty: 60 TABLET | Refills: 1 | Status: ON HOLD
Start: 2019-06-24 | End: 2021-02-22

## 2019-06-24 RX ORDER — CAPECITABINE 500 MG/1
1000 TABLET, FILM COATED ORAL 2 TIMES DAILY
Qty: 56 TABLET | Refills: 4 | Status: SHIPPED | OUTPATIENT
Start: 2019-06-24 | End: 2019-07-09 | Stop reason: SDUPTHER

## 2019-06-24 RX ORDER — DIPHENOXYLATE HYDROCHLORIDE AND ATROPINE SULFATE 2.5; .025 MG/1; MG/1
1 TABLET ORAL 4 TIMES DAILY PRN
Qty: 60 TABLET | Refills: 1 | Status: SHIPPED | OUTPATIENT
Start: 2019-06-24 | End: 2019-07-09

## 2019-06-24 RX ORDER — CHLORHEXIDINE GLUCONATE 0.12 MG/ML
15 RINSE ORAL 2 TIMES DAILY
Qty: 420 ML | Refills: 0 | Status: SHIPPED | OUTPATIENT
Start: 2019-06-24 | End: 2019-07-08

## 2019-06-24 NOTE — PROGRESS NOTES
33% dose reduction on Capecitabine - New dose 1000 mg Twice a day for 14 days and then off for 7 days and repeat for 4 more additional cycles. She will have medication delivered 6-25-19 to start Cycle 2 day 1 and NEW dose. Confirmed via telephone with Lynder Cockayne., RP at [facility]. Patient had the opportunity to ask questions with all questions being answered to their satisfaction. I told patient to call if there are any questions. The patient expresses understanding and acceptance of instructions.      Electronically signed by Angeli Hendricks [facility] on 6/24/2019 at 3:02 PM

## 2019-06-24 NOTE — PROGRESS NOTES
on 08/02/2018. Cycle # 1 Dose-Dense Taxol was on 08/16/2018. Cycle # 2 Dose-Dense Taxol was on 08/30/2018. Cycle # 3 Dose Dense Taxol was on 09/13/2018. On 09/25/2018 Patient was admitted to the hospital for sepsis (due to complicated pyelonephritis with Klebsiella bacteremia), ZHAO bilateral ureteral stone and hydronephrosis s/p cysto/stent insertions 09/28/2018. Discharged on 10/01/2018 On Abx (cephalexin) for Klebsiella Bacteremia by ID team.     MRI Bilateral Breast 10/16/2018 noted Near complete response to therapy on the right with 3 residual right axillary lymph nodes demonstrating mild eccentric cortical thickening suggestive of neoplasm. Readmitted to the hospital on 10/27/2018 with thrombocytopenia, electrolyte disturbances, chills, fever, syncope, nausea and vomiting. Completed Abx    She underwent a removal of mediport, right breast simple mastectomy, blue dye injection, right axillary dissection on February 27, 2019. Pathological evaluation completed at [facility]):  A.  Mediport capsule and cord: Device identified. Benign fibroadipose tissue. B.  Right breast, mastectomy: Biopsy site; negative for residual carcinoma. Skin with seborrheic keratosis. Size of largest metastatic deposit-1.3 cm. C.  Right axillary contents, regional resection: 5 of 7 lymph nodes positive for metastatic poorly differentiated mammary carcinoma (grade 3).    Comment: The tumor cells in part C are immunoreactive with pankeratin.  The cells are negative for LCA.  Intradepartmental  consultation is obtained.     TNM classification (AJCC eighth edition)-  ypT0 N2a M0 TNBC   RT was completed on 05/24/2019. Adjuvant Xeloda was started on 06/04/2019. Review of Systems;  CONSTITUTIONAL: No fever, chills. Fair appetite. Fatigue  ENMT: Eyes: No diplopia; Nose: No epistaxis. Mouth: Oral sores  RESPIRATORY: No hemoptysis, shortness of breath. CARDIOVASCULAR: No chest pain, palpitations.   GASTROINTESTINAL: Intermittent nausea vomiting. Intermittent diarrhea  GENITOURINARY: No dysuria, urinary frequency, hematuria. NEURO: No syncope, presyncope, headache. Peripheral neuropathy. Remainder:  ROS NEGATIVE    Past Medical History:      Diagnosis Date    Acid reflux     Breast cancer ([facility] [facility] 06/2018    right    H/O renal calculi     Hyperlipidemia     Hypertension     Insomnia     Seasonal allergies     Stroke [facility]) 2013    tia    Type 2 diabetes mellitus without complication ([facility]     UTI (urinary tract infection) 2018     Medications:  Reviewed and reconciled. Allergies: Allergies   Allergen Reactions    Avelox [Moxifloxacin] Hives     Physical Exam:  /61 (Site: Left Upper Arm, Position: Sitting, Cuff Size: Large Adult)   Pulse 69   Temp 98 °F (36.7 °C) (Temporal)   Ht 5' 2\" (1.575 m)   Wt 140 lb 8 oz (63.7 kg)   BMI 25.70 kg/m²   GENERAL: Alert, oriented x 3, not in acute distress  HEENT: PERRLA; EOMI. oral sores  NECK: Supple. No palpable LN  LUNGS: Good air entry bilaterally. No wheezing, crackles or ronchi. CARDIOVASCULAR: Regular rate. No murmurs, rubs or gallops. ABDOMEN: Soft. Non-tender, non-distended. Positive bowel sounds. EXTREMITIES: Without clubbing, cyanosis, or edema. NEUROLOGIC: No focal deficits. ECOG PS 1    Impression/Plan:  78 y/o female with Right Breast Cancer: On 04/10/2018:  A. Right breast, core biopsy: Poorly differentiated ductal carcinoma, Angel score 3+3+2 = 8.  B. Right lymph node, core biopsy:  Metastatic poorly differentiated carcinoma    Comment: Per report from [facility] laboratory:  Estrogen receptor: Negative  Progesterone receptor: Negative  HER-2/jocelyn status: Negative (0)  Enclosed keratin immunohistochemical stains are positive    CT chest 05/11/2018:  Nodule just above the thyroid measures 2 x 2.7 cm. Thyroid nodules range up to 10 mm. Right axillary lymph nodes range up to 1.9 x 3.1 cm. No significant mediastinal or hilar LN.   2.4 mm groundglass nodule in the lingula     CT abdomen/pelvis 05/11/2018 noted no evidence of metastatic disease. Bone scan 05/11/2018 noted no convincing evidence of metastatic disease. PET/CT scan 05/23/2018:  Multiple enlarged hypermetabolic lymph nodes at level of the right axilla measuring up to 33 x 22 mm with hypermetabolic activity and SUV measuring up to 5.6. Enlarged lymph node located adjacent to posterior aspect of the right parotid gland which measures 12 x 10 mm with SUV of 4.8. Nodule associated with thyroid isthmus measuring 22 x 27 mm. This nodule is solid; however no FDG uptake within this lesion. Otherwise negative. U/S guided fine needle aspiration of a right parotid tail on 05/30/2018:   Cytology: Negative for malignant cells. Sheets of benign-appearing oncocytic cells present in a background of mature lymphocytes. Pattern best fits with Warthin tumor. U/S guided fine needle aspiration of thyroid isthmus on 05/30/2018:  Negative for malignant cells. Benign-appearing follicular cells, foam cells, abundant colloid and blood present. These findings would be compatible with colloid nodule/nodular goiter. 06/10/2018 CT Soft Tissue: 2.3 x 1.7 cm solid lesion within the superficial tail of the right parotid gland. Question a focal solid neoplasm such as a Warthin's tumor. 2.5 x 2.0 cm hyperdense structure within the midline visceral space just inferior to the cricoid cartilage  This may represent a solid lesion arising from the thyroid gland. ENT team consulted. 06/05/2018 MRI Breast: 2.5 x 0.7 x 1.4 cm Irregular, enhancing mass in the right breast 12:00 with associated artifact from a metallic clip. At least 15 abnormal right axillary lymph nodes consistent with known metastatic disease. No evidence of neoplasm on the left. 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  Side effects of AC-T reviewed with patient. She agreed to proceed. Mediport was placed. 06/16/2018  2D-Echo: EF 60%. Cycle # 1 Dose-Dense AC was on 06/21/2018. Cycle # 4 Dose-Dense AC was on 08/02/2018. Cycle # 1 Dose-Dense Taxol was on 08/16/2018. Cycle # 2 Dose-Dense Taxol was on 08/30/2018. Cycle # 3 Dose Dense Taxol was on 09/13/2018. On 09/25/2018 Patient was admitted to the hospital for sepsis (due to complicated pyelonephritis with Klebsiella bacteremia), ZHAO bilateral ureteral stone and hydronephrosis s/p cysto/stent insertions 09/28/2018. Discharged on 10/01/2018 On Abx (cephalexin) for Klebsiella Bacteremia by ID team.   Due to the above and worsening peripheral neuropathy, we omitted last cycle of Taxol. MRI Bilateral Breast 10/16/2018 noted Near complete response to therapy on the right with 3 residual right axillary lymph nodes demonstrating mild eccentric cortical thickening suggestive of neoplasm. Readmitted to the hospital on 10/27/2018 with thrombocytopenia, electrolyte disturbances, chills, fever, syncope, nausea and vomiting. Completed Abx    Right simple mastectomy - blue dye inj - right axillary dissection) was on 12/19/2018  A.  Mediport capsule and cord: Device identified. Benign fibroadipose tissue. B.  Right breast, mastectomy: Biopsy site; negative for residual carcinoma. Skin with seborrheic keratosis. Size of largest metastatic deposit-1.3 cm. C.  Right axillary contents, regional resection: 5 of 7 lymph nodes positive for metastatic poorly differentiated mammary carcinoma (grade 3).    Comment: The tumor cells in part C are immunoreactive with pankeratin.  The cells are negative for LCA.  Intradepartmental  consultation is obtained.     TNM classification (AJCC eighth edition)-  ypT0 N2a M0 TNBC   Pathology report discussed. RT was completed on 05/24/2019. We recommended adjuvant Xeloda bid 2 weeks on one week off for 6 cycles. Adjuvant Xeloda was started on 06/04/2019. Intermittent nausea vomiting; recommended Zofran every 8 hours. Intermittent diarrhea; recommended alternating Imodium and Lomotil. Oral sores recommended Peridex. Decrease Xeloda to 1000 mg p.o. twice daily    RTC 2 weeks.     Jarred oMnson MD  Medical Oncologist  Board Certified, Medical Oncology  Board Certified, Internal Medicine  June 24, 2019

## 2019-07-08 ENCOUNTER — HOSPITAL ENCOUNTER (OUTPATIENT)
Dept: INFUSION THERAPY | Age: 69
Discharge: HOME | End: 2019-07-08
Payer: MEDICARE

## 2019-07-08 ENCOUNTER — OFFICE VISIT (OUTPATIENT)
Dept: ONCOLOGY | Age: 69
End: 2019-07-08
Payer: MEDICARE

## 2019-07-08 VITALS
HEIGHT: 62 IN | WEIGHT: 146.8 LBS | HEART RATE: 59 BPM | BODY MASS INDEX: 27.02 KG/M2 | SYSTOLIC BLOOD PRESSURE: 123 MMHG | TEMPERATURE: 98.8 F | DIASTOLIC BLOOD PRESSURE: 59 MMHG

## 2019-07-08 DIAGNOSIS — C50.911 BREAST CANCER METASTASIZED TO AXILLARY LYMPH NODE, RIGHT (HCC): Primary | ICD-10-CM

## 2019-07-08 DIAGNOSIS — C77.3 BREAST CANCER METASTASIZED TO AXILLARY LYMPH NODE, RIGHT (HCC): ICD-10-CM

## 2019-07-08 DIAGNOSIS — C50.911 BREAST CANCER METASTASIZED TO AXILLARY LYMPH NODE, RIGHT (HCC): ICD-10-CM

## 2019-07-08 DIAGNOSIS — C77.3 BREAST CANCER METASTASIZED TO AXILLARY LYMPH NODE, RIGHT (HCC): Primary | ICD-10-CM

## 2019-07-08 LAB
ALBUMIN SERPL-MCNC: 4.6 G/DL (ref 3.5–5.2)
ALP BLD-CCNC: 88 U/L (ref 35–104)
ALT SERPL-CCNC: 29 U/L (ref 0–32)
ANION GAP SERPL CALCULATED.3IONS-SCNC: 12 MMOL/L (ref 7–16)
AST SERPL-CCNC: 29 U/L (ref 0–31)
BASOPHILS ABSOLUTE: 0.02 E9/L (ref 0–0.2)
BASOPHILS RELATIVE PERCENT: 0.5 % (ref 0–2)
BILIRUB SERPL-MCNC: 1.1 MG/DL (ref 0–1.2)
BUN BLDV-MCNC: 18 MG/DL (ref 8–23)
CALCIUM SERPL-MCNC: 9.7 MG/DL (ref 8.6–10.2)
CHLORIDE BLD-SCNC: 95 MMOL/L (ref 98–107)
CO2: 30 MMOL/L (ref 22–29)
CREAT SERPL-MCNC: 1.4 MG/DL (ref 0.5–1)
EOSINOPHILS ABSOLUTE: 0.19 E9/L (ref 0.05–0.5)
EOSINOPHILS RELATIVE PERCENT: 4.7 % (ref 0–6)
GFR AFRICAN AMERICAN: 45
GFR NON-AFRICAN AMERICAN: 37 ML/MIN/1.73
GLUCOSE BLD-MCNC: 252 MG/DL (ref 74–99)
HCT VFR BLD CALC: 32.7 % (ref 34–48)
HEMOGLOBIN: 11.2 G/DL (ref 11.5–15.5)
IMMATURE GRANULOCYTES #: 0.05 E9/L
IMMATURE GRANULOCYTES %: 1.2 % (ref 0–5)
LYMPHOCYTES ABSOLUTE: 0.33 E9/L (ref 1.5–4)
LYMPHOCYTES RELATIVE PERCENT: 8.1 % (ref 20–42)
MCH RBC QN AUTO: 32.3 PG (ref 26–35)
MCHC RBC AUTO-ENTMCNC: 34.3 % (ref 32–34.5)
MCV RBC AUTO: 94.2 FL (ref 80–99.9)
MONOCYTES ABSOLUTE: 0.43 E9/L (ref 0.1–0.95)
MONOCYTES RELATIVE PERCENT: 10.6 % (ref 2–12)
NEUTROPHILS ABSOLUTE: 3.04 E9/L (ref 1.8–7.3)
NEUTROPHILS RELATIVE PERCENT: 74.9 % (ref 43–80)
PDW BLD-RTO: 17.6 FL (ref 11.5–15)
PLATELET # BLD: 138 E9/L (ref 130–450)
PMV BLD AUTO: 10.2 FL (ref 7–12)
POTASSIUM SERPL-SCNC: 4.2 MMOL/L (ref 3.5–5)
RBC # BLD: 3.47 E12/L (ref 3.5–5.5)
SODIUM BLD-SCNC: 137 MMOL/L (ref 132–146)
TOTAL PROTEIN: 7.2 G/DL (ref 6.4–8.3)
WBC # BLD: 4.1 E9/L (ref 4.5–11.5)

## 2019-07-08 PROCEDURE — 85025 COMPLETE CBC W/AUTO DIFF WBC: CPT

## 2019-07-08 PROCEDURE — 36415 COLL VENOUS BLD VENIPUNCTURE: CPT | Performed by: INTERNAL MEDICINE

## 2019-07-08 PROCEDURE — G8419 CALC BMI OUT NRM PARAM NOF/U: HCPCS | Performed by: INTERNAL MEDICINE

## 2019-07-08 PROCEDURE — 99214 OFFICE O/P EST MOD 30 MIN: CPT | Performed by: INTERNAL MEDICINE

## 2019-07-08 PROCEDURE — 36415 COLL VENOUS BLD VENIPUNCTURE: CPT

## 2019-07-08 PROCEDURE — 3017F COLORECTAL CA SCREEN DOC REV: CPT | Performed by: INTERNAL MEDICINE

## 2019-07-08 PROCEDURE — G8427 DOCREV CUR MEDS BY ELIG CLIN: HCPCS | Performed by: INTERNAL MEDICINE

## 2019-07-08 PROCEDURE — 99212 OFFICE O/P EST SF 10 MIN: CPT | Performed by: INTERNAL MEDICINE

## 2019-07-08 PROCEDURE — 80053 COMPREHEN METABOLIC PANEL: CPT

## 2019-07-08 PROCEDURE — 1123F ACP DISCUSS/DSCN MKR DOCD: CPT | Performed by: INTERNAL MEDICINE

## 2019-07-08 PROCEDURE — 4004F PT TOBACCO SCREEN RCVD TLK: CPT | Performed by: INTERNAL MEDICINE

## 2019-07-08 PROCEDURE — 1090F PRES/ABSN URINE INCON ASSESS: CPT | Performed by: INTERNAL MEDICINE

## 2019-07-08 PROCEDURE — 4040F PNEUMOC VAC/ADMIN/RCVD: CPT | Performed by: INTERNAL MEDICINE

## 2019-07-08 PROCEDURE — G8400 PT W/DXA NO RESULTS DOC: HCPCS | Performed by: INTERNAL MEDICINE

## 2019-07-08 NOTE — PROGRESS NOTES
on 08/02/2018. Cycle # 1 Dose-Dense Taxol was on 08/16/2018. Cycle # 2 Dose-Dense Taxol was on 08/30/2018. Cycle # 3 Dose Dense Taxol was on 09/13/2018. On 09/25/2018 Patient was admitted to the hospital for sepsis (due to complicated pyelonephritis with Klebsiella bacteremia), ZHAO bilateral ureteral stone and hydronephrosis s/p cysto/stent insertions 09/28/2018. Discharged on 10/01/2018 On Abx (cephalexin) for Klebsiella Bacteremia by ID team.     MRI Bilateral Breast 10/16/2018 noted Near complete response to therapy on the right with 3 residual right axillary lymph nodes demonstrating mild eccentric cortical thickening suggestive of neoplasm. Readmitted to the hospital on 10/27/2018 with thrombocytopenia, electrolyte disturbances, chills, fever, syncope, nausea and vomiting. Completed Abx    She underwent a removal of mediport, right breast simple mastectomy, blue dye injection, right axillary dissection on February 27, 2019. Pathological evaluation completed at [facility]):  A.  Mediport capsule and cord: Device identified. Benign fibroadipose tissue. B.  Right breast, mastectomy: Biopsy site; negative for residual carcinoma. Skin with seborrheic keratosis. Size of largest metastatic deposit-1.3 cm. C.  Right axillary contents, regional resection: 5 of 7 lymph nodes positive for metastatic poorly differentiated mammary carcinoma (grade 3).    Comment: The tumor cells in part C are immunoreactive with pankeratin.  The cells are negative for LCA.  Intradepartmental  consultation is obtained.     TNM classification (AJCC eighth edition)-  ypT0 N2a M0 TNBC   RT was completed on 05/24/2019. Adjuvant Xeloda was started on 06/04/2019. Intermittent nausea vomiting, diarrhea and oral sores. Decreased Xeloda to 1000 mg p.o. twice daily starting on 06/25/2019 which she is tolerating it much better. Review of Systems;  CONSTITUTIONAL: No fever, chills. Fair appetite and energy level.   ENMT: Eyes: No the thyroid measures 2 x 2.7 cm. Thyroid nodules range up to 10 mm. Right axillary lymph nodes range up to 1.9 x 3.1 cm. No significant mediastinal or hilar LN. 2.4 mm groundglass nodule in the lingula     CT abdomen/pelvis 05/11/2018 noted no evidence of metastatic disease. Bone scan 05/11/2018 noted no convincing evidence of metastatic disease. PET/CT scan 05/23/2018:  Multiple enlarged hypermetabolic lymph nodes at level of the right axilla measuring up to 33 x 22 mm with hypermetabolic activity and SUV measuring up to 5.6. Enlarged lymph node located adjacent to posterior aspect of the right parotid gland which measures 12 x 10 mm with SUV of 4.8. Nodule associated with thyroid isthmus measuring 22 x 27 mm. This nodule is solid; however no FDG uptake within this lesion. Otherwise negative. U/S guided fine needle aspiration of a right parotid tail on 05/30/2018:   Cytology: Negative for malignant cells. Sheets of benign-appearing oncocytic cells present in a background of mature lymphocytes. Pattern best fits with Warthin tumor. U/S guided fine needle aspiration of thyroid isthmus on 05/30/2018:  Negative for malignant cells. Benign-appearing follicular cells, foam cells, abundant colloid and blood present. These findings would be compatible with colloid nodule/nodular goiter. 06/10/2018 CT Soft Tissue: 2.3 x 1.7 cm solid lesion within the superficial tail of the right parotid gland. Question a focal solid neoplasm such as a Warthin's tumor. 2.5 x 2.0 cm hyperdense structure within the midline visceral space just inferior to the cricoid cartilage  This may represent a solid lesion arising from the thyroid gland. ENT team consulted. 06/05/2018 MRI Breast: 2.5 x 0.7 x 1.4 cm Irregular, enhancing mass in the right breast 12:00 with associated artifact from a metallic clip. At least 15 abnormal right axillary lymph nodes consistent with known metastatic disease.     No evidence of

## 2019-07-09 ENCOUNTER — HOSPITAL ENCOUNTER (OUTPATIENT)
Dept: RADIATION ONCOLOGY | Age: 69
Discharge: HOME | End: 2019-07-09
Attending: RADIOLOGY
Payer: MEDICARE

## 2019-07-09 VITALS
HEIGHT: 62 IN | BODY MASS INDEX: 26.04 KG/M2 | SYSTOLIC BLOOD PRESSURE: 108 MMHG | TEMPERATURE: 98.4 F | HEART RATE: 80 BPM | RESPIRATION RATE: 18 BRPM | WEIGHT: 141.5 LBS | DIASTOLIC BLOOD PRESSURE: 60 MMHG

## 2019-07-09 DIAGNOSIS — Z17.1 MALIGNANT NEOPLASM OF CENTRAL PORTION OF RIGHT BREAST IN FEMALE, ESTROGEN RECEPTOR NEGATIVE (HCC): Primary | ICD-10-CM

## 2019-07-09 DIAGNOSIS — C50.111 MALIGNANT NEOPLASM OF CENTRAL PORTION OF RIGHT BREAST IN FEMALE, ESTROGEN RECEPTOR NEGATIVE (HCC): Primary | ICD-10-CM

## 2019-07-09 DIAGNOSIS — Z17.1 MALIGNANT NEOPLASM OF CENTRAL PORTION OF RIGHT BREAST IN FEMALE, ESTROGEN RECEPTOR NEGATIVE (HCC): ICD-10-CM

## 2019-07-09 DIAGNOSIS — C50.111 MALIGNANT NEOPLASM OF CENTRAL PORTION OF RIGHT BREAST IN FEMALE, ESTROGEN RECEPTOR NEGATIVE (HCC): ICD-10-CM

## 2019-07-09 RX ORDER — CAPECITABINE 500 MG/1
1000 TABLET, FILM COATED ORAL 2 TIMES DAILY
Qty: 56 TABLET | Refills: 3 | Status: SHIPPED | OUTPATIENT
Start: 2019-07-09 | End: 2019-10-15 | Stop reason: ALTCHOICE

## 2019-07-12 ENCOUNTER — TELEPHONE (OUTPATIENT)
Dept: ONCOLOGY | Age: 69
End: 2019-07-12

## 2019-07-29 ENCOUNTER — OFFICE VISIT (OUTPATIENT)
Dept: ONCOLOGY | Age: 69
End: 2019-07-29
Payer: MEDICARE

## 2019-07-29 ENCOUNTER — HOSPITAL ENCOUNTER (OUTPATIENT)
Dept: INFUSION THERAPY | Age: 69
Discharge: HOME | End: 2019-07-29
Payer: MEDICARE

## 2019-07-29 VITALS
TEMPERATURE: 97.9 F | WEIGHT: 143 LBS | HEART RATE: 84 BPM | RESPIRATION RATE: 18 BRPM | BODY MASS INDEX: 26.31 KG/M2 | SYSTOLIC BLOOD PRESSURE: 104 MMHG | DIASTOLIC BLOOD PRESSURE: 55 MMHG | HEIGHT: 62 IN

## 2019-07-29 VITALS — RESPIRATION RATE: 16 BRPM | DIASTOLIC BLOOD PRESSURE: 57 MMHG | HEART RATE: 77 BPM | SYSTOLIC BLOOD PRESSURE: 115 MMHG

## 2019-07-29 DIAGNOSIS — C77.3 BREAST CANCER METASTASIZED TO AXILLARY LYMPH NODE, RIGHT (HCC): ICD-10-CM

## 2019-07-29 DIAGNOSIS — C77.3 BREAST CANCER METASTASIZED TO AXILLARY LYMPH NODE, RIGHT (HCC): Primary | ICD-10-CM

## 2019-07-29 DIAGNOSIS — Z17.1 MALIGNANT NEOPLASM OF CENTRAL PORTION OF RIGHT BREAST IN FEMALE, ESTROGEN RECEPTOR NEGATIVE (HCC): ICD-10-CM

## 2019-07-29 DIAGNOSIS — C50.911 BREAST CANCER METASTASIZED TO AXILLARY LYMPH NODE, RIGHT (HCC): Primary | ICD-10-CM

## 2019-07-29 DIAGNOSIS — C50.911 BREAST CANCER METASTASIZED TO AXILLARY LYMPH NODE, RIGHT (HCC): ICD-10-CM

## 2019-07-29 DIAGNOSIS — C50.111 MALIGNANT NEOPLASM OF CENTRAL PORTION OF RIGHT BREAST IN FEMALE, ESTROGEN RECEPTOR NEGATIVE (HCC): ICD-10-CM

## 2019-07-29 DIAGNOSIS — F19.982 INSOMNIA DUE TO DRUG (HCC): ICD-10-CM

## 2019-07-29 LAB
ALBUMIN SERPL-MCNC: 4.5 G/DL (ref 3.5–5.2)
ALP BLD-CCNC: 93 U/L (ref 35–104)
ALT SERPL-CCNC: 18 U/L (ref 0–32)
ANION GAP SERPL CALCULATED.3IONS-SCNC: 15 MMOL/L (ref 7–16)
ANISOCYTOSIS: ABNORMAL
AST SERPL-CCNC: 25 U/L (ref 0–31)
BASOPHILS ABSOLUTE: 0.02 E9/L (ref 0–0.2)
BASOPHILS RELATIVE PERCENT: 0.4 % (ref 0–2)
BILIRUB SERPL-MCNC: 1.1 MG/DL (ref 0–1.2)
BUN BLDV-MCNC: 26 MG/DL (ref 8–23)
CALCIUM SERPL-MCNC: 9.8 MG/DL (ref 8.6–10.2)
CHLORIDE BLD-SCNC: 93 MMOL/L (ref 98–107)
CO2: 28 MMOL/L (ref 22–29)
CREAT SERPL-MCNC: 1.5 MG/DL (ref 0.5–1)
EOSINOPHILS ABSOLUTE: 0.22 E9/L (ref 0.05–0.5)
EOSINOPHILS RELATIVE PERCENT: 4.2 % (ref 0–6)
GFR AFRICAN AMERICAN: 42
GFR NON-AFRICAN AMERICAN: 34 ML/MIN/1.73
GLUCOSE BLD-MCNC: 226 MG/DL (ref 74–99)
HCT VFR BLD CALC: 29 % (ref 34–48)
HEMOGLOBIN: 10.2 G/DL (ref 11.5–15.5)
IMMATURE GRANULOCYTES #: 0.06 E9/L
IMMATURE GRANULOCYTES %: 1.2 % (ref 0–5)
LYMPHOCYTES ABSOLUTE: 0.42 E9/L (ref 1.5–4)
LYMPHOCYTES RELATIVE PERCENT: 8.1 % (ref 20–42)
MCH RBC QN AUTO: 35.1 PG (ref 26–35)
MCHC RBC AUTO-ENTMCNC: 35.2 % (ref 32–34.5)
MCV RBC AUTO: 99.7 FL (ref 80–99.9)
MONOCYTES ABSOLUTE: 0.48 E9/L (ref 0.1–0.95)
MONOCYTES RELATIVE PERCENT: 9.2 % (ref 2–12)
NEUTROPHILS ABSOLUTE: 3.99 E9/L (ref 1.8–7.3)
NEUTROPHILS RELATIVE PERCENT: 76.9 % (ref 43–80)
PDW BLD-RTO: 19.5 FL (ref 11.5–15)
PLATELET # BLD: 156 E9/L (ref 130–450)
PMV BLD AUTO: 9.9 FL (ref 7–12)
POTASSIUM SERPL-SCNC: 4.6 MMOL/L (ref 3.5–5)
RBC # BLD: 2.91 E12/L (ref 3.5–5.5)
SODIUM BLD-SCNC: 136 MMOL/L (ref 132–146)
TOTAL PROTEIN: 7.3 G/DL (ref 6.4–8.3)
WBC # BLD: 5.2 E9/L (ref 4.5–11.5)

## 2019-07-29 PROCEDURE — 80053 COMPREHEN METABOLIC PANEL: CPT

## 2019-07-29 PROCEDURE — G8427 DOCREV CUR MEDS BY ELIG CLIN: HCPCS | Performed by: INTERNAL MEDICINE

## 2019-07-29 PROCEDURE — 36415 COLL VENOUS BLD VENIPUNCTURE: CPT

## 2019-07-29 PROCEDURE — 4004F PT TOBACCO SCREEN RCVD TLK: CPT | Performed by: INTERNAL MEDICINE

## 2019-07-29 PROCEDURE — 4040F PNEUMOC VAC/ADMIN/RCVD: CPT | Performed by: INTERNAL MEDICINE

## 2019-07-29 PROCEDURE — 3017F COLORECTAL CA SCREEN DOC REV: CPT | Performed by: INTERNAL MEDICINE

## 2019-07-29 PROCEDURE — 1123F ACP DISCUSS/DSCN MKR DOCD: CPT | Performed by: INTERNAL MEDICINE

## 2019-07-29 PROCEDURE — 1090F PRES/ABSN URINE INCON ASSESS: CPT | Performed by: INTERNAL MEDICINE

## 2019-07-29 PROCEDURE — G8400 PT W/DXA NO RESULTS DOC: HCPCS | Performed by: INTERNAL MEDICINE

## 2019-07-29 PROCEDURE — 2580000003 HC RX 258: Performed by: INTERNAL MEDICINE

## 2019-07-29 PROCEDURE — 85025 COMPLETE CBC W/AUTO DIFF WBC: CPT

## 2019-07-29 PROCEDURE — G8419 CALC BMI OUT NRM PARAM NOF/U: HCPCS | Performed by: INTERNAL MEDICINE

## 2019-07-29 PROCEDURE — 99214 OFFICE O/P EST MOD 30 MIN: CPT | Performed by: INTERNAL MEDICINE

## 2019-07-29 RX ORDER — SODIUM CHLORIDE 0.9 % (FLUSH) 0.9 %
10 SYRINGE (ML) INJECTION PRN
Status: DISCONTINUED | OUTPATIENT
Start: 2019-07-29 | End: 2019-07-30 | Stop reason: HOSPADM

## 2019-07-29 RX ORDER — SODIUM CHLORIDE 9 MG/ML
INJECTION, SOLUTION INTRAVENOUS
Status: DISCONTINUED
Start: 2019-07-29 | End: 2019-07-30 | Stop reason: HOSPADM

## 2019-07-29 RX ORDER — RAMELTEON 8 MG/1
8 TABLET ORAL NIGHTLY PRN
Qty: 30 TABLET | Refills: 0 | Status: SHIPPED | OUTPATIENT
Start: 2019-07-29 | End: 2019-10-15 | Stop reason: ALTCHOICE

## 2019-07-29 RX ORDER — 0.9 % SODIUM CHLORIDE 0.9 %
1000 INTRAVENOUS SOLUTION INTRAVENOUS ONCE
Status: CANCELLED | OUTPATIENT
Start: 2019-08-05

## 2019-07-29 RX ORDER — HEPARIN SODIUM (PORCINE) LOCK FLUSH IV SOLN 100 UNIT/ML 100 UNIT/ML
500 SOLUTION INTRAVENOUS PRN
Status: CANCELLED | OUTPATIENT
Start: 2019-07-29

## 2019-07-29 RX ORDER — 0.9 % SODIUM CHLORIDE 0.9 %
1000 INTRAVENOUS SOLUTION INTRAVENOUS ONCE
Status: CANCELLED | OUTPATIENT
Start: 2019-07-29

## 2019-07-29 RX ORDER — SODIUM CHLORIDE 0.9 % (FLUSH) 0.9 %
10 SYRINGE (ML) INJECTION PRN
Status: CANCELLED | OUTPATIENT
Start: 2019-08-05

## 2019-07-29 RX ORDER — 0.9 % SODIUM CHLORIDE 0.9 %
1000 INTRAVENOUS SOLUTION INTRAVENOUS ONCE
Status: COMPLETED | OUTPATIENT
Start: 2019-07-29 | End: 2019-07-29

## 2019-07-29 RX ORDER — HEPARIN SODIUM (PORCINE) LOCK FLUSH IV SOLN 100 UNIT/ML 100 UNIT/ML
500 SOLUTION INTRAVENOUS PRN
Status: CANCELLED | OUTPATIENT
Start: 2019-08-05

## 2019-07-29 RX ORDER — SODIUM CHLORIDE 0.9 % (FLUSH) 0.9 %
10 SYRINGE (ML) INJECTION PRN
Status: CANCELLED | OUTPATIENT
Start: 2019-07-29

## 2019-07-29 RX ADMIN — Medication 10 ML: at 12:18

## 2019-07-29 RX ADMIN — SODIUM CHLORIDE 1000 ML: 9 INJECTION, SOLUTION INTRAVENOUS at 12:18

## 2019-07-29 NOTE — PROGRESS NOTES
Pt presents to clinic for 1L of hydration. Pt tolerated treatment well without complications. Pt was discharged ambulatory in stable condition.

## 2019-08-05 ENCOUNTER — HOSPITAL ENCOUNTER (OUTPATIENT)
Dept: INFUSION THERAPY | Age: 69
Discharge: HOME | End: 2019-08-05
Payer: MEDICARE

## 2019-08-05 VITALS
HEART RATE: 70 BPM | TEMPERATURE: 98 F | SYSTOLIC BLOOD PRESSURE: 92 MMHG | DIASTOLIC BLOOD PRESSURE: 51 MMHG | RESPIRATION RATE: 16 BRPM

## 2019-08-05 DIAGNOSIS — Z17.1 MALIGNANT NEOPLASM OF CENTRAL PORTION OF RIGHT BREAST IN FEMALE, ESTROGEN RECEPTOR NEGATIVE (HCC): Primary | ICD-10-CM

## 2019-08-05 DIAGNOSIS — C50.111 MALIGNANT NEOPLASM OF CENTRAL PORTION OF RIGHT BREAST IN FEMALE, ESTROGEN RECEPTOR NEGATIVE (HCC): Primary | ICD-10-CM

## 2019-08-05 PROCEDURE — 2580000003 HC RX 258: Performed by: INTERNAL MEDICINE

## 2019-08-05 PROCEDURE — 2580000003 HC RX 258

## 2019-08-05 PROCEDURE — 96360 HYDRATION IV INFUSION INIT: CPT

## 2019-08-05 RX ORDER — 0.9 % SODIUM CHLORIDE 0.9 %
1000 INTRAVENOUS SOLUTION INTRAVENOUS ONCE
Status: CANCELLED | OUTPATIENT
Start: 2019-08-12

## 2019-08-05 RX ORDER — SODIUM CHLORIDE 9 MG/ML
INJECTION, SOLUTION INTRAVENOUS
Status: COMPLETED
Start: 2019-08-05 | End: 2019-08-05

## 2019-08-05 RX ORDER — HEPARIN SODIUM (PORCINE) LOCK FLUSH IV SOLN 100 UNIT/ML 100 UNIT/ML
500 SOLUTION INTRAVENOUS PRN
Status: CANCELLED | OUTPATIENT
Start: 2019-08-12

## 2019-08-05 RX ORDER — SODIUM CHLORIDE 0.9 % (FLUSH) 0.9 %
10 SYRINGE (ML) INJECTION PRN
Status: DISCONTINUED | OUTPATIENT
Start: 2019-08-05 | End: 2019-08-06 | Stop reason: HOSPADM

## 2019-08-05 RX ORDER — 0.9 % SODIUM CHLORIDE 0.9 %
1000 INTRAVENOUS SOLUTION INTRAVENOUS ONCE
Status: COMPLETED | OUTPATIENT
Start: 2019-08-05 | End: 2019-08-05

## 2019-08-05 RX ORDER — SODIUM CHLORIDE 0.9 % (FLUSH) 0.9 %
10 SYRINGE (ML) INJECTION PRN
Status: CANCELLED | OUTPATIENT
Start: 2019-08-12

## 2019-08-05 RX ADMIN — Medication 10 ML: at 13:30

## 2019-08-05 RX ADMIN — SODIUM CHLORIDE 1000 ML: 9 INJECTION, SOLUTION INTRAVENOUS at 13:32

## 2019-08-05 RX ADMIN — Medication 1000 ML: at 13:32

## 2019-08-12 ENCOUNTER — HOSPITAL ENCOUNTER (OUTPATIENT)
Dept: INFUSION THERAPY | Age: 69
Discharge: HOME | End: 2019-08-12
Payer: MEDICARE

## 2019-08-12 VITALS
SYSTOLIC BLOOD PRESSURE: 98 MMHG | TEMPERATURE: 96.4 F | RESPIRATION RATE: 18 BRPM | HEART RATE: 79 BPM | DIASTOLIC BLOOD PRESSURE: 51 MMHG

## 2019-08-12 DIAGNOSIS — Z17.1 MALIGNANT NEOPLASM OF CENTRAL PORTION OF RIGHT BREAST IN FEMALE, ESTROGEN RECEPTOR NEGATIVE (HCC): Primary | ICD-10-CM

## 2019-08-12 DIAGNOSIS — C50.111 MALIGNANT NEOPLASM OF CENTRAL PORTION OF RIGHT BREAST IN FEMALE, ESTROGEN RECEPTOR NEGATIVE (HCC): Primary | ICD-10-CM

## 2019-08-12 PROCEDURE — 2580000003 HC RX 258: Performed by: INTERNAL MEDICINE

## 2019-08-12 PROCEDURE — 96360 HYDRATION IV INFUSION INIT: CPT

## 2019-08-12 RX ORDER — 0.9 % SODIUM CHLORIDE 0.9 %
1000 INTRAVENOUS SOLUTION INTRAVENOUS ONCE
Status: CANCELLED | OUTPATIENT
Start: 2019-08-19

## 2019-08-12 RX ORDER — HEPARIN SODIUM (PORCINE) LOCK FLUSH IV SOLN 100 UNIT/ML 100 UNIT/ML
500 SOLUTION INTRAVENOUS PRN
Status: CANCELLED | OUTPATIENT
Start: 2019-08-19

## 2019-08-12 RX ORDER — HEPARIN SODIUM (PORCINE) LOCK FLUSH IV SOLN 100 UNIT/ML 100 UNIT/ML
500 SOLUTION INTRAVENOUS PRN
Status: DISCONTINUED | OUTPATIENT
Start: 2019-08-12 | End: 2019-08-13 | Stop reason: HOSPADM

## 2019-08-12 RX ORDER — 0.9 % SODIUM CHLORIDE 0.9 %
1000 INTRAVENOUS SOLUTION INTRAVENOUS ONCE
Status: COMPLETED | OUTPATIENT
Start: 2019-08-12 | End: 2019-08-12

## 2019-08-12 RX ORDER — SODIUM CHLORIDE 0.9 % (FLUSH) 0.9 %
10 SYRINGE (ML) INJECTION PRN
Status: DISCONTINUED | OUTPATIENT
Start: 2019-08-12 | End: 2019-08-13 | Stop reason: HOSPADM

## 2019-08-12 RX ORDER — SODIUM CHLORIDE 0.9 % (FLUSH) 0.9 %
10 SYRINGE (ML) INJECTION PRN
Status: CANCELLED | OUTPATIENT
Start: 2019-08-19

## 2019-08-12 RX ADMIN — Medication 10 ML: at 13:39

## 2019-08-12 RX ADMIN — SODIUM CHLORIDE 1000 ML: 9 INJECTION, SOLUTION INTRAVENOUS at 13:39

## 2019-08-19 ENCOUNTER — OFFICE VISIT (OUTPATIENT)
Dept: ONCOLOGY | Age: 69
End: 2019-08-19
Payer: MEDICARE

## 2019-08-19 ENCOUNTER — HOSPITAL ENCOUNTER (OUTPATIENT)
Dept: INFUSION THERAPY | Age: 69
Discharge: HOME | End: 2019-08-19
Payer: MEDICARE

## 2019-08-19 VITALS
WEIGHT: 142.1 LBS | RESPIRATION RATE: 20 BRPM | TEMPERATURE: 97.8 F | SYSTOLIC BLOOD PRESSURE: 135 MMHG | HEART RATE: 72 BPM | DIASTOLIC BLOOD PRESSURE: 63 MMHG | BODY MASS INDEX: 26.15 KG/M2 | HEIGHT: 62 IN

## 2019-08-19 DIAGNOSIS — C50.911 BREAST CANCER METASTASIZED TO AXILLARY LYMPH NODE, RIGHT (HCC): Primary | ICD-10-CM

## 2019-08-19 DIAGNOSIS — H04.203 EXCESSIVE TEAR PRODUCTION OF BOTH LACRIMAL GLANDS: ICD-10-CM

## 2019-08-19 DIAGNOSIS — C50.911 BREAST CANCER METASTASIZED TO AXILLARY LYMPH NODE, RIGHT (HCC): ICD-10-CM

## 2019-08-19 DIAGNOSIS — C77.3 BREAST CANCER METASTASIZED TO AXILLARY LYMPH NODE, RIGHT (HCC): Primary | ICD-10-CM

## 2019-08-19 DIAGNOSIS — Z17.1 MALIGNANT NEOPLASM OF CENTRAL PORTION OF RIGHT BREAST IN FEMALE, ESTROGEN RECEPTOR NEGATIVE (HCC): ICD-10-CM

## 2019-08-19 DIAGNOSIS — C77.3 BREAST CANCER METASTASIZED TO AXILLARY LYMPH NODE, RIGHT (HCC): ICD-10-CM

## 2019-08-19 DIAGNOSIS — C50.111 MALIGNANT NEOPLASM OF CENTRAL PORTION OF RIGHT BREAST IN FEMALE, ESTROGEN RECEPTOR NEGATIVE (HCC): ICD-10-CM

## 2019-08-19 LAB
ALBUMIN SERPL-MCNC: 4.5 G/DL (ref 3.5–5.2)
ALP BLD-CCNC: 99 U/L (ref 35–104)
ALT SERPL-CCNC: 20 U/L (ref 0–32)
ANION GAP SERPL CALCULATED.3IONS-SCNC: 11 MMOL/L (ref 7–16)
ANISOCYTOSIS: ABNORMAL
AST SERPL-CCNC: 32 U/L (ref 0–31)
BASOPHILS ABSOLUTE: 0.02 E9/L (ref 0–0.2)
BASOPHILS RELATIVE PERCENT: 0.8 % (ref 0–2)
BILIRUB SERPL-MCNC: 1.6 MG/DL (ref 0–1.2)
BUN BLDV-MCNC: 18 MG/DL (ref 8–23)
CALCIUM SERPL-MCNC: 9.6 MG/DL (ref 8.6–10.2)
CHLORIDE BLD-SCNC: 99 MMOL/L (ref 98–107)
CO2: 29 MMOL/L (ref 22–29)
CREAT SERPL-MCNC: 1.3 MG/DL (ref 0.5–1)
EOSINOPHILS ABSOLUTE: 0.18 E9/L (ref 0.05–0.5)
EOSINOPHILS RELATIVE PERCENT: 7.2 % (ref 0–6)
GFR AFRICAN AMERICAN: 49
GFR NON-AFRICAN AMERICAN: 41 ML/MIN/1.73
GLUCOSE BLD-MCNC: 221 MG/DL (ref 74–99)
HCT VFR BLD CALC: 27.6 % (ref 34–48)
HEMOGLOBIN: 9.5 G/DL (ref 11.5–15.5)
IMMATURE GRANULOCYTES #: 0.03 E9/L
IMMATURE GRANULOCYTES %: 1.2 % (ref 0–5)
LYMPHOCYTES ABSOLUTE: 0.22 E9/L (ref 1.5–4)
LYMPHOCYTES RELATIVE PERCENT: 8.8 % (ref 20–42)
MCH RBC QN AUTO: 37.5 PG (ref 26–35)
MCHC RBC AUTO-ENTMCNC: 34.4 % (ref 32–34.5)
MCV RBC AUTO: 109.1 FL (ref 80–99.9)
MONOCYTES ABSOLUTE: 0.39 E9/L (ref 0.1–0.95)
MONOCYTES RELATIVE PERCENT: 15.7 % (ref 2–12)
NEUTROPHILS ABSOLUTE: 1.65 E9/L (ref 1.8–7.3)
NEUTROPHILS RELATIVE PERCENT: 66.3 % (ref 43–80)
OVALOCYTES: ABNORMAL
PDW BLD-RTO: 19.4 FL (ref 11.5–15)
PLATELET # BLD: 124 E9/L (ref 130–450)
PMV BLD AUTO: 10.2 FL (ref 7–12)
POIKILOCYTES: ABNORMAL
POLYCHROMASIA: ABNORMAL
POTASSIUM SERPL-SCNC: 3.7 MMOL/L (ref 3.5–5)
RBC # BLD: 2.53 E12/L (ref 3.5–5.5)
SCHISTOCYTES: ABNORMAL
SODIUM BLD-SCNC: 139 MMOL/L (ref 132–146)
TEAR DROP CELLS: ABNORMAL
TOTAL PROTEIN: 7 G/DL (ref 6.4–8.3)
WBC # BLD: 2.5 E9/L (ref 4.5–11.5)

## 2019-08-19 PROCEDURE — G8427 DOCREV CUR MEDS BY ELIG CLIN: HCPCS | Performed by: INTERNAL MEDICINE

## 2019-08-19 PROCEDURE — 80053 COMPREHEN METABOLIC PANEL: CPT

## 2019-08-19 PROCEDURE — 4040F PNEUMOC VAC/ADMIN/RCVD: CPT | Performed by: INTERNAL MEDICINE

## 2019-08-19 PROCEDURE — 99214 OFFICE O/P EST MOD 30 MIN: CPT | Performed by: INTERNAL MEDICINE

## 2019-08-19 PROCEDURE — 1090F PRES/ABSN URINE INCON ASSESS: CPT | Performed by: INTERNAL MEDICINE

## 2019-08-19 PROCEDURE — G8419 CALC BMI OUT NRM PARAM NOF/U: HCPCS | Performed by: INTERNAL MEDICINE

## 2019-08-19 PROCEDURE — 99212 OFFICE O/P EST SF 10 MIN: CPT

## 2019-08-19 PROCEDURE — 36415 COLL VENOUS BLD VENIPUNCTURE: CPT

## 2019-08-19 PROCEDURE — 3017F COLORECTAL CA SCREEN DOC REV: CPT | Performed by: INTERNAL MEDICINE

## 2019-08-19 PROCEDURE — 1123F ACP DISCUSS/DSCN MKR DOCD: CPT | Performed by: INTERNAL MEDICINE

## 2019-08-19 PROCEDURE — 4004F PT TOBACCO SCREEN RCVD TLK: CPT | Performed by: INTERNAL MEDICINE

## 2019-08-19 PROCEDURE — G8400 PT W/DXA NO RESULTS DOC: HCPCS | Performed by: INTERNAL MEDICINE

## 2019-08-19 PROCEDURE — 85025 COMPLETE CBC W/AUTO DIFF WBC: CPT

## 2019-08-19 RX ORDER — KETOTIFEN FUMARATE 0.35 MG/ML
2 SOLUTION/ DROPS OPHTHALMIC 2 TIMES DAILY
Qty: 2 ML | Refills: 0 | Status: SHIPPED | OUTPATIENT
Start: 2019-08-19 | End: 2019-08-19 | Stop reason: SDUPTHER

## 2019-08-19 RX ORDER — KETOTIFEN FUMARATE 0.35 MG/ML
2 SOLUTION/ DROPS OPHTHALMIC 2 TIMES DAILY
Qty: 10 ML | Refills: 0 | Status: SHIPPED | OUTPATIENT
Start: 2019-08-19 | End: 2019-08-29

## 2019-08-19 NOTE — PROGRESS NOTES
Subjective:      Patient ID: Fredrick Coleman is a 77 y.o. female.     HPI    Review of Systems    Objective:   Physical Exam    Assessment:      ***      Plan:      ***        Josie Davis MA

## 2019-08-19 NOTE — PROGRESS NOTES
on 08/02/2018. Cycle # 1 Dose-Dense Taxol was on 08/16/2018. Cycle # 2 Dose-Dense Taxol was on 08/30/2018. Cycle # 3 Dose Dense Taxol was on 09/13/2018. On 09/25/2018 Patient was admitted to the hospital for sepsis (due to complicated pyelonephritis with Klebsiella bacteremia), ZHAO bilateral ureteral stone and hydronephrosis s/p cysto/stent insertions 09/28/2018. Discharged on 10/01/2018 On Abx (cephalexin) for Klebsiella Bacteremia by ID team.     MRI Bilateral Breast 10/16/2018 noted Near complete response to therapy on the right with 3 residual right axillary lymph nodes demonstrating mild eccentric cortical thickening suggestive of neoplasm. Readmitted to the hospital on 10/27/2018 with thrombocytopenia, electrolyte disturbances, chills, fever, syncope, nausea and vomiting. Completed Abx    She underwent a removal of mediport, right breast simple mastectomy, blue dye injection, right axillary dissection on February 27, 2019. Pathological evaluation completed at [facility]):  A.  Mediport capsule and cord: Device identified. Benign fibroadipose tissue. B.  Right breast, mastectomy: Biopsy site; negative for residual carcinoma. Skin with seborrheic keratosis. Size of largest metastatic deposit-1.3 cm. C.  Right axillary contents, regional resection: 5 of 7 lymph nodes positive for metastatic poorly differentiated mammary carcinoma (grade 3).    Comment: The tumor cells in part C are immunoreactive with pankeratin.  The cells are negative for LCA.  Intradepartmental  consultation is obtained.     TNM classification (AJCC eighth edition)-  ypT0 N2a M0 TNBC   RT was completed on 05/24/2019. Adjuvant Xeloda was started on 06/04/2019. Intermittent nausea vomiting, diarrhea and oral sores. Decreased Xeloda to 1000 mg p.o. twice daily starting on 06/25/2019 which she is tolerating it much better. Review of Systems;  CONSTITUTIONAL: No fever, chills. Fair appetite. Mild fatigue.   ENMT: Eyes: excessive report discussed. RT was completed on 05/24/2019. We recommended adjuvant Xeloda bid 2 weeks on one week off for 6 cycles. Adjuvant Xeloda was started on 06/04/2019. Intermittent nausea vomiting, diarrhea and oral sores. Decreased Xeloda to 1000 mg p.o. twice daily starting on 06/25/2019 which she is tolerating it much better. Excessive tearing; prescribed ketotifen. Elevated creat; IVF weekly. RTC 3 weeks.     Qi Singleton MD  Medical Oncologist  Board Certified, Medical Oncology  Board Certified, Internal Medicine  August 19, 2019

## 2019-08-19 NOTE — PROGRESS NOTES
Patient declined IV hydration for today 08/19/2019 and states she will increase her fluid intake today by mouth.

## 2019-08-26 ENCOUNTER — OFFICE VISIT (OUTPATIENT)
Dept: ONCOLOGY | Age: 69
End: 2019-08-26
Payer: MEDICARE

## 2019-08-26 ENCOUNTER — HOSPITAL ENCOUNTER (OUTPATIENT)
Dept: INFUSION THERAPY | Age: 69
Discharge: HOME | End: 2019-08-26
Payer: MEDICARE

## 2019-08-26 VITALS
RESPIRATION RATE: 20 BRPM | SYSTOLIC BLOOD PRESSURE: 148 MMHG | DIASTOLIC BLOOD PRESSURE: 65 MMHG | TEMPERATURE: 97.4 F | HEART RATE: 72 BPM

## 2019-08-26 VITALS
WEIGHT: 144.5 LBS | DIASTOLIC BLOOD PRESSURE: 58 MMHG | SYSTOLIC BLOOD PRESSURE: 127 MMHG | TEMPERATURE: 98.2 F | BODY MASS INDEX: 26.59 KG/M2 | HEART RATE: 79 BPM | HEIGHT: 62 IN

## 2019-08-26 DIAGNOSIS — Z17.1 MALIGNANT NEOPLASM OF CENTRAL PORTION OF RIGHT BREAST IN FEMALE, ESTROGEN RECEPTOR NEGATIVE (HCC): Primary | ICD-10-CM

## 2019-08-26 DIAGNOSIS — Z09 FOLLOW UP: Primary | ICD-10-CM

## 2019-08-26 DIAGNOSIS — C50.111 MALIGNANT NEOPLASM OF CENTRAL PORTION OF RIGHT BREAST IN FEMALE, ESTROGEN RECEPTOR NEGATIVE (HCC): Primary | ICD-10-CM

## 2019-08-26 PROCEDURE — 1090F PRES/ABSN URINE INCON ASSESS: CPT | Performed by: INTERNAL MEDICINE

## 2019-08-26 PROCEDURE — 2580000003 HC RX 258

## 2019-08-26 PROCEDURE — G8427 DOCREV CUR MEDS BY ELIG CLIN: HCPCS | Performed by: INTERNAL MEDICINE

## 2019-08-26 PROCEDURE — 2580000003 HC RX 258: Performed by: INTERNAL MEDICINE

## 2019-08-26 PROCEDURE — G8400 PT W/DXA NO RESULTS DOC: HCPCS | Performed by: INTERNAL MEDICINE

## 2019-08-26 PROCEDURE — 1123F ACP DISCUSS/DSCN MKR DOCD: CPT | Performed by: INTERNAL MEDICINE

## 2019-08-26 PROCEDURE — 99214 OFFICE O/P EST MOD 30 MIN: CPT | Performed by: INTERNAL MEDICINE

## 2019-08-26 PROCEDURE — 4040F PNEUMOC VAC/ADMIN/RCVD: CPT | Performed by: INTERNAL MEDICINE

## 2019-08-26 PROCEDURE — G8419 CALC BMI OUT NRM PARAM NOF/U: HCPCS | Performed by: INTERNAL MEDICINE

## 2019-08-26 PROCEDURE — 3017F COLORECTAL CA SCREEN DOC REV: CPT | Performed by: INTERNAL MEDICINE

## 2019-08-26 PROCEDURE — 4004F PT TOBACCO SCREEN RCVD TLK: CPT | Performed by: INTERNAL MEDICINE

## 2019-08-26 PROCEDURE — 96360 HYDRATION IV INFUSION INIT: CPT

## 2019-08-26 RX ORDER — HEPARIN SODIUM (PORCINE) LOCK FLUSH IV SOLN 100 UNIT/ML 100 UNIT/ML
500 SOLUTION INTRAVENOUS PRN
Status: DISCONTINUED | OUTPATIENT
Start: 2019-08-26 | End: 2019-08-27 | Stop reason: HOSPADM

## 2019-08-26 RX ORDER — HEPARIN SODIUM (PORCINE) LOCK FLUSH IV SOLN 100 UNIT/ML 100 UNIT/ML
500 SOLUTION INTRAVENOUS PRN
Status: CANCELLED | OUTPATIENT
Start: 2019-09-02

## 2019-08-26 RX ORDER — SODIUM CHLORIDE 9 MG/ML
INJECTION, SOLUTION INTRAVENOUS
Status: COMPLETED
Start: 2019-08-26 | End: 2019-08-26

## 2019-08-26 RX ORDER — 0.9 % SODIUM CHLORIDE 0.9 %
1000 INTRAVENOUS SOLUTION INTRAVENOUS ONCE
Status: COMPLETED | OUTPATIENT
Start: 2019-08-26 | End: 2019-08-26

## 2019-08-26 RX ORDER — SODIUM CHLORIDE 0.9 % (FLUSH) 0.9 %
10 SYRINGE (ML) INJECTION PRN
Status: CANCELLED | OUTPATIENT
Start: 2019-09-02

## 2019-08-26 RX ORDER — 0.9 % SODIUM CHLORIDE 0.9 %
1000 INTRAVENOUS SOLUTION INTRAVENOUS ONCE
Status: CANCELLED | OUTPATIENT
Start: 2019-09-02

## 2019-08-26 RX ORDER — SODIUM CHLORIDE 0.9 % (FLUSH) 0.9 %
10 SYRINGE (ML) INJECTION PRN
Status: DISCONTINUED | OUTPATIENT
Start: 2019-08-26 | End: 2019-08-27 | Stop reason: HOSPADM

## 2019-08-26 RX ORDER — METHYLPREDNISOLONE 4 MG/1
TABLET ORAL
Qty: 1 KIT | Refills: 0 | Status: SHIPPED | OUTPATIENT
Start: 2019-08-26 | End: 2019-09-01

## 2019-08-26 RX ADMIN — Medication 1000 ML: at 13:27

## 2019-08-26 RX ADMIN — Medication 10 ML: at 13:26

## 2019-08-26 RX ADMIN — SODIUM CHLORIDE 1000 ML: 9 INJECTION, SOLUTION INTRAVENOUS at 13:27

## 2019-08-26 NOTE — PROGRESS NOTES
hemoptysis, shortness of breath. CARDIOVASCULAR: No chest pain, palpitations. GASTROINTESTINAL: No N/V, abdominal pain. GENITOURINARY: No dysuria, urinary frequency, hematuria. NEURO: No syncope, presyncope, headache. Peripheral neuropathy. Remainder:  ROS NEGATIVE    Past Medical History:      Diagnosis Date    Acid reflux     [facility]2018    right    H/O renal calculi     Hyperlipidemia     Hypertension     Insomnia     Seasonal allergies     Stroke [facility]) 2013    tia    Type 2 diabetes mellitus without complication ([facility]     UTI (urinary tract infection) 2018     Medications:  Reviewed and reconciled. Allergies: Allergies   Allergen Reactions    Avelox [Moxifloxacin] Hives     Physical Exam:  BP (!) 127/58 (Site: Left Upper Arm, Position: Sitting, Cuff Size: Medium Adult)   Pulse 79   Temp 98.2 °F (36.8 °C) (Temporal)   Ht 5' 2\" (1.575 m)   Wt 144 lb 8 oz (65.5 kg)   BMI 26.43 kg/m²   GENERAL: Alert, oriented x 3, tired  HEENT: PERRLA; EOMI. Facial swelling/redness noted. NECK: Supple. No palpable LN  LUNGS: Good air entry bilaterally. No wheezing, crackles or ronchi. CARDIOVASCULAR: Regular rate. No murmurs, rubs or gallops. ABDOMEN: Soft. Non-tender, non-distended. Positive bowel sounds. EXTREMITIES: Without clubbing, cyanosis, or edema. NEUROLOGIC: No focal deficits. ECOG PS 2    Impression/Plan:  78 y/o female with Right Breast Cancer: On 04/10/2018:  A. Right breast, core biopsy: Poorly differentiated ductal carcinoma, Angel score 3+3+2 = 8.  B. Right lymph node, core biopsy:  Metastatic poorly differentiated carcinoma    Comment: Per report from [facility] laboratory:  Estrogen receptor: Negative  Progesterone receptor: Negative  HER-2/jocelyn status: Negative (0)  Enclosed keratin immunohistochemical stains are positive    CT chest 05/11/2018:  Nodule just above the thyroid measures 2 x 2.7 cm. Thyroid nodules range up to 10 mm.    Right axillary

## 2019-09-27 ASSESSMENT — ENCOUNTER SYMPTOMS
RHINORRHEA: 0
SHORTNESS OF BREATH: 0
BLOOD IN STOOL: 0
TROUBLE SWALLOWING: 0
SINUS PAIN: 0
COUGH: 0
ABDOMINAL PAIN: 0
VOICE CHANGE: 0
CHEST TIGHTNESS: 0
VOMITING: 0
SINUS PRESSURE: 0
EYE ITCHING: 0
SORE THROAT: 0
WHEEZING: 0
CHOKING: 0
ABDOMINAL DISTENTION: 0
BACK PAIN: 0
CONSTIPATION: 0
NAUSEA: 0
EYE DISCHARGE: 0
DIARRHEA: 0

## 2019-10-09 ENCOUNTER — OFFICE VISIT (OUTPATIENT)
Dept: BREAST CENTER | Age: 69
End: 2019-10-09
Payer: MEDICARE

## 2019-10-09 ENCOUNTER — TELEPHONE (OUTPATIENT)
Dept: CASE MANAGEMENT | Age: 69
End: 2019-10-09

## 2019-10-09 ENCOUNTER — HOSPITAL ENCOUNTER (OUTPATIENT)
Dept: GENERAL RADIOLOGY | Age: 69
Discharge: HOME | End: 2019-10-11
Payer: MEDICARE

## 2019-10-09 VITALS
HEIGHT: 62 IN | HEART RATE: 85 BPM | BODY MASS INDEX: 25.58 KG/M2 | SYSTOLIC BLOOD PRESSURE: 104 MMHG | WEIGHT: 139 LBS | RESPIRATION RATE: 16 BRPM | TEMPERATURE: 97.8 F | OXYGEN SATURATION: 98 % | DIASTOLIC BLOOD PRESSURE: 76 MMHG

## 2019-10-09 DIAGNOSIS — Z85.3 PERSONAL HISTORY OF BREAST CANCER: ICD-10-CM

## 2019-10-09 DIAGNOSIS — C50.911 BREAST CANCER METASTASIZED TO AXILLARY LYMPH NODE, RIGHT (HCC): ICD-10-CM

## 2019-10-09 DIAGNOSIS — C77.3 BREAST CANCER METASTASIZED TO AXILLARY LYMPH NODE, RIGHT (HCC): ICD-10-CM

## 2019-10-09 DIAGNOSIS — Z12.31 ENCOUNTER FOR SCREENING MAMMOGRAM FOR MALIGNANT NEOPLASM OF BREAST: ICD-10-CM

## 2019-10-09 PROCEDURE — 1123F ACP DISCUSS/DSCN MKR DOCD: CPT | Performed by: NURSE PRACTITIONER

## 2019-10-09 PROCEDURE — 99214 OFFICE O/P EST MOD 30 MIN: CPT | Performed by: NURSE PRACTITIONER

## 2019-10-09 PROCEDURE — 4004F PT TOBACCO SCREEN RCVD TLK: CPT | Performed by: NURSE PRACTITIONER

## 2019-10-09 PROCEDURE — G8419 CALC BMI OUT NRM PARAM NOF/U: HCPCS | Performed by: NURSE PRACTITIONER

## 2019-10-09 PROCEDURE — G8484 FLU IMMUNIZE NO ADMIN: HCPCS | Performed by: NURSE PRACTITIONER

## 2019-10-09 PROCEDURE — 1090F PRES/ABSN URINE INCON ASSESS: CPT | Performed by: NURSE PRACTITIONER

## 2019-10-09 PROCEDURE — 4040F PNEUMOC VAC/ADMIN/RCVD: CPT | Performed by: NURSE PRACTITIONER

## 2019-10-09 PROCEDURE — 77063 BREAST TOMOSYNTHESIS BI: CPT

## 2019-10-09 PROCEDURE — G8400 PT W/DXA NO RESULTS DOC: HCPCS | Performed by: NURSE PRACTITIONER

## 2019-10-09 PROCEDURE — G8427 DOCREV CUR MEDS BY ELIG CLIN: HCPCS | Performed by: NURSE PRACTITIONER

## 2019-10-09 PROCEDURE — 99213 OFFICE O/P EST LOW 20 MIN: CPT | Performed by: NURSE PRACTITIONER

## 2019-10-09 PROCEDURE — 3017F COLORECTAL CA SCREEN DOC REV: CPT | Performed by: NURSE PRACTITIONER

## 2019-10-15 ENCOUNTER — HOSPITAL ENCOUNTER (OUTPATIENT)
Dept: RADIATION ONCOLOGY | Age: 69
Discharge: HOME | End: 2019-10-15
Attending: RADIOLOGY
Payer: MEDICARE

## 2019-10-15 VITALS
HEART RATE: 77 BPM | SYSTOLIC BLOOD PRESSURE: 124 MMHG | TEMPERATURE: 98 F | DIASTOLIC BLOOD PRESSURE: 74 MMHG | WEIGHT: 136 LBS | BODY MASS INDEX: 24.87 KG/M2 | RESPIRATION RATE: 16 BRPM

## 2019-10-15 DIAGNOSIS — C50.111 MALIGNANT NEOPLASM OF CENTRAL PORTION OF RIGHT BREAST IN FEMALE, ESTROGEN RECEPTOR NEGATIVE (HCC): Primary | ICD-10-CM

## 2019-10-15 DIAGNOSIS — Z17.1 MALIGNANT NEOPLASM OF CENTRAL PORTION OF RIGHT BREAST IN FEMALE, ESTROGEN RECEPTOR NEGATIVE (HCC): Primary | ICD-10-CM

## 2019-10-15 DIAGNOSIS — Z92.3 S/P RADIATION THERAPY > 12 WKS AGO: ICD-10-CM

## 2019-10-15 PROCEDURE — 99213 OFFICE O/P EST LOW 20 MIN: CPT | Performed by: NURSE PRACTITIONER

## 2019-10-18 ENCOUNTER — HOSPITAL ENCOUNTER (OUTPATIENT)
Dept: CT IMAGING | Age: 69
Discharge: HOME | End: 2019-10-20
Payer: MEDICARE

## 2019-10-18 DIAGNOSIS — N15.1 ABSCESS OF LEFT KIDNEY: ICD-10-CM

## 2019-10-18 PROCEDURE — 2580000003 HC RX 258: Performed by: RADIOLOGY

## 2019-10-18 PROCEDURE — 6360000004 HC RX CONTRAST MEDICATION: Performed by: RADIOLOGY

## 2019-10-18 PROCEDURE — 74177 CT ABD & PELVIS W/CONTRAST: CPT

## 2019-10-18 RX ORDER — SODIUM CHLORIDE 0.9 % (FLUSH) 0.9 %
10 SYRINGE (ML) INJECTION PRN
Status: COMPLETED | OUTPATIENT
Start: 2019-10-18 | End: 2019-10-18

## 2019-10-18 RX ADMIN — IOPAMIDOL 110 ML: 755 INJECTION, SOLUTION INTRAVENOUS at 14:33

## 2019-10-18 RX ADMIN — Medication 10 ML: at 14:27

## 2019-10-18 RX ADMIN — IOHEXOL 50 ML: 240 INJECTION, SOLUTION INTRATHECAL; INTRAVASCULAR; INTRAVENOUS; ORAL at 13:06

## 2019-10-28 ENCOUNTER — OFFICE VISIT (OUTPATIENT)
Dept: ONCOLOGY | Age: 69
End: 2019-10-28
Payer: MEDICARE

## 2019-10-28 ENCOUNTER — HOSPITAL ENCOUNTER (OUTPATIENT)
Dept: INFUSION THERAPY | Age: 69
Discharge: HOME | End: 2019-10-28
Payer: MEDICARE

## 2019-10-28 ENCOUNTER — TELEPHONE (OUTPATIENT)
Dept: ONCOLOGY | Age: 69
End: 2019-10-28

## 2019-10-28 VITALS
WEIGHT: 138.6 LBS | SYSTOLIC BLOOD PRESSURE: 122 MMHG | BODY MASS INDEX: 25.51 KG/M2 | HEART RATE: 70 BPM | TEMPERATURE: 97.7 F | HEIGHT: 62 IN | DIASTOLIC BLOOD PRESSURE: 58 MMHG

## 2019-10-28 DIAGNOSIS — C50.911 BREAST CANCER METASTASIZED TO AXILLARY LYMPH NODE, RIGHT (HCC): Primary | ICD-10-CM

## 2019-10-28 DIAGNOSIS — C77.3 BREAST CANCER METASTASIZED TO AXILLARY LYMPH NODE, RIGHT (HCC): ICD-10-CM

## 2019-10-28 DIAGNOSIS — C50.911 BREAST CANCER METASTASIZED TO AXILLARY LYMPH NODE, RIGHT (HCC): ICD-10-CM

## 2019-10-28 DIAGNOSIS — C77.3 BREAST CANCER METASTASIZED TO AXILLARY LYMPH NODE, RIGHT (HCC): Primary | ICD-10-CM

## 2019-10-28 LAB
ALBUMIN SERPL-MCNC: 4.4 G/DL (ref 3.5–5.2)
ALP BLD-CCNC: 72 U/L (ref 35–104)
ALT SERPL-CCNC: 16 U/L (ref 0–32)
ANION GAP SERPL CALCULATED.3IONS-SCNC: 9 MMOL/L (ref 7–16)
AST SERPL-CCNC: 19 U/L (ref 0–31)
BASOPHILS ABSOLUTE: 0 E9/L (ref 0–0.2)
BASOPHILS RELATIVE PERCENT: 0.8 % (ref 0–2)
BILIRUB SERPL-MCNC: 0.4 MG/DL (ref 0–1.2)
BUN BLDV-MCNC: 21 MG/DL (ref 8–23)
CALCIUM SERPL-MCNC: 10 MG/DL (ref 8.6–10.2)
CHLORIDE BLD-SCNC: 98 MMOL/L (ref 98–107)
CO2: 33 MMOL/L (ref 22–29)
CREAT SERPL-MCNC: 1.4 MG/DL (ref 0.5–1)
EOSINOPHILS ABSOLUTE: 0.07 E9/L (ref 0.05–0.5)
EOSINOPHILS RELATIVE PERCENT: 1.7 % (ref 0–6)
GFR AFRICAN AMERICAN: 45
GFR NON-AFRICAN AMERICAN: 37 ML/MIN/1.73
GLUCOSE BLD-MCNC: 178 MG/DL (ref 74–99)
HCT VFR BLD CALC: 38.4 % (ref 34–48)
HEMOGLOBIN: 12.6 G/DL (ref 11.5–15.5)
LYMPHOCYTES ABSOLUTE: 0.6 E9/L (ref 1.5–4)
LYMPHOCYTES RELATIVE PERCENT: 14.5 % (ref 20–42)
MCH RBC QN AUTO: 32.1 PG (ref 26–35)
MCHC RBC AUTO-ENTMCNC: 32.8 % (ref 32–34.5)
MCV RBC AUTO: 98 FL (ref 80–99.9)
METAMYELOCYTES RELATIVE PERCENT: 2.6 % (ref 0–1)
MONOCYTES ABSOLUTE: 0.4 E9/L (ref 0.1–0.95)
MONOCYTES RELATIVE PERCENT: 10.3 % (ref 2–12)
NEUTROPHILS ABSOLUTE: 2.96 E9/L (ref 1.8–7.3)
NEUTROPHILS RELATIVE PERCENT: 70.9 % (ref 43–80)
PDW BLD-RTO: 11.9 FL (ref 11.5–15)
PLATELET # BLD: 170 E9/L (ref 130–450)
PMV BLD AUTO: 9.7 FL (ref 7–12)
POTASSIUM SERPL-SCNC: 4.8 MMOL/L (ref 3.5–5)
RBC # BLD: 3.92 E12/L (ref 3.5–5.5)
RBC # BLD: NORMAL 10*6/UL
SODIUM BLD-SCNC: 140 MMOL/L (ref 132–146)
TOTAL PROTEIN: 7.3 G/DL (ref 6.4–8.3)
WBC # BLD: 4 E9/L (ref 4.5–11.5)

## 2019-10-28 PROCEDURE — G8427 DOCREV CUR MEDS BY ELIG CLIN: HCPCS | Performed by: INTERNAL MEDICINE

## 2019-10-28 PROCEDURE — 1123F ACP DISCUSS/DSCN MKR DOCD: CPT | Performed by: INTERNAL MEDICINE

## 2019-10-28 PROCEDURE — 1090F PRES/ABSN URINE INCON ASSESS: CPT | Performed by: INTERNAL MEDICINE

## 2019-10-28 PROCEDURE — 99212 OFFICE O/P EST SF 10 MIN: CPT

## 2019-10-28 PROCEDURE — 80053 COMPREHEN METABOLIC PANEL: CPT

## 2019-10-28 PROCEDURE — 4004F PT TOBACCO SCREEN RCVD TLK: CPT | Performed by: INTERNAL MEDICINE

## 2019-10-28 PROCEDURE — 99214 OFFICE O/P EST MOD 30 MIN: CPT | Performed by: INTERNAL MEDICINE

## 2019-10-28 PROCEDURE — 3017F COLORECTAL CA SCREEN DOC REV: CPT | Performed by: INTERNAL MEDICINE

## 2019-10-28 PROCEDURE — G8484 FLU IMMUNIZE NO ADMIN: HCPCS | Performed by: INTERNAL MEDICINE

## 2019-10-28 PROCEDURE — 4040F PNEUMOC VAC/ADMIN/RCVD: CPT | Performed by: INTERNAL MEDICINE

## 2019-10-28 PROCEDURE — 85025 COMPLETE CBC W/AUTO DIFF WBC: CPT

## 2019-10-28 PROCEDURE — 36415 COLL VENOUS BLD VENIPUNCTURE: CPT

## 2019-10-28 PROCEDURE — G8400 PT W/DXA NO RESULTS DOC: HCPCS | Performed by: INTERNAL MEDICINE

## 2019-10-28 PROCEDURE — G8417 CALC BMI ABV UP PARAM F/U: HCPCS | Performed by: INTERNAL MEDICINE

## 2019-10-29 ENCOUNTER — TELEPHONE (OUTPATIENT)
Dept: CASE MANAGEMENT | Age: 69
End: 2019-10-29

## 2020-01-23 ENCOUNTER — OFFICE VISIT (OUTPATIENT)
Dept: CARDIOLOGY CLINIC | Age: 70
End: 2020-01-23
Payer: MEDICARE

## 2020-01-23 VITALS
DIASTOLIC BLOOD PRESSURE: 78 MMHG | WEIGHT: 140 LBS | HEART RATE: 67 BPM | RESPIRATION RATE: 18 BRPM | HEIGHT: 62 IN | SYSTOLIC BLOOD PRESSURE: 122 MMHG | BODY MASS INDEX: 25.76 KG/M2

## 2020-01-23 PROCEDURE — 1123F ACP DISCUSS/DSCN MKR DOCD: CPT | Performed by: INTERNAL MEDICINE

## 2020-01-23 PROCEDURE — 4040F PNEUMOC VAC/ADMIN/RCVD: CPT | Performed by: INTERNAL MEDICINE

## 2020-01-23 PROCEDURE — 4004F PT TOBACCO SCREEN RCVD TLK: CPT | Performed by: INTERNAL MEDICINE

## 2020-01-23 PROCEDURE — 3017F COLORECTAL CA SCREEN DOC REV: CPT | Performed by: INTERNAL MEDICINE

## 2020-01-23 PROCEDURE — 99204 OFFICE O/P NEW MOD 45 MIN: CPT | Performed by: INTERNAL MEDICINE

## 2020-01-23 PROCEDURE — G8417 CALC BMI ABV UP PARAM F/U: HCPCS | Performed by: INTERNAL MEDICINE

## 2020-01-23 PROCEDURE — G8484 FLU IMMUNIZE NO ADMIN: HCPCS | Performed by: INTERNAL MEDICINE

## 2020-01-23 PROCEDURE — G8427 DOCREV CUR MEDS BY ELIG CLIN: HCPCS | Performed by: INTERNAL MEDICINE

## 2020-01-23 PROCEDURE — 93000 ELECTROCARDIOGRAM COMPLETE: CPT | Performed by: INTERNAL MEDICINE

## 2020-01-23 PROCEDURE — G8400 PT W/DXA NO RESULTS DOC: HCPCS | Performed by: INTERNAL MEDICINE

## 2020-01-23 PROCEDURE — 1090F PRES/ABSN URINE INCON ASSESS: CPT | Performed by: INTERNAL MEDICINE

## 2020-01-23 RX ORDER — PHENOL 1.4 %
AEROSOL, SPRAY (ML) MUCOUS MEMBRANE
COMMUNITY

## 2020-01-23 RX ORDER — VARENICLINE TARTRATE 0.5 MG/1
.5-1 TABLET, FILM COATED ORAL SEE ADMIN INSTRUCTIONS
Qty: 57 TABLET | Refills: 2 | Status: ON HOLD
Start: 2020-01-23 | End: 2021-02-22

## 2020-01-23 NOTE — PROGRESS NOTES
CHOLECYSTECTOMY      HYSTERECTOMY, TOTAL ABDOMINAL      MASTECTOMY, MODIFIED RADICAL Right 2/27/2019    REMOVAL OF MEDIPORT, RIGHT BREAST SIMPLE MASTECTOMY, BLUE DYE INJECTION, RIGHT AXILLARY DISSECTION performed by Claudia Ellis MD at [address] [place] CYSTO/URETERO W/LITHOTRIPSY &INDWELL STENT INSRT Bilateral 9/28/2018    CYSTOSCOPY BILATERAL STENT INSERTION performed by Page Orosco MD at [address] CYSTO/URETERO W/LITHOTRIPSY &INDWELL STENT INSRT Bilateral 10/25/2018    CYSTOSCOPY RETROGRADE PYELOGRAM URETEROSCOPY J STENT LASER LITHOTRIPSY BILATERAL performed by Page Orosco MD at [address] [place] [address] CTR VAD W/SUBQ PORT AGE 5 YR/> N/A 6/12/2018    PORT INSERTION performed by Mike Irby MD at [facility] [address] Right     TUBAL LIGATION      TUNNELED CENTRAL VENOUS CATHETER W/ SUBCUTANEOUS PORT  02/27/2019    removed       Family History:  Family History   Problem Relation Age of Onset    Cancer Mother [de-identified]        breast    Heart Disease Mother     Diabetes Mother     Hypertension Mother     Cancer Sister         lymphoma    Hypertension Father     Stroke Maternal Grandmother     Stroke Paternal Grandmother        Social History:  Social History     Socioeconomic History    Marital status:      Spouse name: Clarita Mays Number of children: 3    Years of education: Not on file    Highest education level: Not on file   Occupational History    Not on file   Social Needs    Financial resource strain: Not on file    Food insecurity:     Worry: Not on file     Inability: Not on file    Transportation needs:     Medical: Not on file     Non-medical: Not on file   Tobacco Use    Smoking status: Current Every Day Smoker     Packs/day: 1.00     Years: 50.00     Pack years: 50.00     Types: Cigarettes    Smokeless tobacco: Never Used    Tobacco comment: smoking x 50 years- ongoing    Substance and Sexual Activity    Alcohol use: No    Drug use: No    Sexual 1/23/2020) 60 tablet 1    loperamide (IMODIUM) 2 MG capsule Take 2 mg by mouth 4 times daily as needed for Diarrhea      prochlorperazine (COMPAZINE) 10 MG tablet Take 1 tablet by mouth every 6 hours as needed (Nausea) (Patient not taking: Reported on 1/23/2020) 120 tablet 3     No current facility-administered medications for this visit. Physical Exam:  /78   Pulse 67   Resp 18   Ht 5' 2\" (1.575 m)   Wt 140 lb (63.5 kg)   BMI 25.61 kg/m²   Wt Readings from Last 3 Encounters:   01/23/20 140 lb (63.5 kg)   10/28/19 138 lb 9.6 oz (62.9 kg)   10/15/19 136 lb (61.7 kg)     Appearance: Awake, alert, no acute respiratory distress  Skin: Intact, no rash  Head: Normocephalic, atraumatic  Eyes: EOMI, no conjunctival erythema  ENMT: No pharyngeal erythema, MMM, no rhinorrhea  Neck: Supple, no elevated JVP, has left carotid bruit. Lungs: Clear to auscultation bilaterally. No wheezes, rales, or rhonchi. Cardiac: Regular rate and rhythm, +S1S2, has ejection systolic murmur grade 3/6 heard over the right upper sternal border and also has mid diastolic murmur 2/6 over the apex.     Abdomen: Soft, nontender, +bowel sounds  Extremities: Moves all extremities x 4, no lower extremity edema  Neurologic: No focal motor deficits apparent, normal mood and affect  Peripheral Pulses: Intact posterior tibial pulses bilaterally    Laboratory Tests:  Lab Results   Component Value Date    CREATININE 1.4 (H) 10/28/2019    BUN 21 10/28/2019     10/28/2019    K 4.8 10/28/2019    CL 98 10/28/2019    CO2 33 (H) 10/28/2019     Lab Results   Component Value Date    MG 1.5 01/25/2019     Lab Results   Component Value Date    WBC 4.0 (L) 10/28/2019    HGB 12.6 10/28/2019    HCT 38.4 10/28/2019    MCV 98.0 10/28/2019     10/28/2019     Lab Results   Component Value Date    ALT 16 10/28/2019    AST 19 10/28/2019    ALKPHOS 72 10/28/2019    BILITOT 0.4 10/28/2019     Lab Results   Component Value Date    CKTOTAL 53 well controlled    Plan:   · Will repeat an echo to assess Mitral stenosis, aortic stenosis and AI severity  · She was advised to quit smoking and offered to pharmacological therapy and agreeable to start Chantix for smoking cessation   · Continue Simvastatin and rest of the current medications  · Will obtain carotid Doppler's results and lipid panel results from Dr. Jessica Ojeda office  · Follow up with me in 3 months      Thank you for allowing me to participate in your patient's care. Please feel free to contact me if you have any questions or concerns.     Olinda Saint, MD  [facility]) Cardiology

## 2020-02-26 ENCOUNTER — OFFICE VISIT (OUTPATIENT)
Dept: ONCOLOGY | Age: 70
End: 2020-02-26
Payer: MEDICARE

## 2020-02-26 ENCOUNTER — HOSPITAL ENCOUNTER (OUTPATIENT)
Dept: INFUSION THERAPY | Age: 70
Discharge: HOME | End: 2020-02-26
Payer: MEDICARE

## 2020-02-26 VITALS
WEIGHT: 143.7 LBS | HEART RATE: 71 BPM | SYSTOLIC BLOOD PRESSURE: 150 MMHG | OXYGEN SATURATION: 98 % | BODY MASS INDEX: 26.44 KG/M2 | TEMPERATURE: 98.5 F | DIASTOLIC BLOOD PRESSURE: 64 MMHG | HEIGHT: 62 IN

## 2020-02-26 DIAGNOSIS — C50.911 BREAST CANCER METASTASIZED TO AXILLARY LYMPH NODE, RIGHT (HCC): ICD-10-CM

## 2020-02-26 DIAGNOSIS — C77.3 BREAST CANCER METASTASIZED TO AXILLARY LYMPH NODE, RIGHT (HCC): ICD-10-CM

## 2020-02-26 LAB
ALBUMIN SERPL-MCNC: 4.2 G/DL (ref 3.5–5.2)
ALP BLD-CCNC: 72 U/L (ref 35–104)
ALT SERPL-CCNC: 12 U/L (ref 0–32)
ANION GAP SERPL CALCULATED.3IONS-SCNC: 10 MMOL/L (ref 7–16)
AST SERPL-CCNC: 15 U/L (ref 0–31)
BASOPHILS ABSOLUTE: 0.03 E9/L (ref 0–0.2)
BASOPHILS RELATIVE PERCENT: 0.9 % (ref 0–2)
BILIRUB SERPL-MCNC: 0.2 MG/DL (ref 0–1.2)
BUN BLDV-MCNC: 18 MG/DL (ref 8–23)
CALCIUM SERPL-MCNC: 9.3 MG/DL (ref 8.6–10.2)
CHLORIDE BLD-SCNC: 102 MMOL/L (ref 98–107)
CO2: 31 MMOL/L (ref 22–29)
CREAT SERPL-MCNC: 1.4 MG/DL (ref 0.5–1)
EOSINOPHILS ABSOLUTE: 0.27 E9/L (ref 0.05–0.5)
EOSINOPHILS RELATIVE PERCENT: 7.4 % (ref 0–6)
GFR AFRICAN AMERICAN: 45
GFR NON-AFRICAN AMERICAN: 37 ML/MIN/1.73
GLUCOSE BLD-MCNC: 172 MG/DL (ref 74–99)
HCT VFR BLD CALC: 39.9 % (ref 34–48)
HEMOGLOBIN: 12.8 G/DL (ref 11.5–15.5)
LYMPHOCYTES ABSOLUTE: 0.41 E9/L (ref 1.5–4)
LYMPHOCYTES RELATIVE PERCENT: 11.1 % (ref 20–42)
MCH RBC QN AUTO: 29.1 PG (ref 26–35)
MCHC RBC AUTO-ENTMCNC: 32.1 % (ref 32–34.5)
MCV RBC AUTO: 90.7 FL (ref 80–99.9)
MONOCYTES ABSOLUTE: 0.33 E9/L (ref 0.1–0.95)
MONOCYTES RELATIVE PERCENT: 9.3 % (ref 2–12)
NEUTROPHILS ABSOLUTE: 2.63 E9/L (ref 1.8–7.3)
NEUTROPHILS RELATIVE PERCENT: 71.3 % (ref 43–80)
PDW BLD-RTO: 13.7 FL (ref 11.5–15)
PLATELET # BLD: 135 E9/L (ref 130–450)
PMV BLD AUTO: 10.8 FL (ref 7–12)
POIKILOCYTES: ABNORMAL
POTASSIUM SERPL-SCNC: 3.8 MMOL/L (ref 3.5–5)
RBC # BLD: 4.4 E12/L (ref 3.5–5.5)
SODIUM BLD-SCNC: 143 MMOL/L (ref 132–146)
TEAR DROP CELLS: ABNORMAL
TOTAL PROTEIN: 7 G/DL (ref 6.4–8.3)
WBC # BLD: 3.7 E9/L (ref 4.5–11.5)

## 2020-02-26 PROCEDURE — 1123F ACP DISCUSS/DSCN MKR DOCD: CPT | Performed by: INTERNAL MEDICINE

## 2020-02-26 PROCEDURE — 99212 OFFICE O/P EST SF 10 MIN: CPT

## 2020-02-26 PROCEDURE — 4004F PT TOBACCO SCREEN RCVD TLK: CPT | Performed by: INTERNAL MEDICINE

## 2020-02-26 PROCEDURE — 3017F COLORECTAL CA SCREEN DOC REV: CPT | Performed by: INTERNAL MEDICINE

## 2020-02-26 PROCEDURE — G8400 PT W/DXA NO RESULTS DOC: HCPCS | Performed by: INTERNAL MEDICINE

## 2020-02-26 PROCEDURE — 99214 OFFICE O/P EST MOD 30 MIN: CPT | Performed by: INTERNAL MEDICINE

## 2020-02-26 PROCEDURE — G8417 CALC BMI ABV UP PARAM F/U: HCPCS | Performed by: INTERNAL MEDICINE

## 2020-02-26 PROCEDURE — G8427 DOCREV CUR MEDS BY ELIG CLIN: HCPCS | Performed by: INTERNAL MEDICINE

## 2020-02-26 PROCEDURE — 36415 COLL VENOUS BLD VENIPUNCTURE: CPT

## 2020-02-26 PROCEDURE — 85025 COMPLETE CBC W/AUTO DIFF WBC: CPT

## 2020-02-26 PROCEDURE — G8484 FLU IMMUNIZE NO ADMIN: HCPCS | Performed by: INTERNAL MEDICINE

## 2020-02-26 PROCEDURE — 4040F PNEUMOC VAC/ADMIN/RCVD: CPT | Performed by: INTERNAL MEDICINE

## 2020-02-26 PROCEDURE — 80053 COMPREHEN METABOLIC PANEL: CPT

## 2020-02-26 PROCEDURE — 1090F PRES/ABSN URINE INCON ASSESS: CPT | Performed by: INTERNAL MEDICINE

## 2020-02-26 NOTE — PROGRESS NOTES
on 08/02/2018. Cycle # 1 Dose-Dense Taxol was on 08/16/2018. Cycle # 2 Dose-Dense Taxol was on 08/30/2018. Cycle # 3 Dose Dense Taxol was on 09/13/2018. On 09/25/2018 Patient was admitted to the hospital for sepsis (due to complicated pyelonephritis with Klebsiella bacteremia), ZHAO bilateral ureteral stone and hydronephrosis s/p cysto/stent insertions 09/28/2018. Discharged on 10/01/2018 On Abx (cephalexin) for Klebsiella Bacteremia by ID team.     MRI Bilateral Breast 10/16/2018 noted Near complete response to therapy on the right with 3 residual right axillary lymph nodes demonstrating mild eccentric cortical thickening suggestive of neoplasm. Readmitted to the hospital on 10/27/2018 with thrombocytopenia, electrolyte disturbances, chills, fever, syncope, nausea and vomiting. Completed Abx    She underwent a removal of mediport, right breast simple mastectomy, blue dye injection, right axillary dissection on February 27, 2019. Pathological evaluation completed at [facility]):  A.  Mediport capsule and cord: Device identified. Benign fibroadipose tissue. B.  Right breast, mastectomy: Biopsy site; negative for residual carcinoma. Skin with seborrheic keratosis. Size of largest metastatic deposit-1.3 cm. C.  Right axillary contents, regional resection: 5 of 7 lymph nodes positive for metastatic poorly differentiated mammary carcinoma (grade 3).    Comment: The tumor cells in part C are immunoreactive with pankeratin.  The cells are negative for LCA.  Intradepartmental  consultation is obtained.     TNM classification (AJCC eighth edition)-  ypT0 N2a M0 TNBC   RT was completed on 05/24/2019. Adjuvant Xeloda was started on 06/04/2019. Significant fatigue, mouth sores, facial swelling/redness noted in August 2019. Patient decided to d/c the rest of remaining Xeloda on 08/26/2019 and wanted be on observation only.  Sx resolved after d/c Xeloda  She presented today 2/26/2020 for follow-up and is doing really well. Denies chest pain or shortness of breath. No nausea vomiting. No diarrhea. Seen by cardiology team recently with a 2D echo showing normal heart function and moderate aortic stenosis. Cardiologist told her to repeat a 2D echo in 1 year. Review of Systems;  CONSTITUTIONAL: No fever, chills. Fair appetite and energy level. ENMT: Eyes: No blurry vision Nose: No epistaxis. Mouth: No sore throat. RESPIRATORY: No hemoptysis, shortness of breath. CARDIOVASCULAR: No chest pain. See HPI. GASTROINTESTINAL: No N/V, abdominal pain. Intermittent diarrhea  GENITOURINARY: No dysuria, urinary frequency, hematuria. NEURO: No syncope, presyncope, headache. Remainder:  ROS NEGATIVE    Past Medical History:      Diagnosis Date    Acid reflux     Breast cancer ([address] 06/2018    right    H/O renal calculi     Hyperlipidemia     Hypertension     Insomnia     Pyelonephritis 2019    Seasonal allergies     Stroke [facility]) 2013    tia    Type 2 diabetes mellitus without complication (HCC)     UTI (urinary tract infection) 2018     Medications:  Reviewed and reconciled. Allergies: Allergies   Allergen Reactions    Avelox [Moxifloxacin] Hives     Physical Exam:  BP (!) 150/64 (Site: Left Upper Arm, Position: Sitting, Cuff Size: Medium Adult)   Pulse 71   Temp 98.5 °F (36.9 °C) (Temporal)   Ht 5' 2\" (1.575 m)   Wt 143 lb 11.2 oz (65.2 kg)   SpO2 98%   BMI 26.28 kg/m²   GENERAL: Alert, oriented x 3, not in acute distress. HEENT: PERRLA; EOMI. No sore throat. NECK: Supple. No palpable LN  LUNGS: Good air entry bilaterally. No wheezing, crackles or ronchi. BREASTS: Incision intact. No palpable masses or LN. CARDIOVASCULAR: Regular rate. No murmurs, rubs or gallops. ABDOMEN: Soft. Non-tender, non-distended. Positive bowel sounds. EXTREMITIES: Without clubbing, cyanosis, or edema. NEUROLOGIC: No focal deficits. ECOG PS 1    Impression/Plan:  72 y.o. female with Right Breast Cancer:     On hyperdense structure within the midline visceral space just inferior to the cricoid cartilage  This may represent a solid lesion arising from the thyroid gland. ENT team consulted. 06/05/2018 MRI Breast: 2.5 x 0.7 x 1.4 cm Irregular, enhancing mass in the right breast 12:00 with associated artifact from a metallic clip. At least 15 abnormal right axillary lymph nodes consistent with known metastatic disease. No evidence of neoplasm on the left. 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 Side effects of AC-T reviewed with patient. She agreed to proceed. Mediport was placed. 06/16/2018  2D-Echo: EF 60%. Cycle # 1 Dose-Dense AC was on 06/21/2018. Cycle # 4 Dose-Dense AC was on 08/02/2018. Cycle # 1 Dose-Dense Taxol was on 08/16/2018. Cycle # 2 Dose-Dense Taxol was on 08/30/2018. Cycle # 3 Dose Dense Taxol was on 09/13/2018. On 09/25/2018 Patient was admitted to the hospital for sepsis (due to complicated pyelonephritis with Klebsiella bacteremia), ZHAO bilateral ureteral stone and hydronephrosis s/p cysto/stent insertions 09/28/2018. Discharged on 10/01/2018 On Abx (cephalexin) for Klebsiella Bacteremia by ID team.   Due to the above and worsening peripheral neuropathy, we omitted last cycle of Taxol. MRI Bilateral Breast 10/16/2018 noted Near complete response to therapy on the right with 3 residual right axillary lymph nodes demonstrating mild eccentric cortical thickening suggestive of neoplasm. Readmitted to the hospital on 10/27/2018 with thrombocytopenia, electrolyte disturbances, chills, fever, syncope, nausea and vomiting. Completed Abx    Right simple mastectomy - blue dye inj - right axillary dissection) was on 12/19/2018  A.  Mediport capsule and cord: Device identified. Benign fibroadipose tissue. B.  Right breast, mastectomy: Biopsy site; negative for residual carcinoma.  Skin with seborrheic keratosis. Size of largest metastatic deposit-1.3 cm. C.  Right axillary contents, regional resection:   5 of 7 lymph nodes positive for metastatic poorly differentiated mammary carcinoma (grade 3).    Comment: The tumor cells in part C are immunoreactive with pankeratin.  The cells are negative for LCA.  Intradepartmental  consultation is obtained.     TNM classification (AJCC eighth edition)-  ypT0 N2a M0 TNBC   Pathology report discussed. RT was completed on 05/24/2019. We recommended adjuvant Xeloda bid 2 weeks on one week off for 6 cycles. Adjuvant Xeloda was started on 06/04/2019. Significant fatigue, mouth sores, facial swelling/redness noted in August 2019. Patient decided to d/c the rest of remaining Xeloda on 08/26/2019 and wanted be on observation only. Sx almost resolved after d/c Xeloda  Left screening mammogram 10/09/2019 Negative for Malignancy. She presented today 2/26/2020 for follow-up and is doing really well. Denies chest pain or shortness of breath. No nausea vomiting. No diarrhea. Seen by cardiology team recently with a 2D echo showing normal heart function and moderate aortic stenosis. Cardiologist told her to repeat a 2D echo in 1 year. No clinical evidence of breast cancer recurrence. Labs reviewed. RTC 4 months with labs.     Quinton Whiteside MD  Medical Oncologist  Board Certified, Medical Oncology  Board Certified, Internal Medicine  February 26, 2020

## 2020-03-30 ENCOUNTER — TELEPHONE (OUTPATIENT)
Dept: BREAST CENTER | Age: 70
End: 2020-03-30

## 2020-04-13 RX ORDER — MECLIZINE HCL 12.5 MG/1
1 TABLET ORAL 3 TIMES DAILY PRN
COMMUNITY
Start: 2020-04-09

## 2020-04-13 RX ORDER — ZOLPIDEM TARTRATE 10 MG/1
1 TABLET ORAL
COMMUNITY
Start: 2020-03-06

## 2020-04-14 ENCOUNTER — VIRTUAL VISIT (OUTPATIENT)
Dept: CARDIOLOGY CLINIC | Age: 70
End: 2020-04-14
Payer: MEDICARE

## 2020-04-14 VITALS — HEIGHT: 62 IN | WEIGHT: 141 LBS | BODY MASS INDEX: 25.95 KG/M2

## 2020-04-14 PROCEDURE — 99442 PR PHYS/QHP TELEPHONE EVALUATION 11-20 MIN: CPT | Performed by: INTERNAL MEDICINE

## 2020-04-14 RX ORDER — B-COMPLEX WITH VITAMIN C
TABLET ORAL
COMMUNITY

## 2020-04-14 RX ORDER — FAMOTIDINE 20 MG
TABLET ORAL
COMMUNITY

## 2020-04-14 RX ORDER — ACYCLOVIR 400 MG/1
1 TABLET ORAL PRN
COMMUNITY
Start: 2020-02-19

## 2020-04-14 NOTE — PATIENT INSTRUCTIONS
· She was advised to again to quit smoking and try Chantix for smoking cessation  · She was advised to drink minimum of 64 oz of fluid and monitor BP in supine and standing positions and call us with numbers to make sure she is not having orthostatic hypotension. · Continue rest of the current medications. · Echo results were discussed with the patient. · Follow up with me in 3 months.

## 2020-04-14 NOTE — PROGRESS NOTES
Ruben Lopez is a 72 y.o. female evaluated via telephone on 4/14/2020. Consent:  She and/or health care decision maker is aware that that she may receive a bill for this telephone service, depending on her insurance coverage, and has provided verbal consent to proceed: Yes      Documentation:  I communicated with the patient and/or health care decision maker about Hypertension and aortic stenosis. She is still smoking, she is did not Chantix but she has prescription at home. She denies any chest pain, sob or syncope. She has intermittent dizziness. Height 5' 2\" (1.575 m), weight 141 lb (64 kg), not currently breastfeeding. Details of this discussion including any medical advice provided:   · She was advised to again to quit smoking and try Chantix for smoking cessation  · She was advised to drink minimum of 64 oz of fluid and monitor BP in supine and standing positions and call us with numbers to make sure she is not having orthostatic hypotension. · Continue rest of the current medications. · Echo results were discussed with the patient. · Follow up with me in 3 months. I affirm this is a Patient Initiated Episode with an Established Patient who has not had a related appointment within my department in the past 7 days or scheduled within the next 24 hours.     Total Time: minutes: 11-20 minutes    Note: not billable if this call serves to triage the patient into an appointment for the relevant concern      Oren Montes

## 2020-06-24 ASSESSMENT — ENCOUNTER SYMPTOMS
NAUSEA: 0
EYE ITCHING: 0
BLOOD IN STOOL: 0
TROUBLE SWALLOWING: 0
CONSTIPATION: 0
DIARRHEA: 0
VOICE CHANGE: 0
COUGH: 0
ABDOMINAL DISTENTION: 0
SINUS PAIN: 0
VOMITING: 0
BACK PAIN: 0
SORE THROAT: 0
RHINORRHEA: 0
CHOKING: 0
EYE DISCHARGE: 0
SINUS PRESSURE: 0
SHORTNESS OF BREATH: 0
CHEST TIGHTNESS: 0
WHEEZING: 0
ABDOMINAL PAIN: 0

## 2020-06-24 NOTE — PROGRESS NOTES
metastatic disease. Bone scan 05/11/2018 noted no convincing evidence of metastatic disease.     PET/CT scan 05/23/2018:  Multiple enlarged hypermetabolic lymph nodes at level of the right axilla measuring up to 33 x 22 mm with hypermetabolic activity and SUV measuring up to 5.6. Enlarged lymph node located adjacent to posterior aspect of the right parotid gland which measures 12 x 10 mm with SUV of 4.8. Nodule associated with thyroid isthmus measuring 22 x 27 mm. This nodule is solid; however no FDG uptake within this lesion. Otherwise negative.     U/S guided fine needle aspiration of a right parotid tail on 05/30/2018:   Cytology: Negative for malignant cells. Sheets of benign-appearing oncocytic cells present in a background of mature lymphocytes. Pattern best fits with Warthin tumor. U/S guided fine needle aspiration of thyroid isthmus on 05/30/2018:  Negative for malignant cells. Benign-appearing follicular cells, foam cells, abundant colloid and blood present. These findings would be compatible with colloid nodule/nodular goiter.      06/10/2018 CT Soft Tissue: 2.3 x 1.7 cm solid lesion within the superficial tail of the right parotid gland. Question a focal solid neoplasm such as a Warthin's tumor. 2.5 x 2.0 cm hyperdense structure within the midline visceral space just inferior to the cricoid cartilage  This may represent a solid lesion arising from the thyroid gland. ENT team consulted. Seen by Dr. Kin Ayoub on 07/16/2018 in consultation. 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 She reports that the mass on the right has resolved and she does not want another surgery. 06/05/2018 MRI Breast: 2.5 x 0.7 x 1.4 cm Irregular, enhancing mass in the right breast 12:00 with associated artifact from a metallic clip. At least 15 abnormal right axillary lymph nodes consistent with known metastatic disease. No evidence of neoplasm on the left.     Stage IIIA T2 N2 M0 IDC Grade 3 Triple Negative Right Breast Cancer:    NACT per Medical Oncology, Amisha Qiu MD:  Dose-Dense AC-T. Cycle # 1 Dose-Dense AC was on 06/21/2018. Cycle # 4 Dose-Dense AC was on 08/02/2018. Cycle # 1 Dose-Dense Taxol was on 08/16/2018. Cycle # 2 Dose-Dense Taxol was on 08/30/2018. Cycle # 3 Dose Dense Taxol was on 09/13/2018.     On 09/25/2018 Patient was admitted to the hospital for sepsis (due to complicated pyelonephritis with Klebsiella bacteremia), ZHAO bilateral ureteral stone and hydronephrosis s/p cysto/stent insertions 09/28/2018. Discharged on 10/01/2018 On Abx (cephalexin) for Klebsiella Bacteremia by ID team.   Due to the above and worsening peripheral neuropathy, we omitted last cycle of Taxol.     MRI Bilateral Breast 10/16/2018 noted Near complete response to therapy on the right with 3 residual right axillary lymph nodes demonstrating mild eccentric cortical thickening suggestive of neoplasm.     Readmitted to the hospital on 10/27/2018 with thrombocytopenia, electrolyte disturbances, chills, fever, syncope, nausea and vomiting. Completed Abx    Right simple mastectomy - blue dye inj - right axillary dissection) was on 12/19/2018  A.  Mediport capsule and cord: Device identified. Benign fibroadipose tissue. B.  Right breast, mastectomy: Biopsy site; negative for residual carcinoma. Skin with seborrheic keratosis. Size of largest metastatic deposit-1.3 cm. C.  Right axillary contents, regional resection:   5 of 7 lymph nodes positive for metastatic poorly differentiated mammary carcinoma (grade 3).      Comment: The tumor cells in part C are immunoreactive with pankeratin.  The cells are negative for LCA.  Intradepartmental  consultation is obtained.     TNM classification (AJCC eighth edition)-  ypT0 N2a M0 TNBC     -RT was completed on 05/24/2019.  -Medical Oncology, Amisha Qiu MD recommended adjuvant Xeloda bid

## 2020-06-25 ENCOUNTER — OFFICE VISIT (OUTPATIENT)
Dept: BREAST CENTER | Age: 70
End: 2020-06-25
Payer: MEDICARE

## 2020-06-25 ENCOUNTER — HOSPITAL ENCOUNTER (OUTPATIENT)
Dept: INFUSION THERAPY | Age: 70
Discharge: HOME | End: 2020-06-25
Payer: MEDICARE

## 2020-06-25 ENCOUNTER — OFFICE VISIT (OUTPATIENT)
Dept: ONCOLOGY | Age: 70
End: 2020-06-25
Payer: MEDICARE

## 2020-06-25 VITALS
HEIGHT: 62 IN | SYSTOLIC BLOOD PRESSURE: 126 MMHG | TEMPERATURE: 97 F | WEIGHT: 146 LBS | DIASTOLIC BLOOD PRESSURE: 60 MMHG | BODY MASS INDEX: 26.87 KG/M2 | HEART RATE: 63 BPM | OXYGEN SATURATION: 97 % | RESPIRATION RATE: 20 BRPM

## 2020-06-25 VITALS
OXYGEN SATURATION: 97 % | BODY MASS INDEX: 26.67 KG/M2 | SYSTOLIC BLOOD PRESSURE: 142 MMHG | HEART RATE: 66 BPM | DIASTOLIC BLOOD PRESSURE: 85 MMHG | TEMPERATURE: 98 F | WEIGHT: 145.8 LBS

## 2020-06-25 DIAGNOSIS — C77.3 BREAST CANCER METASTASIZED TO AXILLARY LYMPH NODE, RIGHT (HCC): ICD-10-CM

## 2020-06-25 DIAGNOSIS — C50.911 BREAST CANCER METASTASIZED TO AXILLARY LYMPH NODE, RIGHT (HCC): ICD-10-CM

## 2020-06-25 LAB
ALBUMIN SERPL-MCNC: 4.5 G/DL (ref 3.5–5.2)
ALP BLD-CCNC: 82 U/L (ref 35–104)
ALT SERPL-CCNC: 19 U/L (ref 0–32)
ANION GAP SERPL CALCULATED.3IONS-SCNC: 11 MMOL/L (ref 7–16)
AST SERPL-CCNC: 20 U/L (ref 0–31)
BASOPHILS ABSOLUTE: 0.03 E9/L (ref 0–0.2)
BASOPHILS RELATIVE PERCENT: 0.5 % (ref 0–2)
BILIRUB SERPL-MCNC: 0.4 MG/DL (ref 0–1.2)
BUN BLDV-MCNC: 19 MG/DL (ref 8–23)
CALCIUM SERPL-MCNC: 10.2 MG/DL (ref 8.6–10.2)
CHLORIDE BLD-SCNC: 97 MMOL/L (ref 98–107)
CO2: 28 MMOL/L (ref 22–29)
CREAT SERPL-MCNC: 1.2 MG/DL (ref 0.5–1)
EOSINOPHILS ABSOLUTE: 0.27 E9/L (ref 0.05–0.5)
EOSINOPHILS RELATIVE PERCENT: 4.7 % (ref 0–6)
GFR AFRICAN AMERICAN: 54
GFR NON-AFRICAN AMERICAN: 44 ML/MIN/1.73
GLUCOSE BLD-MCNC: 223 MG/DL (ref 74–99)
HCT VFR BLD CALC: 43.1 % (ref 34–48)
HEMOGLOBIN: 14.4 G/DL (ref 11.5–15.5)
IMMATURE GRANULOCYTES #: 0.05 E9/L
IMMATURE GRANULOCYTES %: 0.9 % (ref 0–5)
LYMPHOCYTES ABSOLUTE: 0.75 E9/L (ref 1.5–4)
LYMPHOCYTES RELATIVE PERCENT: 13 % (ref 20–42)
MCH RBC QN AUTO: 29.5 PG (ref 26–35)
MCHC RBC AUTO-ENTMCNC: 33.4 % (ref 32–34.5)
MCV RBC AUTO: 88.3 FL (ref 80–99.9)
MONOCYTES ABSOLUTE: 0.65 E9/L (ref 0.1–0.95)
MONOCYTES RELATIVE PERCENT: 11.2 % (ref 2–12)
NEUTROPHILS ABSOLUTE: 4.03 E9/L (ref 1.8–7.3)
NEUTROPHILS RELATIVE PERCENT: 69.7 % (ref 43–80)
PDW BLD-RTO: 13.2 FL (ref 11.5–15)
PLATELET # BLD: 167 E9/L (ref 130–450)
PMV BLD AUTO: 10.5 FL (ref 7–12)
POTASSIUM SERPL-SCNC: 4.1 MMOL/L (ref 3.5–5)
RBC # BLD: 4.88 E12/L (ref 3.5–5.5)
SODIUM BLD-SCNC: 136 MMOL/L (ref 132–146)
TOTAL PROTEIN: 7.7 G/DL (ref 6.4–8.3)
WBC # BLD: 5.8 E9/L (ref 4.5–11.5)

## 2020-06-25 PROCEDURE — G8417 CALC BMI ABV UP PARAM F/U: HCPCS | Performed by: NURSE PRACTITIONER

## 2020-06-25 PROCEDURE — 4004F PT TOBACCO SCREEN RCVD TLK: CPT | Performed by: NURSE PRACTITIONER

## 2020-06-25 PROCEDURE — 36415 COLL VENOUS BLD VENIPUNCTURE: CPT

## 2020-06-25 PROCEDURE — 3017F COLORECTAL CA SCREEN DOC REV: CPT | Performed by: NURSE PRACTITIONER

## 2020-06-25 PROCEDURE — 4040F PNEUMOC VAC/ADMIN/RCVD: CPT | Performed by: NURSE PRACTITIONER

## 2020-06-25 PROCEDURE — 1090F PRES/ABSN URINE INCON ASSESS: CPT | Performed by: INTERNAL MEDICINE

## 2020-06-25 PROCEDURE — G8427 DOCREV CUR MEDS BY ELIG CLIN: HCPCS | Performed by: NURSE PRACTITIONER

## 2020-06-25 PROCEDURE — 4004F PT TOBACCO SCREEN RCVD TLK: CPT | Performed by: INTERNAL MEDICINE

## 2020-06-25 PROCEDURE — G8400 PT W/DXA NO RESULTS DOC: HCPCS | Performed by: INTERNAL MEDICINE

## 2020-06-25 PROCEDURE — 1090F PRES/ABSN URINE INCON ASSESS: CPT | Performed by: NURSE PRACTITIONER

## 2020-06-25 PROCEDURE — 4040F PNEUMOC VAC/ADMIN/RCVD: CPT | Performed by: INTERNAL MEDICINE

## 2020-06-25 PROCEDURE — 80053 COMPREHEN METABOLIC PANEL: CPT

## 2020-06-25 PROCEDURE — 1123F ACP DISCUSS/DSCN MKR DOCD: CPT | Performed by: NURSE PRACTITIONER

## 2020-06-25 PROCEDURE — 1123F ACP DISCUSS/DSCN MKR DOCD: CPT | Performed by: INTERNAL MEDICINE

## 2020-06-25 PROCEDURE — 99213 OFFICE O/P EST LOW 20 MIN: CPT | Performed by: NURSE PRACTITIONER

## 2020-06-25 PROCEDURE — G8400 PT W/DXA NO RESULTS DOC: HCPCS | Performed by: NURSE PRACTITIONER

## 2020-06-25 PROCEDURE — 85025 COMPLETE CBC W/AUTO DIFF WBC: CPT

## 2020-06-25 PROCEDURE — 99213 OFFICE O/P EST LOW 20 MIN: CPT

## 2020-06-25 PROCEDURE — G8428 CUR MEDS NOT DOCUMENT: HCPCS | Performed by: INTERNAL MEDICINE

## 2020-06-25 PROCEDURE — G8417 CALC BMI ABV UP PARAM F/U: HCPCS | Performed by: INTERNAL MEDICINE

## 2020-06-25 PROCEDURE — 3017F COLORECTAL CA SCREEN DOC REV: CPT | Performed by: INTERNAL MEDICINE

## 2020-06-25 PROCEDURE — 99214 OFFICE O/P EST MOD 30 MIN: CPT | Performed by: INTERNAL MEDICINE

## 2020-06-25 PROCEDURE — 99214 OFFICE O/P EST MOD 30 MIN: CPT | Performed by: NURSE PRACTITIONER

## 2020-06-25 NOTE — PROGRESS NOTES
Department of [facility] Oncology   Attending Clinic Note    Reason for Visit: Follow-up on a patient with Right Breast Cancer    PCP:  Ky Mensah MD    History of Present Illness: The mass was located in the 12 o'clock position of the right breast.     Breast cancer risk factors include age, gender, mother with breast cancer. Age of menarche was 16. Total hysterectomy at age 53. Patient was on hormonal therapy, patient unsure of how long. Patient is . Age of first live birth was 33. Patient did not breast feed.     Bilateral screening mammogram on 2018:  Probable 15 mm thick soft tissue mass in the 12:00 position of the right breast.     Right Diagnostic Mammogram on 2018:  there is an 18 mm mass in the 12:00 position of the right breast consistent with carcinoma until proven otherwise. There is also an abnormal right axillary lymph node. Right Breast U/S on 2018:  18 mm mass in the 12:00 position of the right breast consistent with carcinoma until proven otherwise. Enlarged right axillary lymph node. On 04/10/2018:  A. Right breast, core biopsy, slides for review ( A): Poorly differentiated ductal carcinoma, Breckenridge score 3+3+2 = 8.  B. Right lymph node, core biopsy, slides for review ( B):  Metastatic poorly differentiated carcinoma    Comment:   Per report from [facility]:  Estrogen receptor: Negative  Progesterone receptor: Negative  HER-2/jocelyn status: Negative (0)  Enclosed keratin immunohistochemical stains are positive    Stage IIIA T2 N2 M0 IDC Grade 3   Triple Negative Right Breast Cancer: We recommended neoadjuvant chemotherapy consisting of Dose-Dense AC-T. Side effects of AC-T reviewed with patient. She agreed to proceed. Mediport was placed. 2018 2D-Echo: EF 60%. Cycle # 1 Dose-Dense AC was on 2018. Cycle # 2 Dose-Dense AC was on 2018. Cycle # 3 Dose-Dense AC was on 2018.     Cycle # 4 Dose-Dense AC was report from [facility]:  Estrogen receptor: Negative  Progesterone receptor: Negative  HER-2/jocelyn status: Negative (0)  Enclosed keratin immunohistochemical stains are positive    CT chest 05/11/2018:  Nodule just above the thyroid measures 2 x 2.7 cm. Thyroid nodules range up to 10 mm. Right axillary lymph nodes range up to 1.9 x 3.1 cm. No significant mediastinal or hilar LN. 2.4 mm groundglass nodule in the lingula     CT abdomen/pelvis 05/11/2018 noted no evidence of metastatic disease. Bone scan 05/11/2018 noted no convincing evidence of metastatic disease. PET/CT scan 05/23/2018:  Multiple enlarged hypermetabolic lymph nodes at level of the right axilla measuring up to 33 x 22 mm with hypermetabolic activity and SUV measuring up to 5.6. Enlarged lymph node located adjacent to posterior aspect of the right parotid gland which measures 12 x 10 mm with SUV of 4.8. Nodule associated with thyroid isthmus measuring 22 x 27 mm. This nodule is solid; however no FDG uptake within this lesion. Otherwise negative. U/S guided fine needle aspiration of a right parotid tail on 05/30/2018:   Cytology: Negative for malignant cells. Sheets of benign-appearing oncocytic cells present in a background of mature lymphocytes. Pattern best fits with Warthin tumor. U/S guided fine needle aspiration of thyroid isthmus on 05/30/2018:  Negative for malignant cells. Benign-appearing follicular cells, foam cells, abundant colloid and blood present. These findings would be compatible with colloid nodule/nodular goiter. 06/10/2018 CT Soft Tissue: 2.3 x 1.7 cm solid lesion within the superficial tail of the right parotid gland. Question a focal solid neoplasm such as a Warthin's tumor. 2.5 x 2.0 cm hyperdense structure within the midline visceral space just inferior to the cricoid cartilage  This may represent a solid lesion arising from the thyroid gland. ENT team consulted.     06/05/2018 MRI Breast: 3).     Comment: The tumor cells in part C are immunoreactive with pankeratin.  The cells are negative for LCA.  Intradepartmental  consultation is obtained.     TNM classification (AJCC eighth edition)-  ypT0 N2a M0 TNBC   Pathology report discussed. RT was completed on 05/24/2019. We recommended adjuvant Xeloda bid 2 weeks on one week off for 6 cycles. Adjuvant Xeloda was started on 06/04/2019. Significant fatigue, mouth sores, facial swelling/redness noted in August 2019. Patient decided to d/c the rest of remaining Xeloda on 08/26/2019 and wanted be on observation only. Sx almost resolved after d/c Xeloda  Left screening mammogram 10/09/2019 Negative for Malignancy. No clinical evidence of breast cancer recurrence. Labs reviewed. RTC October 2020 with mammogram done same day and labs.     Argentina Tobar MD  Medical Oncologist  Board Certified, Medical Oncology  Board Certified, Internal Medicine  June 25, 2020

## 2020-06-29 ENCOUNTER — HOSPITAL ENCOUNTER (OUTPATIENT)
Dept: MRI IMAGING | Age: 70
Discharge: HOME | End: 2020-07-01
Payer: MEDICARE

## 2020-06-29 ENCOUNTER — TELEPHONE (OUTPATIENT)
Dept: BREAST CENTER | Age: 70
End: 2020-06-29

## 2020-06-29 PROCEDURE — 6360000004 HC RX CONTRAST MEDICATION: Performed by: RADIOLOGY

## 2020-06-29 PROCEDURE — A9577 INJ MULTIHANCE: HCPCS | Performed by: RADIOLOGY

## 2020-06-29 PROCEDURE — 70553 MRI BRAIN STEM W/O & W/DYE: CPT

## 2020-06-29 RX ADMIN — GADOBENATE DIMEGLUMINE 13 ML: 529 INJECTION, SOLUTION INTRAVENOUS at 09:42

## 2020-07-01 ENCOUNTER — TELEPHONE (OUTPATIENT)
Dept: BREAST CENTER | Age: 70
End: 2020-07-01

## 2020-08-06 ENCOUNTER — OFFICE VISIT (OUTPATIENT)
Dept: NEUROLOGY | Age: 70
End: 2020-08-06
Payer: MEDICARE

## 2020-08-06 VITALS
WEIGHT: 146 LBS | SYSTOLIC BLOOD PRESSURE: 153 MMHG | DIASTOLIC BLOOD PRESSURE: 70 MMHG | BODY MASS INDEX: 26.87 KG/M2 | TEMPERATURE: 98 F | HEIGHT: 62 IN

## 2020-08-06 PROBLEM — G62.0 CHEMOTHERAPY-INDUCED NEUROPATHY (HCC): Status: ACTIVE | Noted: 2020-08-06

## 2020-08-06 PROBLEM — T45.1X5A CHEMOTHERAPY-INDUCED NEUROPATHY (HCC): Status: ACTIVE | Noted: 2020-08-06

## 2020-08-06 PROCEDURE — 3017F COLORECTAL CA SCREEN DOC REV: CPT | Performed by: PSYCHIATRY & NEUROLOGY

## 2020-08-06 PROCEDURE — 1090F PRES/ABSN URINE INCON ASSESS: CPT | Performed by: PSYCHIATRY & NEUROLOGY

## 2020-08-06 PROCEDURE — 4040F PNEUMOC VAC/ADMIN/RCVD: CPT | Performed by: PSYCHIATRY & NEUROLOGY

## 2020-08-06 PROCEDURE — 4004F PT TOBACCO SCREEN RCVD TLK: CPT | Performed by: PSYCHIATRY & NEUROLOGY

## 2020-08-06 PROCEDURE — G8427 DOCREV CUR MEDS BY ELIG CLIN: HCPCS | Performed by: PSYCHIATRY & NEUROLOGY

## 2020-08-06 PROCEDURE — G8400 PT W/DXA NO RESULTS DOC: HCPCS | Performed by: PSYCHIATRY & NEUROLOGY

## 2020-08-06 PROCEDURE — G8417 CALC BMI ABV UP PARAM F/U: HCPCS | Performed by: PSYCHIATRY & NEUROLOGY

## 2020-08-06 PROCEDURE — 99204 OFFICE O/P NEW MOD 45 MIN: CPT | Performed by: PSYCHIATRY & NEUROLOGY

## 2020-08-06 PROCEDURE — 1123F ACP DISCUSS/DSCN MKR DOCD: CPT | Performed by: PSYCHIATRY & NEUROLOGY

## 2020-08-06 ASSESSMENT — ENCOUNTER SYMPTOMS
GASTROINTESTINAL NEGATIVE: 1
ALLERGIC/IMMUNOLOGIC NEGATIVE: 1
EYES NEGATIVE: 1
RESPIRATORY NEGATIVE: 1

## 2020-08-06 NOTE — PROGRESS NOTES
Neurology Consult Note:    Patient: Garth Ramey  : 1950  Date: 20  Referring provider: HAO Portillo    Referral to Neurology: Dizziness, episodic vertigo when turning to left, hx gait imbalance. History of diabetes mellitus, hx east cancer, s/p chemotherapy and radiation therapy in 2018. Dear Tamiko Castanedas you for your referral of Garth Ramey to the Neurology clinic, an alert 45-year-old woman referred for evaluation of episodic vertigo, dizziness and gait imbalance. Her recent MR brain scan showed no evidence of acute infarct, mass, mass-effect or hemorrhage but did show chronic periventricular subcortical white matter ischemia consistent with chronic microangiopathy or cerebral arterial sclerosis of moderate degree, likely related to her medical/vascular risk factors, nonspecific. She reports a 2-month history of intermittent dizziness and lightheadedness or episodic subjective vertigo occurring when she turns to the left or rolls over to the left side. She does have a history of chronic hearing loss and tinnitus. 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 She denies a spinning vertigo sensation, headache, nausea or vomiting, slurred speech, diplopia, dysphagia, tremor, facial droop, hemiparesis, hemisensory loss or other focal neurologic complaint. She does have underlying cervical spondylosis. She reports she has been seen by an ENT physician in [facility] before and has not followed up recently. She also reports a history of cataracts, affecting primarily her right eye. Past medical history: Hypertension, hyperlipidemia, diabetes, history of breast cancer. Lab Data:reviewed from 20, 20. Elevated blood glucose values, 223, creatinine 1.2, normal LFTs.     Imaging Data: MRI brain scan reviewed, 2020, with and without contrast; reviewed with patient. Diffuse atrophy likely age related    Findings compatible with small vessel ischemic changes. Findings compatible with a lacunar infarct, likely old.                 Current Outpatient Medications   Medication Sig Dispense Refill    acyclovir (ZOVIRAX) 400 MG tablet Take 1 tablet by mouth as needed      Vitamin B Complex-C CAPS Take by mouth      Vitamin D, Cholecalciferol, 25 MCG (1000 UT) CAPS Take by mouth      zolpidem (AMBIEN) 5 MG tablet Take 1 tablet by mouth.       meclizine (ANTIVERT) 12.5 MG tablet Take 1 tablet by mouth      metFORMIN (GLUCOPHAGE) 1000 MG tablet Take 1 tablet by mouth 2 times daily      Melatonin 10 MG TABS Take by mouth      varenicline (CHANTIX) 0.5 MG tablet Take 1-2 tablets by mouth See Admin Instructions 0.5mg DAILY for 3 days followed by 0.5mg TWICE DAILY for 4 days followed by 1mg TWICE DAILY 57 tablet 2    ondansetron (ZOFRAN) 4 MG tablet Take 1 tablet by mouth every 8 hours as needed for Nausea or Vomiting Take 1 tablet 30 minutes prior to Capecitabine 60 tablet 1    ranitidine (ZANTAC) 150 MG tablet Take 150 mg by mouth nightly as needed for Heartburn (severe heartburn)      loperamide (IMODIUM) 2 MG capsule Take 2 mg by mouth 4 times daily as needed for Diarrhea      escitalopram (LEXAPRO) 20 MG tablet Take 10 mg by mouth every evening       simvastatin (ZOCOR) 40 MG tablet Take 40 mg by mouth daily       dipyridamole-aspirin (AGGRENOX)  MG per extended release capsule Take 1 capsule by mouth 2 times daily To verify if needs to be held w Dr. Art Johnson linagliptin (TRADJENTA) 5 MG tablet Take 5 mg by mouth daily      atenolol-chlorthalidone (TENORETIC) 50-25 MG per tablet Take 1 tablet by mouth daily      lisinopril (PRINIVIL;ZESTRIL) 20 MG tablet Take 20 mg by mouth every evening       cetirizine (ZYRTEC ALLERGY) 10 MG tablet Take 10 mg by mouth daily       Current Facility-Administered Medications   Medication Dose Route Frequency 6/12/2018    PORT INSERTION performed by Rafael Bahena MD at [address] [place] [address] Ave Right     TUBAL LIGATION      TUNNELED CENTRAL VENOUS CATHETER W/ SUBCUTANEOUS PORT  02/27/2019    removed       Family History   Problem Relation Age of Onset    Cancer Mother [de-identified]        breast    Heart Disease Mother     Diabetes Mother     Hypertension Mother     Cancer Sister         lymphoma    Hypertension Father     Stroke Maternal Grandmother     Stroke Paternal Grandmother        Social History     Socioeconomic History    Marital status:      Spouse name: Dalton Board Number of children: 3    Years of education: Not on file    Highest education level: Not on file   Occupational History    Not on file   Social Needs    Financial resource strain: Not on file    Food insecurity     Worry: Not on file     Inability: Not on file   [place] needs     Medical: Not on file     Non-medical: Not on file   Tobacco Use    Smoking status: Current Every Day Smoker     Packs/day: 1.50     Years: 50.00     Pack years: 75.00     Types: Cigarettes    Smokeless tobacco: Never Used    Tobacco comment: smoking x 50 years- ongoing    Substance and Sexual Activity    Alcohol use: No    Drug use: No    Sexual activity: Yes     Partners: Male   Lifestyle    Physical activity     Days per week: Not on file     Minutes per session: Not on file    Stress: Not on file   Relationships    Social connections     Talks on phone: Not on file     Gets together: Not on file     Attends Protestant service: Not on file     Active member of club or organization: Not on file     Attends meetings of clubs or organizations: Not on file     Relationship status: Not on file    Intimate partner violence     Fear of current or ex partner: Not on file     Emotionally abused: Not on file     Physically abused: Not on file     Forced sexual activity: Not on file   Other Topics Concern    Not on file   Social History Narrative    Not on file     Review of Systems   Constitutional: Negative. HENT: Positive for hearing loss and tinnitus. Eyes: Negative. Respiratory: Negative. Cardiovascular: Negative. Gastrointestinal: Negative. Endocrine:        Type 2 diabetes mellitus   Genitourinary: Negative. Musculoskeletal: Positive for neck pain. Chronic posterior cervicalgia   Skin: Negative. Allergic/Immunologic: Negative. Neurological: Positive for dizziness, light-headedness and numbness. Hematological: Negative. Psychiatric/Behavioral: The patient is nervous/anxious. All other systems reviewed and are negative. Neurologic Exam:  BP (!) 153/70 (Site: Right Upper Arm, Position: Sitting, Cuff Size: Medium Adult)   Temp 98 °F (36.7 °C)   Ht 5' 2\" (1.575 m)   Wt 146 lb (66.2 kg)   BMI 26.70 kg/m²   General appearance: Alert, obese, cooperative, anxious, with nourished, groomed, seated on the exam table in the company of her spouse, no acute distress. HEENT: Normocephalic/atraumatic. Neck: Supple, no carotid bruits. Cardiac: RRR  Respiratory: grossly clear  Extremities: No edema, erythema or cyanosis  Skin: No apparent lesions or rashes  Musculoskeletal: No fasciculations or tremors  Mental Status: Alert, oriented x3  Speech/Language: Clear, grossly fluent, no paraphasic word errors.   Attention span/Concentration: Grossly intact  Affect/Mood: Anxious  Insight/Judgement: Appears fairly good     Fund of Knowledge/Current events: Grossly intact  CN II-XII:     Pupils: Equal, reactive to light, 1.4 mm     EOM's: Full without nystagmus  Visual Fields: Full to confrontation  Fundi: Miosis to light  CN V: normal V1-V3  CN VII: No facial droop  CN VIII: History of chronic hearing loss, tinnitus  CN IX-XII: Tongue midline  SCM/Trapezii: 5/5 power  Motor: 5/5 power in the upper and lower extremities without tremor or drift and normal motor tone without cogwheeling or spasticity, intact for motor function of both hands, symmetric. DTR's: 1+ and symmetric in the upper extremities, trace to 1+ patellar, trace absent ankle jerks, no ankle clonus, plantar responses are flexor. Sensory: Decreased sensation in a stocking distribution to below the level of the knees and reports grossly intact sensation in the upper extremities. Moderately reduced vibratory sensation at the ankles and mildly reduced at the wrist.  The above is consistent with a chronic sensorimotor peripheral polyneuropathy. Coordination/Gait: No gross limb dysmetria on finger-to-nose testing. No truncal or cerebellar gait ataxia. Assessment/Plan:  1. Chronic sensorimotor peripheral polyneuropathy affecting primarily the distal lower extremities, likely related to past chemotherapy and diabetes mellitus. 2.  Probable peripheral vestibulopathy affecting the left ear with underlying history of tinnitus and hearing loss. She is advised to follow-up with her ENT physician PRN. 3.  Additional lab tests that may be checked for neuropathy evaluation including vitamin H59 and folic acid level, vitamin B6 and thiamine levels, SPEP, ESR, RPR, magnesium level, TSH, free T4, Sjogren antibody test, however, patient did not wish me to order these laboratories for her but will discuss further with you and also indicated she would be starting a multivitamin with B complex. 4.  She was provided patient information on peripheral neuropathy and cerebral arterial sclerosis, stroke prevention and dizziness and vertigo. A vestibular exercise sheet was also provided to her. 5.  Medical management of her chronic medical conditions and significant vascular risk factors are otherwise advisable for secondary stroke risk reduction. If no absolute medical contraindications, then low-dose aspirin 81 mg daily would also be advisable. Sincerely,      Marcus Thomason MD    This note was created using speech recognition transcription software.  Despite proofreading, there may be several typographical errors present that may affect the meaning of the content. Please call with any questions. Note: More than 50% of this 40-minute face-face visit time included counseling and coordination of care based on clinical impression, review of test results, treatment plan, risk factor reduction and patient and/or family education.

## 2020-10-15 ENCOUNTER — HOSPITAL ENCOUNTER (OUTPATIENT)
Dept: INFUSION THERAPY | Age: 70
Discharge: HOME | End: 2020-10-15
Payer: MEDICARE

## 2020-10-15 ENCOUNTER — OFFICE VISIT (OUTPATIENT)
Dept: ONCOLOGY | Age: 70
End: 2020-10-15
Payer: MEDICARE

## 2020-10-15 VITALS
TEMPERATURE: 97.9 F | HEART RATE: 81 BPM | DIASTOLIC BLOOD PRESSURE: 61 MMHG | SYSTOLIC BLOOD PRESSURE: 139 MMHG | BODY MASS INDEX: 27.25 KG/M2 | HEIGHT: 62 IN | WEIGHT: 148.1 LBS | OXYGEN SATURATION: 97 %

## 2020-10-15 DIAGNOSIS — C50.111 MALIGNANT NEOPLASM OF CENTRAL PORTION OF RIGHT BREAST IN FEMALE, ESTROGEN RECEPTOR NEGATIVE (HCC): ICD-10-CM

## 2020-10-15 DIAGNOSIS — Z17.1 MALIGNANT NEOPLASM OF CENTRAL PORTION OF RIGHT BREAST IN FEMALE, ESTROGEN RECEPTOR NEGATIVE (HCC): ICD-10-CM

## 2020-10-15 LAB
ALBUMIN SERPL-MCNC: 4.5 G/DL (ref 3.5–5.2)
ALP BLD-CCNC: 77 U/L (ref 35–104)
ALT SERPL-CCNC: 16 U/L (ref 0–32)
ANION GAP SERPL CALCULATED.3IONS-SCNC: 11 MMOL/L (ref 7–16)
AST SERPL-CCNC: 15 U/L (ref 0–31)
BASOPHILS ABSOLUTE: 0.05 E9/L (ref 0–0.2)
BASOPHILS RELATIVE PERCENT: 0.8 % (ref 0–2)
BILIRUB SERPL-MCNC: 0.5 MG/DL (ref 0–1.2)
BUN BLDV-MCNC: 18 MG/DL (ref 8–23)
CALCIUM SERPL-MCNC: 10.1 MG/DL (ref 8.6–10.2)
CHLORIDE BLD-SCNC: 95 MMOL/L (ref 98–107)
CO2: 29 MMOL/L (ref 22–29)
CREAT SERPL-MCNC: 1.2 MG/DL (ref 0.5–1)
EOSINOPHILS ABSOLUTE: 0.19 E9/L (ref 0.05–0.5)
EOSINOPHILS RELATIVE PERCENT: 3.1 % (ref 0–6)
GFR AFRICAN AMERICAN: 54
GFR NON-AFRICAN AMERICAN: 44 ML/MIN/1.73
GLUCOSE BLD-MCNC: 337 MG/DL (ref 74–99)
HCT VFR BLD CALC: 43.7 % (ref 34–48)
HEMOGLOBIN: 15.1 G/DL (ref 11.5–15.5)
IMMATURE GRANULOCYTES #: 0.05 E9/L
IMMATURE GRANULOCYTES %: 0.8 % (ref 0–5)
LYMPHOCYTES ABSOLUTE: 0.77 E9/L (ref 1.5–4)
LYMPHOCYTES RELATIVE PERCENT: 12.5 % (ref 20–42)
MCH RBC QN AUTO: 30.3 PG (ref 26–35)
MCHC RBC AUTO-ENTMCNC: 34.6 % (ref 32–34.5)
MCV RBC AUTO: 87.6 FL (ref 80–99.9)
MONOCYTES ABSOLUTE: 0.63 E9/L (ref 0.1–0.95)
MONOCYTES RELATIVE PERCENT: 10.2 % (ref 2–12)
NEUTROPHILS ABSOLUTE: 4.48 E9/L (ref 1.8–7.3)
NEUTROPHILS RELATIVE PERCENT: 72.6 % (ref 43–80)
PDW BLD-RTO: 13.2 FL (ref 11.5–15)
PLATELET # BLD: 156 E9/L (ref 130–450)
PMV BLD AUTO: 10.8 FL (ref 7–12)
POTASSIUM SERPL-SCNC: 4 MMOL/L (ref 3.5–5)
RBC # BLD: 4.99 E12/L (ref 3.5–5.5)
SODIUM BLD-SCNC: 135 MMOL/L (ref 132–146)
TOTAL PROTEIN: 7.6 G/DL (ref 6.4–8.3)
WBC # BLD: 6.2 E9/L (ref 4.5–11.5)

## 2020-10-15 PROCEDURE — 1090F PRES/ABSN URINE INCON ASSESS: CPT | Performed by: INTERNAL MEDICINE

## 2020-10-15 PROCEDURE — G8417 CALC BMI ABV UP PARAM F/U: HCPCS | Performed by: INTERNAL MEDICINE

## 2020-10-15 PROCEDURE — 99212 OFFICE O/P EST SF 10 MIN: CPT

## 2020-10-15 PROCEDURE — 36415 COLL VENOUS BLD VENIPUNCTURE: CPT

## 2020-10-15 PROCEDURE — 3017F COLORECTAL CA SCREEN DOC REV: CPT | Performed by: INTERNAL MEDICINE

## 2020-10-15 PROCEDURE — 85025 COMPLETE CBC W/AUTO DIFF WBC: CPT

## 2020-10-15 PROCEDURE — 4040F PNEUMOC VAC/ADMIN/RCVD: CPT | Performed by: INTERNAL MEDICINE

## 2020-10-15 PROCEDURE — 1123F ACP DISCUSS/DSCN MKR DOCD: CPT | Performed by: INTERNAL MEDICINE

## 2020-10-15 PROCEDURE — G8427 DOCREV CUR MEDS BY ELIG CLIN: HCPCS | Performed by: INTERNAL MEDICINE

## 2020-10-15 PROCEDURE — G8400 PT W/DXA NO RESULTS DOC: HCPCS | Performed by: INTERNAL MEDICINE

## 2020-10-15 PROCEDURE — 4004F PT TOBACCO SCREEN RCVD TLK: CPT | Performed by: INTERNAL MEDICINE

## 2020-10-15 PROCEDURE — 80053 COMPREHEN METABOLIC PANEL: CPT

## 2020-10-15 PROCEDURE — G8484 FLU IMMUNIZE NO ADMIN: HCPCS | Performed by: INTERNAL MEDICINE

## 2020-10-15 PROCEDURE — 99214 OFFICE O/P EST MOD 30 MIN: CPT | Performed by: INTERNAL MEDICINE

## 2020-10-15 NOTE — PROGRESS NOTES
Department of [facility] Oncology   Attending Clinic Note    Reason for Visit: Follow-up on a patient with Right Breast Cancer    PCP:  Susanne Thomson    History of Present Illness: The mass was located in the 12 o'clock position of the right breast.     Breast cancer risk factors include age, gender, mother with breast cancer. Age of menarche was 16. Total hysterectomy at age 53. Patient was on hormonal therapy, patient unsure of how long. Patient is . Age of first live birth was 33. Patient did not breast feed.     Bilateral screening mammogram on 2018:  Probable 15 mm thick soft tissue mass in the 12:00 position of the right breast.     Right Diagnostic Mammogram on 2018:  there is an 18 mm mass in the 12:00 position of the right breast consistent with carcinoma until proven otherwise. There is also an abnormal right axillary lymph node. Right Breast U/S on 2018:  18 mm mass in the 12:00 position of the right breast consistent with carcinoma until proven otherwise. Enlarged right axillary lymph node. On 04/10/2018:  A. Right breast, core biopsy, slides for review ( A): Poorly differentiated ductal carcinoma, Bremond score 3+3+2 = 8.  B. 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 Side effects of AC-T reviewed with patient. She agreed to proceed. Mediport was placed. 2018 2D-Echo: EF 60%. Cycle # 1 Dose-Dense AC was on 2018. Cycle # 2 Dose-Dense AC was on 2018. Cycle # 3 Dose-Dense AC was on 2018.     Cycle # 4 Dose-Dense AC was on doing well. Fair appetite and energy level. No fever, chills. Denies chest pain or shortness of breath. Review of Systems;  CONSTITUTIONAL: No fever, chills. Fair appetite and energy level. ENMT: Eyes: No blurry vision Nose: No epistaxis. Mouth: No sore throat. RESPIRATORY: No hemoptysis, shortness of breath. CARDIOVASCULAR: No chest pain, SOB. GASTROINTESTINAL: No N/V, abdominal pain. GENITOURINARY: No dysuria, urinary frequency, hematuria. NEURO: No syncope, presyncope, headache. Remainder:  ROS NEGATIVE    Past Medical History:      Diagnosis Date    Acid reflux     Breast cancer ([facility] [address] 06/2018    right    H/O renal calculi     Hyperlipidemia     Hypertension     Insomnia     Pyelonephritis 2019    Seasonal allergies     Stroke [facility]) 2013    tia    Type 2 diabetes mellitus without complication (HCC)     UTI (urinary tract infection) 2018     Medications:  Reviewed and reconciled. Allergies: Allergies   Allergen Reactions    Avelox [Moxifloxacin] Hives     Physical Exam:  /61   Pulse 81   Temp 97.9 °F (36.6 °C)   Ht 5' 2\" (1.575 m)   Wt 148 lb 1.6 oz (67.2 kg)   SpO2 97%   BMI 27.09 kg/m²   GENERAL: Alert, oriented x 3, not in acute distress. HEENT: PERRLA; EOMI. No sore throat. NECK: Supple. No palpable LN  LUNGS: Good air entry bilaterally. No wheezing, crackles or ronchi. BREASTS: Incision intact. No palpable masses or LN. CARDIOVASCULAR: Regular rate. No murmurs, rubs or gallops. ABDOMEN: Soft. Non-tender, non-distended. Positive bowel sounds. EXTREMITIES: Without clubbing, cyanosis, or edema. NEUROLOGIC: No focal deficits. ECOG PS 1    Impression/Plan:  72 y.o. female with Right Breast Cancer: On 04/10/2018:  A.  Right breast, core biopsy: Poorly differentiated ductal carcinoma, Angel score 3+3+2 = 8.  B. Right lymph node, core biopsy:  Metastatic poorly differentiated carcinoma    Comment: Per report from [facility] laboratory:  Estrogen receptor: Negative  Progesterone receptor: Negative  HER-2/jocelyn status: Negative (0)  Enclosed keratin immunohistochemical stains are positive    CT chest 05/11/2018:  Nodule just above the thyroid measures 2 x 2.7 cm. Thyroid nodules range up to 10 mm. Right axillary lymph nodes range up to 1.9 x 3.1 cm. No significant mediastinal or hilar LN. 2.4 mm groundglass nodule in the lingula     CT abdomen/pelvis 05/11/2018 noted no evidence of metastatic disease. Bone scan 05/11/2018 noted no convincing evidence of metastatic disease. PET/CT scan 05/23/2018:  Multiple enlarged hypermetabolic lymph nodes at level of the right axilla measuring up to 33 x 22 mm with hypermetabolic activity and SUV measuring up to 5.6. Enlarged lymph node located adjacent to posterior aspect of the right parotid gland which measures 12 x 10 mm with SUV of 4.8. Nodule associated with thyroid isthmus measuring 22 x 27 mm. This nodule is solid; however no FDG uptake within this lesion. Otherwise negative. U/S guided fine needle aspiration of a right parotid tail on 05/30/2018:   Cytology: Negative for malignant cells. Sheets of benign-appearing oncocytic cells present in a background of mature lymphocytes. Pattern best fits with Warthin tumor. U/S guided fine needle aspiration of thyroid isthmus on 05/30/2018:  Negative for malignant cells. Benign-appearing follicular cells, foam cells, abundant colloid and blood present. These findings would be compatible with colloid nodule/nodular goiter. 06/10/2018 CT Soft Tissue: 2.3 x 1.7 cm solid lesion within the superficial tail of the right parotid gland. Question a focal solid neoplasm such as a Warthin's tumor. 2.5 x 2.0 cm hyperdense structure within the midline visceral space just inferior to the cricoid cartilage  This may represent a solid lesion arising from the thyroid gland. ENT team consulted.     06/05/2018 MRI Breast: 2.5 x 0.7 x 1.4 cm Irregular, enhancing mass in the right breast 12:00 with associated artifact from a metallic clip. At least 15 abnormal right axillary lymph nodes consistent with known metastatic disease. No evidence of neoplasm on the left. 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 Side effects of AC-T reviewed with patient. She agreed to proceed. Mediport was placed. 06/16/2018  2D-Echo: EF 60%. Cycle # 1 Dose-Dense AC was on 06/21/2018. Cycle # 4 Dose-Dense AC was on 08/02/2018. Cycle # 1 Dose-Dense Taxol was on 08/16/2018. Cycle # 2 Dose-Dense Taxol was on 08/30/2018. Cycle # 3 Dose Dense Taxol was on 09/13/2018. On 09/25/2018 Patient was admitted to the hospital for sepsis (due to complicated pyelonephritis with Klebsiella bacteremia), ZHAO bilateral ureteral stone and hydronephrosis s/p cysto/stent insertions 09/28/2018. Discharged on 10/01/2018 On Abx (cephalexin) for Klebsiella Bacteremia by ID team.   Due to the above and worsening peripheral neuropathy, we omitted last cycle of Taxol. MRI Bilateral Breast 10/16/2018 noted Near complete response to therapy on the right with 3 residual right axillary lymph nodes demonstrating mild eccentric cortical thickening suggestive of neoplasm. Readmitted to the hospital on 10/27/2018 with thrombocytopenia, electrolyte disturbances, chills, fever, syncope, nausea and vomiting. Completed Abx    Right simple mastectomy - blue dye inj - right axillary dissection) was on 12/19/2018  A.  Mediport capsule and cord: Device identified. Benign fibroadipose tissue. B.  Right breast, mastectomy: Biopsy site; negative for residual carcinoma. Skin with seborrheic keratosis. Size of largest metastatic deposit-1.3 cm. C.  Right axillary contents, regional resection:   5 of 7 lymph nodes positive for metastatic poorly differentiated mammary carcinoma (grade 3).      Comment: The tumor cells in part C are immunoreactive with pankeratin.  The cells are negative for LCA.  Intradepartmental  consultation is obtained.     TNM classification (AJCC eighth edition)-  ypT0 N2a M0 TNBC   Pathology report discussed. RT was completed on 05/24/2019. We recommended adjuvant Xeloda bid 2 weeks on one week off for 6 cycles. Adjuvant Xeloda was started on 06/04/2019. Significant fatigue, mouth sores, facial swelling/redness noted in August 2019. Patient decided to d/c the rest of remaining Xeloda on 08/26/2019 and wanted be on observation only. Sx almost resolved after d/c Xeloda  Left screening mammogram 10/09/2019 Negative for Malignancy. Left screening mammogram 10/15/2020 pending. No clinical evidence of breast cancer recurrence. Labs reviewed. RTC 4 months with labs.     Winsome Engle MD  Medical Oncologist  Board Certified, Medical Oncology  Board Certified, Internal Medicine  October 15, 2020

## 2020-10-22 ENCOUNTER — TELEPHONE (OUTPATIENT)
Dept: BREAST CENTER | Age: 70
End: 2020-10-22

## 2020-10-29 ENCOUNTER — HOSPITAL ENCOUNTER (OUTPATIENT)
Dept: GENERAL RADIOLOGY | Age: 70
Discharge: HOME | End: 2020-10-31
Payer: MEDICARE

## 2020-10-29 PROCEDURE — 77063 BREAST TOMOSYNTHESIS BI: CPT

## 2021-02-11 DIAGNOSIS — Z85.3 PERSONAL HISTORY OF BREAST CANCER: Primary | ICD-10-CM

## 2021-02-21 DIAGNOSIS — Z12.31 SCREENING MAMMOGRAM FOR HIGH-RISK PATIENT: Primary | ICD-10-CM

## 2021-02-22 ENCOUNTER — HOSPITAL ENCOUNTER (INPATIENT)
Age: 71
LOS: 2 days | Discharge: HOME | DRG: 065 | End: 2021-02-24
Attending: EMERGENCY MEDICINE | Admitting: INTERNAL MEDICINE
Payer: MEDICARE

## 2021-02-22 ENCOUNTER — CLINICAL DOCUMENTATION (OUTPATIENT)
Dept: ONCOLOGY | Age: 71
End: 2021-02-22

## 2021-02-22 ENCOUNTER — APPOINTMENT (OUTPATIENT)
Dept: ULTRASOUND IMAGING | Age: 71
DRG: 065 | End: 2021-02-22
Payer: MEDICARE

## 2021-02-22 ENCOUNTER — HOSPITAL ENCOUNTER (OUTPATIENT)
Dept: INFUSION THERAPY | Age: 71
Discharge: HOME | End: 2021-02-22
Payer: MEDICARE

## 2021-02-22 ENCOUNTER — APPOINTMENT (OUTPATIENT)
Dept: CT IMAGING | Age: 71
DRG: 065 | End: 2021-02-22
Payer: MEDICARE

## 2021-02-22 DIAGNOSIS — R29.90 STROKE-LIKE SYMPTOM: Primary | ICD-10-CM

## 2021-02-22 DIAGNOSIS — Z85.3 PERSONAL HISTORY OF BREAST CANCER: ICD-10-CM

## 2021-02-22 PROBLEM — I63.9 STROKE DETERMINED BY CLINICAL ASSESSMENT (HCC): Status: ACTIVE | Noted: 2021-02-22

## 2021-02-22 LAB
ALBUMIN SERPL-MCNC: 4.2 G/DL (ref 3.5–5.2)
ALP BLD-CCNC: 67 U/L (ref 35–104)
ALT SERPL-CCNC: 22 U/L (ref 0–32)
ANION GAP SERPL CALCULATED.3IONS-SCNC: 14 MMOL/L (ref 7–16)
APTT: 31.8 SEC (ref 24.5–35.1)
AST SERPL-CCNC: 23 U/L (ref 0–31)
BASOPHILS ABSOLUTE: 0.05 E9/L (ref 0–0.2)
BASOPHILS RELATIVE PERCENT: 0.6 % (ref 0–2)
BILIRUB SERPL-MCNC: 0.6 MG/DL (ref 0–1.2)
BUN BLDV-MCNC: 20 MG/DL (ref 8–23)
CALCIUM SERPL-MCNC: 9.9 MG/DL (ref 8.6–10.2)
CHLORIDE BLD-SCNC: 99 MMOL/L (ref 98–107)
CO2: 25 MMOL/L (ref 22–29)
CREAT SERPL-MCNC: 1.4 MG/DL (ref 0.5–1)
EOSINOPHILS ABSOLUTE: 0.24 E9/L (ref 0.05–0.5)
EOSINOPHILS RELATIVE PERCENT: 3 % (ref 0–6)
GFR AFRICAN AMERICAN: 45
GFR NON-AFRICAN AMERICAN: 37 ML/MIN/1.73
GLUCOSE BLD-MCNC: 284 MG/DL (ref 74–99)
HCT VFR BLD CALC: 44 % (ref 34–48)
HEMOGLOBIN: 14.2 G/DL (ref 11.5–15.5)
IMMATURE GRANULOCYTES #: 0.05 E9/L
IMMATURE GRANULOCYTES %: 0.6 % (ref 0–5)
INR BLD: 1
LYMPHOCYTES ABSOLUTE: 0.92 E9/L (ref 1.5–4)
LYMPHOCYTES RELATIVE PERCENT: 11.6 % (ref 20–42)
MCH RBC QN AUTO: 29 PG (ref 26–35)
MCHC RBC AUTO-ENTMCNC: 32.3 % (ref 32–34.5)
MCV RBC AUTO: 90 FL (ref 80–99.9)
METER GLUCOSE: 128 MG/DL (ref 74–99)
METER GLUCOSE: 214 MG/DL (ref 74–99)
MONOCYTES ABSOLUTE: 0.76 E9/L (ref 0.1–0.95)
MONOCYTES RELATIVE PERCENT: 9.6 % (ref 2–12)
NEUTROPHILS ABSOLUTE: 5.89 E9/L (ref 1.8–7.3)
NEUTROPHILS RELATIVE PERCENT: 74.6 % (ref 43–80)
PDW BLD-RTO: 13.1 FL (ref 11.5–15)
PLATELET # BLD: 214 E9/L (ref 130–450)
PMV BLD AUTO: 11.1 FL (ref 7–12)
POTASSIUM SERPL-SCNC: 3.5 MMOL/L (ref 3.5–5)
PROTHROMBIN TIME: 11.2 SEC (ref 9.3–12.4)
RBC # BLD: 4.89 E12/L (ref 3.5–5.5)
SODIUM BLD-SCNC: 138 MMOL/L (ref 132–146)
TOTAL PROTEIN: 7.5 G/DL (ref 6.4–8.3)
TROPONIN: <0.01 NG/ML (ref 0–0.03)
WBC # BLD: 7.9 E9/L (ref 4.5–11.5)

## 2021-02-22 PROCEDURE — 2580000003 HC RX 258: Performed by: STUDENT IN AN ORGANIZED HEALTH CARE EDUCATION/TRAINING PROGRAM

## 2021-02-22 PROCEDURE — 85730 THROMBOPLASTIN TIME PARTIAL: CPT

## 2021-02-22 PROCEDURE — 93005 ELECTROCARDIOGRAM TRACING: CPT | Performed by: NURSE PRACTITIONER

## 2021-02-22 PROCEDURE — 36415 COLL VENOUS BLD VENIPUNCTURE: CPT

## 2021-02-22 PROCEDURE — 82962 GLUCOSE BLOOD TEST: CPT

## 2021-02-22 PROCEDURE — 93880 EXTRACRANIAL BILAT STUDY: CPT

## 2021-02-22 PROCEDURE — 6360000002 HC RX W HCPCS: Performed by: FAMILY MEDICINE

## 2021-02-22 PROCEDURE — 99284 EMERGENCY DEPT VISIT MOD MDM: CPT

## 2021-02-22 PROCEDURE — 85610 PROTHROMBIN TIME: CPT

## 2021-02-22 PROCEDURE — 84484 ASSAY OF TROPONIN QUANT: CPT

## 2021-02-22 PROCEDURE — 80053 COMPREHEN METABOLIC PANEL: CPT

## 2021-02-22 PROCEDURE — 2060000000 HC ICU INTERMEDIATE R&B

## 2021-02-22 PROCEDURE — 93880 EXTRACRANIAL BILAT STUDY: CPT | Performed by: RADIOLOGY

## 2021-02-22 PROCEDURE — 70450 CT HEAD/BRAIN W/O DYE: CPT

## 2021-02-22 PROCEDURE — 6370000000 HC RX 637 (ALT 250 FOR IP): Performed by: FAMILY MEDICINE

## 2021-02-22 PROCEDURE — 85025 COMPLETE CBC W/AUTO DIFF WBC: CPT

## 2021-02-22 RX ORDER — ESCITALOPRAM OXALATE 10 MG/1
10 TABLET ORAL EVERY EVENING
Status: DISCONTINUED | OUTPATIENT
Start: 2021-02-22 | End: 2021-02-24 | Stop reason: HOSPADM

## 2021-02-22 RX ORDER — DEXTROSE MONOHYDRATE 50 MG/ML
100 INJECTION, SOLUTION INTRAVENOUS PRN
Status: DISCONTINUED | OUTPATIENT
Start: 2021-02-22 | End: 2021-02-23 | Stop reason: SDUPTHER

## 2021-02-22 RX ORDER — LABETALOL HYDROCHLORIDE 5 MG/ML
10 INJECTION, SOLUTION INTRAVENOUS EVERY 10 MIN PRN
Status: DISCONTINUED | OUTPATIENT
Start: 2021-02-22 | End: 2021-02-24 | Stop reason: HOSPADM

## 2021-02-22 RX ORDER — PROMETHAZINE HYDROCHLORIDE 25 MG/1
12.5 TABLET ORAL EVERY 6 HOURS PRN
Status: DISCONTINUED | OUTPATIENT
Start: 2021-02-22 | End: 2021-02-24 | Stop reason: HOSPADM

## 2021-02-22 RX ORDER — ZOLPIDEM TARTRATE 5 MG/1
5 TABLET ORAL NIGHTLY PRN
Status: DISCONTINUED | OUTPATIENT
Start: 2021-02-22 | End: 2021-02-24 | Stop reason: HOSPADM

## 2021-02-22 RX ORDER — NICOTINE POLACRILEX 4 MG
15 LOZENGE BUCCAL PRN
Status: DISCONTINUED | OUTPATIENT
Start: 2021-02-22 | End: 2021-02-23

## 2021-02-22 RX ORDER — CETIRIZINE HYDROCHLORIDE 10 MG/1
10 TABLET ORAL DAILY
Status: DISCONTINUED | OUTPATIENT
Start: 2021-02-23 | End: 2021-02-24 | Stop reason: HOSPADM

## 2021-02-22 RX ORDER — SODIUM CHLORIDE 0.9 % (FLUSH) 0.9 %
10 SYRINGE (ML) INJECTION PRN
Status: DISCONTINUED | OUTPATIENT
Start: 2021-02-22 | End: 2021-02-24 | Stop reason: HOSPADM

## 2021-02-22 RX ORDER — DEXTROSE MONOHYDRATE 25 G/50ML
12.5 INJECTION, SOLUTION INTRAVENOUS PRN
Status: DISCONTINUED | OUTPATIENT
Start: 2021-02-22 | End: 2021-02-23 | Stop reason: SDUPTHER

## 2021-02-22 RX ORDER — LOPERAMIDE HYDROCHLORIDE 2 MG/1
2 CAPSULE ORAL 4 TIMES DAILY PRN
Status: DISCONTINUED | OUTPATIENT
Start: 2021-02-22 | End: 2021-02-24 | Stop reason: HOSPADM

## 2021-02-22 RX ORDER — ASPIRIN AND DIPYRIDAMOLE 25; 200 MG/1; MG/1
1 CAPSULE, EXTENDED RELEASE ORAL 2 TIMES DAILY
Status: DISCONTINUED | OUTPATIENT
Start: 2021-02-22 | End: 2021-02-23

## 2021-02-22 RX ORDER — POLYETHYLENE GLYCOL 3350 17 G/17G
17 POWDER, FOR SOLUTION ORAL DAILY PRN
Status: DISCONTINUED | OUTPATIENT
Start: 2021-02-22 | End: 2021-02-24 | Stop reason: HOSPADM

## 2021-02-22 RX ORDER — 0.9 % SODIUM CHLORIDE 0.9 %
1000 INTRAVENOUS SOLUTION INTRAVENOUS ONCE
Status: COMPLETED | OUTPATIENT
Start: 2021-02-22 | End: 2021-02-22

## 2021-02-22 RX ORDER — SODIUM CHLORIDE 9 MG/ML
100 INJECTION, SOLUTION INTRAVENOUS CONTINUOUS
Status: DISCONTINUED | OUTPATIENT
Start: 2021-02-22 | End: 2021-02-24

## 2021-02-22 RX ORDER — SODIUM CHLORIDE 0.9 % (FLUSH) 0.9 %
10 SYRINGE (ML) INJECTION EVERY 12 HOURS SCHEDULED
Status: DISCONTINUED | OUTPATIENT
Start: 2021-02-22 | End: 2021-02-24 | Stop reason: HOSPADM

## 2021-02-22 RX ORDER — ONDANSETRON 2 MG/ML
4 INJECTION INTRAMUSCULAR; INTRAVENOUS EVERY 6 HOURS PRN
Status: DISCONTINUED | OUTPATIENT
Start: 2021-02-22 | End: 2021-02-24 | Stop reason: HOSPADM

## 2021-02-22 RX ORDER — ATORVASTATIN CALCIUM 80 MG/1
80 TABLET, FILM COATED ORAL NIGHTLY
Status: DISCONTINUED | OUTPATIENT
Start: 2021-02-22 | End: 2021-02-24 | Stop reason: HOSPADM

## 2021-02-22 RX ADMIN — ENOXAPARIN SODIUM 30 MG: 30 INJECTION SUBCUTANEOUS at 17:14

## 2021-02-22 RX ADMIN — ASPIRIN AND DIPYRIDAMOLE 1 CAPSULE: 25; 200 CAPSULE, EXTENDED RELEASE ORAL at 20:09

## 2021-02-22 RX ADMIN — ATORVASTATIN CALCIUM 80 MG: 80 TABLET, FILM COATED ORAL at 20:09

## 2021-02-22 RX ADMIN — SODIUM CHLORIDE 100 ML/HR: 9 INJECTION, SOLUTION INTRAVENOUS at 17:04

## 2021-02-22 RX ADMIN — LOPERAMIDE HYDROCHLORIDE 2 MG: 2 CAPSULE ORAL at 22:07

## 2021-02-22 RX ADMIN — ONDANSETRON 4 MG: 2 INJECTION INTRAMUSCULAR; INTRAVENOUS at 22:35

## 2021-02-22 RX ADMIN — INSULIN LISPRO 4 UNITS: 100 INJECTION, SOLUTION INTRAVENOUS; SUBCUTANEOUS at 19:16

## 2021-02-22 RX ADMIN — SODIUM CHLORIDE 1000 ML: 9 INJECTION, SOLUTION INTRAVENOUS at 12:33

## 2021-02-22 ASSESSMENT — PAIN SCALES - GENERAL
PAINLEVEL_OUTOF10: 0
PAINLEVEL_OUTOF10: 6

## 2021-02-22 NOTE — ED TRIAGE NOTES
FIRST PROVIDER CONTACT ASSESSMENT NOTE      Department of Emergency Medicine   ED  First Provider Note   2/22/21  10:14 AM EST    Chief Complaint: Aphasia (starting yesterday at 0900 has now gotten better but states still has a hard time with some words, states her tongue feels swollen and having a hard time swallowing normal; denies any extremity weakness; facial droop )      History of Present Illness:    Elenita Vega is a 80 y.o. female who presents to the ED by private car for dysphagia and facial droop that began yesterday and has since improved. Focused Screening Exam:  Constitutional:  Alert, appears stated age and is in no distress. Respirations easy nonlabored. Left facial droop with no drooling or airway compromise.     *ALLERGIES*     Avelox [moxifloxacin]     ED Triage Vitals   BP Temp Temp src Pulse Resp SpO2 Height Weight   02/22/21 1009 02/22/21 0940 -- 02/22/21 0940 02/22/21 0940 02/22/21 0940 -- --   105/61 96.6 °F (35.9 °C)  68 16 96 %          Initial Plan of Care:  Initiate Treatment-Testing, Proceed toTreatment Area When Bed Available for ED Attending/MLP to Continue Care    -----------------END OF FIRST PROVIDER CONTACT ASSESSMENT NOTE--------------  Electronically signed by GIULIANA Rodriguez CNP   DD: 2/22/21

## 2021-02-22 NOTE — H&P
Hospitalist History & Physical      PCP: Jose Eduardo Ricketts    Date of Admission: 2/22/2021    Date of Service: Pt seen/examined on 2/22/2021 and is admitted to Inpatient with expected LOS greater than two midnights due to medical therapy. Chief Complaint:  had concerns including Aphasia (starting yesterday at 0900 has now gotten better but states still has a hard time with some words, states her tongue feels swollen and having a hard time swallowing normal; denies any extremity weakness; facial droop ). History Of Present Illness:    Ms. Marcie Gee, a 80y.o. year old female  who  has a past medical history of Acid reflux, Breast cancer ([facility] [address] H/O renal calculi, Hyperlipidemia, Hypertension, Insomnia, Pyelonephritis, Seasonal allergies, Stroke ([address] Type 2 diabetes mellitus without complication ([address] and UTI (urinary tract infection). Briefly this is a 10year-old female with the past medical history of breast cancer, hypertension, hyperlipidemia, type 2 diabetes who presents to the emergency department today with complaints of aphasia that started about 9:00 in the morning yesterday and has gotten a little bit better but she still has some difficulty with finding words and she reports her tongue feels swollen. She denies any extremity weakness or change in sensorium. She has no facial droop. ER course: Patient with a CT head which was fairly unremarkable for acute changes but she did exhibit stable multifocal chronic areas of infarcts both lacunar and subcortical.  There is atrophy and chronic appearing periventricular leukomalacia that appears unchanged from a prior study. Her blood work is remarkable for serum creatinine of 1.4. Normal LFTs. Normal blood counts.       Past Medical History:   Diagnosis Date    Acid reflux     Breast cancer ([facility] [address] 06/2018    right    H/O renal calculi     Hyperlipidemia     Hypertension     Insomnia     Pyelonephritis 2019    Seasonal allergies varenicline (CHANTIX) 0.5 MG tablet Take 1-2 tablets by mouth See Admin Instructions 0.5mg DAILY for 3 days followed by 0.5mg TWICE DAILY for 4 days followed by 1mg TWICE DAILY  Patient not taking: Reported on 10/15/2020 1/23/20   Nina Mcghee MD   ondansetron (ZOFRAN) 4 MG tablet Take 1 tablet by mouth every 8 hours as needed for Nausea or Vomiting Take 1 tablet 30 minutes prior to Capecitabine  Patient not taking: Reported on 10/15/2020 6/24/19   Anthony Lawson MD   ranitidine (ZANTAC) 150 MG tablet Take 150 mg by mouth nightly as needed for Heartburn (severe heartburn)    Historical Provider, MD   loperamide (IMODIUM) 2 MG capsule Take 2 mg by mouth 4 times daily as needed for Diarrhea    Historical Provider, MD   escitalopram (LEXAPRO) 20 MG tablet Take 10 mg by mouth every evening     Historical Provider, MD   simvastatin (ZOCOR) 40 MG tablet Take 40 mg by mouth daily     Historical Provider, MD   dipyridamole-aspirin (AGGRENOX)  MG per extended release capsule Take 1 capsule by mouth 2 times daily To verify if needs to be held w Dr. Kerri Alexis    Historical Provider, MD   linagliptin (TRADJENTA) 5 MG tablet Take 5 mg by mouth daily    Historical Provider, MD   atenolol-chlorthalidone (TENORETIC) 50-25 MG per tablet Take 1 tablet by mouth daily    Historical Provider, MD   lisinopril (PRINIVIL;ZESTRIL) 20 MG tablet Take 20 mg by mouth every evening     Historical Provider, MD   cetirizine (ZYRTEC ALLERGY) 10 MG tablet Take 10 mg by mouth daily    Historical Provider, MD         Allergies: Avelox [moxifloxacin]    Social History:    TOBACCO:   reports that she has been smoking cigarettes. She has a 75.00 pack-year smoking history. She has never used smokeless tobacco.  ETOH:   reports no history of alcohol use. Family History:    Reviewed in detail and negative for DM, CAD, Cancer, CVA.  Positive as follows\"      Problem Relation Age of Onset    Cancer Mother [de-identified]        breast CRP 0.3 12/03/2018     D Dimer: No results found for: DDIMER   Folate and B12: No results found for: HHFHHVOF25, No results found for: FOLATE  Lactic Acid:   Lab Results   Component Value Date    LACTA 0.6 10/28/2018     Thyroid Studies:   Lab Results   Component Value Date    TSH 0.505 09/25/2018       Oupatient labs:  Lab Results   Component Value Date    CHOL 152 03/27/2017    TRIG 147 03/27/2017    HDL 60 03/27/2017    TSH 0.505 09/25/2018    INR 1.0 02/22/2021    LABA1C 7.9 (H) 03/27/2017       Urinalysis:    Lab Results   Component Value Date    NITRU Negative 01/25/2019    WBCUA 1-3 11/01/2018    BACTERIA RARE 11/01/2018    RBCUA 1-3 11/01/2018    BLOODU Negative 01/25/2019    SPECGRAV 1.010 01/25/2019    GLUCOSEU Negative 01/25/2019       Imaging:  Ct Head Wo Contrast    Result Date: 2/22/2021 EXAMINATION: CT OF THE HEAD WITHOUT CONTRAST  2/22/2021 11:38 am TECHNIQUE: CT of the head was performed without the administration of intravenous contrast. Dose modulation, iterative reconstruction, and/or weight based adjustment of the mA/kV was utilized to reduce the radiation dose to as low as reasonably achievable. COMPARISON: Brain MRI 06/29/2020 HISTORY: ORDERING SYSTEM PROVIDED HISTORY: facial droop and dysphagia, LKW yesterday TECHNOLOGIST PROVIDED HISTORY: Has a \"code stroke\" or \"stroke alert\" been called? ->No Reason for exam:->facial droop and dysphagia, LKW yesterday What reading provider will be dictating this exam?->CRC History of breast cancer with prior right mastectomy FINDINGS: No acute air-fluid level visualized in the included air-filled sinuses. Bone windows demonstrate no acute fracture. There is again ventricular and sulcal prominence compatible with age-appropriate global cerebrocortical atrophy. Nonspecific decreased attenuation regions in the periventricular white matter again likely represent periventricular leukomalacia from chronic small vessel ischemic gliosis (leukoarioasis). Findings are nonspecific, however, and differential diagnostic possibilities include normal aging process, hypertension, atherosclerotic vascular disease, encephalopathy, edema, or a demyelinating process. The brain contains no mass, mass effect, hemorrhage, or acute infarct. There is no extra-axial intracranial bleed or brain bleed. There is no midline shift. Stable multifocal hypodensities with slight encephalomalacia and volume loss are again most compatible with areas of old ischemia and or lacunar infarcts. Lacunar infarct unchanged in left cerebellar hemisphere. Cortical and subcortical infarcts appear unchanged in the bilateral frontoparietal lobe region and bilateral posterior occipital lobe region.

## 2021-02-22 NOTE — Clinical Note
Patient Class: Inpatient [101]   REQUIRED: Diagnosis: Stroke-like symptoms [450935]   Estimated Length of Stay: Estimated stay of more than 2 midnights   Admitting Provider:  Genie Erazo [[id number]]   Telemetry Bed Required?: Yes

## 2021-02-22 NOTE — PROGRESS NOTES
Pt in CT at time of room assignment given in the ED. Spoke with Abiel Morales in [facility] and made her aware that pt would be going to RM 37 when scans are complete. Section RN made aware that pt would need to be placed on the monitor.

## 2021-02-22 NOTE — ED PROVIDER NOTES
HPI:     Bella Lee is a 80 y.o. female presenting to the ED for aphasia, beginning 9 AM yesterday morning. The complaint has been intermittent, mild in severity, and worsened by nothing. Patient does have a history of prior CVA. States that she had a transient episode of aphasia yesterday approximately 9 AM which lasted few hours and spontaneously resolved. Patient states that she is also left-sided facial droop. This concerns her to come to the ER for further evaluation. Denies any headache, dizziness, fever, chest or, cough, nausea, vomiting, abdominal pain, diarrhea, constipation, urinary symptoms. Review of Systems:   Please see HPI above. All bolded are positive. All un-bolded are negative.     Constitutional Symptoms: fever, chills, fatigue, generalized weakness, diaphoresis, increase in thirst, loss of appetite  Eyes: vision change   Ears, Nose, Mouth, Throat: hearing loss, nasal congestion, sores in the mouth  Cardiovascular: chest pain, chest heaviness, palpitations  Respiratory: shortness of breath, wheezing, coughing  Gastrointestinal: abdominal pain, nausea, vomiting, diarrhea, constipation, melena, hematochezia, hematemesis  Genitourinary: dysuria, hematuria, increased frequency  Musculoskeletal: lower extremity edema, myalgias, arthralgias, back pain  Integumentary: rashes, itching   Neurological: headache, lightheadedness, dizziness, confusion, syncope, numbness, tingling, focal weakness, aphasia, facial droop  Psychiatric: depression, suicidal ideation, anxiety  Endocrine: unintentional weight change  Hematologic/Lymphatic: lymphadenopathy, easy bruising, easy bleeding   Allergic/Immunologic: recurrent infections            --------------------------------------------- PAST HISTORY --------------------------------------------- Platelets 510 383 - 218 E9/L    MPV 11.1 7.0 - 12.0 fL    Neutrophils % 74.6 43.0 - 80.0 %    Immature Granulocytes % 0.6 0.0 - 5.0 %    Lymphocytes % 11.6 (L) 20.0 - 42.0 %    Monocytes % 9.6 2.0 - 12.0 %    Eosinophils % 3.0 0.0 - 6.0 %    Basophils % 0.6 0.0 - 2.0 %    Neutrophils Absolute 5.89 1.80 - 7.30 E9/L    Immature Granulocytes # 0.05 E9/L    Lymphocytes Absolute 0.92 (L) 1.50 - 4.00 E9/L    Monocytes Absolute 0.76 0.10 - 0.95 E9/L    Eosinophils Absolute 0.24 0.05 - 0.50 E9/L    Basophils Absolute 0.05 0.00 - 0.20 E9/L   Comprehensive Metabolic Panel   Result Value Ref Range    Sodium 138 132 - 146 mmol/L    Potassium 3.5 3.5 - 5.0 mmol/L    Chloride 99 98 - 107 mmol/L    CO2 25 22 - 29 mmol/L    Anion Gap 14 7 - 16 mmol/L    Glucose 284 (H) 74 - 99 mg/dL    BUN 20 8 - 23 mg/dL    CREATININE 1.4 (H) 0.5 - 1.0 mg/dL    GFR Non-African American 37 >=60 mL/min/1.73    GFR African American 45     Calcium 9.9 8.6 - 10.2 mg/dL    Total Protein 7.5 6.4 - 8.3 g/dL    Albumin 4.2 3.5 - 5.2 g/dL    Total Bilirubin 0.6 0.0 - 1.2 mg/dL    Alkaline Phosphatase 67 35 - 104 U/L    ALT 22 0 - 32 U/L    AST 23 0 - 31 U/L   Protime-INR   Result Value Ref Range    Protime 11.2 9.3 - 12.4 sec    INR 1.0    Troponin   Result Value Ref Range    Troponin <0.01 0.00 - 0.03 ng/mL   APTT   Result Value Ref Range    aPTT 31.8 24.5 - 35.1 sec   EKG 12 Lead   Result Value Ref Range    Ventricular Rate 64 BPM    Atrial Rate 64 BPM    P-R Interval 164 ms    QRS Duration 82 ms    Q-T Interval 432 ms    QTc Calculation (Bazett) 445 ms    P Axis 31 degrees    R Axis 44 degrees    T Axis 93 degrees       RADIOLOGY:  Interpreted by Radiologist.  CT HEAD WO CONTRAST   Final Result   No acute CVA, intracranial bleed, or brain mass.    Atrophy and chronic appearing likely periventricular leukomalacia appear   unchanged   Stable multifocal chronic areas of infarcts, lacunar and subcortical      CTA NECK W CONTRAST    (Results Pending) CTA HEAD W CONTRAST    (Results Pending)       ------------------------- NURSING NOTES AND VITALS REVIEWED ---------------------------   The nursing notes within the ED encounter and vital signs as below have been reviewed. /69   Pulse 59   Temp 97.3 °F (36.3 °C)   Resp 17   SpO2 96%   Oxygen Saturation Interpretation: Normal      ---------------------------------------------------PHYSICAL EXAM--------------------------------------      Constitutional/General: Alert and oriented x3, well appearing, non toxic in NAD  Head: Normocephalic and atraumatic, left-sided facial droop noted, forehead is spared. Eyes: PERRL, EOMI  Mouth: Oropharynx clear, handling secretions, no trismus  Neck: Supple, full ROM,   Pulmonary: Lungs clear to auscultation bilaterally, no wheezes, rales, or rhonchi. Not in respiratory distress  Cardiovascular:  Regular rate and rhythm, no murmurs, gallops, or rubs. 2+ distal pulses  Abdomen: Soft, non tender, non distended,   Extremities: Moves all extremities x 4. Warm and well perfused  Skin: warm and dry without rash  Neurologic: GCS 15,  NIH Stroke Scale/Score at time of initial evaluation:  1A: Level of Consciousness 0 - alert; keenly responsive   1B: Ask Month and Age 0 - answers both questions correctly   1C:  Tell Patient To Open and Close Eyes, then Hand  Squeeze 0 - performs both tasks correctly   2: Test Horizontal Extraocular Movements 0 - normal   3: Test Visual Fields 0 - no visual loss   4: Test Facial Palsy 2 - partial paralysis (total or near total paralysis of the lower face)   5A: Test Left Arm Motor Drift 0 - no drift, limb holds 90 (or 45) degrees for full 10 seconds   5B: Test Right Arm Motor Drift 0 - no drift, limb holds 90 (or 45) degrees for full 10 seconds   6A: Test Left Leg Motor Drift 0 - no drift; leg holds 30 degree position for full 5 seconds   6B: Test Right Leg Motor Drift 0 - no drift; leg holds 30 degree position for full 5 seconds 7: Test Limb Ataxia   (FNF/Heel-Shin) 0 - absent   8: Test Sensation 0 - normal; no sensory loss   9: Test Language/Aphasia 0 - no aphasia, normal   10: Test Dysarthria 0 - normal   11: Test Extinction/Inattention 0 - no abnormality   Total 2     Psych: Normal Affect        EKG: This EKG is signed and interpreted by me. Rate: 64  Rhythm: Sinus  Interpretation: Normal sinus rhythm, no acute changes noted, no axis deviation,  ms  Comparison: stable as compared to patient's most recent EKG    ------------------------------ ED COURSE/MEDICAL DECISION MAKING----------------------  Medications   0.9 % sodium chloride infusion (has no administration in time range)   0.9 % sodium chloride bolus (1,000 mLs Intravenous New Bag 2/22/21 1233)         ED COURSE:       Medical Decision Making:    Patient presented to the ER today with chief complaint of aphasia left-sided facial droop. Aphasia has resolved. Patient is outside the window for TPA or natanael trial.  She is over 24 hours outside as her last known well was 9 AM.  At this time no dysphagia, however patient does have left-sided facial droop with sparing of the forehead. Labs are unremarkable, CT does demonstrate old infarcts. Did speak to Dr. Mercedes Dose is agreeable to admission. Patient agreeable as well. All questions have been answered. Counseling: The emergency provider has spoken with the patient and discussed todays results, in addition to providing specific details for the plan of care and counseling regarding the diagnosis and prognosis. Questions are answered at this time and they are agreeable with the plan.      --------------------------------- IMPRESSION AND DISPOSITION ---------------------------------    IMPRESSION  1.  Stroke-like symptom        New Prescriptions    No medications on file       DISPOSITION  Disposition: Admit to telemetry  Patient condition is stable

## 2021-02-22 NOTE — PROGRESS NOTES
Patient here for lab draw. C/o facial droop and slurred speech which started yesterday morning. Rojelio Ramos., NP, notified and here to see patient. Patient transferred to ER per wheelchair for further evaluation.  present. Report called to ER by Allan.

## 2021-02-23 ENCOUNTER — APPOINTMENT (OUTPATIENT)
Dept: CT IMAGING | Age: 71
DRG: 065 | End: 2021-02-23
Payer: MEDICARE

## 2021-02-23 ENCOUNTER — APPOINTMENT (OUTPATIENT)
Dept: MRI IMAGING | Age: 71
DRG: 065 | End: 2021-02-23
Payer: MEDICARE

## 2021-02-23 PROBLEM — I63.139 CEREBRAL INFARCTION DUE TO EMBOLISM OF CAROTID ARTERY (HCC): Status: ACTIVE | Noted: 2021-02-23

## 2021-02-23 PROBLEM — I65.22 LEFT CAROTID STENOSIS: Status: ACTIVE | Noted: 2021-02-23

## 2021-02-23 LAB
ANION GAP SERPL CALCULATED.3IONS-SCNC: 7 MMOL/L (ref 7–16)
BUN BLDV-MCNC: 23 MG/DL (ref 8–23)
CALCIUM SERPL-MCNC: 8.6 MG/DL (ref 8.6–10.2)
CHLORIDE BLD-SCNC: 104 MMOL/L (ref 98–107)
CHOLESTEROL, TOTAL: 106 MG/DL (ref 0–199)
CO2: 27 MMOL/L (ref 22–29)
CREAT SERPL-MCNC: 1.3 MG/DL (ref 0.5–1)
EKG ATRIAL RATE: 64 BPM
EKG P AXIS: 31 DEGREES
EKG P-R INTERVAL: 164 MS
EKG Q-T INTERVAL: 432 MS
EKG QRS DURATION: 82 MS
EKG QTC CALCULATION (BAZETT): 445 MS
EKG R AXIS: 44 DEGREES
EKG T AXIS: 93 DEGREES
EKG VENTRICULAR RATE: 64 BPM
GFR AFRICAN AMERICAN: 49
GFR NON-AFRICAN AMERICAN: 40 ML/MIN/1.73
GLUCOSE BLD-MCNC: 190 MG/DL (ref 74–99)
HBA1C MFR BLD: 11.6 % (ref 4–5.6)
HCT VFR BLD CALC: 37.6 % (ref 34–48)
HDLC SERPL-MCNC: 38 MG/DL
HEMOGLOBIN: 12.3 G/DL (ref 11.5–15.5)
LDL CHOLESTEROL CALCULATED: 28 MG/DL (ref 0–99)
MAGNESIUM: 1.6 MG/DL (ref 1.6–2.6)
MCH RBC QN AUTO: 29.2 PG (ref 26–35)
MCHC RBC AUTO-ENTMCNC: 32.7 % (ref 32–34.5)
MCV RBC AUTO: 89.3 FL (ref 80–99.9)
METER GLUCOSE: 180 MG/DL (ref 74–99)
METER GLUCOSE: 196 MG/DL (ref 74–99)
METER GLUCOSE: 203 MG/DL (ref 74–99)
METER GLUCOSE: 255 MG/DL (ref 74–99)
PDW BLD-RTO: 13 FL (ref 11.5–15)
PLATELET # BLD: 159 E9/L (ref 130–450)
PMV BLD AUTO: 11.1 FL (ref 7–12)
POTASSIUM REFLEX MAGNESIUM: 3.3 MMOL/L (ref 3.5–5)
RBC # BLD: 4.21 E12/L (ref 3.5–5.5)
SODIUM BLD-SCNC: 138 MMOL/L (ref 132–146)
TRIGL SERPL-MCNC: 201 MG/DL (ref 0–149)
VLDLC SERPL CALC-MCNC: 40 MG/DL
WBC # BLD: 6.1 E9/L (ref 4.5–11.5)

## 2021-02-23 PROCEDURE — 80061 LIPID PANEL: CPT

## 2021-02-23 PROCEDURE — 70551 MRI BRAIN STEM W/O DYE: CPT

## 2021-02-23 PROCEDURE — 99221 1ST HOSP IP/OBS SF/LOW 40: CPT | Performed by: PSYCHIATRY & NEUROLOGY

## 2021-02-23 PROCEDURE — 2060000000 HC ICU INTERMEDIATE R&B

## 2021-02-23 PROCEDURE — 6370000000 HC RX 637 (ALT 250 FOR IP): Performed by: FAMILY MEDICINE

## 2021-02-23 PROCEDURE — 83036 HEMOGLOBIN GLYCOSYLATED A1C: CPT

## 2021-02-23 PROCEDURE — 93010 ELECTROCARDIOGRAM REPORT: CPT | Performed by: INTERNAL MEDICINE

## 2021-02-23 PROCEDURE — 97161 PT EVAL LOW COMPLEX 20 MIN: CPT

## 2021-02-23 PROCEDURE — 92507 TX SP LANG VOICE COMM INDIV: CPT | Performed by: SPEECH-LANGUAGE PATHOLOGIST

## 2021-02-23 PROCEDURE — 2580000003 HC RX 258: Performed by: RADIOLOGY

## 2021-02-23 PROCEDURE — 97535 SELF CARE MNGMENT TRAINING: CPT

## 2021-02-23 PROCEDURE — 82962 GLUCOSE BLOOD TEST: CPT

## 2021-02-23 PROCEDURE — 83735 ASSAY OF MAGNESIUM: CPT

## 2021-02-23 PROCEDURE — 6360000002 HC RX W HCPCS: Performed by: FAMILY MEDICINE

## 2021-02-23 PROCEDURE — 99222 1ST HOSP IP/OBS MODERATE 55: CPT | Performed by: SURGERY

## 2021-02-23 PROCEDURE — 80048 BASIC METABOLIC PNL TOTAL CA: CPT

## 2021-02-23 PROCEDURE — 97165 OT EVAL LOW COMPLEX 30 MIN: CPT

## 2021-02-23 PROCEDURE — 92610 EVALUATE SWALLOWING FUNCTION: CPT | Performed by: SPEECH-LANGUAGE PATHOLOGIST

## 2021-02-23 PROCEDURE — 2580000003 HC RX 258: Performed by: STUDENT IN AN ORGANIZED HEALTH CARE EDUCATION/TRAINING PROGRAM

## 2021-02-23 PROCEDURE — 6370000000 HC RX 637 (ALT 250 FOR IP): Performed by: PSYCHIATRY & NEUROLOGY

## 2021-02-23 PROCEDURE — 92523 SPEECH SOUND LANG COMPREHEN: CPT | Performed by: SPEECH-LANGUAGE PATHOLOGIST

## 2021-02-23 PROCEDURE — 85027 COMPLETE CBC AUTOMATED: CPT

## 2021-02-23 PROCEDURE — 36415 COLL VENOUS BLD VENIPUNCTURE: CPT

## 2021-02-23 PROCEDURE — 2580000003 HC RX 258: Performed by: FAMILY MEDICINE

## 2021-02-23 PROCEDURE — 6370000000 HC RX 637 (ALT 250 FOR IP): Performed by: INTERNAL MEDICINE

## 2021-02-23 RX ORDER — NICOTINE POLACRILEX 4 MG
15 LOZENGE BUCCAL PRN
Status: DISCONTINUED | OUTPATIENT
Start: 2021-02-23 | End: 2021-02-24 | Stop reason: HOSPADM

## 2021-02-23 RX ORDER — DEXTROSE MONOHYDRATE 25 G/50ML
12.5 INJECTION, SOLUTION INTRAVENOUS PRN
Status: DISCONTINUED | OUTPATIENT
Start: 2021-02-23 | End: 2021-02-24 | Stop reason: HOSPADM

## 2021-02-23 RX ORDER — CLOPIDOGREL BISULFATE 75 MG/1
75 TABLET ORAL DAILY
Status: DISCONTINUED | OUTPATIENT
Start: 2021-02-23 | End: 2021-02-24 | Stop reason: HOSPADM

## 2021-02-23 RX ORDER — ASPIRIN 81 MG/1
81 TABLET ORAL DAILY
Status: DISCONTINUED | OUTPATIENT
Start: 2021-02-23 | End: 2021-02-24 | Stop reason: HOSPADM

## 2021-02-23 RX ORDER — DEXTROSE MONOHYDRATE 50 MG/ML
100 INJECTION, SOLUTION INTRAVENOUS PRN
Status: DISCONTINUED | OUTPATIENT
Start: 2021-02-23 | End: 2021-02-24 | Stop reason: HOSPADM

## 2021-02-23 RX ORDER — INSULIN GLARGINE 100 [IU]/ML
5 INJECTION, SOLUTION SUBCUTANEOUS NIGHTLY
Status: DISCONTINUED | OUTPATIENT
Start: 2021-02-23 | End: 2021-02-24 | Stop reason: HOSPADM

## 2021-02-23 RX ORDER — SODIUM CHLORIDE 0.9 % (FLUSH) 0.9 %
10 SYRINGE (ML) INJECTION ONCE
Status: COMPLETED | OUTPATIENT
Start: 2021-02-23 | End: 2021-02-23

## 2021-02-23 RX ADMIN — INSULIN LISPRO 4 UNITS: 100 INJECTION, SOLUTION INTRAVENOUS; SUBCUTANEOUS at 11:38

## 2021-02-23 RX ADMIN — ENOXAPARIN SODIUM 30 MG: 30 INJECTION SUBCUTANEOUS at 08:44

## 2021-02-23 RX ADMIN — INSULIN LISPRO 2 UNITS: 100 INJECTION, SOLUTION INTRAVENOUS; SUBCUTANEOUS at 08:45

## 2021-02-23 RX ADMIN — SODIUM CHLORIDE, PRESERVATIVE FREE 10 ML: 5 INJECTION INTRAVENOUS at 08:44

## 2021-02-23 RX ADMIN — ATORVASTATIN CALCIUM 80 MG: 80 TABLET, FILM COATED ORAL at 21:08

## 2021-02-23 RX ADMIN — INSULIN LISPRO 2 UNITS: 100 INJECTION, SOLUTION INTRAVENOUS; SUBCUTANEOUS at 16:59

## 2021-02-23 RX ADMIN — ESCITALOPRAM 10 MG: 10 TABLET, FILM COATED ORAL at 16:58

## 2021-02-23 RX ADMIN — ASPIRIN AND DIPYRIDAMOLE 1 CAPSULE: 25; 200 CAPSULE, EXTENDED RELEASE ORAL at 08:44

## 2021-02-23 RX ADMIN — CETIRIZINE HYDROCHLORIDE 10 MG: 10 TABLET, FILM COATED ORAL at 08:44

## 2021-02-23 RX ADMIN — SODIUM CHLORIDE 100 ML/HR: 9 INJECTION, SOLUTION INTRAVENOUS at 17:06

## 2021-02-23 RX ADMIN — SODIUM CHLORIDE, PRESERVATIVE FREE 10 ML: 5 INJECTION INTRAVENOUS at 08:45

## 2021-02-23 RX ADMIN — ASPIRIN 81 MG: 81 TABLET, COATED ORAL at 17:18

## 2021-02-23 RX ADMIN — ZOLPIDEM TARTRATE 5 MG: 5 TABLET ORAL at 21:08

## 2021-02-23 RX ADMIN — SODIUM CHLORIDE, PRESERVATIVE FREE 10 ML: 5 INJECTION INTRAVENOUS at 21:08

## 2021-02-23 RX ADMIN — INSULIN GLARGINE 5 UNITS: 100 INJECTION, SOLUTION SUBCUTANEOUS at 21:12

## 2021-02-23 ASSESSMENT — PAIN SCALES - GENERAL
PAINLEVEL_OUTOF10: 0
PAINLEVEL_OUTOF10: 0

## 2021-02-23 ASSESSMENT — ENCOUNTER SYMPTOMS
ALLERGIC/IMMUNOLOGIC NEGATIVE: 1
NAUSEA: 1
DIARRHEA: 1
VOMITING: 1
RESPIRATORY NEGATIVE: 1

## 2021-02-23 NOTE — PROGRESS NOTES
Physical Therapy    Physical Therapy Initial Assessment     Name: Dana Odonnell  : 1950  MRN: [id number]    Referring Provider:  Crow Mota MD    Date of Service: 2021    Evaluating PT:  Farbizio Shankar PT, DPT    Room #:  0683/3013-G  Diagnosis:  Stroke like symptoms  PMHx/PSHx:  HTN, DM2, Stroke , Breast cancer , R mastectomy 2019, Hyperlipidemia, UTI, IBS, Pyelonephritis, R rotator cuff repair  Procedure/Surgery:  MRI: Acute to subacute infarction posterior R frontal lobe  Precautions:  Falls, L facial droop, slurred speech  Equipment Needs:  None    SUBJECTIVE:    Pt lives with her  in a 2 story home with 5+6 stairs to enter and 1 rail(s). Bed is on 2nd floor and bath is on 2nd floor with flight and 1 rail(s). Pt ambulated with no AD PTA. Pt reported independent with ADLs. Pt is actively driving. OBJECTIVE:   Initial Evaluation  Date: 2021 Treatment  NA   AM-PAC 6 Clicks 56/41    Was pt agreeable to Eval/treatment? Yes     Does pt have pain? No c/o pain. Bed Mobility  Rolling: Independent  Supine to sit: Independent  Sit to supine: Independent  Scooting: Independent    Transfers Sit to stand: Independent  Stand to sit:  Independent  Stand pivot: Independent    Ambulation    300 feet with no AD with Supervision for assessment    Stair negotiation: ascended and descended  4 steps with 2 rail with Supervision    ROM BUE:  WFL  BLE:  WFL    Strength BUE:  4+/5  BLE:  4+5/    Balance Sitting EOB:  Independent  Dynamic Standing:  Independent/Supervision      Pt is A & O x 4  Sensation:  Pt denied numbness and tingling  Edema:  None noted    Vitals:  Heart Rate at rest 80 Heart Rate post session 87   SPO2 at rest 97% SPO2 post session 96%     Therapeutic Exercises:  NT    Patient education  Pt educated on purpose of PT assessment, importance of mobility, safety with mobility, transfers, gait    Patient response to education: Pt verbalized understanding Pt demonstrated skill Pt requires further education in this area   Yes  Yes with verbal cues Reinforcement as needed     ASSESSMENT:    Comments:  Patient cleared by RN and agreeable to treatment. Patient found in semi Sandhu's and able to perform bed mobility independently. Patient denied dizziness with positional change. Patient demonstrated bennett gait speed with equal stride length and steady on her feet. Patient able to ascend/descend stairs in reciprocal manner without difficulty. Patient asked to ambulate farther and taken around the unit. Patient assisted into the bathroom following assessment. Patient reported taking self to/from the bathroom all day. Patient with no acute PT needs. Treatment:  Patient practiced and was instructed in the following treatment:    ? Bed mobility: Verbal/tactile cues for sequencing BUEs/BLEs for safe technique with rolling/supine<>sit task. ? Transfer training: Verbal/tactile cues to facilitate proper hand placement, technique and safety during sit to stand task. ? Gait training: Verbal and tactile cues to facilitate upright posture and safety as well as provided with physical assistance to complete task. ? Therapeutic exercises: As noted above  ? Neuromuscular education: NT    Pt's/ family goals   1. To be able to go home. Patient and or family understand(s) diagnosis, prognosis, and plan of care. Yes     PLAN OF CARE:    Current Treatment Recommendations     [] Strengthening     [] ROM   [] Balance Training   [] Endurance Training   [] Transfer Training   [] Gait Training   [] Stair Training   [] Positioning   [] Safety and Education Training   [] Patient/Caregiver Education   [] HEP  [x] Other Outpatient speech if needed. PT orders will be discontinued as patient with no acute PT needs. Patient able to perform functional tasks assessed with good safety and with supervision or independently. Thank you for the opportunity to assist in the care of this patient. Time in  1410  Time out  1421    Total Treatment Time  0 minutes     Evaluation Time includes thorough review of current medical information, gathering information on past medical history/social history and prior level of function, completion of standardized testing/informal observation of tasks, assessment of data and education on plan of care and goals.     CPT codes:  [x] Low Complexity PT evaluation 56612  [] Moderate Complexity PT evaluation 89516  [] High Complexity PT evaluation 50285  [] PT Re-evaluation 43373  [] Gait training 74549 - minutes  [] Manual therapy 88429 - minutes  [] Therapeutic activities 63247 - minutes  [] Therapeutic exercises 82936 - minutes  [] Neuromuscular reeducation 37633 - minutes     Nishi Reyes, PT, DPT  License [id number]

## 2021-02-23 NOTE — CONSULTS
Cam Ramos is a 80 y.o. female       Chief Complaint   Patient presents with   Gwen Bob     starting yesterday at 0900 has now gotten better but states still has a hard time with some words, states her tongue feels swollen and having a hard time swallowing normal; denies any extremity weakness; facial droop        HPI:    This is a 81 y/o F with PMHx of GERD, stage III CKD, HLD, IBS, insomnia, pyelonephritis, type 2 DM, breast cancer treated with chemo and radiation,3 prior CVAs with most recent 5 years ago and no residual neurologic deficits. She presented 2/22/21 with CC of aphasia beginning   2/21/21 associated with swollen tongue and speech abnormalities that both resolved with benadryl in a few hours. She denies recent changes to dietary intake and only medication changes have been addition of vitamin C and culturelle for IBS. She states she had no issues with comprehension and no issues with getting intended words out, she spoke coherently just dysarthric. Simultaneously she noticed a L sided facial droop  involving both the upper and lower face with lower left facial numbness as well as dysphagia and choking while eating that has continued throughout her hospital stay. She also endorses L sided vision changes yesterday described as blurred compared to the right eye that have since improved. She recalls 2 recent falls associated with dizziness/lightheadedness in the last few weeks. She denies extremity weakness but endorses numbness, tingling, and decreased motor function of right digits 2-4 that has been a chronic issue since chemotherapy. Last night she experienced profound nausea with vomiting and diarrhea, as well as a posterior occipital headache this morning while in the MRI machine. Prior to Visit Medications    Medication Sig Taking?  Authorizing Provider dipyridamole-aspirin (AGGRENOX)  MG per extended release capsule Take 1 capsule by mouth 2 times daily To verify if needs to be held w Dr. Tracy Martin Yes Historical Provider, MD   acyclovir (ZOVIRAX) 400 MG tablet Take 1 tablet by mouth as needed  Historical Provider, MD   Vitamin B Complex-C CAPS Take by mouth  Historical Provider, MD   Vitamin D, Cholecalciferol, 25 MCG (1000 UT) CAPS Take by mouth  Historical Provider, MD   zolpidem (AMBIEN) 10 MG tablet Take 1 tablet by mouth.    Historical Provider, MD   meclizine (ANTIVERT) 12.5 MG tablet Take 1 tablet by mouth 3 times daily as needed   Historical Provider, MD   metFORMIN (GLUCOPHAGE) 1000 MG tablet Take 1 tablet by mouth 2 times daily  Historical Provider, MD   Melatonin 10 MG TABS Take by mouth  Historical Provider, MD   ranitidine (ZANTAC) 150 MG tablet Take 150 mg by mouth nightly as needed for Heartburn (severe heartburn)  Historical Provider, MD   loperamide (IMODIUM) 2 MG capsule Take 2 mg by mouth 4 times daily as needed for Diarrhea  Historical Provider, MD   escitalopram (LEXAPRO) 20 MG tablet Take 10 mg by mouth every evening   Historical Provider, MD   simvastatin (ZOCOR) 40 MG tablet Take 40 mg by mouth daily   Historical Provider, MD   atenolol-chlorthalidone (TENORETIC) 50-25 MG per tablet Take 1 tablet by mouth daily  Historical Provider, MD   lisinopril (PRINIVIL;ZESTRIL) 20 MG tablet Take 20 mg by mouth every evening   Historical Provider, MD   cetirizine (ZYRTEC ALLERGY) 10 MG tablet Take 10 mg by mouth daily  Historical Provider, MD     Social History     Tobacco Use    Smoking status: Current Every Day Smoker     Packs/day: 1.50     Years: 50.00     Pack years: 75.00     Types: Cigarettes    Smokeless tobacco: Never Used    Tobacco comment: smoking x 50 years- ongoing    Substance Use Topics    Alcohol use: No    Drug use: No     Family History   Problem Relation Age of Onset    Cancer Mother [de-identified]        breast  Heart Disease Mother     Diabetes Mother     Hypertension Mother     Cancer Sister         lymphoma    Hypertension Father     Stroke Maternal Grandmother     Stroke Paternal Grandmother      Past Surgical History:   Procedure Laterality Date    BREAST BIOPSY Right     CHOLECYSTECTOMY      HYSTERECTOMY, TOTAL ABDOMINAL      MASTECTOMY, MODIFIED RADICAL Right 2/27/2019    REMOVAL OF MEDIPORT, RIGHT BREAST SIMPLE MASTECTOMY, BLUE DYE INJECTION, RIGHT AXILLARY DISSECTION performed by Yecenia Aguilera MD at [facility] W/LITHOTRIPSY &INDWELL STENT INSRT Bilateral 9/28/2018    CYSTOSCOPY BILATERAL STENT INSERTION performed by Mariana Marie MD at [facility] CYSTO/URETERO W/LITHOTRIPSY &INDWELL STENT INSRT Bilateral 10/25/2018    CYSTOSCOPY RETROGRADE PYELOGRAM URETEROSCOPY J STENT LASER LITHOTRIPSY BILATERAL performed by Mariana Marie MD at [facility]  CTR VAD W/SUBQ PORT AGE 5 YR/> N/A 6/12/2018    PORT INSERTION performed by Jemal Woods MD at [address] Right     TUBAL LIGATION      TUNNELED CENTRAL VENOUS CATHETER W/ SUBCUTANEOUS PORT  02/27/2019    removed     Past Medical History:   Diagnosis Date    Acid reflux     Breast cancer ([address] 06/2018    right    H/O renal calculi     Hyperlipidemia     Hypertension     IBS (irritable bowel syndrome)     Insomnia     Pyelonephritis 2019    Seasonal allergies     Stroke [facility]) 2013    tia    Type 2 diabetes mellitus without complication (HCC)     UTI (urinary tract infection) 2018     Review of Systems   Constitutional: Negative. HENT: Negative. Eyes: Positive for visual disturbance. Respiratory: Negative. Cardiovascular: Positive for palpitations. Gastrointestinal: Positive for diarrhea, nausea and vomiting. Endocrine: Negative. Genitourinary: Negative. Musculoskeletal: Negative. Skin: Negative. Allergic/Immunologic: Negative. Neurological: Positive for dizziness, facial asymmetry, speech difficulty, light-headedness, numbness and headaches. Psychiatric/Behavioral: Negative. Objective:   /71   Pulse 68   Temp 98 °F (36.7 °C) (Temporal)   Resp 16   Ht 5' 2\" (1.575 m)   Wt 148 lb (67.1 kg)   SpO2 97%   BMI 27.07 kg/m²     Physical Exam  Constitutional:       Appearance: Normal appearance. She is normal weight. HENT:      Head: Normocephalic and atraumatic. Eyes:      Extraocular Movements: Extraocular movements intact. Conjunctiva/sclera: Conjunctivae normal.      Pupils: Pupils are equal, round, and reactive to light. Neck:      Musculoskeletal: Normal range of motion and neck supple. Cardiovascular:      Rate and Rhythm: Normal rate and regular rhythm. Pulses: Normal pulses. Heart sounds: Normal heart sounds. Pulmonary:      Effort: Pulmonary effort is normal.      Breath sounds: Normal breath sounds. Abdominal:      General: Abdomen is flat. Bowel sounds are normal.      Palpations: Abdomen is soft. Neurological:      Mental Status: She is oriented to person, place, and time. Cranial Nerves: Dysarthria present. Sensory: Sensory deficit present. Neurological Exam  Mental Status   Oriented to person, place, time and situation. Recent and remote memory are intact. Mild dysarthria present. Language is fluent with no aphasia. Fund of knowledge is appropriate for level of education. Apraxia absent. Cranial Nerves  CN III, IV, VI: Extraocular movements intact bilaterally. Pupils equal round and reactive to light bilaterally.       Laboratory/Radiology:     {LABS:3WBC/Hgb/Hct/Plts:  6.1/12.3/37.6/159 (02/23 0419) WBC/Hgb/Hct/Plts:  6.1/12.3/37.6/159 (02/23 0419)     Lab Results   Component Value Date     02/22/2021    K 3.5 02/22/2021    K 4.2 10/28/2018    CL 99 02/22/2021    CO2 25 02/22/2021    BUN 20 02/22/2021    CREATININE 1.4 02/22/2021 GLUCOSE 284 02/22/2021    GLUCOSE 432 09/24/2018    CALCIUM 9.9 02/22/2021        CT head: no acute CVA, intracranial bleed, or masses. Atrophy, chronic periventricualr leukomalacia unchanged. MRI brain:acute/subacute infarction in posterior right frontal lobe. Old small scattered infarcts of BL frontal parietal lobes, right occipital lobe, BL cerebellar hemispheres, old lacunar infarcts, R thalamus and L mid cerebellar peduncle. Volume loss BL parietal lobe. Chronic microvascular disease    CTAs head/neck: P CKD and reduced GFR    Carotid US: L 50-69% stenosis  I independently reviewed the labs and imaging studies at today's appointment. Assessment:   1. MCA CVA of posterior right frontal lobe  2.embolic strokes- multiple sites involved          Plan:   1. MRA to definitively distinguish thrombotic vs embolic stroke   2. Continue ASA-dipyridamole  3. Continue statin  4. Continue work-up for embolic source with   - repeat echo  -LOYDA   -o/p event monitor  4. On tele- currently in [facility] AT [facility]  10:53 AM  2/23/2021    I personally performed history and physical. Reviewed relevant historical and clinical data and images. Note written in conjunction with medical student and edited to reflect my findings and clinical reasoning. Multiple previous gilliam in various vascular distributions. Obtain MRAs  DAPT  Echo potential LOYDA. Event monitor.    Smoking cessation is critical.  Octavio Storm MD

## 2021-02-23 NOTE — PROGRESS NOTES
SPEECH/LANGUAGE PATHOLOGY  SPEECH/LANGUAGE/COGNITIVE EVALUATION      PATIENT NAME:  Rajendra Garner      :  1950          TODAY'S DATE:  2021 ROOM:  [address] [place] [address]     ADMITTING DIAGNOSIS: Stroke-like symptoms [R29.90]  Stroke determined by clinical assessment ([facility] [I63.9]    SPEECH PATHOLOGY DIAGNOSIS:    Mild dysarthria; Cognition & Rec/Exp. Language WFL    THERAPY RECOMMENDATIONS:   Speech Pathology intervention is recommended 3-6 times per week for LOS or when goals are met with emphasis on the following:     Speech intelligibility via compensatory strategies and oral motor exercises (overemphasize sounds, reduce speaking rate, increase vocal loudness)               MOTOR SPEECH       Oral Peripheral Examination   Left labiobuccal weakness    Parameters of Speech Production  Respiration:  Adequate for speech production  Articulation:  Distortion  Resonance:  Within functional limits  Quality:   Within functional limits  Pitch: Within functional limits  Intensity: Within functional limits  Fluency:  Intact  Prosody Intact    RECEPTIVE LANGUAGE    Comprehension of Yes/No Questions:    Within functional limits    Process  Simple Verbal Commands:   Within functional limits  Process Intermediate Verbal Commands:   Within functional limits  Process Complex Verbal Commands:     Within functional limits    Comprehension of Conversation:      Within functional limits      EXPRESSIVE LANGUAGE     Serials: Functional    Imitation:  Words   Functional     Naming:  (Modality used:  Verbal)  Confrontation Naming  Functional  Functional Description  Functional  Response Naming: Functional    Conversation:      Conversation was within functional limits    COGNITION     Attention/Orientation  Attention: Sustained attention   Orientation:  Oriented to Person, Place, Date, Reason for hospitalization    Memory   Immediate Recall: Repeated 3/3    Delayed Recall:   Recalled 3/3 Long Term Recall:   Recalled Birthdate, Age and Family    Organization/Problem Solving/Reasoning   Verbal Sequencing:   Functional        Verbal Problem solving:   Functional          CLINICAL OBSERVATIONS NOTED DURING THE EVALUATION  Within functional limits; Pt expressed empathy for roommate and family during difficult discussion regarding pt's medical status/prognosis                   The Speech Language Pathologist (SLP) completed education with the patient regarding results of evaluation. Explained that Speech Pathology intervention is warranted  at this time   Prognosis for improvements is good     This plan will be re-evaluated and revised in 1 week  if warranted. Patient stated goals: Agreed with above,   Treatment goals discussed with Patient   The Patient understand(s) the diagnosis, prognosis and plan of care       CPT code:    81343  eval speech sound lang comprehension    Speech Pathologist (SLP) completed education with the patient/family regarding type of speech impairment. Discussed compensatory strategies to promote increased speech intelligibility, including slow rate of delivery and exaggerated articulation. Reviewed oral motor exercises as/if appropriate. Encouraged pt and/or family to engage SLP in unstructured Q&A session relative to identified deficit areas; indicated understanding of all information provided via satisfactory verbal response. The admitting diagnosis and active problem list, as listed below have been reviewed prior to initiation of this evaluation.         ACTIVE PROBLEM LIST:   Patient Active Problem List   Diagnosis    Malignant neoplasm of central portion of right breast in female, estrogen receptor negative ([address]    Breast cancer metastasized to axillary lymph node, right ([address]    Encounter for insertion of venous access port    SIRS (systemic inflammatory response syndrome) ([address]  Type 2 diabetes mellitus with hyperglycemia, without long-term current use of insulin (HCC)    HTN (hypertension)    History of stroke    Hyponatremia    [facility]) [facility]    Other hyperlipidemia    Palliative care by specialist    Post-operative pain    Sepsis ([facility]    Pyelonephritis    Chemotherapy-induced neuropathy ([facility]    Stroke-like symptoms    [facility]       NATALIA Lee.   Intern

## 2021-02-23 NOTE — PROGRESS NOTES
Hospitalist Progress Note      PCP: Litzy Hagen    Date of Admission: 2/22/2021    Hospital Course:\"80y.o. year old female  history of acid reflux, Breast cancer ([facility] H/O renal calculi, Hyperlipidemia, Hypertension, Insomnia, Pyelonephritis, Seasonal allergies, Stroke ([facility] Type 2 diabetes mellitus without complication ([facility] and UTI (urinary tract infection) who presented with complaints of aphasia. CT head which was fairly unremarkable for acute changes but she did exhibit stable multifocal chronic areas of infarcts both lacunar and subcortical.  There is atrophy and chronic appearing periventricular leukomalacia that appears unchanged from a prior study. Her blood work is remarkable for serum creatinine of 1.4. Normal LFTs. Normal blood counts. MRI shows acute subacute infarction in the posterior right lobe. She was admitted for further evaluation. \"       Subjective:   No events overnight. She is ambulating to bathroom. She is doing activities that require fine motor. She feels she still has drooping of left face and slurred speech. She feels her tongue is swollen    Medications:  Reviewed    Infusion Medications    dextrose      sodium chloride 100 mL/hr (02/23/21 0655)     Scheduled Medications    insulin glargine  5 Units Subcutaneous Nightly    aspirin-dipyridamole  1 capsule Oral BID    sodium chloride flush  10 mL Intravenous 2 times per day    enoxaparin  30 mg Subcutaneous Daily    insulin lispro  0-12 Units Subcutaneous TID WC    atorvastatin  80 mg Oral Nightly    cetirizine  10 mg Oral Daily    escitalopram  10 mg Oral QPM     PRN Meds: iopamidol, glucose, dextrose, glucagon (rDNA), dextrose, sodium chloride flush, promethazine **OR** ondansetron, polyethylene glycol, labetalol, perflutren lipid microspheres, loperamide, zolpidem      Intake/Output Summary (Last 24 hours) at 2/23/2021 1119  Last data filed at 2/23/2021 0900  Gross per 24 hour   Intake 1665 ml   Output  Net 1665 ml       Physical Exam Performed:    /71   Pulse 68   Temp 98 °F (36.7 °C) (Temporal)   Resp 16   Ht 5' 2\" (1.575 m)   Wt 148 lb (67.1 kg)   SpO2 97%   BMI 27.07 kg/m²     General appearance: No apparent distress, appears stated age and cooperative. HEENT: Pupils equal, round, and reactive to light. Conjunctivae/corneas clear. Neck: Supple, with full range of motion. No jugular venous distention. Trachea midline. Respiratory:  Normal respiratory effort. Clear to auscultation, bilaterally without Rales/Wheezes/Rhonchi. Cardiovascular: Regular rate and rhythm with normal S1/S2 without murmurs, rubs or gallops. Abdomen: Soft, non-tender, non-distended with normal bowel sounds. Musculoskeletal: No clubbing, cyanosis or edema bilaterally. Full range of motion without deformity. Skin: Skin color, texture, turgor normal.  No rashes or lesions. Neurologic:  Left facial droop is mild,no other focal weakness  Psychiatric: Alert and oriented, thought content appropriate, normal insight      Labs:   Recent Labs     02/22/21  1025 02/23/21  0419   WBC 7.9 6.1   HGB 14.2 12.3   HCT 44.0 37.6    159     Recent Labs     02/22/21  1025      K 3.5   CL 99   CO2 25   BUN 20   CREATININE 1.4*   CALCIUM 9.9     Recent Labs     02/22/21  1025   AST 23   ALT 22   BILITOT 0.6   ALKPHOS 67     Recent Labs     02/22/21  1025   INR 1.0     Recent Labs     02/22/21  1025 02/22/21  1703 02/22/21  2028   TROPONINI <0.01 <0.01 <0.01       Urinalysis:      Lab Results   Component Value Date    NITRU Negative 01/25/2019    WBCUA 1-3 11/01/2018    BACTERIA RARE 11/01/2018    RBCUA 1-3 11/01/2018    BLOODU Negative 01/25/2019    SPECGRAV 1.010 01/25/2019    GLUCOSEU Negative 01/25/2019       Radiology:  MRI brain without contrast   Final Result   Acute to subacute infarction in the posterior right frontal lobe.    Scattered small old infarctions in the bilateral frontal parietal lobes, -Not on PPI    DVT Prophylaxis: Lovenox  Diet: DIET LOW SODIUM 2 GM; Carb Control: 4 carb choices (60 gms)/meal  Code Status: Full Code    PT/OT Eval Status: Ordered    Dispo -Home in 1 to 2 days  Niesha Adams MD

## 2021-02-23 NOTE — PROGRESS NOTES
SPEECH/LANGUAGE PATHOLOGY  CLINICAL ASSESSMENT OF SWALLOWING FUNCTION    PATIENT NAME:  Nicholas Le      :  1950      TODAY'S DATE:  2021  ROOM:  [address] [place] [address]    SUMMARY OF EVALUATION    RN cleared Pt for participation in assessment?: yes     DYSPHAGIA DIAGNOSIS:  Clinical indicators consistent with questionable pharyngeal dysphagia      DIET RECOMMENDATIONS:  Dysphagia 3, Soft/advanced (Soft & Bite-sized) solids with  thin liquids until MBSS         FEEDING RECOMMENDATIONS:     Assistance level:  Set-up is required for all oral intake      Compensatory strategies recommended: Small bites/sips, Alternate solids and liquids and Check for oral pocketing, No straw    THERAPY RECOMMENDATIONS:      Dysphagia therapy per MBSS results/ recommendations to target appropriate areas of impairment. A Video Swallow Study (MBSS) is recommended and requires a physician order     PAST HISTORY OF DYSPHAGIA?: no  Diet PRIOR to hospital admission:    Regular consistency solids with  thin liquids   COGNITION: Alert & Oriented x 4                  PROCEDURE     Oral Peripheral Examination   Left labiobuccal weakness    Current Respiratory Status   room air    Parameters of Speech Production  Respiration:  Adequate for speech production  Quality:   Within functional limits  Intensity: Within functional limits    Consistencies Administered During the Evaluation   Liquids: thin liquid   Solids:  pureed foods and soft solid foods      Method of Intake:   cup, spoon  Self fed      Position:   Seated, upright @ EoB                  RESULTS     Oral Stage: The oral stage of swallowing was within functional limits      Pharyngeal Stage:      Throat clearing present after presentation of thin liquid and solid foods                  The Speech Language Pathologist (SLP) completed education with the patient regarding results of evaluation.    Explained that Speech Pathology intervention is warranted  at this time

## 2021-02-23 NOTE — PROGRESS NOTES
Reason for consult:  Uncontrolled type 2 DM    A1C: 11.6%     [] Not available                 Patient states the following concerns/barriers to diabetes self-management:       [x] None       [] Medication cost   [] Food cost/availability          [] Reading  [] Hearing   [] Vision                  [] Work    [] Transportation  [] No insurance    [] Physical limitations    [] Other:              Diabetes survival packet provided to:   [x] Patient     [] Other:    Information reviewed:   Definition of diabetes   Target glucose ranges/A1C   Self-monitoring of blood glucose   Prevention/symptoms/treatment of hypo-/hyperglycemia   Medication adherence   The plate method/meal planning guidelines   The benefits of exercise and recommendations   Reducing the risk of chronic complications Comment:  Patient pleasant and receptive to education. She admits that she has been noncompliant with taking her medications and usually takes them about 3 out of 7 days per week. She plans to start using a pill box and setting it next to her coffee pot because she always has coffee in the morning. She can then use an alarm for her p.m. pills. Patient knew that she was on metformin 1000 mg twice daily, but cannot recall the dose of her glipizide. She states she was also on Tradjenta and Trulicity, but these were too expensive and therefore discontinued. Patient understands that her metformin is currently on hold and Lantus has been initiated. She is agreeable to starting on insulin via the pen. Education provided on the use, timing, purpose, and side effects of long and rapid acting insulins. Patient would most likely be compliant with a long-acting insulin--I am not as confident that she would comply with meal time insulin and a sliding scale at this time. Hypoglycemia signs, symptoms and treatments reviewed and literature provided. Patient instructed on the preparation and injection of insulin dose via pen method. Patient completed a return demonstration of the proper assembly of pen and injection technique using the injection pillow. Patient verbalized understanding of site rotation, storage and proper sharps disposal. Floor nursing should continue to have patient practice insulin self-injection when insulin dose is due and document in the progress notes. Patient states she is not very active at home, other than cleaning her house. I encouraged her to aim for an ultimate goal of 30 minutes of aerobic activity 5 days per week, but this can be broken down into three 10 minute intervals each day. Patient states she has a working glucose meter, but does not often check her blood sugar. She is agreeable to self-monitoring when she goes home and will keep a glucose log of results. She has a lot of stress at home and understands the importance of healthy coping mechanisms, as stress raises glucose levels. She understands that good glucose control is imperative to reduce the risk of chronic complications, such as future strokes, MI, further damage to her kidneys, etc.     Evaluation/Plan/Recommendations:   Patient's understanding of diabetes:   []Poor   [x]Fair    []Good   [] Excellent     Outpatient diabetes education is recommended:   [x] Yes  [] No   [] As needed  Patient is interested in outpatient diabetes education:   [x] Yes    [] No    [] Unsure    Recommended:     [] Consult to social work; patient has no insurance or financial hardship      [] Script for glucometer and supplies (per preference of patient's insurance)               [x] Script for outpatient diabetes education classes from PCP    [x] Carbohydrate-controlled diet    [x] Patient prefers/educator recommends insulin pens instead of syringes     (if insulin ordered for home use)    Thank you for this consult.     Magalie Freitas RN, BSN, Raeann Ramos

## 2021-02-23 NOTE — PROGRESS NOTES
OCCUPATIONAL THERAPY INITIAL EVALUATION      Date:2021  Patient Name: Mildred Cortez  MRN: [id number]  : 1950  Room: [address] [place] [address]      [address], OTR[address]L [id number]    AM-PAC Daily Activity Raw Score:  Recommended Adaptive Equipment: TBD     Diagnosis: Stroke-like symptoms [R29.90]  Stroke [facility] ([facility] [I63.9]     Modified Kershaw Scale (MRS)  Score     Description  0             No symptoms  1             No significant disability despite symptoms  2             Slight disability; able to look after own affairs  3             Moderate disability; able to ambulate without assist/ requires assist with ADLs  4             Moderate/Severe disability;requires assist to ambulate/assist with ADLs  5             Severe disability;bedridden/incontinent   6               Score:  3    Referring physician: Ora Alpers, MD    Pertinent Medical History: breast CA, HTN, IBS, TIA, DMII, UTI    Precautions:  Falls, slurred speech     Home Living: Pt lives with spouse in 2 floor home.  6 NOREEN, 2 handrails  Bath and bed on 2nd floor - flight of stairs, bath also on 1st floor  Bathroom setup: walk in shower with built in shower seat   Equipment owned: n/a    Prior Level of Function: independent with ADLs , shares with IADLs; ambulated independently w/o AD  Driving: yes    Pain Level: Pt c/o no pain this session    Cognition: A&O: 4/4; Follows 1-2 step directions  Slurred speech (mild), R facial droop   Memory:  fair + (immediate repetition of 3/3 words: good, pt able to recall 2/3 words after ~3 minutes)   Sequencing:  fair +   Problem solving:  fair    Judgement/safety:  fair      Functional Assessment:   Initial Eval Status  Date: 21 Treatment Status  Date: Short Term Goals/LTG  Treatment frequency: 1-4x/wk   Feeding Independent   -    Grooming Supervision   Modified Kleberg    UB Dressing Supervision   Modified Kleberg LB Dressing Stand by Assist   Modified Lyons    Bathing Stand by Assist  Modified Lyons    Toileting Stand by Assist   Modified Lyons    Bed Mobility  Supine to sit: Independent   Sit to supine: Independent   -   Functional Transfers Supervision  Various surfaces  Independent   Functional Mobility Supervision w/o AD  In room and bathrm  Independent   Balance Sitting: Independent  Standing: Supervision, no AD     Activity Tolerance Fair  Good   Visual/  Perceptual Glasses: Bifocals    Visual field test: wfl                Hand dominance: R   Strength ROM Additional Info:    RUE  4/5  WFL   good  and wfl FMC/dexterity noted during ADL tasks    Finger opp: wfl     LUE 4/5  WFL   good  and wfl FMC/dexterity noted during ADL tasks    Finger opp: fair+       Hearing: WFL   Sensation: c/o numbness R hand (chronic) and L facial  Light touch: diminished L facial, R hand  Tone: WFL  Edema: none noted                   Comments: Upon arrival patient lying in bed. Pt agreeable to OT session this date. At end of session, patient lying in bed (per pt request, spouse present) with call light and phone within reach, all lines and tubes intact. Overall patient demonstrated decreased independence and safety during completion of ADL/functional transfer/mobility tasks. Pt would benefit from continued skilled OT to increase safety and independence with completion of ADL/IADL tasks for functional independence and quality of life.     Treatment: OT treatment provided this date includes: Facilitation of bed mobility, unsupported sitting balance (addressing posture, weight shifting and dynamic reaching to prep for ADL's), functional transfers (various surfaces-EOB, commode w/ education on safety), standing tolerance tasks (addressing posture, balance and activity tolerance while incorporating light functional reaching impacting ADLs) and functional ambulation tasks without AD (to/from bathroom w/ education posture, energy conservation techniques and safety). Therapist facilitated self-care retraining: UB/LB self-care tasks (gown, socks), simulated toileting task and standing grooming tasks while educating pt on modified techniques, posture, safety and energy conservation techniques. Skilled monitoring of HR, O2 sats and pts response to treatment. Eval Complexity: Low    Evaluation Time includes thorough review of current medical information, gathering information on past medical history/social history and prior level of function, completion of standardized testing/informal observation of tasks, assessment of data and education on plan of care and goals.     Assessment of current deficits   Functional mobility [x]  ADLs [x] Strength [x]  Cognition []  Functional transfers  [x] IADLs [x] Safety Awareness [x]  Endurance [x]  Fine Motor Coordination [] Balance [x] Vision/perception [] Sensation []   Gross Motor Coordination [] ROM [] Delirium []                  Motor Control []    Plan of Care: 1-3 days/week for 1-2 weeks PRN   Instruction/training on adapted ADL techniques and AE recommendations to increase functional independence within precautions  Training on energy conservation strategies/techniques to improve independence/tolerance for self-care routine  Functional transfer/mobility training/DME recommendations for increased independence, safety, and fall prevention  Patient/Family education to increase follow through with safety techniques and functional independence Recommendation of environmental modifications for increased safety with functional transfers/mobility and ADLs  Therapeutic exercise to improve motor endurance, ROM, and functional strength for ADLs/functional transfers  Therapeutic activities to facilitate/challenge dynamic balance, stand tolerance, fine motor dexterity/in-hand manipulation for increased independence with ADLs    Rehab Potential: Good for established goals    Patient / Family Goal: Not stated     Patient and/or family were instructed on diagnosis, prognosis/goals and plan of care. Demonstrated good understanding. [] Malnutrition indicators have been identified and nursing has been notified to ensure a dietitian consult is ordered.        Low Evaluation completed +              Time In: 09:18  Time Out: 09:45  Total Treatment Time: 10 minutes   Min Units   OT Eval Low 97165 X 1   OT Eval Medium 12385     OT Eval High [id number]     OT Re-Eval [id number]     Therapeutic Ex 90176     Therapeutic Activities 20576     ADL/Self Care 72084 10 1   Orthotic Management 49125     Neuro Re-Ed [facility] [address], OTR/L [id number]

## 2021-02-23 NOTE — CONSULTS
 aspirin-dipyridamole (AGGRENOX)  MG per extended release capsule 1 capsule  1 capsule Oral BID Bhavin Hyde MD   1 capsule at 02/23/21 0844    sodium chloride flush 0.9 % injection 10 mL  10 mL Intravenous 2 times per day Bhavin Hyde MD   10 mL at 02/23/21 0844    sodium chloride flush 0.9 % injection 10 mL  10 mL Intravenous PRN Bhavin Hyde MD        promethazine (PHENERGAN) tablet 12.5 mg  12.5 mg Oral Q6H PRN Bhavin Hyde MD        Or    ondansetron [facility]) injection 4 mg  4 mg Intravenous Q6H PRN Bhavin Hyde MD   4 mg at 02/22/21 2235    polyethylene glycol (GLYCOLAX) packet 17 g  17 g Oral Daily PRN Bhavin Hyde MD        enoxaparin (LOVENOX) injection 30 mg  30 mg Subcutaneous Daily Bhavin Hyde MD   30 mg at 02/23/21 0844    insulin lispro (HUMALOG) injection vial 0-12 Units  0-12 Units Subcutaneous TID WC Bhavin Hyde MD   4 Units at 02/23/21 1138    atorvastatin (LIPITOR) tablet 80 mg  80 mg Oral Nightly Bhavin Hyde MD   80 mg at 02/22/21 2009    labetalol (NORMODYNE;TRANDATE) injection 10 mg  10 mg Intravenous Q10 Min PRN Bhavin Hyde MD        perflutren lipid microspheres (DEFINITY) injection 1.65 mg  1.5 mL Intravenous ONCE PRN Bhavin Hyde MD        0.9 % sodium chloride infusion  100 mL/hr Intravenous Continuous Gordy Arelis,  mL/hr at 02/23/21 0655 100 mL/hr at 02/23/21 1999    loperamide (IMODIUM) capsule 2 mg  2 mg Oral 4x Daily PRN Tien Clayton MD   2 mg at 02/22/21 2207    cetirizine (ZYRTEC) tablet 10 mg  10 mg Oral Daily Tien Clayton MD   10 mg at 02/23/21 0844    escitalopram (LEXAPRO) tablet 10 mg  10 mg Oral QPM Tien Clayton MD   Stopped at 02/22/21 2231    zolpidem (AMBIEN) tablet 5 mg  5 mg Oral Nightly PRN Tien Clayton MD           Past Medical History:   Diagnosis Date    Acid reflux     [facility]    right    H/O renal calculi     Hyperlipidemia     Hypertension  IBS (irritable bowel syndrome)     Insomnia     Pyelonephritis 2019    Seasonal allergies     Stroke [facility]) 2013    tia    Type 2 diabetes mellitus without complication (HCC)     UTI (urinary tract infection) 2018       Past Surgical History:   Procedure Laterality Date    BREAST BIOPSY Right     CHOLECYSTECTOMY      HYSTERECTOMY, TOTAL ABDOMINAL      MASTECTOMY, MODIFIED RADICAL Right 2/27/2019    REMOVAL OF MEDIPORT, RIGHT BREAST SIMPLE MASTECTOMY, BLUE DYE INJECTION, RIGHT AXILLARY DISSECTION performed by Bradley Velásquez MD at [place] [address] CA CYSTO/URETERO W/LITHOTRIPSY &INDWELL STENT INSRT Bilateral 9/28/2018    CYSTOSCOPY BILATERAL STENT INSERTION performed by Rambo Daniels MD at [place] [address] CA CYSTO/URETERO W/LITHOTRIPSY &INDWELL STENT INSRT Bilateral 10/25/2018    CYSTOSCOPY RETROGRADE PYELOGRAM URETEROSCOPY J STENT LASER LITHOTRIPSY BILATERAL performed by Rambo Daniels MD at [facility] OR    CA INSJ PRPH CTR VAD W/SUBQ PORT AGE 5 YR/> N/A 6/12/2018    PORT INSERTION performed by Renae Hoskins MD at [address] [place] [address] Right     TUBAL LIGATION      TUNNELED CENTRAL VENOUS CATHETER W/ SUBCUTANEOUS PORT  02/27/2019    removed       Family History   Problem Relation Age of Onset    Cancer Mother [de-identified]        breast    Heart Disease Mother     Diabetes Mother     Hypertension Mother     Cancer Sister         lymphoma    Hypertension Father     Stroke Maternal Grandmother     Stroke Paternal Grandmother        Social History     Socioeconomic History    Marital status:      Spouse name: Zaida Gonsales Number of children: 3    Years of education: Not on file    Highest education level: Not on file   Occupational History    Not on file   Social Needs    Financial resource strain: Not on file    Food insecurity     Worry: Not on file     Inability: Not on file   [employer] needs     Medical: Not on file     Non-medical: Not on file   Tobacco Use LABGLOM 40 02/23/2021    GFRAA 49 02/23/2021     Lab Results   Component Value Date    APTT 31.8 02/22/2021      Lab Results   Component Value Date    INR 1.0 02/22/2021    INR 1.0 (L) 09/24/2018    INR 0.9 (L) 04/09/2018    PROTIME 11.2 02/22/2021    PROTIME 10.9 09/24/2018    PROTIME 9.9 04/09/2018        3. MRI brain without contrast   Final Result   Acute to subacute infarction in the posterior right frontal lobe. Scattered small old infarctions in the bilateral frontal parietal lobes,   right occipital lobe and bilateral cerebellar hemispheres. Old lacunar   infarcts in right thalamus and left middle cerebellar peduncle. Moderate parenchymal volume loss. Mild to moderate chronic microvascular disease. Results were reported to LINO Hernandez at 8:12 a.m. on February 23, 2021. US CAROTID ARTERY BILATERAL   Final Result   Atherosclerotic disease. Estimated stenosis by NASCET criteria in the proximal right carotid   artery is between 0% to 49% . Estimated stenosis by NASCET criteria in the proximal left carotid   artery is between 50% to 69%. CT HEAD WO CONTRAST   Final Result   No acute CVA, intracranial bleed, or brain mass. Atrophy and chronic appearing likely periventricular leukomalacia appear   unchanged   Stable multifocal chronic areas of infarcts, lacunar and subcortical      CTA NECK W CONTRAST    (Results Pending)   CTA HEAD W CONTRAST    (Results Pending)   MRA HEAD WO CONTRAST    (Results Pending)   MRA NECK WO CONTRAST    (Results Pending)     4. The history physical, neurology consultation notes were reviewed    5. The carotid ultrasound was personally reviewed by me, essentially normal study on the right side, does have approximately 40 to 50% stenosis on the left side documented atherosclerotic plaque as well as slightly increased velocities    6.   The MRI of his brain was personally reviewed, evidence of posterior frontal lobe infarct close to the parietal lobe 7.  2D echo of the heart is pending    Assessment:    1. Slurring the speech associated with the right posterior frontal and parotid lobe infarct    2. Carotid ultrasound revealing minimal disease in the right, possibly 40 to 50% stenosis on the left    3. Diabetes mellitus, hypertension, hyperlipidemia    4.   Tobacco use 1 pack/day    Plan:     I had a long detailed discussion patient and patient , options, risks benefits and alternatives were explained to the patient    From a vascular perspective, recommend consider therapy with close follow-up with antiplatelet therapy, strongly counseled to stop smoking completely, tighter control of lipids and blood glucose levels    To call and come to hospital patient had recurrent neurological symptoms, explained    Informed him, that once the MRI is done, I will review it again and make additional recommendations if there are any significant disease that was noted, extracranially or intracranially      All the questions were answered      Thank you for letting me participate in the care of your patient                Electronically signed by Patricia Avila MD on 2/23/2021 at 4:21 PM

## 2021-02-24 ENCOUNTER — TELEPHONE (OUTPATIENT)
Dept: NEUROLOGY | Age: 71
End: 2021-02-24

## 2021-02-24 ENCOUNTER — APPOINTMENT (OUTPATIENT)
Dept: GENERAL RADIOLOGY | Age: 71
DRG: 065 | End: 2021-02-24
Payer: MEDICARE

## 2021-02-24 ENCOUNTER — APPOINTMENT (OUTPATIENT)
Dept: MRI IMAGING | Age: 71
DRG: 065 | End: 2021-02-24
Payer: MEDICARE

## 2021-02-24 VITALS
TEMPERATURE: 97 F | RESPIRATION RATE: 14 BRPM | SYSTOLIC BLOOD PRESSURE: 136 MMHG | BODY MASS INDEX: 27.23 KG/M2 | DIASTOLIC BLOOD PRESSURE: 74 MMHG | HEART RATE: 60 BPM | HEIGHT: 62 IN | WEIGHT: 148 LBS | OXYGEN SATURATION: 98 %

## 2021-02-24 PROBLEM — R29.90 STROKE-LIKE SYMPTOMS: Status: RESOLVED | Noted: 2021-02-22 | Resolved: 2021-02-24

## 2021-02-24 PROBLEM — I63.9 STROKE DETERMINED BY CLINICAL ASSESSMENT (HCC): Status: RESOLVED | Noted: 2021-02-22 | Resolved: 2021-02-24

## 2021-02-24 PROBLEM — I63.511 ACUTE RIGHT MCA STROKE (HCC): Status: ACTIVE | Noted: 2021-02-24

## 2021-02-24 PROBLEM — I63.139 CEREBRAL INFARCTION DUE TO EMBOLISM OF CAROTID ARTERY (HCC): Status: RESOLVED | Noted: 2021-02-23 | Resolved: 2021-02-24

## 2021-02-24 LAB
ANION GAP SERPL CALCULATED.3IONS-SCNC: 10 MMOL/L (ref 7–16)
BUN BLDV-MCNC: 17 MG/DL (ref 8–23)
CALCIUM SERPL-MCNC: 9 MG/DL (ref 8.6–10.2)
CHLORIDE BLD-SCNC: 106 MMOL/L (ref 98–107)
CO2: 27 MMOL/L (ref 22–29)
CREAT SERPL-MCNC: 1.2 MG/DL (ref 0.5–1)
GFR AFRICAN AMERICAN: 54
GFR NON-AFRICAN AMERICAN: 44 ML/MIN/1.73
GLUCOSE BLD-MCNC: 211 MG/DL (ref 74–99)
LV EF: 65 %
LVEF MODALITY: NORMAL
METER GLUCOSE: 226 MG/DL (ref 74–99)
METER GLUCOSE: 246 MG/DL (ref 74–99)
METER GLUCOSE: 284 MG/DL (ref 74–99)
POTASSIUM REFLEX MAGNESIUM: 3.8 MMOL/L (ref 3.5–5)
SODIUM BLD-SCNC: 143 MMOL/L (ref 132–146)

## 2021-02-24 PROCEDURE — 36415 COLL VENOUS BLD VENIPUNCTURE: CPT

## 2021-02-24 PROCEDURE — 99233 SBSQ HOSP IP/OBS HIGH 50: CPT | Performed by: NURSE PRACTITIONER

## 2021-02-24 PROCEDURE — 2580000003 HC RX 258: Performed by: FAMILY MEDICINE

## 2021-02-24 PROCEDURE — 6370000000 HC RX 637 (ALT 250 FOR IP): Performed by: FAMILY MEDICINE

## 2021-02-24 PROCEDURE — 82962 GLUCOSE BLOOD TEST: CPT

## 2021-02-24 PROCEDURE — 70544 MR ANGIOGRAPHY HEAD W/O DYE: CPT

## 2021-02-24 PROCEDURE — 92611 MOTION FLUOROSCOPY/SWALLOW: CPT

## 2021-02-24 PROCEDURE — 2500000003 HC RX 250 WO HCPCS: Performed by: RADIOLOGY

## 2021-02-24 PROCEDURE — 6360000002 HC RX W HCPCS: Performed by: FAMILY MEDICINE

## 2021-02-24 PROCEDURE — 70547 MR ANGIOGRAPHY NECK W/O DYE: CPT

## 2021-02-24 PROCEDURE — 74230 X-RAY XM SWLNG FUNCJ C+: CPT

## 2021-02-24 PROCEDURE — 2580000003 HC RX 258: Performed by: STUDENT IN AN ORGANIZED HEALTH CARE EDUCATION/TRAINING PROGRAM

## 2021-02-24 PROCEDURE — 93306 TTE W/DOPPLER COMPLETE: CPT

## 2021-02-24 PROCEDURE — 80048 BASIC METABOLIC PNL TOTAL CA: CPT

## 2021-02-24 PROCEDURE — 6370000000 HC RX 637 (ALT 250 FOR IP): Performed by: PSYCHIATRY & NEUROLOGY

## 2021-02-24 RX ORDER — ATORVASTATIN CALCIUM 80 MG/1
80 TABLET, FILM COATED ORAL NIGHTLY
Qty: 30 TABLET | Refills: 3 | Status: SHIPPED | OUTPATIENT
Start: 2021-02-24

## 2021-02-24 RX ORDER — GLIPIZIDE 5 MG/1
5 TABLET ORAL DAILY
Qty: 60 TABLET | Refills: 3 | Status: SHIPPED | OUTPATIENT
Start: 2021-02-24

## 2021-02-24 RX ORDER — CLOPIDOGREL BISULFATE 75 MG/1
75 TABLET ORAL DAILY
Qty: 30 TABLET | Refills: 0 | Status: SHIPPED | OUTPATIENT
Start: 2021-02-25 | End: 2021-05-20 | Stop reason: ALTCHOICE

## 2021-02-24 RX ORDER — ASPIRIN 81 MG/1
81 TABLET ORAL DAILY
Qty: 30 TABLET | Refills: 3 | Status: SHIPPED | OUTPATIENT
Start: 2021-02-25

## 2021-02-24 RX ADMIN — ASPIRIN 81 MG: 81 TABLET, COATED ORAL at 08:26

## 2021-02-24 RX ADMIN — BARIUM SULFATE 15 ML: 400 SUSPENSION ORAL at 15:10

## 2021-02-24 RX ADMIN — CLOPIDOGREL 75 MG: 75 TABLET, FILM COATED ORAL at 08:25

## 2021-02-24 RX ADMIN — BARIUM SULFATE 15 ML: 400 PASTE ORAL at 15:10

## 2021-02-24 RX ADMIN — BARIUM SULFATE 15 ML: 0.81 POWDER, FOR SUSPENSION ORAL at 15:10

## 2021-02-24 RX ADMIN — CETIRIZINE HYDROCHLORIDE 10 MG: 10 TABLET, FILM COATED ORAL at 08:25

## 2021-02-24 RX ADMIN — INSULIN LISPRO 4 UNITS: 100 INJECTION, SOLUTION INTRAVENOUS; SUBCUTANEOUS at 08:26

## 2021-02-24 RX ADMIN — SODIUM CHLORIDE 100 ML/HR: 9 INJECTION, SOLUTION INTRAVENOUS at 04:11

## 2021-02-24 RX ADMIN — ENOXAPARIN SODIUM 30 MG: 30 INJECTION SUBCUTANEOUS at 08:26

## 2021-02-24 RX ADMIN — INSULIN LISPRO 4 UNITS: 100 INJECTION, SOLUTION INTRAVENOUS; SUBCUTANEOUS at 11:40

## 2021-02-24 RX ADMIN — SODIUM CHLORIDE, PRESERVATIVE FREE 10 ML: 5 INJECTION INTRAVENOUS at 08:25

## 2021-02-24 NOTE — PROGRESS NOTES
· Acute to subacute CVA involving right frontal lobe  · Uncontrolled diabetes mellitus  · Hypertension  · CKD stage III  · Noncompliance  · Mild carotid stenosis  · Tobacco abuse    Plan:  · Continue with aggressive risk factor modification, awaiting echocardiogram results. Educated patient regarding test.  · Emphasized better control of blood sugars. · Continue current antihypertensives  · Smoking cessation advised      Labs:   Recent Labs     02/22/21  1025 02/23/21  0419   WBC 7.9 6.1   HGB 14.2 12.3   HCT 44.0 37.6    159     Recent Labs     02/22/21  1025 02/23/21  1050 02/24/21  0841    138 143   K 3.5 3.3* 3.8   CL 99 104 106   CO2 25 27 27   BUN 20 23 17   CREATININE 1.4* 1.3* 1.2*   CALCIUM 9.9 8.6 9.0     Recent Labs     02/22/21  1025   AST 23   ALT 22   BILITOT 0.6   ALKPHOS 67     Recent Labs     02/22/21  1025   INR 1.0     Recent Labs     02/22/21  1025 02/22/21  1703 02/22/21 2028   TROPONINI <0.01 <0.01 <0.01       Medications:  Reviewed    Infusion Medications    dextrose       Scheduled Medications    insulin glargine  5 Units Subcutaneous Nightly    aspirin  81 mg Oral Daily    clopidogrel  75 mg Oral Daily    sodium chloride flush  10 mL Intravenous 2 times per day    enoxaparin  30 mg Subcutaneous Daily    insulin lispro  0-12 Units Subcutaneous TID WC    atorvastatin  80 mg Oral Nightly    cetirizine  10 mg Oral Daily    escitalopram  10 mg Oral QPM     PRN Meds: iopamidol, glucose, dextrose, glucagon (rDNA), dextrose, sodium chloride flush, promethazine **OR** ondansetron, polyethylene glycol, labetalol, perflutren lipid microspheres, loperamide, zolpidem      Intake/Output Summary (Last 24 hours) at 2/24/2021 1428  Last data filed at 2/24/2021 1121  Gross per 24 hour   Intake 400 ml   Output    Net 400 ml     · Body mass index is 27.07 kg/m².       · Diet  · DIET LOW SODIUM 2 GM; Carb Control: 4 carb choices (60 gms)/meal    · Code Status  · Full Code Electronically signed by Dario Hernandez MD on 2/24/2021 at 2:28 PM  [employer]   Please contact me through perfect [employer]    NOTE: This report was transcribed using voice recognition software. Every effort was made to ensure accuracy; however, inadvertent computerized transcription errors may be present.

## 2021-02-24 NOTE — CARE COORDINATION
Echo order noted ordered 2/22. Call placed to Spencer Marshall, manager with the echo department re: need for echo to be completed today. Detailed VM left with call back number if necessary. Will continue to follow.      Ramona Diaz.  P:  [phone]

## 2021-02-24 NOTE — PROGRESS NOTES
 Breast cancer metastasized to axillary lymph node, right ([address]    Encounter for insertion of venous access port    SIRS (systemic inflammatory response syndrome) (HCC)    Type 2 diabetes mellitus with hyperglycemia, without long-term current use of insulin (HCC)    HTN (hypertension)    History of stroke    Hyponatremia    ZHAO (acute kidney injury) ([address]    Other hyperlipidemia    Palliative care by specialist    Post-operative pain    [facility]    Pyelonephritis    Chemotherapy-induced neuropathy ([facility]    Left carotid stenosis    Acute right MCA stroke ([facility]

## 2021-02-24 NOTE — PROGRESS NOTES
Mika Gonzalez is a 80 y.o. right handed female     Neurology is following for stroke    PMH: Breast cancer s/o chemo/rad and right mastectomy, CKD, kidney stones, left carotid stenosis, diabetes, TIA, HTN, HLD, smoking    She presented on 2/22 with slurred speech, swollen tongue, and speech abnormalities that resolved in a few hours with Benadryl. Also noted left facial drooping, left-sided vision changes, dizziness/lightheadedness and left facial numbness, and an occipital headache. She is sitting up at the bedside eating lunch. She notes only residual left facial drooping and some minor swallowing issues but no other headache or dizziness. Echo is pending. Vitals have been stable. She was on Aggrenox at home.     No chest pain or palpitations  No SOB  No vertigo, lightheadedness or loss of consciousness  No falls, tripping or stumbling  No incontinence of bowels or bladder  No itching or bruising appreciated    ROS otherwise negative     Current Facility-Administered Medications   Medication Dose Route Frequency Provider Last Rate Last Admin    iopamidol (ISOVUE-370) 76 % injection 70 mL  70 mL Intravenous ONCE PRN Felicia Castellanos DO        glucose (GLUTOSE) 40 % oral gel 15 g  15 g Oral PRN Lynne Hernandez MD        dextrose 50 % IV solution  12.5 g Intravenous PRN Lynne Hernandez MD        glucagon (rDNA) injection 1 mg  1 mg Intramuscular PRN Lynne Hernandez MD        dextrose 5 % solution  100 mL/hr Intravenous PRN Lynne Hernandez MD        insulin glargine (LANTUS) injection vial 5 Units  5 Units Subcutaneous Nightly Lynne Hernandez MD   5 Units at 02/23/21 2112    aspirin EC tablet 81 mg  81 mg Oral Daily Sita Lopez MD   81 mg at 02/24/21 0826    clopidogrel (PLAVIX) tablet 75 mg  75 mg Oral Daily Sita Lopez MD   75 mg at 02/24/21 0825    sodium chloride flush 0.9 % injection 10 mL  10 mL Intravenous 2 times per day Fidelia Quiroz MD   10 mL at 02/24/21 0825  sodium chloride flush 0.9 % injection 10 mL  10 mL Intravenous PRN Sandy Colbert MD        promethazine (PHENERGAN) tablet 12.5 mg  12.5 mg Oral Q6H PRN Sandy Colbert MD        Or    ondansetron [facility]) injection 4 mg  4 mg Intravenous Q6H PRN Sandy Colbert MD   4 mg at 02/22/21 2235    polyethylene glycol (GLYCOLAX) packet 17 g  17 g Oral Daily PRN Sandy Colbert MD        enoxaparin (LOVENOX) injection 30 mg  30 mg Subcutaneous Daily aSndy Colbert MD   30 mg at 02/24/21 0826    insulin lispro (HUMALOG) injection vial 0-12 Units  0-12 Units Subcutaneous TID WC Sandy Colbert MD   4 Units at 02/24/21 1140    atorvastatin (LIPITOR) tablet 80 mg  80 mg Oral Nightly Sandy Colbert MD   80 mg at 02/23/21 2108    labetalol (NORMODYNE;TRANDATE) injection 10 mg  10 mg Intravenous Q10 Min PRN Sandy Colbert MD        perflutren lipid microspheres (DEFINITY) injection 1.65 mg  1.5 mL Intravenous ONCE PRN Sandy Colbert MD        loperamide (IMODIUM) capsule 2 mg  2 mg Oral 4x Daily PRN Renee Giordano MD   2 mg at 02/22/21 2207    cetirizine (ZYRTEC) tablet 10 mg  10 mg Oral Daily Renee Giordano MD   10 mg at 02/24/21 0825    escitalopram (LEXAPRO) tablet 10 mg  10 mg Oral QPM Renee Giordano MD   10 mg at 02/23/21 1658    zolpidem (AMBIEN) tablet 5 mg  5 mg Oral Nightly PRN Renee Giordano MD   5 mg at 02/23/21 2108     Objective:     /76   Pulse 58   Temp 96.8 °F (36 °C) (Temporal)   Resp 12   Ht 5' 2\" (1.575 m)   Wt 148 lb (67.1 kg)   SpO2 99%   BMI 27.07 kg/m²     General appearance: alert, appears stated age, cooperative and no distress--sitting up at bedside eating lunch  Head: Bruising to left lower jaw  Eyes: conjunctivae/corneas clear.  .  Neck: no carotid bruit  Lungs: Diminished throughout  Heart: Bradycardic rate and rhythm--SB  Extremities: normal, atraumatic, no cyanosis or edema  Pulses: 2+ and symmetric Skin: color, texture, turgor normal---no rashes or lesions      Mental Status: Alert, oriented x4    Appropriate attention/concentration  Intact fundus of knowledge  Intact memories    Speech: Mild dysarthria  Language: no aphasias    Cranial Nerves:  I: smell NA   II: visual acuity  NA   II: visual fields Full to confrontation   II: pupils IVAN   III,VII: ptosis None   III,IV,VI: extraocular muscles  Full ROM   V: mastication Normal   V: facial light touch sensation  Normal   V,VII: corneal reflex     VII: facial muscle function - upper     VII: facial muscle function - lower L 7th [facility] paresis   VIII: hearing Normal   IX: soft palate elevation  Normal   IX,X: gag reflex    XI: trapezius strength  5/5   XI: sternocleidomastoid strength 5/5   XI: neck extension strength  5/5   XII: tongue strength  Normal     Motor:  Mild left hemiparesis--slight L pronator drift  5/5 R side  Normal bulk and tone  No abnormal movements    Sensory:  LT symmetric in all limbs    Coordination:   FN and FFM slower on L relative to weakness    DTR:   No Rodarte's    No pathological reflexes    Laboratory/Radiology:     CMP:    Lab Results   Component Value Date     02/24/2021    K 3.8 02/24/2021     02/24/2021    CO2 27 02/24/2021    BUN 17 02/24/2021    CREATININE 1.2 02/24/2021    GFRAA 54 02/24/2021    LABGLOM 44 02/24/2021    GLUCOSE 211 02/24/2021    PROT 7.5 02/22/2021    LABALBU 4.2 02/22/2021    CALCIUM 9.0 02/24/2021    BILITOT 0.6 02/22/2021    ALKPHOS 67 02/22/2021    AST 23 02/22/2021    ALT 22 02/22/2021     HgBA1c:    Lab Results   Component Value Date    LABA1C 11.6 02/23/2021     FLP:    Lab Results   Component Value Date    TRIG 201 02/23/2021    HDL 38 02/23/2021    LDLCALC 28 02/23/2021    LABVLDL 40 02/23/2021     MRI brain: Acute right MCA stroke; remote strokes in multiple vascular distributions; atrophy        MRI head/neck: Unremarkable

## 2021-02-24 NOTE — DISCHARGE SUMMARY
meclizine (ANTIVERT) 12.5 MG tablet Take 1 tablet by mouth 3 times daily as needed       metFORMIN (GLUCOPHAGE) 1000 MG tablet Take 1 tablet by mouth 2 times daily      Melatonin 10 MG TABS Take by mouth      ranitidine (ZANTAC) 150 MG tablet Take 150 mg by mouth nightly as needed for Heartburn (severe heartburn)      loperamide (IMODIUM) 2 MG capsule Take 2 mg by mouth 4 times daily as needed for Diarrhea      escitalopram (LEXAPRO) 20 MG tablet Take 10 mg by mouth every evening       atenolol-chlorthalidone (TENORETIC) 50-25 MG per tablet Take 1 tablet by mouth daily      lisinopril (PRINIVIL;ZESTRIL) 20 MG tablet Take 20 mg by mouth every evening       cetirizine (ZYRTEC ALLERGY) 10 MG tablet Take 10 mg by mouth daily             Time Spent on discharge is more than 31 min in the examination, evaluation, counseling and review of medications and discharge plan. Signed:    Devaughn Ramos MD   2/24/2021      Thank you Frantz Caputo for the opportunity to be involved in this patient's care. If you have any questions or concerns please feel free to contact me. NOTE: This report was transcribed using voice recognition software. Every effort was made to ensure accuracy; however, inadvertent computerized transcription errors may be present.

## 2021-03-04 ENCOUNTER — TELEPHONE (OUTPATIENT)
Dept: NEUROLOGY | Age: 71
End: 2021-03-04

## 2021-03-19 ENCOUNTER — OFFICE VISIT (OUTPATIENT)
Dept: NEUROLOGY | Age: 71
End: 2021-03-19
Payer: MEDICARE

## 2021-03-19 VITALS
BODY MASS INDEX: 26.5 KG/M2 | HEART RATE: 60 BPM | HEIGHT: 62 IN | SYSTOLIC BLOOD PRESSURE: 127 MMHG | RESPIRATION RATE: 18 BRPM | WEIGHT: 144 LBS | DIASTOLIC BLOOD PRESSURE: 62 MMHG | TEMPERATURE: 97.3 F | OXYGEN SATURATION: 100 %

## 2021-03-19 DIAGNOSIS — R00.2 HEART PALPITATIONS: ICD-10-CM

## 2021-03-19 DIAGNOSIS — I10 ESSENTIAL HYPERTENSION: ICD-10-CM

## 2021-03-19 DIAGNOSIS — E78.5 DYSLIPIDEMIA, GOAL LDL BELOW 70: ICD-10-CM

## 2021-03-19 DIAGNOSIS — F17.200 SMOKER: ICD-10-CM

## 2021-03-19 DIAGNOSIS — I63.9 CRYPTOGENIC STROKE (HCC): Primary | ICD-10-CM

## 2021-03-19 PROCEDURE — 3017F COLORECTAL CA SCREEN DOC REV: CPT | Performed by: NURSE PRACTITIONER

## 2021-03-19 PROCEDURE — G8428 CUR MEDS NOT DOCUMENT: HCPCS | Performed by: NURSE PRACTITIONER

## 2021-03-19 PROCEDURE — 99215 OFFICE O/P EST HI 40 MIN: CPT | Performed by: NURSE PRACTITIONER

## 2021-03-19 PROCEDURE — 4004F PT TOBACCO SCREEN RCVD TLK: CPT | Performed by: NURSE PRACTITIONER

## 2021-03-19 PROCEDURE — 4040F PNEUMOC VAC/ADMIN/RCVD: CPT | Performed by: NURSE PRACTITIONER

## 2021-03-19 PROCEDURE — G8484 FLU IMMUNIZE NO ADMIN: HCPCS | Performed by: NURSE PRACTITIONER

## 2021-03-19 PROCEDURE — 1111F DSCHRG MED/CURRENT MED MERGE: CPT | Performed by: NURSE PRACTITIONER

## 2021-03-19 PROCEDURE — G8417 CALC BMI ABV UP PARAM F/U: HCPCS | Performed by: NURSE PRACTITIONER

## 2021-03-19 PROCEDURE — 1123F ACP DISCUSS/DSCN MKR DOCD: CPT | Performed by: NURSE PRACTITIONER

## 2021-03-19 PROCEDURE — G8400 PT W/DXA NO RESULTS DOC: HCPCS | Performed by: NURSE PRACTITIONER

## 2021-03-19 PROCEDURE — 1090F PRES/ABSN URINE INCON ASSESS: CPT | Performed by: NURSE PRACTITIONER

## 2021-03-19 RX ORDER — FAMOTIDINE 20 MG/1
20 TABLET, FILM COATED ORAL DAILY
COMMUNITY

## 2021-03-19 NOTE — PROGRESS NOTES
or bladder  No numbness, tingling or focal arm/leg weakness  No speech difficulties    ROS otherwise negative    Objective:     /62   Pulse 60   Temp 97.3 °F (36.3 °C)   Resp 18   Ht 5' 2\" (1.575 m)   Wt 144 lb (65.3 kg)   SpO2 100%   BMI 26.34 kg/m²     General Appearance: alert, cooperative, no distress, appears stated age    Head: normocephalic, without obvious abnormality, atraumatic    Eyes: conjunctiva/corneas clear    Neck: supple, symmetrical, trachea midline, no carotid bruit    Lungs: clear to auscultation bilaterally, respirations unlabored    Heart: regular rate and rhythm, S1 and S2 normal   Extremities: normal, atraumatic, no cyanosis or edema   Pulses: 2+ and symmetric all extremities   Skin: color, texture and turgor normal, no rashes or lesions     Mental Status: alert and oriented, thought content appropriate  Speech: mild intermittent dysarthria   Language: no aphasia    Cranial Nerves:  I: smell NA   II: visual acuity  NA   II: visual fields Full    II: pupils Equal and reactive    III,VII: ptosis None   III,IV,VI: extraocular muscles  Full ROM without nystagmus   V: mastication Normal   V: facial light touch sensation  Normal   V,VII: corneal reflex     VII: facial muscle function - upper  Normal   VII: facial muscle function - lower Normal   VIII: hearing Normal   IX: soft palate elevation  Normal   IX,X: gag reflex    XI: trapezius strength  5/5   XI: sternocleidomastoid strength 5/5   XI: neck extension strength  5/5   XII: tongue strength  Normal     Motor:  5/5 throughout  No abnormal movements  No drift   Normal bulk and tone     Sensory:  LT intact throughout     Coordination:   FN, FFM and MARYLOU symmetric  HS normal    Gait:  Normal    DTR:   Right Brachioradialis reflex 1+  Left Brachioradialis reflex 1+  Right Biceps reflex 1+  Left Biceps reflex 1+  Right Quadriceps reflex 1+  Left Quadriceps reflex 1+  Right Achilles reflex NOT DONE  Left Achilles reflex NOT DONE  No Rodarte's  No other pathological reflexes      Laboratory/Radiology:     CBC:   Lab Results   Component Value Date    WBC 6.1 02/23/2021    RBC 4.21 02/23/2021    HGB 12.3 02/23/2021    HCT 37.6 02/23/2021    MCV 89.3 02/23/2021    MCH 29.2 02/23/2021    MCHC 32.7 02/23/2021    RDW 13.0 02/23/2021     02/23/2021    MPV 11.1 02/23/2021     CMP:    Lab Results   Component Value Date     02/24/2021    K 3.8 02/24/2021     02/24/2021    CO2 27 02/24/2021    BUN 17 02/24/2021    CREATININE 1.2 02/24/2021    GFRAA 54 02/24/2021    LABGLOM 44 02/24/2021    LABGLOM 31 09/24/2018    GLUCOSE 211 02/24/2021    GLUCOSE 432 09/24/2018    PROT 7.5 02/22/2021    LABALBU 4.2 02/22/2021    LABALBU 2.9 09/24/2018    CALCIUM 9.0 02/24/2021    BILITOT 0.6 02/22/2021    ALKPHOS 67 02/22/2021    AST 23 02/22/2021    ALT 22 02/22/2021     HgBA1c:    Lab Results   Component Value Date    LABA1C 11.6 02/23/2021     FLP:    Lab Results   Component Value Date    TRIG 201 02/23/2021    HDL 38 02/23/2021    LDLCALC 28 02/23/2021    LABVLDL 40 02/23/2021     MRA head/neck  Unremarkable MR angiogram of the head and neck. MRI brain  Acute to subacute infarction in the posterior right frontal lobe. Scattered small old infarctions in the bilateral frontal parietal lobes,   right occipital lobe and bilateral cerebellar hemispheres.  Old lacunar   infarcts in right thalamus and left middle cerebellar peduncle. Moderate parenchymal volume loss. Mild to moderate chronic microvascular disease. Echo with bubble   Summary   Normal left ventricular systolic function. Ejection fraction is visually estimated at 65%. Mild concentric left ventricular hypertrophy. Normal right ventricular size and function (TAPSE 2.5 cm). There is doppler evidence of stage II diastolic dysfunction. No evidence of interatrial shunting on bubble study. Mild mitral regurgitation. Moderate aortic regurgitation.    Moderate aortic stenosis. Mild tricuspid regurgitation. * Images and labs personally reviewed at the time of this office visit    Assessment     Acuter right posterior frontal lobe infarct involving the right middle cerebral artery territory. Stroke mechanism evaluation thus far unrevealing with no significant intracranial or extracranial atherosclerotic disease and lack of cardioembolic source on telemetry monitoring or echocardiogram. Recommend further cardiac event monitoring and evaluation for embolic stroke of undetermined source. On exam, she displays mild intermittent dysarthria with reported slight swallowing difficulties. Vascular risk factors including markedly uncontrolled type 2 diabetes, hypertension, hyperlipidemia and tobacco use. Plan     Continue DAPT x 30 days, transition to baby aspirin daily thereafter for secondary stroke prevention    Risk factor modification  Hyperlipidemia: goal LDL less than 70, LDL 28- continue statin therapy  Hypertension: controlled today, continue antihypertensives per PCP   Diabetes mellitus, type 2: A1C markedly elevated- 11.6%- defer to PCP - recent changes to medications   Tobacco use: smoking cessation encouraged    Follow up with neurology in 3-4 months  Follow up with PCP as planned   Refer to cardiology for embolic stroke work-up     Advised patient that you can reduce your risk for stroke/TIA by modifying/controlling the risk factors that you have. Patient advised to take the medications as prescribed, which will be detailed in the discharge instructions, and to not stop taking them without consulting their physician. In addition, pt. advised to maintain a healthy diet, exercise regularly and to not smoke.     Maritza Brasher MSN, APRN, FNP-C  1:44 PM  3/19/21    I spent 40 minutes with this patient obtaining the HPI and discussing test results and exam with 50% of the visit counseling the patient and/or family on diagnosis, risk factor modification and our plan of action. All questions were answered prior to leaving my office.

## 2021-04-14 ENCOUNTER — OFFICE VISIT (OUTPATIENT)
Dept: CARDIOLOGY CLINIC | Age: 71
End: 2021-04-14
Payer: MEDICARE

## 2021-04-14 VITALS
BODY MASS INDEX: 25.76 KG/M2 | SYSTOLIC BLOOD PRESSURE: 128 MMHG | WEIGHT: 140 LBS | RESPIRATION RATE: 18 BRPM | DIASTOLIC BLOOD PRESSURE: 64 MMHG | HEIGHT: 62 IN | HEART RATE: 74 BPM

## 2021-04-14 DIAGNOSIS — I63.9 CEREBROVASCULAR ACCIDENT (CVA), UNSPECIFIED MECHANISM (HCC): ICD-10-CM

## 2021-04-14 DIAGNOSIS — I10 ESSENTIAL HYPERTENSION: Primary | ICD-10-CM

## 2021-04-14 DIAGNOSIS — R00.2 PALPITATIONS: ICD-10-CM

## 2021-04-14 PROCEDURE — 1123F ACP DISCUSS/DSCN MKR DOCD: CPT | Performed by: INTERNAL MEDICINE

## 2021-04-14 PROCEDURE — G8417 CALC BMI ABV UP PARAM F/U: HCPCS | Performed by: INTERNAL MEDICINE

## 2021-04-14 PROCEDURE — 4040F PNEUMOC VAC/ADMIN/RCVD: CPT | Performed by: INTERNAL MEDICINE

## 2021-04-14 PROCEDURE — G8427 DOCREV CUR MEDS BY ELIG CLIN: HCPCS | Performed by: INTERNAL MEDICINE

## 2021-04-14 PROCEDURE — 4004F PT TOBACCO SCREEN RCVD TLK: CPT | Performed by: INTERNAL MEDICINE

## 2021-04-14 PROCEDURE — 3017F COLORECTAL CA SCREEN DOC REV: CPT | Performed by: INTERNAL MEDICINE

## 2021-04-14 PROCEDURE — G8400 PT W/DXA NO RESULTS DOC: HCPCS | Performed by: INTERNAL MEDICINE

## 2021-04-14 PROCEDURE — 99214 OFFICE O/P EST MOD 30 MIN: CPT | Performed by: INTERNAL MEDICINE

## 2021-04-14 PROCEDURE — 1090F PRES/ABSN URINE INCON ASSESS: CPT | Performed by: INTERNAL MEDICINE

## 2021-04-14 PROCEDURE — 93000 ELECTROCARDIOGRAM COMPLETE: CPT | Performed by: INTERNAL MEDICINE

## 2021-04-14 NOTE — PROGRESS NOTES
CHIEF COMPLAINT: Cardiac evaluation/CVA    HISTORY OF PRESENT ILLNESS: Patient is a 80 y.o. female seen at the request of Cathy Santiago. Patient recently hospitalized for CVA. Echo with buble study showed no PFO. No CP or SOB. Rare palpitations. Presents for cardiac evaluation.     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 ([facility] 2/23/2021    H/O renal calculi     Hyperlipidemia     Hypertension     IBS (irritable bowel syndrome)     Insomnia     Left carotid stenosis 2/23/2021    Pyelonephritis 2019    Seasonal allergies     Stroke [facility]) 2013    tia    Type 2 diabetes mellitus without complication (HCC)     UTI (urinary tract infection) 2018       Patient Active Problem List   Diagnosis    Malignant neoplasm of central portion of right breast in female, estrogen receptor negative ([facility]    Breast cancer metastasized to axillary lymph node, right ([facility]    Encounter for insertion of venous access port    SIRS (systemic inflammatory response syndrome) ([facility]    Type 2 diabetes mellitus with hyperglycemia, without long-term current use of insulin (HCC)    HTN (hypertension)    History of stroke    Hyponatremia    ZHAO (acute kidney injury) ([facility]    Other hyperlipidemia    Palliative care by specialist    Post-operative pain    Sepsis ([facility]    Pyelonephritis    Chemotherapy-induced neuropathy ([facility]    Left carotid stenosis    Acute right MCA stroke (HCC)       Allergies   Allergen Reactions    Avelox [Moxifloxacin] Hives       Current Outpatient Medications   Medication Sig Dispense Refill    famotidine (PEPCID) 20 MG tablet Take 20 mg by mouth daily      aspirin 81 MG EC tablet Take 1 tablet by mouth daily 30 tablet 3    atorvastatin (LIPITOR) 80 MG tablet Take 1 tablet by mouth nightly 30 tablet 3    clopidogrel (PLAVIX) 75 MG tablet Take 1 tablet by mouth daily 30 tablet 0    glipiZIDE (GLUCOTROL) 5 MG tablet Take 1 tablet by mouth daily 60 tablet 3    acyclovir (ZOVIRAX) 400 MG tablet Take 1 tablet by mouth as needed      Vitamin B Complex-C CAPS Take by mouth      Vitamin D, Cholecalciferol, 25 MCG (1000 UT) CAPS Take by mouth      zolpidem (AMBIEN) 10 MG tablet Take 1 tablet by mouth.        meclizine (ANTIVERT) 12.5 MG tablet Take 1 tablet by mouth 3 times daily as needed       metFORMIN (GLUCOPHAGE) 1000 MG tablet Take 1 tablet by mouth 2 times daily      Melatonin 10 MG TABS Take by mouth      loperamide (IMODIUM) 2 MG capsule Take 2 mg by mouth 4 times daily as needed for Diarrhea      escitalopram (LEXAPRO) 20 MG tablet Take 10 mg by mouth every evening       atenolol-chlorthalidone (TENORETIC) 50-25 MG per tablet Take 1 tablet by mouth daily      lisinopril (PRINIVIL;ZESTRIL) 20 MG tablet Take 20 mg by mouth every evening       cetirizine (ZYRTEC ALLERGY) 10 MG tablet Take 10 mg by mouth daily       Current Facility-Administered Medications   Medication Dose Route Frequency Provider Last Rate Last Admin    perflutren lipid microspheres (DEFINITY) injection 1.65 mg  1.5 mL Intravenous ONCE PRN Nisha Boyer MD           Social History     Socioeconomic History    Marital status:      Spouse name: Cherry Green Number of children: 3    Years of education: Not on file    Highest education level: Not on file   Occupational History    Not on file   Social Needs    Financial resource strain: Not on file    Food insecurity     Worry: Not on file     Inability: Not on file   [employer] needs     Medical: Not on file     Non-medical: Not on file   Tobacco Use    Smoking status: Current Every Day Smoker     Packs/day: 1.00     Years: 50.00     Pack years: 50.00     Types: Cigarettes    Smokeless tobacco: Never Used    Tobacco comment: smoking x 50 years- ongoing    Substance and Sexual Activity    Alcohol use: No    Drug use: No    Sexual activity: Yes     Partners: Male   Lifestyle    Physical activity     Days per week: Not on file     Minutes per session: Not on file    Stress: Not on file   Relationships    Social connections     Talks on phone: Not on file     Gets together: Not on file     Attends [employer] service: Not on file     Active member of club or organization: Not on file     Attends meetings of clubs or organizations: Not on file     Relationship status: Not on file    Intimate partner violence     Fear of current or ex partner: Not on file     Emotionally abused: Not on file     Physically abused: Not on file     Forced sexual activity: Not on file   Other Topics Concern    Not on file   Social History Narrative    Not on file       Family History   Problem Relation Age of Onset    Cancer Mother [de-identified]        breast    Heart Disease Mother     Diabetes Mother     Hypertension Mother     Cancer Sister         lymphoma    Hypertension Father     Stroke Maternal Grandmother     Stroke Paternal Grandmother        Review of Systems:  Heart: as above   Lungs: as above   Eyes: denies changes in vision or discharge. Ears: denies changes in hearing or pain. Nose: denies epistaxis or masses   Throat: denies sore throat or trouble swallowing. Neuro: denies numbness, tingling, tremors. Skin: denies rashes or itching. : denies hematuria, dysuria   GI: denies vomiting, diarrhea   Psych: denies mood changed, anxiety, depression. All other systems negative. Physical Exam   /64   Pulse 74   Resp 18   Ht 5' 2\" (1.575 m)   Wt 140 lb (63.5 kg)   BMI 25.61 kg/m²   Constitutional: Oriented to person, place, and time. Well-developed and well-nourished. No distress. Head: Normocephalic and atraumatic. Eyes: EOM are normal. Pupils are equal, round, and reactive to light. Neck: Normal range of motion. Neck supple. No hepatojugular reflux and no JVD present. Carotid bruit is not present.  No tracheal deviation present. No thyromegaly present. Cardiovascular: Normal rate, regular rhythm, normal heart sounds and intact distal pulses. Exam reveals no gallop and no friction rub. No murmur heard. Pulmonary/Chest: Effort normal and breath sounds normal. No respiratory distress. No wheezes. No rales. No tenderness. Abdominal: Soft. Bowel sounds are normal. No distension and no mass. No tenderness. No rebound and no guarding. Musculoskeletal: Normal range of motion. No edema and no tenderness. Lymphadenopathy:   No cervical adenopathy. No groin adenopathy. Neurological: Alert and oriented to person, place, and time. Skin: Skin is warm and dry. No rash noted. Not diaphoretic. No erythema. Psychiatric: Normal mood and affect. Behavior is normal.     EKG personally reviewed 04/14/21:  normal sinus rhythm, nonspecific ST and T waves changes, LVH. Echo Summary 2/24/2021:   Normal left ventricular systolic function. Ejection fraction is visually estimated at 65%. Mild concentric left ventricular hypertrophy. Normal right ventricular size and function (TAPSE 2.5 cm). There is doppler evidence of stage II diastolic dysfunction. No evidence of interatrial shunting on bubble study. Mild mitral regurgitation. Moderate aortic regurgitation. Moderate aortic stenosis. Mild tricuspid regurgitation.     ASSESSMENT AND PLAN:  Patient Active Problem List   Diagnosis    Malignant neoplasm of central portion of right breast in female, estrogen receptor negative ([address]    Breast cancer metastasized to axillary lymph node, right ([address]    Encounter for insertion of venous access port    SIRS (systemic inflammatory response syndrome) ([address]    Type 2 diabetes mellitus with hyperglycemia, without long-term current use of insulin (HCC)    HTN (hypertension)    History of stroke    Hyponatremia    ZHAO (acute kidney injury) ([address]    Other hyperlipidemia    Palliative care by specialist    Post-operative pain    [facility]eps[facility]    Pyelonephritis    Chemotherapy-induced neuropathy ([facility]    Left carotid stenosis    Acute right MCA stroke ([facility]     1. CVA: Recent echo with negative bubble study. EKG NSR. Arrange 30 day monitor to assess for occult Afib. 2. VHD: Recent echo as above. Medically manage. 3. Diastolic Dysfunction    4. HTN: Observe    5. DM: Per PCP. 6. Lipids: Statin. 7. CKD: Follow labs. Claudia Oneil D.O.   Cardiologist  Cardiology, [facility]

## 2021-05-13 ENCOUNTER — HOSPITAL ENCOUNTER (OUTPATIENT)
Dept: INFUSION THERAPY | Age: 71
Discharge: HOME | End: 2021-05-13
Payer: MEDICARE

## 2021-05-13 ENCOUNTER — OFFICE VISIT (OUTPATIENT)
Dept: ONCOLOGY | Age: 71
End: 2021-05-13
Payer: MEDICARE

## 2021-05-13 VITALS
RESPIRATION RATE: 16 BRPM | SYSTOLIC BLOOD PRESSURE: 121 MMHG | DIASTOLIC BLOOD PRESSURE: 58 MMHG | TEMPERATURE: 97 F | OXYGEN SATURATION: 98 % | BODY MASS INDEX: 27.48 KG/M2 | HEART RATE: 63 BPM | WEIGHT: 140 LBS | HEIGHT: 60 IN

## 2021-05-13 DIAGNOSIS — Z85.3 PERSONAL HISTORY OF BREAST CANCER: ICD-10-CM

## 2021-05-13 DIAGNOSIS — Z85.3 PERSONAL HISTORY OF BREAST CANCER: Primary | ICD-10-CM

## 2021-05-13 LAB
ALBUMIN SERPL-MCNC: 4.5 G/DL (ref 3.5–5.2)
ALP BLD-CCNC: 75 U/L (ref 35–104)
ALT SERPL-CCNC: 14 U/L (ref 0–32)
ANION GAP SERPL CALCULATED.3IONS-SCNC: 12 MMOL/L (ref 7–16)
AST SERPL-CCNC: 16 U/L (ref 0–31)
BASOPHILS ABSOLUTE: 0.05 E9/L (ref 0–0.2)
BASOPHILS RELATIVE PERCENT: 0.9 % (ref 0–2)
BILIRUB SERPL-MCNC: 0.4 MG/DL (ref 0–1.2)
BUN BLDV-MCNC: 26 MG/DL (ref 6–23)
CALCIUM SERPL-MCNC: 9.9 MG/DL (ref 8.6–10.2)
CHLORIDE BLD-SCNC: 96 MMOL/L (ref 98–107)
CO2: 28 MMOL/L (ref 22–29)
CREAT SERPL-MCNC: 1.3 MG/DL (ref 0.5–1)
EOSINOPHILS ABSOLUTE: 0.39 E9/L (ref 0.05–0.5)
EOSINOPHILS RELATIVE PERCENT: 7 % (ref 0–6)
GFR AFRICAN AMERICAN: 49
GFR NON-AFRICAN AMERICAN: 40 ML/MIN/1.73
GLUCOSE BLD-MCNC: 279 MG/DL (ref 74–99)
HCT VFR BLD CALC: 42.2 % (ref 34–48)
HEMOGLOBIN: 13.9 G/DL (ref 11.5–15.5)
IMMATURE GRANULOCYTES #: 0.02 E9/L
IMMATURE GRANULOCYTES %: 0.4 % (ref 0–5)
LYMPHOCYTES ABSOLUTE: 0.86 E9/L (ref 1.5–4)
LYMPHOCYTES RELATIVE PERCENT: 15.4 % (ref 20–42)
MCH RBC QN AUTO: 29.3 PG (ref 26–35)
MCHC RBC AUTO-ENTMCNC: 32.9 % (ref 32–34.5)
MCV RBC AUTO: 89 FL (ref 80–99.9)
MONOCYTES ABSOLUTE: 0.54 E9/L (ref 0.1–0.95)
MONOCYTES RELATIVE PERCENT: 9.7 % (ref 2–12)
NEUTROPHILS ABSOLUTE: 3.71 E9/L (ref 1.8–7.3)
NEUTROPHILS RELATIVE PERCENT: 66.6 % (ref 43–80)
PDW BLD-RTO: 13.7 FL (ref 11.5–15)
PLATELET # BLD: 178 E9/L (ref 130–450)
PMV BLD AUTO: 11.1 FL (ref 7–12)
POTASSIUM SERPL-SCNC: 4.3 MMOL/L (ref 3.5–5)
RBC # BLD: 4.74 E12/L (ref 3.5–5.5)
SODIUM BLD-SCNC: 136 MMOL/L (ref 132–146)
TOTAL PROTEIN: 7.7 G/DL (ref 6.4–8.3)
WBC # BLD: 5.6 E9/L (ref 4.5–11.5)

## 2021-05-13 PROCEDURE — 3017F COLORECTAL CA SCREEN DOC REV: CPT | Performed by: INTERNAL MEDICINE

## 2021-05-13 PROCEDURE — 99213 OFFICE O/P EST LOW 20 MIN: CPT

## 2021-05-13 PROCEDURE — 4040F PNEUMOC VAC/ADMIN/RCVD: CPT | Performed by: INTERNAL MEDICINE

## 2021-05-13 PROCEDURE — 1090F PRES/ABSN URINE INCON ASSESS: CPT | Performed by: INTERNAL MEDICINE

## 2021-05-13 PROCEDURE — G8417 CALC BMI ABV UP PARAM F/U: HCPCS | Performed by: INTERNAL MEDICINE

## 2021-05-13 PROCEDURE — G8400 PT W/DXA NO RESULTS DOC: HCPCS | Performed by: INTERNAL MEDICINE

## 2021-05-13 PROCEDURE — 4004F PT TOBACCO SCREEN RCVD TLK: CPT | Performed by: INTERNAL MEDICINE

## 2021-05-13 PROCEDURE — G8427 DOCREV CUR MEDS BY ELIG CLIN: HCPCS | Performed by: INTERNAL MEDICINE

## 2021-05-13 PROCEDURE — 99214 OFFICE O/P EST MOD 30 MIN: CPT | Performed by: INTERNAL MEDICINE

## 2021-05-13 PROCEDURE — 1123F ACP DISCUSS/DSCN MKR DOCD: CPT | Performed by: INTERNAL MEDICINE

## 2021-05-13 PROCEDURE — 36415 COLL VENOUS BLD VENIPUNCTURE: CPT

## 2021-05-13 NOTE — PROGRESS NOTES
Department of [facility] On[facility]   Attending Clinic Note    Reason for Visit: Follow-up on a patient with Right Breast Cancer    PCP:  Laury Whitehead    History of Present Illness: The mass was located in the 12 o'clock position of the right breast.     Breast cancer risk factors include age, gender, mother with breast cancer. Age of menarche was 16. Total hysterectomy at age 53. Patient was on hormonal therapy, patient unsure of how long. Patient is . Age of first live birth was 33. Patient did not breast feed.     Bilateral screening mammogram on 2018:  Probable 15 mm thick soft tissue mass in the 12:00 position of the right breast.     Right Diagnostic Mammogram on 2018:  there is an 18 mm mass in the 12:00 position of the right breast consistent with carcinoma until proven otherwise. There is also an abnormal right axillary lymph node. Right Breast U/S on 2018:  18 mm mass in the 12:00 position of the right breast consistent with carcinoma until proven otherwise. Enlarged right axillary lymph node. On 04/10/2018:  A. Right breast, core biopsy, slides for review ( A): Poorly differentiated ductal carcinoma, [place] score 3+3+2 = 8.  B. Right lymph node, core biopsy, slides for review ( B):  Metastatic poorly differentiated carcinoma    Comment:   Per report from [facility] laboratory:  Estrogen receptor: Negative  Progesterone receptor: Negative  HER-2/jocelyn status: Negative (0)  Enclosed keratin immunohistochemical stains are positive    Stage IIIA T2 N2 M0 IDC Grade 3   Triple Negative Right Breast Cancer: We recommended neoadjuvant chemotherapy consisting of Dose-Dense AC-T. Side effects of AC-T reviewed with patient. She agreed to proceed. Mediport was placed. 2018 2D-Echo: EF 60%. Cycle # 1 Dose-Dense AC was on 2018. Cycle # 2 Dose-Dense AC was on 2018. Cycle # 3 Dose-Dense AC was on 2018.     Cycle # 4 Dose-Dense AC was on 08/02/2018. Cycle # 1 Dose-Dense Taxol was on 08/16/2018. Cycle # 2 Dose-Dense Taxol was on 08/30/2018. Cycle # 3 Dose Dense Taxol was on 09/13/2018. On 09/25/2018 Patient was admitted to the hospital for sepsis (due to complicated pyelonephritis with Klebsiella bacteremia), ZHAO bilateral ureteral stone and hydronephrosis s/p cysto/stent insertions 09/28/2018. Discharged on 10/01/2018 On Abx (cephalexin) for Klebsiella Bacteremia by ID team.     MRI Bilateral Breast 10/16/2018 noted Near complete response to therapy on the right with 3 residual right axillary lymph nodes demonstrating mild eccentric cortical thickening suggestive of neoplasm. Readmitted to the hospital on 10/27/2018 with thrombocytopenia, electrolyte disturbances, chills, fever, syncope, nausea and vomiting. Completed Abx    She underwent a removal of mediport, right breast simple mastectomy, blue dye injection, right axillary dissection on February 27, 2019. Pathological evaluation completed at [facility]):  A.  Mediport capsule and cord: Device identified. Benign fibroadipose tissue. B.  Right breast, mastectomy: Biopsy site; negative for residual carcinoma. Skin with seborrheic keratosis. Size of largest metastatic deposit-1.3 cm. C.  Right axillary contents, regional resection: 5 of 7 lymph nodes positive for metastatic poorly differentiated mammary carcinoma (grade 3).    Comment: The tumor cells in part C are immunoreactive with pankeratin.  The cells are negative for LCA.  Intradepartmental  consultation is obtained.     TNM classification (AJCC eighth edition)-  ypT0 N2a M0 TNBC   RT was completed on 05/24/2019. Adjuvant Xeloda was started on 06/04/2019. Significant fatigue, mouth sores, facial swelling/redness noted in August 2019. Patient decided to d/c the rest of remaining Xeloda on 08/26/2019 and wanted be on observation only.    Sx resolved after d/c Xeloda    She presented today 05/13/2021 for follow-up and is doing well. Fair appetite and energy level. No fever, chills. Denies chest pain or shortness of breath. Review of Systems;  CONSTITUTIONAL: No fever, chills. Fair appetite and energy level. ENMT: Eyes: No blurry vision Nose: No epistaxis. Mouth: No sore throat. RESPIRATORY: No hemoptysis, shortness of breath. CARDIOVASCULAR: No chest pain, SOB. GASTROINTESTINAL: No N/V, abdominal pain. GENITOURINARY: No dysuria, urinary frequency, hematuria. NEURO: No syncope, presyncope, headache. 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     UTI (urinary tract infection) 2018     Medications:  Reviewed and reconciled. Allergies: Allergies   Allergen Reactions    Avelox [Moxifloxacin] Hives     Physical Exam:  BP (!) 121/58   Pulse 63   Temp 97 °F (36.1 °C)   Resp 16   Ht 5' (1.524 m)   Wt 140 lb (63.5 kg)   SpO2 98%   BMI 27.34 kg/m²   GENERAL: Alert, oriented x 3, not in acute distress. EXTREMITIES: Without clubbing, cyanosis, or edema. ECOG PS 1    Impression/Plan:  80 y.o. female with Right Breast Cancer: On 04/10/2018:  A.  Right breast, core biopsy: Poorly differentiated ductal carcinoma, [place] score 3+3+2 = 8.  B. Right lymph node, core biopsy:  Metastatic poorly differentiated carcinoma    Comment: Per report from [facility] laboratory:  Estrogen receptor: Negative  Progesterone receptor: Negative  HER-2/jocelyn status: Negative (0)  Enclosed keratin immunohistochemical stains are positive    CT chest 05/11/2018:  Nodule just above the thyroid measures 2 x 2.7 cm.   Thyroid nodules range up to 10 mm. Right axillary lymph nodes range up to 1.9 x 3.1 cm. No significant mediastinal or hilar LN. 2.4 mm groundglass nodule in the lingula     CT abdomen/pelvis 05/11/2018 noted no evidence of metastatic disease. Bone scan 05/11/2018 noted no convincing evidence of metastatic disease. PET/CT scan 05/23/2018:  Multiple enlarged hypermetabolic lymph nodes at level of the right axilla measuring up to 33 x 22 mm with hypermetabolic activity and SUV measuring up to 5.6. Enlarged lymph node located adjacent to posterior aspect of the right parotid gland which measures 12 x 10 mm with SUV of 4.8. Nodule associated with thyroid isthmus measuring 22 x 27 mm. This nodule is solid; however no FDG uptake within this lesion. Otherwise negative. U/S guided fine needle aspiration of a right parotid tail on 05/30/2018:   Cytology: Negative for malignant cells. Sheets of benign-appearing oncocytic cells present in a background of mature lymphocytes. Pattern best fits with Warthin tumor. U/S guided fine needle aspiration of thyroid isthmus on 05/30/2018:  Negative for malignant cells. Benign-appearing follicular cells, foam cells, abundant colloid and blood present. These findings would be compatible with colloid nodule/nodular goiter. 06/10/2018 CT Soft Tissue: 2.3 x 1.7 cm solid lesion within the superficial tail of the right parotid gland. Question a focal solid neoplasm such as a Warthin's tumor. 2.5 x 2.0 cm hyperdense structure within the midline visceral space just inferior to the cricoid cartilage  This may represent a solid lesion arising from the thyroid gland. ENT team consulted. 06/05/2018 MRI Breast: 2.5 x 0.7 x 1.4 cm Irregular, enhancing mass in the right breast 12:00 with associated artifact from a metallic clip. At least 15 abnormal right axillary lymph nodes consistent with known metastatic disease. No evidence of neoplasm on the left.     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 Side effects of AC-T reviewed with patient. She agreed to proceed. Mediport was placed. 06/16/2018  2D-Echo: EF 60%. Cycle # 1 Dose-Dense AC was on 06/21/2018. Cycle # 4 Dose-Dense AC was on 08/02/2018. Cycle # 1 Dose-Dense Taxol was on 08/16/2018. Cycle # 2 Dose-Dense Taxol was on 08/30/2018. Cycle # 3 Dose Dense Taxol was on 09/13/2018. On 09/25/2018 Patient was admitted to the hospital for sepsis (due to complicated pyelonephritis with Klebsiella bacteremia), ZHAO bilateral ureteral stone and hydronephrosis s/p cysto/stent insertions 09/28/2018. Discharged on 10/01/2018 On Abx (cephalexin) for Klebsiella Bacteremia by ID team.   Due to the above and worsening peripheral neuropathy, we omitted last cycle of Taxol. MRI Bilateral Breast 10/16/2018 noted Near complete response to therapy on the right with 3 residual right axillary lymph nodes demonstrating mild eccentric cortical thickening suggestive of neoplasm. Readmitted to the hospital on 10/27/2018 with thrombocytopenia, electrolyte disturbances, chills, fever, syncope, nausea and vomiting. Completed Abx    Right simple mastectomy - blue dye inj - right axillary dissection) was on 12/19/2018  A.  Mediport capsule and cord: Device identified. Benign fibroadipose tissue. B.  Right breast, mastectomy: Biopsy site; negative for residual carcinoma. Skin with seborrheic keratosis. Size of largest metastatic deposit-1.3 cm. C.  Right axillary contents, regional resection:   5 of 7 lymph nodes positive for metastatic poorly differentiated mammary carcinoma (grade 3).    Comment: The tumor cells in part C are immunoreactive with pankeratin.  The cells are negative for LCA.  Intradepartmental  consultation is obtained.     TNM classification (AJCC eighth edition)-  ypT0 N2a M0 TNBC   Pathology report discussed.    RT was completed on 05/24/2019. We recommended adjuvant Xeloda bid 2 weeks on one week off for 6 cycles. Adjuvant Xeloda was started on 06/04/2019. Significant fatigue, mouth sores, facial swelling/redness noted in August 2019. Patient decided to d/c the rest of remaining Xeloda on 08/26/2019 and wanted be on observation only. Sx almost resolved after d/c Xeloda  Left screening mammogram 10/29/2020 Negative for malignancy. Imaging reviewed. Labs reviewed. GISELLE.     RTC Nov 2021 mammogram same day    Clarence MD Jean  Medical Oncologist  Board Certified, Medical Oncology  Board Certified, Internal Medicine  May 13, 2021

## 2021-05-18 DIAGNOSIS — I63.9 CEREBROVASCULAR ACCIDENT (CVA), UNSPECIFIED MECHANISM (HCC): ICD-10-CM

## 2021-05-19 RX ORDER — SIMVASTATIN 40 MG
1 TABLET ORAL DAILY
COMMUNITY
Start: 2021-05-06 | End: 2021-06-16

## 2021-05-19 RX ORDER — DAPAGLIFLOZIN 10 MG/1
1 TABLET, FILM COATED ORAL DAILY
COMMUNITY
Start: 2021-05-10

## 2021-05-20 ENCOUNTER — OFFICE VISIT (OUTPATIENT)
Dept: CARDIOLOGY CLINIC | Age: 71
End: 2021-05-20
Payer: MEDICARE

## 2021-05-20 VITALS
DIASTOLIC BLOOD PRESSURE: 66 MMHG | WEIGHT: 143 LBS | HEART RATE: 61 BPM | BODY MASS INDEX: 26.31 KG/M2 | RESPIRATION RATE: 18 BRPM | HEIGHT: 62 IN | SYSTOLIC BLOOD PRESSURE: 128 MMHG

## 2021-05-20 DIAGNOSIS — E11.69 TYPE 2 DIABETES MELLITUS WITH OTHER SPECIFIED COMPLICATION, UNSPECIFIED WHETHER LONG TERM INSULIN USE (HCC): ICD-10-CM

## 2021-05-20 DIAGNOSIS — I47.29 NSVT (NONSUSTAINED VENTRICULAR TACHYCARDIA): ICD-10-CM

## 2021-05-20 DIAGNOSIS — I10 ESSENTIAL HYPERTENSION: Primary | ICD-10-CM

## 2021-05-20 DIAGNOSIS — R06.09 DOE (DYSPNEA ON EXERTION): ICD-10-CM

## 2021-05-20 PROCEDURE — 3017F COLORECTAL CA SCREEN DOC REV: CPT | Performed by: INTERNAL MEDICINE

## 2021-05-20 PROCEDURE — 4040F PNEUMOC VAC/ADMIN/RCVD: CPT | Performed by: INTERNAL MEDICINE

## 2021-05-20 PROCEDURE — 1123F ACP DISCUSS/DSCN MKR DOCD: CPT | Performed by: INTERNAL MEDICINE

## 2021-05-20 PROCEDURE — G8427 DOCREV CUR MEDS BY ELIG CLIN: HCPCS | Performed by: INTERNAL MEDICINE

## 2021-05-20 PROCEDURE — 2022F DILAT RTA XM EVC RTNOPTHY: CPT | Performed by: INTERNAL MEDICINE

## 2021-05-20 PROCEDURE — 99214 OFFICE O/P EST MOD 30 MIN: CPT | Performed by: INTERNAL MEDICINE

## 2021-05-20 PROCEDURE — G8400 PT W/DXA NO RESULTS DOC: HCPCS | Performed by: INTERNAL MEDICINE

## 2021-05-20 PROCEDURE — G8417 CALC BMI ABV UP PARAM F/U: HCPCS | Performed by: INTERNAL MEDICINE

## 2021-05-20 PROCEDURE — 4004F PT TOBACCO SCREEN RCVD TLK: CPT | Performed by: INTERNAL MEDICINE

## 2021-05-20 PROCEDURE — 93000 ELECTROCARDIOGRAM COMPLETE: CPT | Performed by: INTERNAL MEDICINE

## 2021-05-20 PROCEDURE — 1090F PRES/ABSN URINE INCON ASSESS: CPT | Performed by: INTERNAL MEDICINE

## 2021-05-20 PROCEDURE — 3046F HEMOGLOBIN A1C LEVEL >9.0%: CPT | Performed by: INTERNAL MEDICINE

## 2021-05-20 NOTE — PROGRESS NOTES
CHIEF COMPLAINT: Cardiac evaluation/CVA    HISTORY OF PRESENT ILLNESS: Patient is a 80 y.o. female seen at the request of Kota Li. Patient recently hospitalized for CVA. Echo with buble study showed no PFO. No CP or SOB. Rare palpitations. 30 day monitor was without evidence of PAF but did have brief NSVT.     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) 2013    tia    Type 2 diabetes mellitus without complication (HCC)     UTI (urinary tract infection) 2018       Patient Active Problem List   Diagnosis    Malignant neoplasm of central portion of right breast in female, estrogen receptor negative ([facility]    Breast cancer metastasized to axillary lymph node, right ([facility]    Encounter for insertion of venous access port    SIRS (systemic inflammatory response syndrome) ([facility]    Type 2 diabetes mellitus with hyperglycemia, without long-term current use of insulin (HCC)    HTN (hypertension)    History of stroke    Hyponatremia    ZHAO (acute kidney injury) ([facility] [facility]    Other hyperlipidemia    Palliative care by specialist    Post-operative pain    Sepsis ([facility]    Pyelonephritis    Chemotherapy-induced neuropathy ([facility]    Left carotid stenosis    Acute right MCA stroke (HCC)       Allergies   Allergen Reactions    Avelox [Moxifloxacin] Hives       Current Outpatient Medications   Medication Sig Dispense Refill    FARXIGA 10 MG tablet Take 1 tablet by mouth daily      simvastatin (ZOCOR) 40 MG tablet Take 1 tablet by mouth daily      famotidine (PEPCID) 20 MG tablet Take 20 mg by mouth daily      aspirin 81 MG EC tablet Take 1 tablet by mouth daily 30 tablet 3    atorvastatin (LIPITOR) 80 MG tablet Take 1 tablet by mouth nightly 30 tablet 3    glipiZIDE (GLUCOTROL) 5 MG tablet Take 1 tablet by mouth daily 60 tablet 3    acyclovir (ZOVIRAX) 400 MG tablet Take 1 tablet by mouth as needed      Vitamin B Complex-C CAPS Take by mouth      Vitamin D, Cholecalciferol, 25 MCG (1000 UT) CAPS Take by mouth      zolpidem (AMBIEN) 10 MG tablet Take 1 tablet by mouth.        meclizine (ANTIVERT) 12.5 MG tablet Take 1 tablet by mouth 3 times daily as needed       metFORMIN (GLUCOPHAGE) 1000 MG tablet Take 1 tablet by mouth 2 times daily      Melatonin 10 MG TABS Take by mouth      loperamide (IMODIUM) 2 MG capsule Take 2 mg by mouth 4 times daily as needed for Diarrhea      escitalopram (LEXAPRO) 20 MG tablet Take 10 mg by mouth every evening       atenolol-chlorthalidone (TENORETIC) 50-25 MG per tablet Take 1 tablet by mouth daily      lisinopril (PRINIVIL;ZESTRIL) 20 MG tablet Take 20 mg by mouth every evening       cetirizine (ZYRTEC ALLERGY) 10 MG tablet Take 10 mg by mouth daily       Current Facility-Administered Medications   Medication Dose Route Frequency Provider Last Rate Last Admin    perflutren lipid microspheres (DEFINITY) injection 1.65 mg  1.5 mL Intravenous ONCE PRN Deborah Allan MD           Social History     Socioeconomic History    Marital status:      Spouse name: Jarred Rodas Number of children: 3    Years of education: Not on file    Highest education level: Not on file   Occupational History    Not on file   Tobacco Use    Smoking status: Current Every Day Smoker     Packs/day: 1.00     Years: 50.00     Pack years: 50.00     Types: Cigarettes    Smokeless tobacco: Never Used    Tobacco comment: smoking x 50 years- ongoing    Vaping Use    Vaping Use: Never used   Substance and Sexual Activity    Alcohol use: No    Drug use: No    Sexual activity: Yes     Partners: Male   Other Topics Concern    Not on file   Social Pupils are equal, round, and reactive to light. Neck: Normal range of motion. Neck supple. No hepatojugular reflux and no JVD present. Carotid bruit is not present. No tracheal deviation present. No thyromegaly present. Cardiovascular: Normal rate, regular rhythm, normal heart sounds and intact distal pulses. Exam reveals no gallop and no friction rub. No murmur heard. Pulmonary/Chest: Effort normal and breath sounds normal. No respiratory distress. No wheezes. No rales. No tenderness. Abdominal: Soft. Bowel sounds are normal. No distension and no mass. No tenderness. No rebound and no guarding. Musculoskeletal: Normal range of motion. No edema and no tenderness. Lymphadenopathy:   No cervical adenopathy. No groin adenopathy. Neurological: Alert and oriented to person, place, and time. Skin: Skin is warm and dry. No rash noted. Not diaphoretic. No erythema. Psychiatric: Normal mood and affect. Behavior is normal.     EKG personally reviewed 05/20/21:  normal sinus rhythm, nonspecific ST and T waves changes, LVH. Echo Summary 2/24/2021:   Normal left ventricular systolic function. Ejection fraction is visually estimated at 65%. Mild concentric left ventricular hypertrophy. Normal right ventricular size and function (TAPSE 2.5 cm). There is doppler evidence of stage II diastolic dysfunction. No evidence of interatrial shunting on bubble study. Mild mitral regurgitation. Moderate aortic regurgitation. Moderate aortic stenosis. Mild tricuspid regurgitation.     ASSESSMENT AND PLAN:  Patient Active Problem List   Diagnosis    Malignant neoplasm of central portion of right breast in female, estrogen receptor negative ([address]    Breast cancer metastasized to axillary lymph node, right ([address]    Encounter for insertion of venous access port    SIRS (systemic inflammatory response syndrome) ([address]    Type 2 diabetes mellitus with hyperglycemia, without long-term current use of insulin ([facility]    HTN (hypertension)    History of stroke    Hyponatremia    ZHAO (acute kidney injury) ([facility]    Other hyperlipidemia    Palliative care by specialist    Post-operative pain    [facility]    Pyelonephritis    Chem[facility] neuropathy [facility]    Left carotid stenosis    Acute right MCA stroke ([facility]     1. CVA: Recent echo with negative bubble study. EKG NSR.    30 day monitor without occult Afib. 2. VHD: Recent echo as above. Medically manage. 3. NSVT: Pharm stress. On BB. 4. Diastolic Dysfunction: Follow volume. 5. HTN: Observe    6. DM: Per PCP. 7. Lipids: Statin. 8. CKD: Follow labs. John Pavon D.O.   Cardiologist  Cardiology, [facility]

## 2021-05-27 LAB
LV EF: 79 %
LVEF MODALITY: NORMAL

## 2021-06-16 ENCOUNTER — OFFICE VISIT (OUTPATIENT)
Dept: NEUROLOGY | Age: 71
End: 2021-06-16
Payer: MEDICARE

## 2021-06-16 VITALS
DIASTOLIC BLOOD PRESSURE: 62 MMHG | TEMPERATURE: 98.3 F | BODY MASS INDEX: 25.76 KG/M2 | HEIGHT: 62 IN | WEIGHT: 140 LBS | SYSTOLIC BLOOD PRESSURE: 132 MMHG

## 2021-06-16 DIAGNOSIS — I63.9 CRYPTOGENIC STROKE (HCC): Primary | ICD-10-CM

## 2021-06-16 PROBLEM — G89.18 POST-OPERATIVE PAIN: Status: RESOLVED | Noted: 2018-10-25 | Resolved: 2021-06-16

## 2021-06-16 PROBLEM — Z86.73 HISTORY OF STROKE: Status: RESOLVED | Noted: 2018-09-25 | Resolved: 2021-06-16

## 2021-06-16 PROBLEM — N12 PYELONEPHRITIS: Status: RESOLVED | Noted: 2018-10-28 | Resolved: 2021-06-16

## 2021-06-16 PROBLEM — R65.10 SIRS (SYSTEMIC INFLAMMATORY RESPONSE SYNDROME) (HCC): Status: RESOLVED | Noted: 2018-09-24 | Resolved: 2021-06-16

## 2021-06-16 PROBLEM — E87.1 HYPONATREMIA: Status: RESOLVED | Noted: 2018-09-25 | Resolved: 2021-06-16

## 2021-06-16 PROBLEM — A41.9 SEPSIS (HCC): Status: RESOLVED | Noted: 2018-10-27 | Resolved: 2021-06-16

## 2021-06-16 PROBLEM — I63.511 ACUTE RIGHT MCA STROKE (HCC): Status: RESOLVED | Noted: 2021-02-24 | Resolved: 2021-06-16

## 2021-06-16 PROBLEM — N17.9 AKI (ACUTE KIDNEY INJURY) (HCC): Status: RESOLVED | Noted: 2018-09-25 | Resolved: 2021-06-16

## 2021-06-16 PROCEDURE — 4004F PT TOBACCO SCREEN RCVD TLK: CPT | Performed by: NURSE PRACTITIONER

## 2021-06-16 PROCEDURE — G8427 DOCREV CUR MEDS BY ELIG CLIN: HCPCS | Performed by: NURSE PRACTITIONER

## 2021-06-16 PROCEDURE — 3017F COLORECTAL CA SCREEN DOC REV: CPT | Performed by: NURSE PRACTITIONER

## 2021-06-16 PROCEDURE — 99214 OFFICE O/P EST MOD 30 MIN: CPT | Performed by: NURSE PRACTITIONER

## 2021-06-16 PROCEDURE — 4040F PNEUMOC VAC/ADMIN/RCVD: CPT | Performed by: NURSE PRACTITIONER

## 2021-06-16 PROCEDURE — 1090F PRES/ABSN URINE INCON ASSESS: CPT | Performed by: NURSE PRACTITIONER

## 2021-06-16 PROCEDURE — 1123F ACP DISCUSS/DSCN MKR DOCD: CPT | Performed by: NURSE PRACTITIONER

## 2021-06-16 PROCEDURE — G8400 PT W/DXA NO RESULTS DOC: HCPCS | Performed by: NURSE PRACTITIONER

## 2021-06-16 PROCEDURE — G8417 CALC BMI ABV UP PARAM F/U: HCPCS | Performed by: NURSE PRACTITIONER

## 2021-06-16 RX ORDER — DIVALPROEX SODIUM 250 MG/1
250 TABLET, EXTENDED RELEASE ORAL DAILY
Qty: 30 TABLET | Refills: 11 | Status: SHIPPED | OUTPATIENT
Start: 2021-06-16

## 2021-06-16 NOTE — PROGRESS NOTES
Claudia. Henna Oreilly M.D., F.A.C.P. Kashif Curiel, DNP, APRN, CNS  Nickie Nguyen. Rodger Dueñas, MSN, APRN-FNP-C  Mariia Curiel MSN, APRN, FNP-C  Vini COOPER, GAETANO  [address] MSN, APRN, FNP-C     [address], [address]              [place], [address] [place] [address]      [phone]                                   Concha Hughes is a 80 y.o. right handed female     We are seeing her as a stroke clinic follow-up    She presents with her  and is a fairly good historian. She was seen by cardiology, and underwent 30-day cardiac monitoring for A. fib. She states she has not heard the results back yet. No palpitations or shortness of breath. She continues to note some intermittent slurred speech and stumbling over words, which worsen when she is tired or stressed. No new weakness. Mild numbness in her left cheek. No falls or swallowing issues. Taking aspirin and statin faithfullycompleted recommended course of DAPT. She has had increase in irritability since her stroke, often snapping at her family members for the smallest offenses. This is very bothersome to her. No new medications. Taking Ambien for sleep. Using meclizine infrequently for history of vertigo. She continues to note numbness and tingling as well as weakness in both hands secondary to neuropathy from her cancer treatment. Thankfully, cancer has not recurred.     Current Outpatient Medications   Medication Sig Dispense Refill    FARXIGA 10 MG tablet Take 1 tablet by mouth daily      famotidine (PEPCID) 20 MG tablet Take 20 mg by mouth daily      aspirin 81 MG EC tablet Take 1 tablet by mouth daily 30 tablet 3    atorvastatin (LIPITOR) 80 MG tablet Take 1 tablet by mouth nightly 30 tablet 3    glipiZIDE (GLUCOTROL) 5 MG tablet Take 1 tablet by mouth daily 60 tablet 3    acyclovir (ZOVIRAX) 400 MG tablet Take 1 tablet by mouth as needed      Vitamin B Complex-C CAPS Take by mouth      right frontal lobe. Scattered small old infarctions in the bilateral frontal parietal lobes,   right occipital lobe and bilateral cerebellar hemispheres.  Old lacunar   infarcts in right thalamus and left middle cerebellar peduncle. Moderate parenchymal volume loss. Mild to moderate chronic microvascular disease. Echo with bubble   Summary   Normal left ventricular systolic function. Ejection fraction is visually estimated at 65%. Mild concentric left ventricular hypertrophy. Normal right ventricular size and function (TAPSE 2.5 cm). There is doppler evidence of stage II diastolic dysfunction. No evidence of interatrial shunting on bubble study. Mild mitral regurgitation. Moderate aortic regurgitation. Moderate aortic stenosis. Mild tricuspid regurgitation. Ultrasound carotids: Atherosclerotic disease. Estimated stenosis by NASCET criteria in the proximal right carotid artery is between 0% to 49% . Estimated stenosis by NASCET criteria in the proximal left carotid artery is between 50% to 69%. * Images and labs personally reviewed at the time of this office visit    Assessment     R MCA cryptogenic stroke: Stroke mechanism suspicious for embolic event such as occult A. fib. She has evidence of old embolic appearing events on her MRI as well, as well as subcortical strokes. Results of 30-day cardiac monitoring are pending, but if no A. fib seen, she may need longer term monitoring. Thankfully, she recovered fairly well from this stroke, with only residual mild dysarthria. A small dose of Depakote may help her irritability--she may be suffering from a mild pseudobulbar affect-- and should consider Nuedexta pending response to Depakote. Asymptomatic L carotid stenosis: Hemodynamically significant stenosis of 50-69% on ultrasound, but no stenosis seen on MRAs.   Not responsible for her right MCA stroke    Chemotherapy-induced peripheral neuropathy: on gabapentin    Breast

## 2021-06-16 NOTE — PATIENT INSTRUCTIONS
Patient Education        Learning About FAST: Stroke Warning Signs  What is FAST? FAST is a simple way to remember the main symptoms of stroke. Recognizing these symptoms helps you know when to call for medical help. FAST stands for:  · F ace drooping. · A rm weakness. · S peech difficulty. · T 911  lore to call . Other stroke symptoms can include sudden confusion, a severe headache, and problems with vision or balance. What happens when you have a stroke? A stroke occurs when a blood vessel to the brain bursts or is blocked by a blood clot. Within minutes, the nerve cells in that part of the brain die. As a result, the part of the body controlled by those cells cannot work properly. The effects of a stroke may range from mild to severe. They may get better, or they may last the rest of your life. A stroke can affect vision, speech, behavior, thought processes, and your ability to move. It can cause symptoms that may include:  · Sudden numbness, tingling, weakness, or loss of movement in your face, arm, or leg, especially on only one side of your body. · Sudden vision changes. · Sudden trouble speaking. · Sudden confusion or trouble understanding simple statements. · Sudden problems with walking or balance. · A sudden, severe headache that is different from past headaches. Why is it important to get help FAST? Quick treatment may save your life. And it may reduce the damage in your brain so that you have fewer problems after the stroke. When you know stroke symptoms, you will know when it's important to call for medical help. Where can you learn more? Go to https://NearWoopeUrvew.[email or web address] org and sign in to your relocality account. Enter X331 in the [employer] box to learn more about \"Learning About FAST: Stroke Warning Signs. \"     If you do not have an account, please click on the \"Sign Up Now\" link.   Current as of: November 4, 2020               Content Version: 12.9  © 1817-1200 Healthwise, Incorporated. Care instructions adapted under license by [facility] ([facility]). If you have questions about a medical condition or this instruction, always ask your healthcare professional. Jose De La Cruz any warranty or liability for your use of this information. Patient Education        valproic acid (oral/injection)  Pronunciation:  bethany GRACE ik A zulma  Brand:  Depakene, Valproate Sodium  What is the most important information I should know about valproic acid? Valproic acid can cause liver failure that may be fatal,  especially in children under age 3 and in people with liver problems caused by certain genetic disorders. You should not use valproic acid if you have liver disease, a urea cycle disorder, or a genetic disorder such as Alpers' disease or Alpers-Huttenlocher syndrome. Do not start or stop taking this medicine during pregnancy without your doctor's advice. This medicine may harm an unborn baby or cause birth defects, but having a seizure during pregnancy could harm both mother and baby. Do not use valproic acid to prevent migraine headaches if you are pregnant. Call your doctor at once if the person taking this medicine has signs of liver or pancreas problems, such as: loss of appetite, upper stomach pain (that may spread to your back), ongoing nausea or vomiting, dark urine, swelling in the face, or jaundice (yellowing of the skin or eyes). Do not stop using valproic acid without your doctor's advice. Stopping suddenly may cause a serious, life-threatening type of seizure. What is valproic acid? Valproic acid is used to treat various types of seizure disorders. Valproic acid is sometimes used together with other seizure medications. Valproic acid is also used to treat manic episodes related to bipolar disorder (manic depression), and to prevent migraine headaches. Valproic acid may also be used for purposes not listed in this medication guide.   What should I discuss with my healthcare provider before taking valproic acid? You should not use valproic acid if you are allergic to it, or if you have:  · liver disease;  · a urea cycle disorder; or  · a genetic mitochondrial (MYE-toe-ADA-dree-al) disorder such as Alpers' disease or Alpers-Huttenlocher syndrome, especially in a child younger than 3years old. Valproic acid can cause liver failure that may be fatal,  especially in children under age 3 and in people with liver problems caused by a genetic mitochondrial disorder. Tell your doctor if you have ever had:  · liver problems caused by a genetic mitochondrial disorder;  · depression, mental illness, or suicidal thoughts or actions;  · a family history of a urea cycle disorder or infant deaths with unknown cause; or  · HIV or CMV (cytomegalovirus) infection. Some young people have thoughts about suicide when first taking valproic acid. Your doctor should check your progress at regular visits. Your family or other caregivers should also be alert to changes in your mood or symptoms. Using valproic acid during pregnancy may increase the risk of serious birth defects that can develop early in pregnancy, even before you know you are pregnant. Using this medicine during pregnancy can also affect cognitive ability (reasoning, intelligence, problem-solving) later in your child's life. However, having a seizure during pregnancy could harm both the mother and the baby. If you take valproic acid for seizures or manic episodes: The benefit of preventing these conditions may outweigh any risks posed by this medicine. There may be other medications that are safer to use during pregnancy. Do not start or stop taking valproic acid without your doctor's advice. Do not use valproic acid to prevent migraine headaches if you are pregnant or you could become pregnant. If you are not pregnant, use effective birth control to prevent pregnancy while using valproic acid.  Tell your next dose. Do not take two doses at one time. What happens if I overdose? Seek emergency medical attention or call the Poison Help line at 1-190.981.7610. What should I avoid while taking valproic acid? Drinking alcohol may increase certain side effects of valproic acid. Avoid driving or hazardous activity until you know how this medicine will affect you. Your reactions could be impaired. What are the possible side effects of valproic acid? Get emergency medical help if you have signs of an allergic reaction (hives, difficult breathing, swelling in your face or throat) or a severe skin reaction (fever, sore throat, burning eyes, skin pain, red or purple skin rash with blistering and peeling). Seek medical treatment if you have a serious drug reaction that can affect many parts of your body. Symptoms may include: skin rash, fever, swollen glands, muscle aches, severe weakness, unusual bruising, or yellowing of your skin or eyes. Call your doctor at once if the person taking this medicine has signs of liver or pancreas problems, such as: loss of appetite, upper stomach pain (that may spread to your back), ongoing nausea or vomiting, dark urine, swelling in the face, or jaundice (yellowing of the skin or eyes). Report any new or worsening symptoms to your doctor, such as: mood or behavior changes, depression, anxiety, panic attacks, trouble sleeping, or if you feel impulsive, irritable, agitated, hostile, aggressive, restless, hyperactive (mentally or physically), or have thoughts about suicide or hurting yourself. Call your doctor at once if you have any of these other side effects:  · confusion, tiredness, cold feeling, vomiting, change in your mental state;  · easy bruising, unusual bleeding (nose, mouth, or gums), purple or red pinpoint spots under your skin;  · severe drowsiness; or  · worsening seizures. Severe drowsiness may be more likely in older adults.   Common side effects may include:  · nausea, vomiting, stomach pain, diarrhea;  · dizziness, drowsiness, weakness;  · headache;  · tremors, problems with walking or coordination;  · blurred vision, double vision;  · hair loss; or  · changes in appetite, weight gain. This is not a complete list of side effects and others may occur. Call your doctor for medical advice about side effects. You may report side effects to FDA at 9-739-CSG-2964. What other drugs will affect valproic acid? Sometimes it is not safe to use certain medications at the same time. Some drugs can affect your blood levels of other drugs you take, which may increase side effects or make the medications less effective. Many drugs can affect valproic acid. This includes prescription and over-the-counter medicines, vitamins, and herbal products. Not all possible interactions are listed here. Tell your doctor about all your current medicines and any medicine you start or stop using. Where can I get more information? Your pharmacist can provide more information about valproic acid. Remember, keep this and all other medicines out of the reach of children, never share your medicines with others, and use this medication only for the indication prescribed. Every effort has been made to ensure that the information provided by [address] is accurate, up-to-date, and complete, but no guarantee is made to that effect. Drug information contained herein may be time sensitive. [facility] information has been compiled for use by healthcare practitioners and consumers in the [facility] and therefore [facility] does not warrant that uses outside of the [facility] are appropriate, unless specifically indicated otherwise. [facility]'s drug information does not endorse drugs, diagnose patients or recommend therapy.  [facility]'s drug information is an informational resource designed to assist licensed healthcare practitioners in caring for their patients and/or to serve consumers viewing this service as a supplement to, and not a substitute for, the expertise, skill, knowledge and judgment of healthcare practitioners. The absence of a warning for a given drug or drug combination in no way should be construed to indicate that the drug or drug combination is safe, effective or appropriate for any given patient. [facility] does not assume any responsibility for any aspect of healthcare administered with the aid of information [facility] provides. The information contained herein is not intended to cover all possible uses, directions, precautions, warnings, drug interactions, allergic reactions, or adverse effects. If you have questions about the drugs you are taking, check with your doctor, nurse or pharmacist.  Copyright 2825-8945 [address]: 18.01. Revision date: 3/3/2020. Care instructions adapted under license by [facility] ([facility]). If you have questions about a medical condition or this instruction, always ask your healthcare professional. [place] [address] any warranty or liability for your use of this information.

## 2021-11-01 ENCOUNTER — OFFICE VISIT (OUTPATIENT)
Dept: ONCOLOGY | Age: 71
End: 2021-11-01
Payer: MEDICARE

## 2021-11-01 ENCOUNTER — HOSPITAL ENCOUNTER (OUTPATIENT)
Dept: INFUSION THERAPY | Age: 71
Discharge: HOME | End: 2021-11-01
Payer: MEDICARE

## 2021-11-01 VITALS
HEIGHT: 62 IN | WEIGHT: 141.5 LBS | BODY MASS INDEX: 26.04 KG/M2 | OXYGEN SATURATION: 96 % | DIASTOLIC BLOOD PRESSURE: 62 MMHG | TEMPERATURE: 97.6 F | HEART RATE: 59 BPM | SYSTOLIC BLOOD PRESSURE: 129 MMHG

## 2021-11-01 DIAGNOSIS — Z85.3 PERSONAL HISTORY OF BREAST CANCER: ICD-10-CM

## 2021-11-01 DIAGNOSIS — Z85.3 PERSONAL HISTORY OF BREAST CANCER: Primary | ICD-10-CM

## 2021-11-01 LAB
ALBUMIN SERPL-MCNC: 4.3 G/DL (ref 3.5–5.2)
ALP BLD-CCNC: 71 U/L (ref 35–104)
ALT SERPL-CCNC: 12 U/L (ref 0–32)
ANION GAP SERPL CALCULATED.3IONS-SCNC: 13 MMOL/L (ref 7–16)
AST SERPL-CCNC: 14 U/L (ref 0–31)
BASOPHILS ABSOLUTE: 0.03 E9/L (ref 0–0.2)
BASOPHILS RELATIVE PERCENT: 0.6 % (ref 0–2)
BILIRUB SERPL-MCNC: 0.5 MG/DL (ref 0–1.2)
BUN BLDV-MCNC: 29 MG/DL (ref 6–23)
CALCIUM SERPL-MCNC: 9.5 MG/DL (ref 8.6–10.2)
CHLORIDE BLD-SCNC: 88 MMOL/L (ref 98–107)
CO2: 27 MMOL/L (ref 22–29)
CREAT SERPL-MCNC: 1.2 MG/DL (ref 0.5–1)
EOSINOPHILS ABSOLUTE: 0.26 E9/L (ref 0.05–0.5)
EOSINOPHILS RELATIVE PERCENT: 4.9 % (ref 0–6)
GFR AFRICAN AMERICAN: 54
GFR NON-AFRICAN AMERICAN: 44 ML/MIN/1.73
GLUCOSE BLD-MCNC: 427 MG/DL (ref 74–99)
HCT VFR BLD CALC: 41.5 % (ref 34–48)
HEMOGLOBIN: 14.4 G/DL (ref 11.5–15.5)
IMMATURE GRANULOCYTES #: 0.03 E9/L
IMMATURE GRANULOCYTES %: 0.6 % (ref 0–5)
LYMPHOCYTES ABSOLUTE: 1 E9/L (ref 1.5–4)
LYMPHOCYTES RELATIVE PERCENT: 18.7 % (ref 20–42)
MCH RBC QN AUTO: 29.4 PG (ref 26–35)
MCHC RBC AUTO-ENTMCNC: 34.7 % (ref 32–34.5)
MCV RBC AUTO: 84.7 FL (ref 80–99.9)
MONOCYTES ABSOLUTE: 0.58 E9/L (ref 0.1–0.95)
MONOCYTES RELATIVE PERCENT: 10.8 % (ref 2–12)
NEUTROPHILS ABSOLUTE: 3.46 E9/L (ref 1.8–7.3)
NEUTROPHILS RELATIVE PERCENT: 64.4 % (ref 43–80)
PDW BLD-RTO: 13.2 FL (ref 11.5–15)
PLATELET # BLD: 146 E9/L (ref 130–450)
PMV BLD AUTO: 11.1 FL (ref 7–12)
POTASSIUM SERPL-SCNC: 3.4 MMOL/L (ref 3.5–5)
RBC # BLD: 4.9 E12/L (ref 3.5–5.5)
SODIUM BLD-SCNC: 128 MMOL/L (ref 132–146)
TOTAL PROTEIN: 6.9 G/DL (ref 6.4–8.3)
WBC # BLD: 5.4 E9/L (ref 4.5–11.5)

## 2021-11-01 PROCEDURE — 99212 OFFICE O/P EST SF 10 MIN: CPT

## 2021-11-01 PROCEDURE — G8400 PT W/DXA NO RESULTS DOC: HCPCS | Performed by: INTERNAL MEDICINE

## 2021-11-01 PROCEDURE — 99214 OFFICE O/P EST MOD 30 MIN: CPT | Performed by: INTERNAL MEDICINE

## 2021-11-01 PROCEDURE — 3017F COLORECTAL CA SCREEN DOC REV: CPT | Performed by: INTERNAL MEDICINE

## 2021-11-01 PROCEDURE — 36415 COLL VENOUS BLD VENIPUNCTURE: CPT

## 2021-11-01 PROCEDURE — 4004F PT TOBACCO SCREEN RCVD TLK: CPT | Performed by: INTERNAL MEDICINE

## 2021-11-01 PROCEDURE — G8417 CALC BMI ABV UP PARAM F/U: HCPCS | Performed by: INTERNAL MEDICINE

## 2021-11-01 PROCEDURE — G8484 FLU IMMUNIZE NO ADMIN: HCPCS | Performed by: INTERNAL MEDICINE

## 2021-11-01 PROCEDURE — 1123F ACP DISCUSS/DSCN MKR DOCD: CPT | Performed by: INTERNAL MEDICINE

## 2021-11-01 PROCEDURE — G8427 DOCREV CUR MEDS BY ELIG CLIN: HCPCS | Performed by: INTERNAL MEDICINE

## 2021-11-01 PROCEDURE — 1090F PRES/ABSN URINE INCON ASSESS: CPT | Performed by: INTERNAL MEDICINE

## 2021-11-01 PROCEDURE — 4040F PNEUMOC VAC/ADMIN/RCVD: CPT | Performed by: INTERNAL MEDICINE

## 2021-11-01 NOTE — PROGRESS NOTES
Department of [facility] Oncology   Attending Clinic Note    Reason for Visit: Follow-up on a patient with Right Breast Cancer    PCP:  Lilliana Love    History of Present Illness: The mass was located in the 12 o'clock position of the right breast.     Breast cancer risk factors include age, gender, mother with breast cancer. Age of menarche was 16. Total hysterectomy at age 53. Patient was on hormonal therapy, patient unsure of how long. Patient is . Age of first live birth was 33. Patient did not breast feed.     Bilateral screening mammogram on 2018:  Probable 15 mm thick soft tissue mass in the 12:00 position of the right breast.     Right Diagnostic Mammogram on 2018:  there is an 18 mm mass in the 12:00 position of the right breast consistent with carcinoma until proven otherwise. There is also an abnormal right axillary lymph node. Right Breast U/S on 2018:  18 mm mass in the 12:00 position of the right breast consistent with carcinoma until proven otherwise. Enlarged right axillary lymph node. On 04/10/2018:  A. Right breast, core biopsy, slides for review ( A): Poorly differentiated ductal carcinoma, [place] score 3+3+2 = 8.  B. 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 Side effects of AC-T reviewed with patient. She agreed to proceed. Mediport was placed. 2018 2D-Echo: EF 60%. Cycle # 1 Dose-Dense AC was on 2018. Cycle # 2 Dose-Dense AC was on 2018. Cycle # 3 Dose-Dense AC was on 2018.     Cycle # 4 Dose-Dense AC was on 08/02/2018. Cycle # 1 Dose-Dense Taxol was on 08/16/2018. Cycle # 2 Dose-Dense Taxol was on 08/30/2018. Cycle # 3 Dose Dense Taxol was on 09/13/2018. On 09/25/2018 Patient was admitted to the hospital for sepsis (due to complicated pyelonephritis with Klebsiella bacteremia), ZHAO bilateral ureteral stone and hydronephrosis s/p cysto/stent insertions 09/28/2018. Discharged on 10/01/2018 On Abx (cephalexin) for Klebsiella Bacteremia by ID team.     MRI Bilateral Breast 10/16/2018 noted Near complete response to therapy on the right with 3 residual right axillary lymph nodes demonstrating mild eccentric cortical thickening suggestive of neoplasm. Readmitted to the hospital on 10/27/2018 with thrombocytopenia, electrolyte disturbances, chills, fever, syncope, nausea and vomiting. Completed Abx    She underwent a removal of mediport, right breast simple mastectomy, blue dye injection, right axillary dissection on February 27, 2019. Pathological evaluation completed at [facility]):  A.  Mediport capsule and cord: Device identified. Benign fibroadipose tissue. B.  Right breast, mastectomy: Biopsy site; negative for residual carcinoma. Skin with seborrheic keratosis. Size of largest metastatic deposit-1.3 cm. C.  Right axillary contents, regional resection: 5 of 7 lymph nodes positive for metastatic poorly differentiated mammary carcinoma (grade 3).    Comment: The tumor cells in part C are immunoreactive with pankeratin.  The cells are negative for LCA.  Intradepartmental  consultation is obtained.     TNM classification (AJCC eighth edition)-  ypT0 N2a M0 TNBC   RT was completed on 05/24/2019. Adjuvant Xeloda was started on 06/04/2019. Significant fatigue, mouth sores, facial swelling/redness noted in August 2019. Patient decided to d/c the rest of remaining Xeloda on 08/26/2019 and wanted be on observation only.    Sx resolved after d/c Xeloda    She presented today 11/01/2021 for follow-up and is doing well. Fair appetite and energy level. No fever, chills. Denies chest pain or shortness of breath. Review of Systems;  CONSTITUTIONAL: No fever, chills. Fair appetite and energy level. ENMT: Eyes: No blurry vision Nose: No epistaxis. Mouth: No sore throat. RESPIRATORY: No hemoptysis, shortness of breath. CARDIOVASCULAR: No chest pain, SOB. GASTROINTESTINAL: No N/V, abdominal pain. GENITOURINARY: No dysuria, urinary frequency, hematuria. NEURO: No syncope, presyncope, headache. Remainder:  ROS NEGATIVE    Past Medical History:      Diagnosis Date    Acid reflux     Breast cancer ([facility] [address] 06/2018    right    Cerebral infarction due to embolism of right middle cerebral artery ([facility] 02/23/2021    H/O renal calculi     Hyperlipidemia     Hypertension     IBS (irritable bowel syndrome)     Insomnia     Left carotid stenosis 02/23/2021    Pyelonephritis 2019    Seasonal allergies     Stroke [facility]) 2013    tia    [facility]     UTI (urinary tract infection) 2018     Medications:  Reviewed and reconciled. Allergies: Allergies   Allergen Reactions    Avelox [Moxifloxacin] Hives     Physical Exam:  /62   Pulse 59   Temp 97.6 °F (36.4 °C)   Ht 5' 2\" (1.575 m)   Wt 141 lb 8 oz (64.2 kg)   SpO2 96%   BMI 25.88 kg/m²   GENERAL: Alert, oriented x 3, not in acute distress. BREASTS: Incision intact. No palpable masses or LN  EXTREMITIES: Without clubbing, cyanosis, or edema. ECOG PS 1    Lab Results   Component Value Date    WBC 5.4 11/01/2021    HGB 14.4 11/01/2021    HCT 41.5 11/01/2021    MCV 84.7 11/01/2021     11/01/2021     Impression/Plan:  80 y.o. female with Right Breast Cancer: On 04/10/2018:  A.  Right breast, core biopsy: Poorly differentiated ductal carcinoma, [place] score 3+3+2 = 8.  B. Right lymph node, core biopsy:  Metastatic poorly differentiated carcinoma    Comment: Per report from [facility] laboratory:  Estrogen receptor: Negative  Progesterone receptor: Negative  HER-2/jocelyn status: Negative (0)  Enclosed keratin immunohistochemical stains are positive    CT chest 05/11/2018:  Nodule just above the thyroid measures 2 x 2.7 cm. Thyroid nodules range up to 10 mm. Right axillary lymph nodes range up to 1.9 x 3.1 cm. No significant mediastinal or hilar LN. 2.4 mm groundglass nodule in the lingula     CT abdomen/pelvis 05/11/2018 noted no evidence of metastatic disease. Bone scan 05/11/2018 noted no convincing evidence of metastatic disease. PET/CT scan 05/23/2018:  Multiple enlarged hypermetabolic lymph nodes at level of the right axilla measuring up to 33 x 22 mm with hypermetabolic activity and SUV measuring up to 5.6. Enlarged lymph node located adjacent to posterior aspect of the right parotid gland which measures 12 x 10 mm with SUV of 4.8. Nodule associated with thyroid isthmus measuring 22 x 27 mm. This nodule is solid; however no FDG uptake within this lesion. Otherwise negative. U/S guided fine needle aspiration of a right parotid tail on 05/30/2018:   Cytology: Negative for malignant cells. Sheets of benign-appearing oncocytic cells present in a background of mature lymphocytes. Pattern best fits with Warthin tumor. U/S guided fine needle aspiration of thyroid isthmus on 05/30/2018:  Negative for malignant cells. Benign-appearing follicular cells, foam cells, abundant colloid and blood present. These findings would be compatible with colloid nodule/nodular goiter. 06/10/2018 CT Soft Tissue: 2.3 x 1.7 cm solid lesion within the superficial tail of the right parotid gland. Question a focal solid neoplasm such as a Warthin's tumor. 2.5 x 2.0 cm hyperdense structure within the midline visceral space just inferior to the cricoid cartilage  This may represent a solid lesion arising from the thyroid gland. ENT team consulted.     06/05/2018 MRI Breast: 2.5 x 0.7 x 1.4 cm Irregular, enhancing mass in the right breast 12:00 with associated artifact from a metallic clip. At least 15 abnormal right axillary lymph nodes consistent with known metastatic disease. No evidence of neoplasm on the left. 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 Side effects of AC-T reviewed with patient. She agreed to proceed. Mediport was placed. 06/16/2018  2D-Echo: EF 60%. Cycle # 1 Dose-Dense AC was on 06/21/2018. Cycle # 4 Dose-Dense AC was on 08/02/2018. Cycle # 1 Dose-Dense Taxol was on 08/16/2018. Cycle # 2 Dose-Dense Taxol was on 08/30/2018. Cycle # 3 Dose Dense Taxol was on 09/13/2018. On 09/25/2018 Patient was admitted to the hospital for sepsis (due to complicated pyelonephritis with Klebsiella bacteremia), ZHAO bilateral ureteral stone and hydronephrosis s/p cysto/stent insertions 09/28/2018. Discharged on 10/01/2018 On Abx (cephalexin) for Klebsiella Bacteremia by ID team.   Due to the above and worsening peripheral neuropathy, we omitted last cycle of Taxol. MRI Bilateral Breast 10/16/2018 noted Near complete response to therapy on the right with 3 residual right axillary lymph nodes demonstrating mild eccentric cortical thickening suggestive of neoplasm. Readmitted to the hospital on 10/27/2018 with thrombocytopenia, electrolyte disturbances, chills, fever, syncope, nausea and vomiting. Completed Abx    Right simple mastectomy - blue dye inj - right axillary dissection) was on 12/19/2018  A.  Mediport capsule and cord: Device identified. Benign fibroadipose tissue. B.  Right breast, mastectomy: Biopsy site; negative for residual carcinoma. Skin with seborrheic keratosis. Size of largest metastatic deposit-1.3 cm. C.  Right axillary contents, regional resection:   5 of 7 lymph nodes positive for metastatic poorly differentiated mammary carcinoma (grade 3).      Comment: The tumor cells in part C are immunoreactive with pankeratin.  The cells are negative for LCA.  Intradepartmental  consultation is obtained.     TNM classification (AJCC eighth edition)-  ypT0 N2a M0 TNBC     RT was completed on 05/24/2019. We recommended adjuvant Xeloda bid 2 weeks on one week off for 6 cycles. Adjuvant Xeloda was started on 06/04/2019. Significant fatigue, mouth sores, facial swelling/redness noted in August 2019. Patient decided to d/c the rest of remaining Xeloda on 08/26/2019 and wanted be on observation only.  Sx almost resolved after d/c Xeloda  Left screening mammogram ordered and pending  Imaging reviewed, labs reviewed  GISELLE    RTC 4 months with labs    González Guerrero MD  Medical Oncologist  Board Certified, Medical Oncology  Board Certified, Internal Medicine  November 1, 2021

## 2021-11-11 ENCOUNTER — HOSPITAL ENCOUNTER (OUTPATIENT)
Dept: GENERAL RADIOLOGY | Age: 71
Discharge: HOME | End: 2021-11-13
Payer: MEDICARE

## 2021-11-11 DIAGNOSIS — Z85.3 PERSONAL HISTORY OF BREAST CANCER: ICD-10-CM

## 2021-11-11 PROCEDURE — 77063 BREAST TOMOSYNTHESIS BI: CPT

## 2022-03-02 ENCOUNTER — HOSPITAL ENCOUNTER (OUTPATIENT)
Dept: INFUSION THERAPY | Age: 72
Discharge: HOME | End: 2022-03-02
Payer: MEDICARE

## 2022-03-02 ENCOUNTER — OFFICE VISIT (OUTPATIENT)
Dept: ONCOLOGY | Age: 72
End: 2022-03-02
Payer: MEDICARE

## 2022-03-02 VITALS
TEMPERATURE: 97.6 F | BODY MASS INDEX: 25.21 KG/M2 | DIASTOLIC BLOOD PRESSURE: 56 MMHG | WEIGHT: 137 LBS | OXYGEN SATURATION: 97 % | HEART RATE: 66 BPM | SYSTOLIC BLOOD PRESSURE: 117 MMHG | HEIGHT: 62 IN | RESPIRATION RATE: 18 BRPM

## 2022-03-02 DIAGNOSIS — Z85.3 PERSONAL HISTORY OF BREAST CANCER: Primary | ICD-10-CM

## 2022-03-02 DIAGNOSIS — Z85.3 PERSONAL HISTORY OF BREAST CANCER: ICD-10-CM

## 2022-03-02 LAB
ALBUMIN SERPL-MCNC: 4.1 G/DL (ref 3.5–5.2)
ALP BLD-CCNC: 72 U/L (ref 35–104)
ALT SERPL-CCNC: 12 U/L (ref 0–32)
ANION GAP SERPL CALCULATED.3IONS-SCNC: 10 MMOL/L (ref 7–16)
AST SERPL-CCNC: 14 U/L (ref 0–31)
BASOPHILS ABSOLUTE: 0.04 E9/L (ref 0–0.2)
BASOPHILS RELATIVE PERCENT: 0.7 % (ref 0–2)
BILIRUB SERPL-MCNC: 0.4 MG/DL (ref 0–1.2)
BUN BLDV-MCNC: 21 MG/DL (ref 6–23)
CALCIUM SERPL-MCNC: 9.9 MG/DL (ref 8.6–10.2)
CHLORIDE BLD-SCNC: 98 MMOL/L (ref 98–107)
CO2: 29 MMOL/L (ref 22–29)
CREAT SERPL-MCNC: 1.3 MG/DL (ref 0.5–1)
EOSINOPHILS ABSOLUTE: 0.27 E9/L (ref 0.05–0.5)
EOSINOPHILS RELATIVE PERCENT: 4.8 % (ref 0–6)
GFR AFRICAN AMERICAN: 49
GFR NON-AFRICAN AMERICAN: 40 ML/MIN/1.73
GLUCOSE BLD-MCNC: 205 MG/DL (ref 74–99)
HCT VFR BLD CALC: 40.8 % (ref 34–48)
HEMOGLOBIN: 13.1 G/DL (ref 11.5–15.5)
IMMATURE GRANULOCYTES #: 0.03 E9/L
IMMATURE GRANULOCYTES %: 0.5 % (ref 0–5)
LYMPHOCYTES ABSOLUTE: 0.93 E9/L (ref 1.5–4)
LYMPHOCYTES RELATIVE PERCENT: 16.6 % (ref 20–42)
MCH RBC QN AUTO: 29.2 PG (ref 26–35)
MCHC RBC AUTO-ENTMCNC: 32.1 % (ref 32–34.5)
MCV RBC AUTO: 90.9 FL (ref 80–99.9)
MONOCYTES ABSOLUTE: 0.67 E9/L (ref 0.1–0.95)
MONOCYTES RELATIVE PERCENT: 12 % (ref 2–12)
NEUTROPHILS ABSOLUTE: 3.66 E9/L (ref 1.8–7.3)
NEUTROPHILS RELATIVE PERCENT: 65.4 % (ref 43–80)
PDW BLD-RTO: 13.1 FL (ref 11.5–15)
PLATELET # BLD: 179 E9/L (ref 130–450)
PMV BLD AUTO: 10.6 FL (ref 7–12)
POTASSIUM SERPL-SCNC: 3.7 MMOL/L (ref 3.5–5)
RBC # BLD: 4.49 E12/L (ref 3.5–5.5)
SODIUM BLD-SCNC: 137 MMOL/L (ref 132–146)
TOTAL PROTEIN: 7.2 G/DL (ref 6.4–8.3)
WBC # BLD: 5.6 E9/L (ref 4.5–11.5)

## 2022-03-02 PROCEDURE — 4040F PNEUMOC VAC/ADMIN/RCVD: CPT | Performed by: INTERNAL MEDICINE

## 2022-03-02 PROCEDURE — G8400 PT W/DXA NO RESULTS DOC: HCPCS | Performed by: INTERNAL MEDICINE

## 2022-03-02 PROCEDURE — 4004F PT TOBACCO SCREEN RCVD TLK: CPT | Performed by: INTERNAL MEDICINE

## 2022-03-02 PROCEDURE — G8427 DOCREV CUR MEDS BY ELIG CLIN: HCPCS | Performed by: INTERNAL MEDICINE

## 2022-03-02 PROCEDURE — G8484 FLU IMMUNIZE NO ADMIN: HCPCS | Performed by: INTERNAL MEDICINE

## 2022-03-02 PROCEDURE — 80053 COMPREHEN METABOLIC PANEL: CPT

## 2022-03-02 PROCEDURE — 36415 COLL VENOUS BLD VENIPUNCTURE: CPT

## 2022-03-02 PROCEDURE — 99214 OFFICE O/P EST MOD 30 MIN: CPT | Performed by: INTERNAL MEDICINE

## 2022-03-02 PROCEDURE — G8417 CALC BMI ABV UP PARAM F/U: HCPCS | Performed by: INTERNAL MEDICINE

## 2022-03-02 PROCEDURE — 1123F ACP DISCUSS/DSCN MKR DOCD: CPT | Performed by: INTERNAL MEDICINE

## 2022-03-02 PROCEDURE — 3017F COLORECTAL CA SCREEN DOC REV: CPT | Performed by: INTERNAL MEDICINE

## 2022-03-02 PROCEDURE — 85025 COMPLETE CBC W/AUTO DIFF WBC: CPT

## 2022-03-02 PROCEDURE — 99212 OFFICE O/P EST SF 10 MIN: CPT

## 2022-03-02 PROCEDURE — 1090F PRES/ABSN URINE INCON ASSESS: CPT | Performed by: INTERNAL MEDICINE

## 2022-03-02 NOTE — PROGRESS NOTES
Department of [facility] Oncology   Attending Clinic Note    Reason for Visit: Follow-up on a patient with Right Breast Cancer    PCP:  Lois Langford    History of Present Illness: The mass was located in the 12 o'clock position of the right breast.     Breast cancer risk factors include age, gender, mother with breast cancer. Age of menarche was 16. Total hysterectomy at age 53. Patient was on hormonal therapy, patient unsure of how long. Patient is . Age of first live birth was 33. Patient did not breast feed.     Bilateral screening mammogram on 2018:  Probable 15 mm thick soft tissue mass in the 12:00 position of the right breast.     Right Diagnostic Mammogram on 2018:  there is an 18 mm mass in the 12:00 position of the right breast consistent with carcinoma until proven otherwise. There is also an abnormal right axillary lymph node. Right Breast U/S on 2018:  18 mm mass in the 12:00 position of the right breast consistent with carcinoma until proven otherwise. Enlarged right axillary lymph node. On 04/10/2018:  A. Right breast, core biopsy, slides for review ( A): Poorly differentiated ductal carcinoma, Angel score 3+3+2 = 8.  B. 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 Side effects of AC-T reviewed with patient. She agreed to proceed. Mediport was placed. 2018 2D-Echo: EF 60%. Cycle # 1 Dose-Dense AC was on 2018. Cycle # 2 Dose-Dense AC was on 2018. Cycle # 3 Dose-Dense AC was on 2018.     Cycle # 4 Dose-Dense AC was on 08/02/2018. Cycle # 1 Dose-Dense Taxol was on 08/16/2018. Cycle # 2 Dose-Dense Taxol was on 08/30/2018. Cycle # 3 Dose Dense Taxol was on 09/13/2018. On 09/25/2018 Patient was admitted to the hospital for sepsis (due to complicated pyelonephritis with Klebsiella bacteremia), ZHAO bilateral ureteral stone and hydronephrosis s/p cysto/stent insertions 09/28/2018. Discharged on 10/01/2018 On Abx (cephalexin) for Klebsiella Bacteremia by ID team.     MRI Bilateral Breast 10/16/2018 noted Near complete response to therapy on the right with 3 residual right axillary lymph nodes demonstrating mild eccentric cortical thickening suggestive of neoplasm. Readmitted to the hospital on 10/27/2018 with thrombocytopenia, electrolyte disturbances, chills, fever, syncope, nausea and vomiting. Completed Abx    She underwent a removal of mediport, right breast simple mastectomy, blue dye injection, right axillary dissection on February 27, 2019. Pathological evaluation completed at [facility]):  A.  Mediport capsule and cord: Device identified. Benign fibroadipose tissue. B.  Right breast, mastectomy: Biopsy site; negative for residual carcinoma. Skin with seborrheic keratosis. Size of largest metastatic deposit-1.3 cm. C.  Right axillary contents, regional resection: 5 of 7 lymph nodes positive for metastatic poorly differentiated mammary carcinoma (grade 3).    Comment: The tumor cells in part C are immunoreactive with pankeratin.  The cells are negative for LCA.  Intradepartmental  consultation is obtained.     TNM classification (AJCC eighth edition)-  ypT0 N2a M0 TNBC   RT was completed on 05/24/2019. Adjuvant Xeloda was started on 06/04/2019. Significant fatigue, mouth sores, facial swelling/redness noted in August 2019. Patient decided to d/c the rest of remaining Xeloda on 08/26/2019 and wanted be on observation only.    Sx resolved after d/c Xeloda    She presented today 03/02/2022 for follow-up and is doing well. Fair appetite and energy level. No fever, chills. No recent infections. Skin lesion in right lower abdomen    Review of Systems;  CONSTITUTIONAL: No fever, chills. Fair appetite and energy level. ENMT: Eyes: No blurry vision Nose: No epistaxis. Mouth: No sore throat. RESPIRATORY: No hemoptysis, shortness of breath. CARDIOVASCULAR: No chest pain, SOB. GASTROINTESTINAL: No N/V, abdominal pain. GENITOURINARY: No dysuria, urinary frequency, hematuria. NEURO: No syncope, presyncope, headache. Remainder:  ROS NEGATIVE    Past Medical History:      Diagnosis Date    Acid reflux     Breast cancer ([facility] [address] 06/2018    right    Cerebral infarction due to embolism of right middle cerebral artery ([facility] [address] 02/23/2021    H/O renal calculi     History of therapeutic radiation     Hx antineoplastic chemo     Hyperlipidemia     Hypertension     IBS (irritable bowel syndrome)     Insomnia     Left carotid stenosis 02/23/2021    Pyelonephritis 2019    Seasonal allergies     Stroke [facility]) 2013    tia    Type 2 diabetes mellitus without complication (HCC)     UTI (urinary tract infection) 2018     Medications:  Reviewed and reconciled. Allergies: Allergies   Allergen Reactions    Avelox [Moxifloxacin] Hives     Physical Exam:  BP (!) 117/56 (Site: Left Upper Arm, Position: Sitting, Cuff Size: Medium Adult)   Pulse 66   Temp 97.6 °F (36.4 °C) (Infrared)   Resp 18   Ht 5' 2\" (1.575 m)   Wt 137 lb (62.1 kg)   SpO2 97%   BMI 25.06 kg/m²   GENERAL: Alert, oriented x 3, not in acute distress. BREASTS: Incision intact. No palpable masses or LN  SKIN: Skin lesion right lower abdomen  EXTREMITIES: Without clubbing, cyanosis, or edema. ECOG PS 1    Lab Results   Component Value Date    WBC 5.6 03/02/2022    HGB 13.1 03/02/2022    HCT 40.8 03/02/2022    MCV 90.9 03/02/2022     03/02/2022     Impression/Plan:  71 y.o. female with Right Breast Cancer: On 04/10/2018:  A.  Right breast, core biopsy: Poorly differentiated ductal carcinoma, Angel score 3+3+2 = 8.  B. Right lymph node, core biopsy:  Metastatic poorly differentiated carcinoma    Comment: Per report from [facility]:  Estrogen receptor: Negative  Progesterone receptor: Negative  HER-2/jocelyn status: Negative (0)  Enclosed keratin immunohistochemical stains are positive    CT chest 05/11/2018:  Nodule just above the thyroid measures 2 x 2.7 cm. Thyroid nodules range up to 10 mm. Right axillary lymph nodes range up to 1.9 x 3.1 cm. No significant mediastinal or hilar LN. 2.4 mm groundglass nodule in the lingula     CT abdomen/pelvis 05/11/2018 noted no evidence of metastatic disease. Bone scan 05/11/2018 noted no convincing evidence of metastatic disease. PET/CT scan 05/23/2018:  Multiple enlarged hypermetabolic lymph nodes at level of the right axilla measuring up to 33 x 22 mm with hypermetabolic activity and SUV measuring up to 5.6. Enlarged lymph node located adjacent to posterior aspect of the right parotid gland which measures 12 x 10 mm with SUV of 4.8. Nodule associated with thyroid isthmus measuring 22 x 27 mm. This nodule is solid; however no FDG uptake within this lesion. Otherwise negative. U/S guided fine needle aspiration of a right parotid tail on 05/30/2018:   Cytology: Negative for malignant cells. Sheets of benign-appearing oncocytic cells present in a background of mature lymphocytes. Pattern best fits with Warthin tumor. U/S guided fine needle aspiration of thyroid isthmus on 05/30/2018:  Negative for malignant cells. Benign-appearing follicular cells, foam cells, abundant colloid and blood present. These findings would be compatible with colloid nodule/nodular goiter. 06/10/2018 CT Soft Tissue: 2.3 x 1.7 cm solid lesion within the superficial tail of the right parotid gland. Question a focal solid neoplasm such as a Warthin's tumor.    2.5 x 2.0 cm hyperdense structure within the midline visceral space just inferior to the cricoid cartilage  This may represent a solid lesion arising from the thyroid gland. ENT team consulted. 06/05/2018 MRI Breast: 2.5 x 0.7 x 1.4 cm Irregular, enhancing mass in the right breast 12:00 with associated artifact from a metallic clip. At least 15 abnormal right axillary lymph nodes consistent with known metastatic disease. No evidence of neoplasm on the left. 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 Side effects of AC-T reviewed with patient. She agreed to proceed. Mediport was placed. 06/16/2018  2D-Echo: EF 60%. Cycle # 1 Dose-Dense AC was on 06/21/2018. Cycle # 4 Dose-Dense AC was on 08/02/2018. Cycle # 1 Dose-Dense Taxol was on 08/16/2018. Cycle # 2 Dose-Dense Taxol was on 08/30/2018. Cycle # 3 Dose Dense Taxol was on 09/13/2018. On 09/25/2018 Patient was admitted to the hospital for sepsis (due to complicated pyelonephritis with Klebsiella bacteremia), ZHAO bilateral ureteral stone and hydronephrosis s/p cysto/stent insertions 09/28/2018. Discharged on 10/01/2018 On Abx (cephalexin) for Klebsiella Bacteremia by ID team.   Due to the above and worsening peripheral neuropathy, we omitted last cycle of Taxol. MRI Bilateral Breast 10/16/2018 noted Near complete response to therapy on the right with 3 residual right axillary lymph nodes demonstrating mild eccentric cortical thickening suggestive of neoplasm. Readmitted to the hospital on 10/27/2018 with thrombocytopenia, electrolyte disturbances, chills, fever, syncope, nausea and vomiting. Completed Abx    Right simple mastectomy - blue dye inj - right axillary dissection) was on 12/19/2018  A.  Mediport capsule and cord: Device identified. Benign fibroadipose tissue. B.  Right breast, mastectomy: Biopsy site; negative for residual carcinoma. Skin with seborrheic keratosis.  Size of largest metastatic deposit-1.3 cm. C.  Right axillary contents, regional resection:   5 of 7 lymph nodes positive for metastatic poorly differentiated mammary carcinoma (grade 3).    Comment: The tumor cells in part C are immunoreactive with pankeratin.  The cells are negative for LCA.  Intradepartmental  consultation is obtained.     TNM classification (AJCC eighth edition)-  ypT0 N2a M0 TNBC     RT was completed on 05/24/2019. We recommended adjuvant Xeloda bid 2 weeks on one week off for 6 cycles. Adjuvant Xeloda was started on 06/04/2019. Significant fatigue, mouth sores, facial swelling/redness noted in August 2019. Patient decided to d/c the rest of remaining Xeloda on 08/26/2019 and wanted be on observation only. Sx almost resolved after d/c Xeloda  Left screening mammogram 11/11/2021 Negative for malignancy  Imaging reviewed. Labs reviewed.    GISELLE    RTC 4 months with labs    Nafisa Lozano MD  Medical Oncologist  Board Certified, Medical Oncology  Board Certified, Internal Medicine  March 2, 2022

## 2022-07-11 ENCOUNTER — HOSPITAL ENCOUNTER (OUTPATIENT)
Dept: INFUSION THERAPY | Age: 72
Discharge: HOME | End: 2022-07-11
Payer: MEDICARE

## 2022-07-11 ENCOUNTER — OFFICE VISIT (OUTPATIENT)
Dept: ONCOLOGY | Age: 72
End: 2022-07-11
Payer: MEDICARE

## 2022-07-11 VITALS
DIASTOLIC BLOOD PRESSURE: 56 MMHG | BODY MASS INDEX: 26.31 KG/M2 | HEART RATE: 63 BPM | HEIGHT: 62 IN | WEIGHT: 143 LBS | TEMPERATURE: 97.3 F | OXYGEN SATURATION: 98 % | SYSTOLIC BLOOD PRESSURE: 116 MMHG

## 2022-07-11 DIAGNOSIS — C50.911 BREAST CANCER METASTASIZED TO AXILLARY LYMPH NODE, RIGHT (HCC): Primary | ICD-10-CM

## 2022-07-11 DIAGNOSIS — Z12.31 SCREENING MAMMOGRAM FOR HIGH-RISK PATIENT: Primary | ICD-10-CM

## 2022-07-11 DIAGNOSIS — Z85.3 PERSONAL HISTORY OF BREAST CANCER: Primary | ICD-10-CM

## 2022-07-11 DIAGNOSIS — M54.50 ACUTE BILATERAL LOW BACK PAIN, UNSPECIFIED WHETHER SCIATICA PRESENT: ICD-10-CM

## 2022-07-11 DIAGNOSIS — S22.080A COMPRESSION FRACTURE OF T12 VERTEBRA, INITIAL ENCOUNTER (HCC): ICD-10-CM

## 2022-07-11 DIAGNOSIS — Z17.1 MALIGNANT NEOPLASM OF CENTRAL PORTION OF RIGHT BREAST IN FEMALE, ESTROGEN RECEPTOR NEGATIVE (HCC): ICD-10-CM

## 2022-07-11 DIAGNOSIS — C77.3 BREAST CANCER METASTASIZED TO AXILLARY LYMPH NODE, RIGHT (HCC): Primary | ICD-10-CM

## 2022-07-11 DIAGNOSIS — Z85.3 PERSONAL HISTORY OF BREAST CANCER: ICD-10-CM

## 2022-07-11 DIAGNOSIS — C50.111 MALIGNANT NEOPLASM OF CENTRAL PORTION OF RIGHT BREAST IN FEMALE, ESTROGEN RECEPTOR NEGATIVE (HCC): ICD-10-CM

## 2022-07-11 LAB
ALBUMIN SERPL-MCNC: 4.1 G/DL (ref 3.5–5.2)
ALP BLD-CCNC: 63 U/L (ref 35–104)
ALT SERPL-CCNC: 13 U/L (ref 0–32)
ANION GAP SERPL CALCULATED.3IONS-SCNC: 11 MMOL/L (ref 7–16)
AST SERPL-CCNC: 17 U/L (ref 0–31)
BASOPHILS ABSOLUTE: 0.05 E9/L (ref 0–0.2)
BASOPHILS RELATIVE PERCENT: 0.9 % (ref 0–2)
BILIRUB SERPL-MCNC: 0.4 MG/DL (ref 0–1.2)
BUN BLDV-MCNC: 25 MG/DL (ref 6–23)
CALCIUM SERPL-MCNC: 9.4 MG/DL (ref 8.6–10.2)
CHLORIDE BLD-SCNC: 100 MMOL/L (ref 98–107)
CO2: 28 MMOL/L (ref 22–29)
CREAT SERPL-MCNC: 1.3 MG/DL (ref 0.5–1)
EOSINOPHILS ABSOLUTE: 0.35 E9/L (ref 0.05–0.5)
EOSINOPHILS RELATIVE PERCENT: 6.5 % (ref 0–6)
GFR AFRICAN AMERICAN: 49
GFR NON-AFRICAN AMERICAN: 40 ML/MIN/1.73
GLUCOSE BLD-MCNC: 159 MG/DL (ref 74–99)
HCT VFR BLD CALC: 39.5 % (ref 34–48)
HEMOGLOBIN: 13 G/DL (ref 11.5–15.5)
IMMATURE GRANULOCYTES #: 0.03 E9/L
IMMATURE GRANULOCYTES %: 0.6 % (ref 0–5)
LYMPHOCYTES ABSOLUTE: 1.18 E9/L (ref 1.5–4)
LYMPHOCYTES RELATIVE PERCENT: 21.8 % (ref 20–42)
MCH RBC QN AUTO: 30.1 PG (ref 26–35)
MCHC RBC AUTO-ENTMCNC: 32.9 % (ref 32–34.5)
MCV RBC AUTO: 91.4 FL (ref 80–99.9)
MONOCYTES ABSOLUTE: 0.64 E9/L (ref 0.1–0.95)
MONOCYTES RELATIVE PERCENT: 11.8 % (ref 2–12)
NEUTROPHILS ABSOLUTE: 3.16 E9/L (ref 1.8–7.3)
NEUTROPHILS RELATIVE PERCENT: 58.4 % (ref 43–80)
PDW BLD-RTO: 14.2 FL (ref 11.5–15)
PLATELET # BLD: 159 E9/L (ref 130–450)
PMV BLD AUTO: 11 FL (ref 7–12)
POTASSIUM SERPL-SCNC: 3.7 MMOL/L (ref 3.5–5)
RBC # BLD: 4.32 E12/L (ref 3.5–5.5)
SODIUM BLD-SCNC: 139 MMOL/L (ref 132–146)
TOTAL PROTEIN: 6.9 G/DL (ref 6.4–8.3)
WBC # BLD: 5.4 E9/L (ref 4.5–11.5)

## 2022-07-11 PROCEDURE — G8417 CALC BMI ABV UP PARAM F/U: HCPCS | Performed by: INTERNAL MEDICINE

## 2022-07-11 PROCEDURE — 85025 COMPLETE CBC W/AUTO DIFF WBC: CPT

## 2022-07-11 PROCEDURE — 4004F PT TOBACCO SCREEN RCVD TLK: CPT | Performed by: INTERNAL MEDICINE

## 2022-07-11 PROCEDURE — 99214 OFFICE O/P EST MOD 30 MIN: CPT | Performed by: INTERNAL MEDICINE

## 2022-07-11 PROCEDURE — G8400 PT W/DXA NO RESULTS DOC: HCPCS | Performed by: INTERNAL MEDICINE

## 2022-07-11 PROCEDURE — 1090F PRES/ABSN URINE INCON ASSESS: CPT | Performed by: INTERNAL MEDICINE

## 2022-07-11 PROCEDURE — 36415 COLL VENOUS BLD VENIPUNCTURE: CPT

## 2022-07-11 PROCEDURE — 80053 COMPREHEN METABOLIC PANEL: CPT

## 2022-07-11 PROCEDURE — 1123F ACP DISCUSS/DSCN MKR DOCD: CPT | Performed by: INTERNAL MEDICINE

## 2022-07-11 PROCEDURE — 99213 OFFICE O/P EST LOW 20 MIN: CPT

## 2022-07-11 PROCEDURE — 3017F COLORECTAL CA SCREEN DOC REV: CPT | Performed by: INTERNAL MEDICINE

## 2022-07-11 PROCEDURE — G8427 DOCREV CUR MEDS BY ELIG CLIN: HCPCS | Performed by: INTERNAL MEDICINE

## 2022-07-11 RX ORDER — MELOXICAM 15 MG/1
TABLET ORAL
COMMUNITY
Start: 2022-06-24

## 2022-07-11 NOTE — PROGRESS NOTES
Patient did NOT stop at check out window, left without AVS.  Instructions will be mailed to patient, along with courtesy phone call.

## 2022-07-11 NOTE — PROGRESS NOTES
Department of [place] Oncology     Attending Clinic Note    Reason for Visit: Follow-up on a patient with Right Breast Cancer    PCP:  Yesica Gaming    History of Present Illness: The mass was located in the 12 o'clock position of the right breast.     Breast cancer risk factors include age, gender, mother with breast cancer. Age of menarche was 16. Total hysterectomy at age 53. Patient was on hormonal therapy, patient unsure of how long. Patient is . Age of first live birth was 33. Patient did not breast feed.     Bilateral screening mammogram on 2018:  Probable 15 mm thick soft tissue mass in the 12:00 position of the right breast.     Right Diagnostic Mammogram on 2018:  there is an 18 mm mass in the 12:00 position of the right breast consistent with carcinoma until proven otherwise. There is also an abnormal right axillary lymph node. Right Breast U/S on 2018:  18 mm mass in the 12:00 position of the right breast consistent with carcinoma until proven otherwise. Enlarged right axillary lymph node. On 04/10/2018:  A. Right breast, core biopsy, slides for review ( A): Poorly differentiated ductal carcinoma, [place] score 3+3+2 = 8.  B. Right lymph node, core biopsy, slides for review ( B):  Metastatic poorly differentiated carcinoma    Comment:   Per report from [facility] laboratory:  Estrogen receptor: Negative  Progesterone receptor: Negative  HER-2/jocelyn status: Negative (0)  Enclosed keratin immunohistochemical stains are positive    Stage IIIA T2 N2 M0 IDC Grade 3   Triple Negative Right Breast Cancer: We recommended neoadjuvant chemotherapy consisting of Dose-Dense AC-T. Side effects of AC-T reviewed with patient. She agreed to proceed. Mediport was placed. 2018 2D-Echo: EF 60%. Cycle # 1 Dose-Dense AC was on 2018. Cycle # 2 Dose-Dense AC was on 2018. Cycle # 3 Dose-Dense AC was on 2018.     Cycle # 4 Dose-Dense AC was on wearing a brace. No fever, chills. Limited activity due to back pain. Review of Systems:  CONSTITUTIONAL: No fever, chills. Fatigue  ENMT: Eyes: No blurry vision Nose: No epistaxis. Mouth: No sore throat. RESPIRATORY: No hemoptysis, shortness of breath. CARDIOVASCULAR: No chest pain, SOB. GASTROINTESTINAL: No N/V, abdominal pain. GENITOURINARY: No dysuria, urinary frequency, hematuria. MSK: Back pain wearing a brace  NEURO: No syncope, presyncope, headache. Remainder:ROS NEGATIVE    Past Medical History:      Diagnosis Date    Acid reflux     Breast cancer ([facility] [address] 06/2018    right    Cerebral infarction due to embolism of right middle cerebral artery ([facility] [address] 02/23/2021    H/O renal calculi     History of therapeutic radiation     Hx antineoplastic chemo     Hyperlipidemia     Hypertension     IBS (irritable bowel syndrome)     Insomnia     Left carotid stenosis 02/23/2021    Pyelonephritis 2019    Seasonal allergies     Stroke [facility]) 2013    tia    Type 2 diabetes mellitus without complication (HCC)     UTI (urinary tract infection) 2018     Medications:  Reviewed and reconciled. Allergies: Allergies   Allergen Reactions    Avelox [Moxifloxacin] Hives     Physical Exam:  BP (!) 116/56   Pulse 63   Temp 97.3 °F (36.3 °C)   Ht 5' 2\" (1.575 m)   Wt 143 lb (64.9 kg)   SpO2 98%   BMI 26.16 kg/m²   GENERAL: Alert, oriented x 3, not in acute distress. BREASTS: Incision intact. No palpable masses or LN  MSK: Lumbar back tenderness to palpation  EXTREMITIES: Without clubbing, cyanosis, or edema. ECOG PS 1    Lab Results   Component Value Date    WBC 5.4 07/11/2022    HGB 13.0 07/11/2022    HCT 39.5 07/11/2022    MCV 91.4 07/11/2022     07/11/2022     Impression/Plan:  71 y.o. female with Right Breast Cancer: On 04/10/2018:  A.  Right breast, core biopsy: Poorly differentiated ductal carcinoma, Angel score 3+3+2 = 8.  B. Right lymph node, core biopsy:  Metastatic poorly team consulted. 06/05/2018 MRI Breast: 2.5 x 0.7 x 1.4 cm Irregular, enhancing mass in the right breast 12:00 with associated artifact from a metallic clip. At least 15 abnormal right axillary lymph nodes consistent with known metastatic disease. No evidence of neoplasm on the left. 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 Side effects of AC-T reviewed with patient. She agreed to proceed. Mediport was placed. 06/16/2018  2D-Echo: EF 60%. Cycle # 1 Dose-Dense AC was on 06/21/2018. Cycle # 4 Dose-Dense AC was on 08/02/2018. Cycle # 1 Dose-Dense Taxol was on 08/16/2018. Cycle # 2 Dose-Dense Taxol was on 08/30/2018. Cycle # 3 Dose Dense Taxol was on 09/13/2018. On 09/25/2018 Patient was admitted to the hospital for sepsis (due to complicated pyelonephritis with Klebsiella bacteremia), ZHAO bilateral ureteral stone and hydronephrosis s/p cysto/stent insertions 09/28/2018. Discharged on 10/01/2018 On Abx (cephalexin) for Klebsiella Bacteremia by ID team.   Due to the above and worsening peripheral neuropathy, we omitted last cycle of Taxol. MRI Bilateral Breast 10/16/2018 noted Near complete response to therapy on the right with 3 residual right axillary lymph nodes demonstrating mild eccentric cortical thickening suggestive of neoplasm. Readmitted to the hospital on 10/27/2018 with thrombocytopenia, electrolyte disturbances, chills, fever, syncope, nausea and vomiting. Completed Abx    Right simple mastectomy - blue dye inj - right axillary dissection) was on 12/19/2018  A.  Mediport capsule and cord: Device identified. Benign fibroadipose tissue. B.  Right breast, mastectomy: Biopsy site; negative for residual carcinoma. Skin with seborrheic keratosis. Size of largest metastatic deposit-1.3 cm.   C.  Right axillary contents, regional resection:   5 of 7 lymph nodes positive for metastatic poorly differentiated mammary carcinoma (grade 3).    Comment: The tumor cells in part C are immunoreactive with pankeratin.  The cells are negative for LCA.  Intradepartmental  consultation is obtained.     TNM classification (AJCC eighth edition)-  ypT0 N2a M0 TNBC     RT was completed on 05/24/2019. We recommended adjuvant Xeloda bid 2 weeks on one week off for 6 cycles. Adjuvant Xeloda was started on 06/04/2019. Significant fatigue, mouth sores, facial swelling/redness noted in August 2019. Patient decided to d/c the rest of remaining Xeloda on 08/26/2019 and wanted be on observation only. Sx almost resolved after d/c Xeloda  Left screening mammogram 11/11/2021 Negative for malignancy    Lumbar back pain; X-Ray at [facility] 06/27/2022 moderate T12 vertebral body compression fracture not seen with prior CT abdomen/pelvis study 10/27/2018 which may represent osteoporotic fracture but cannot exclude pathologic fracture. No findings of acute lumbosacral anterior wedge compression fracture, acute displaced fracture or traumatic listhesis identified. Imaging reviewed. Labs reviewed. MRI LS and bone scan ordered for further evaluation  RTC 2 weeks to review test results.     Leanan Vail MD  Medical Oncologist  Board Certified, Medical Oncology  Board Certified, Internal Medicine  July 11, 2022

## 2022-08-01 DIAGNOSIS — C50.911 BREAST CANCER METASTASIZED TO AXILLARY LYMPH NODE, RIGHT (HCC): Primary | ICD-10-CM

## 2022-08-01 DIAGNOSIS — C77.3 BREAST CANCER METASTASIZED TO AXILLARY LYMPH NODE, RIGHT (HCC): Primary | ICD-10-CM

## 2022-08-02 ENCOUNTER — HOSPITAL ENCOUNTER (OUTPATIENT)
Dept: MRI IMAGING | Age: 72
Discharge: HOME | End: 2022-08-04
Payer: MEDICARE

## 2022-08-02 ENCOUNTER — HOSPITAL ENCOUNTER (OUTPATIENT)
Dept: NUCLEAR MEDICINE | Age: 72
Discharge: HOME | End: 2022-08-02
Payer: MEDICARE

## 2022-08-02 ENCOUNTER — HOSPITAL ENCOUNTER (OUTPATIENT)
Age: 72
Discharge: HOME | End: 2022-08-02
Payer: MEDICARE

## 2022-08-02 DIAGNOSIS — M54.50 ACUTE BILATERAL LOW BACK PAIN, UNSPECIFIED WHETHER SCIATICA PRESENT: ICD-10-CM

## 2022-08-02 DIAGNOSIS — C77.3 BREAST CANCER METASTASIZED TO AXILLARY LYMPH NODE, RIGHT (HCC): ICD-10-CM

## 2022-08-02 DIAGNOSIS — S22.080A COMPRESSION FRACTURE OF T12 VERTEBRA, INITIAL ENCOUNTER (HCC): ICD-10-CM

## 2022-08-02 DIAGNOSIS — C50.111 MALIGNANT NEOPLASM OF CENTRAL PORTION OF RIGHT BREAST IN FEMALE, ESTROGEN RECEPTOR NEGATIVE (HCC): ICD-10-CM

## 2022-08-02 DIAGNOSIS — C50.911 BREAST CANCER METASTASIZED TO AXILLARY LYMPH NODE, RIGHT (HCC): ICD-10-CM

## 2022-08-02 DIAGNOSIS — Z17.1 MALIGNANT NEOPLASM OF CENTRAL PORTION OF RIGHT BREAST IN FEMALE, ESTROGEN RECEPTOR NEGATIVE (HCC): ICD-10-CM

## 2022-08-02 LAB
BUN BLDV-MCNC: 34 MG/DL (ref 6–23)
CREAT SERPL-MCNC: 1.3 MG/DL (ref 0.5–1)
GFR AFRICAN AMERICAN: 49
GFR NON-AFRICAN AMERICAN: 40 ML/MIN/1.73

## 2022-08-02 PROCEDURE — A9503 TC99M MEDRONATE: HCPCS | Performed by: RADIOLOGY

## 2022-08-02 PROCEDURE — 36415 COLL VENOUS BLD VENIPUNCTURE: CPT

## 2022-08-02 PROCEDURE — 78306 BONE IMAGING WHOLE BODY: CPT | Performed by: FAMILY MEDICINE

## 2022-08-02 PROCEDURE — 84520 ASSAY OF UREA NITROGEN: CPT

## 2022-08-02 PROCEDURE — 6360000004 HC RX CONTRAST MEDICATION: Performed by: RADIOLOGY

## 2022-08-02 PROCEDURE — 72157 MRI CHEST SPINE W/O & W/DYE: CPT

## 2022-08-02 PROCEDURE — A9577 INJ MULTIHANCE: HCPCS | Performed by: RADIOLOGY

## 2022-08-02 PROCEDURE — 82565 ASSAY OF CREATININE: CPT

## 2022-08-02 PROCEDURE — 72158 MRI LUMBAR SPINE W/O & W/DYE: CPT

## 2022-08-02 PROCEDURE — 3430000000 HC RX DIAGNOSTIC RADIOPHARMACEUTICAL: Performed by: RADIOLOGY

## 2022-08-02 RX ORDER — TC 99M MEDRONATE 20 MG/10ML
25 INJECTION, POWDER, LYOPHILIZED, FOR SOLUTION INTRAVENOUS
Status: COMPLETED | OUTPATIENT
Start: 2022-08-02 | End: 2022-08-02

## 2022-08-02 RX ADMIN — TC 99M MEDRONATE 25 MILLICURIE: 20 INJECTION, POWDER, LYOPHILIZED, FOR SOLUTION INTRAVENOUS at 07:50

## 2022-08-02 RX ADMIN — GADOBENATE DIMEGLUMINE 13 ML: 529 INJECTION, SOLUTION INTRAVENOUS at 09:54

## 2022-08-17 ENCOUNTER — HOSPITAL ENCOUNTER (OUTPATIENT)
Dept: INFUSION THERAPY | Age: 72
Discharge: HOME | End: 2022-08-17

## 2022-08-17 ENCOUNTER — OFFICE VISIT (OUTPATIENT)
Dept: ONCOLOGY | Age: 72
End: 2022-08-17
Payer: MEDICARE

## 2022-08-17 VITALS
WEIGHT: 138 LBS | DIASTOLIC BLOOD PRESSURE: 60 MMHG | BODY MASS INDEX: 25.4 KG/M2 | HEART RATE: 61 BPM | HEIGHT: 62 IN | TEMPERATURE: 97.2 F | OXYGEN SATURATION: 97 % | SYSTOLIC BLOOD PRESSURE: 122 MMHG

## 2022-08-17 DIAGNOSIS — S22.080A COMPRESSION FRACTURE OF T12 VERTEBRA, INITIAL ENCOUNTER (HCC): Primary | ICD-10-CM

## 2022-08-17 PROCEDURE — 99213 OFFICE O/P EST LOW 20 MIN: CPT

## 2022-08-17 PROCEDURE — G8417 CALC BMI ABV UP PARAM F/U: HCPCS | Performed by: INTERNAL MEDICINE

## 2022-08-17 PROCEDURE — 99215 OFFICE O/P EST HI 40 MIN: CPT | Performed by: INTERNAL MEDICINE

## 2022-08-17 PROCEDURE — 1090F PRES/ABSN URINE INCON ASSESS: CPT | Performed by: INTERNAL MEDICINE

## 2022-08-17 PROCEDURE — 3017F COLORECTAL CA SCREEN DOC REV: CPT | Performed by: INTERNAL MEDICINE

## 2022-08-17 PROCEDURE — G8400 PT W/DXA NO RESULTS DOC: HCPCS | Performed by: INTERNAL MEDICINE

## 2022-08-17 PROCEDURE — 1123F ACP DISCUSS/DSCN MKR DOCD: CPT | Performed by: INTERNAL MEDICINE

## 2022-08-17 PROCEDURE — 4004F PT TOBACCO SCREEN RCVD TLK: CPT | Performed by: INTERNAL MEDICINE

## 2022-08-17 PROCEDURE — G8427 DOCREV CUR MEDS BY ELIG CLIN: HCPCS | Performed by: INTERNAL MEDICINE

## 2022-08-17 NOTE — PROGRESS NOTES
Department of [facility] Oncology     Attending Clinic Note    Reason for Visit: Follow-up on a patient with Right Breast Cancer    PCP:  Van Prince    History of Present Illness: The mass was located in the 12 o'clock position of the right breast.     Breast cancer risk factors include age, gender, mother with breast cancer. Age of menarche was 16. Total hysterectomy at age 53. Patient was on hormonal therapy, patient unsure of how long. Patient is . Age of first live birth was 33. Patient did not breast feed. Bilateral screening mammogram on 2018:  Probable 15 mm thick soft tissue mass in the 12:00 position of the right breast.     Right Diagnostic Mammogram on 2018:  there is an 18 mm mass in the 12:00 position of the right breast consistent with carcinoma until proven otherwise. There is also an abnormal right axillary lymph node. Right Breast U/S on 2018:  18 mm mass in the 12:00 position of the right breast consistent with carcinoma until proven otherwise. Enlarged right axillary lymph node. On 04/10/2018:  A. Right breast, core biopsy, slides for review ( A): Poorly differentiated ductal carcinoma, [place] score 3+3+2 = 8.  B. 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 Side effects of AC-T reviewed with patient. She agreed to proceed. Mediport was placed. 2018 2D-Echo: EF 60%. Cycle # 1 Dose-Dense AC was on 2018. Cycle # 2 Dose-Dense AC was on 2018. Cycle # 3 Dose-Dense AC was on 2018.     Cycle # 4 Dose-Dense AC was on 08/02/2018. Cycle # 1 Dose-Dense Taxol was on 08/16/2018. Cycle # 2 Dose-Dense Taxol was on 08/30/2018. Cycle # 3 Dose Dense Taxol was on 09/13/2018. On 09/25/2018 Patient was admitted to the hospital for sepsis (due to complicated pyelonephritis with Klebsiella bacteremia), ZHAO bilateral ureteral stone and hydronephrosis s/p cysto/stent insertions 09/28/2018. Discharged on 10/01/2018 On Abx (cephalexin) for Klebsiella Bacteremia by ID team.     MRI Bilateral Breast 10/16/2018 noted Near complete response to therapy on the right with 3 residual right axillary lymph nodes demonstrating mild eccentric cortical thickening suggestive of neoplasm. Readmitted to the hospital on 10/27/2018 with thrombocytopenia, electrolyte disturbances, chills, fever, syncope, nausea and vomiting. Completed Abx    She underwent a removal of mediport, right breast simple mastectomy, blue dye injection, right axillary dissection on February 27, 2019. Pathological evaluation completed at [facility]):  A. Mediport capsule and cord: Device identified. Benign fibroadipose tissue. B.  Right breast, mastectomy: Biopsy site; negative for residual carcinoma. Skin with seborrheic keratosis. Size of largest metastatic deposit-1.3 cm. C.  Right axillary contents, regional resection: 5 of 7 lymph nodes positive for metastatic poorly differentiated mammary carcinoma (grade 3). Comment: The tumor cells in part C are immunoreactive with pankeratin. The cells are negative for LCA. Intradepartmental consultation is obtained. TNM classification (AJCC eighth edition)-  ypT0 N2a M0 TNBC   RT was completed on 05/24/2019. Adjuvant Xeloda was started on 06/04/2019. Significant fatigue, mouth sores, facial swelling/redness noted in August 2019. Patient decided to d/c the rest of remaining Xeloda on 08/26/2019 and wanted be on observation only. Sx resolved after d/c Xeloda    She presented today 08/17/2022 for follow-up. Fatigue.   Back pain wearing a brace. Limited activity due to back pain. Review of Systems:  CONSTITUTIONAL: No fever, chills. Fatigue  ENMT: Eyes: No blurry vision Nose: No epistaxis. Mouth: No sore throat. RESPIRATORY: No hemoptysis, shortness of breath. CARDIOVASCULAR: No chest pain, SOB. GASTROINTESTINAL: No N/V, abdominal pain. GENITOURINARY: No dysuria, urinary frequency, hematuria. MSK: Back pain wearing a brace  NEURO: No syncope, presyncope, headache. Remainder:ROS NEGATIVE    Past Medical History:      Diagnosis Date    Acid reflux     Breast cancer ([facility] 06/2018    right    Cerebral [facility] due to [facility] of right [facility] 02/23/2021    H/O renal calculi     History of therapeutic radiation     Hx antineoplastic chemo     Hyperlipidemia     Hypertension     IBS (irritable bowel syndrome)     Insomnia     Left carotid stenosis 02/23/2021    Pyelonephritis 2019    Seasonal allergies     Stroke [facility]) 2013    tia    Type 2 diabetes mellitus without complication ([facility]     UTI (urinary tract infection) 2018     Medications:  Reviewed and reconciled. Allergies: Allergies   Allergen Reactions    Avelox [Moxifloxacin] Hives     Physical Exam:  /60   Pulse 61   Temp 97.2 °F (36.2 °C)   Ht 5' 2\" (1.575 m)   Wt 138 lb (62.6 kg)   SpO2 97%   BMI 25.24 kg/m²   GENERAL: Alert, oriented x 3, not in acute distress. EXTREMITIES: Without clubbing, cyanosis, or edema.    ECOG PS 1    Lab Results   Component Value Date    WBC 5.4 07/11/2022    HGB 13.0 07/11/2022    HCT 39.5 07/11/2022    MCV 91.4 07/11/2022     07/11/2022     Lab Results   Component Value Date     07/11/2022    K 3.7 07/11/2022     07/11/2022    CO2 28 07/11/2022    BUN 34 (H) 08/02/2022    CREATININE 1.3 (H) 08/02/2022    GLUCOSE 159 (H) 07/11/2022    CALCIUM 9.4 07/11/2022    PROT 6.9 07/11/2022    LABALBU 4.1 07/11/2022    BILITOT 0.4 07/11/2022    ALKPHOS 63 07/11/2022    AST 17 07/11/2022    ALT 13 07/11/2022    LABGLOM 40 08/02/2022    GFRAA 49 08/02/2022     Impression/Plan:  71 y.o. female with Right Breast Cancer: On 04/10/2018:  A. Right breast, core biopsy: Poorly differentiated ductal carcinoma, [place] score 3+3+2 = 8.  B. Right lymph node, core biopsy:  Metastatic poorly differentiated carcinoma    Comment: Per report from [email or web address] laboratory:  Estrogen receptor: Negative  Progesterone receptor: Negative  HER-2/jocelyn status: Negative (0)  Enclosed keratin immunohistochemical stains are positive    CT chest 05/11/2018:  Nodule just above the thyroid measures 2 x 2.7 cm. Thyroid nodules range up to 10 mm. Right axillary lymph nodes range up to 1.9 x 3.1 cm. No significant mediastinal or hilar LN. 2.4 mm groundglass nodule in the lingula     CT abdomen/pelvis 05/11/2018 noted no evidence of metastatic disease. Bone scan 05/11/2018 noted no convincing evidence of metastatic disease. PET/CT scan 05/23/2018:  Multiple enlarged hypermetabolic lymph nodes at level of the right axilla measuring up to 33 x 22 mm with hypermetabolic activity and SUV measuring up to 5.6. Enlarged lymph node located adjacent to posterior aspect of the right parotid gland which measures 12 x 10 mm with SUV of 4.8. Nodule associated with thyroid isthmus measuring 22 x 27 mm. This nodule is solid; however no FDG uptake within this lesion. Otherwise negative. U/S guided fine needle aspiration of a right parotid tail on 05/30/2018:   Cytology: Negative for malignant cells. Sheets of benign-appearing oncocytic cells present in a background of mature lymphocytes. Pattern best fits with Warthin tumor. U/S guided fine needle aspiration of thyroid isthmus on 05/30/2018:  Negative for malignant cells. Benign-appearing follicular cells, foam cells, abundant colloid and blood present. These findings would be compatible with colloid nodule/nodular goiter.      06/10/2018 CT Soft Tissue: 2.3 x 1.7 cm solid lesion within the superficial tail of the right parotid gland. Question a focal solid neoplasm such as a Warthin's tumor. 2.5 x 2.0 cm hyperdense structure within the midline visceral space just inferior to the cricoid cartilage  This may represent a solid lesion arising from the thyroid gland. ENT team consulted. 06/05/2018 MRI Breast: 2.5 x 0.7 x 1.4 cm Irregular, enhancing mass in the right breast 12:00 with associated artifact from a metallic clip. At least 15 abnormal right axillary lymph nodes consistent with known metastatic disease. No evidence of neoplasm on the left. 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 Side effects of AC-T reviewed with patient. She agreed to proceed. Mediport was placed. 06/16/2018  2D-Echo: EF 60%. Cycle # 1 Dose-Dense AC was on 06/21/2018. Cycle # 4 Dose-Dense AC was on 08/02/2018. Cycle # 1 Dose-Dense Taxol was on 08/16/2018. Cycle # 2 Dose-Dense Taxol was on 08/30/2018. Cycle # 3 Dose Dense Taxol was on 09/13/2018. On 09/25/2018 Patient was admitted to the hospital for sepsis (due to complicated pyelonephritis with Klebsiella bacteremia), ZHAO bilateral ureteral stone and hydronephrosis s/p cysto/stent insertions 09/28/2018. Discharged on 10/01/2018 On Abx (cephalexin) for Klebsiella Bacteremia by ID team.   Due to the above and worsening peripheral neuropathy, we omitted last cycle of Taxol. MRI Bilateral Breast 10/16/2018 noted Near complete response to therapy on the right with 3 residual right axillary lymph nodes demonstrating mild eccentric cortical thickening suggestive of neoplasm. Readmitted to the hospital on 10/27/2018 with thrombocytopenia, electrolyte disturbances, chills, fever, syncope, nausea and vomiting. Completed Abx    Right simple mastectomy - blue dye inj - right axillary dissection) was on 12/19/2018  A.   Mediport capsule and cord: Device identified. Benign fibroadipose tissue. B.  Right breast, mastectomy: Biopsy site; negative for residual carcinoma. Skin with seborrheic keratosis. Size of largest metastatic deposit-1.3 cm. C.  Right axillary contents, regional resection:   5 of 7 lymph nodes positive for metastatic poorly differentiated mammary carcinoma (grade 3). Comment: The tumor cells in part C are immunoreactive with pankeratin. The cells are negative for LCA. Intradepartmental  consultation is obtained. TNM classification (AJCC eighth edition)-  ypT0 N2a M0 TNBC     RT was completed on 05/24/2019. We recommended adjuvant Xeloda bid 2 weeks on one week off for 6 cycles. Adjuvant Xeloda was started on 06/04/2019. Significant fatigue, mouth sores, facial swelling/redness noted in August 2019. Patient decided to d/c the rest of remaining Xeloda on 08/26/2019 and wanted be on observation only. Sx almost resolved after d/c Xeloda  Left screening mammogram 11/11/2021 Negative for malignancy    Lumbar back pain; [facility] 06/27/2022 moderate T12 vertebral body compression fracture not seen with prior CT abdomen/pelvis study 10/27/2018 which may represent osteoporotic fracture but cannot exclude pathologic fracture. No findings of acute lumbosacral anterior wedge compression fracture, acute displaced fracture or traumatic listhesis identified.      Bone scan 8/2/2022   Healing T12 compression fracture new as compared to prior bone scan dated 5/11/2018  Healing anterior right rib fractures  The scintigraphic pattern is otherwise similar to prior    MRI thoracic spine 08/02/2022:  Acute/subacute compression deformity at T12  Multilevel degenerative change with canal stenosis and foraminal narrowing most pronounced in the lower thoracic spine    MRI Lumbar spine 08/02/2022:  Acute/subacute compression deformity of T12  Multilevel degenerative disc disease with associated multilevel degenerative facet and ligamentous hypertrophy resulting in canal stenosis most severe at L2-L3  Bilateral foraminal narrowing  Imaging reviewed. Labs reviewed.  Elevated BUN/Creat; Recommended to increase po fluid intake  Referred to Neurosurgery team for further evaluation    RTC 3-4 weeks to review NS evaluation    Marcie Alexis MD  Medical Oncologist  Board Certified, Medical Oncology  Board Certified, Internal Medicine  August 17, 2022

## 2022-08-23 RX ORDER — DIVALPROEX SODIUM 250 MG/1
250 TABLET, EXTENDED RELEASE ORAL DAILY
Qty: 30 TABLET | Refills: 11 | OUTPATIENT
Start: 2022-08-23

## 2022-08-23 NOTE — TELEPHONE ENCOUNTER
Patient states that she does not think she takes depakote anymore. Advised patient to contract her primary doctor if she realizes that she is supposed to be taking it.

## 2022-09-14 ENCOUNTER — OFFICE VISIT (OUTPATIENT)
Dept: NEUROSURGERY | Age: 72
End: 2022-09-14
Payer: MEDICARE

## 2022-09-14 VITALS
OXYGEN SATURATION: 100 % | RESPIRATION RATE: 18 BRPM | BODY MASS INDEX: 25.4 KG/M2 | HEIGHT: 62 IN | DIASTOLIC BLOOD PRESSURE: 58 MMHG | WEIGHT: 138 LBS | SYSTOLIC BLOOD PRESSURE: 122 MMHG | TEMPERATURE: 97.2 F | HEART RATE: 66 BPM

## 2022-09-14 DIAGNOSIS — S22.080A COMPRESSION FRACTURE OF T12 VERTEBRA, INITIAL ENCOUNTER (HCC): Primary | ICD-10-CM

## 2022-09-14 PROCEDURE — G8427 DOCREV CUR MEDS BY ELIG CLIN: HCPCS | Performed by: NEUROLOGICAL SURGERY

## 2022-09-14 PROCEDURE — 4004F PT TOBACCO SCREEN RCVD TLK: CPT | Performed by: NEUROLOGICAL SURGERY

## 2022-09-14 PROCEDURE — 3017F COLORECTAL CA SCREEN DOC REV: CPT | Performed by: NEUROLOGICAL SURGERY

## 2022-09-14 PROCEDURE — 99204 OFFICE O/P NEW MOD 45 MIN: CPT | Performed by: NEUROLOGICAL SURGERY

## 2022-09-14 PROCEDURE — 1123F ACP DISCUSS/DSCN MKR DOCD: CPT | Performed by: NEUROLOGICAL SURGERY

## 2022-09-14 PROCEDURE — G8417 CALC BMI ABV UP PARAM F/U: HCPCS | Performed by: NEUROLOGICAL SURGERY

## 2022-09-14 PROCEDURE — 1090F PRES/ABSN URINE INCON ASSESS: CPT | Performed by: NEUROLOGICAL SURGERY

## 2022-09-14 PROCEDURE — G8400 PT W/DXA NO RESULTS DOC: HCPCS | Performed by: NEUROLOGICAL SURGERY

## 2022-09-14 PROCEDURE — 99202 OFFICE O/P NEW SF 15 MIN: CPT | Performed by: NEUROLOGICAL SURGERY

## 2022-09-14 NOTE — PROGRESS NOTES
[facility] NEUROSURGERY  [facility] [address] [address] [phone]       Chief Complaint:   Chief Complaint   Patient presents with    Back Pain     [place] months ago  no recent physical therapy no injections in back        HPI:     I had the pleasure of seeing Yoon Washington today in neurosurgical clinic. As you know this 71-year-old woman who carries a diagnosis of breast CA presents with a several month history of low back pain. She does state that intermittently she has pain into her left anterior thigh and intermittent numbness. There is no loss of bowel or bladder function. She denies any isabel weakness. She presents today with an MRI in hand.     Past Medical History:   Diagnosis Date    Acid reflux     Breast cancer ([facility] 06/2018    right    Cerebral infarction due to embolism of right middle cerebral artery ([facility] 02/23/2021    H/O renal calculi     History of therapeutic radiation     Hx antineoplastic chemo     Hyperlipidemia     Hypertension     IBS (irritable bowel syndrome)     Insomnia     Left carotid stenosis 02/23/2021    Pyelonephritis 2019    Seasonal allergies     Stroke [facility]) 2013    tia    Type 2 diabetes mellitus without complication (HCC)     UTI (urinary tract infection) 2018     Past Surgical History:   Procedure Laterality Date    BREAST BIOPSY Right     CHOLECYSTECTOMY      CT BIOPSY PERCUTANEOUS DEEP BONE  9/23/2022    CT BIOPSY PERCUTANEOUS DEEP BONE 9/23/2022 JACINTO Allan MD [facility] CT    HYSTERECTOMY, TOTAL ABDOMINAL (CERVIX REMOVED)      MASTECTOMY      MASTECTOMY, MODIFIED RADICAL Right 2/27/2019    REMOVAL OF MEDIPORT, RIGHT BREAST SIMPLE MASTECTOMY, BLUE DYE INJECTION, RIGHT AXILLARY DISSECTION performed by Nicole Mccann MD at [address] [place] [address] W/LITHOTRIPSY &INDWELL STENT INSRT Bilateral 9/28/2018    CYSTOSCOPY BILATERAL STENT INSERTION performed by Wilfrid Mcgill MD at [facility]  CYSTO/URETERO W/LITHOTRIPSY &INDWELL STENT INSRT Bilateral 10/25/2018    CYSTOSCOPY RETROGRADE PYELOGRAM URETEROSCOPY J STENT LASER LITHOTRIPSY BILATERAL performed by Wilfrid Mcgill MD at [address] [place] [address] - [facility] W/SUBQ PORT AGE 5 YR/> N/A 6/12/2018    PORT INSERTION performed by Abraham Pang MD at [address] Right     TUBAL LIGATION      TUNNELED CENTRAL VENOUS CATHETER W/ SUBCUTANEOUS PORT  02/27/2019    removed      Family History   Problem Relation Age of Onset    Heart Disease Mother     Diabetes Mother     Hypertension Mother     Breast Cancer Mother [de-identified]    Hypertension Father     Cancer Sister         lymphoma    Stroke Maternal Grandmother     Stroke Paternal Grandmother       Social History     Socioeconomic History    Marital status:      Spouse name: Jesus Menjivar     Number of children: 3    Years of education: Not on file    Highest education level: Not on file   Occupational History    Not on file   Tobacco Use    Smoking status: Every Day     Packs/day: 1.00     Years: 50.00     Pack years: 50.00     Types: Cigarettes    Smokeless tobacco: Never    Tobacco comments:     smoking x 50 years- ongoing    Vaping Use    Vaping Use: Never used   Substance and Sexual Activity    Alcohol use: No    Drug use: No    Sexual activity: Yes     Partners: Male   Other Topics Concern    Not on file   Social History Narrative    Not on file     Social Determinants of Health     Financial Resource Strain: Not on file   Food Insecurity: Not on file   Transportation Needs: Not on file   Physical Activity: Not on file   Stress: Not on file   Social Connections: Not on file   Intimate Partner Violence: Not on file   Housing Stability: Not on file       Medications:   Current Outpatient Medications   Medication Sig Dispense Refill    meloxicam (MOBIC) 15 MG tablet take 1 tablet by mouth once daily (Patient not taking: No sig reported)      divalproex (DEPAKOTE ER) 250 MG extended release tablet Take 1 tablet by mouth daily (Patient not taking: No sig reported) 30 tablet 11    FARXIGA 10 MG tablet Take 1 tablet by mouth daily      famotidine (PEPCID) 20 MG tablet Take 20 mg by mouth daily      aspirin 81 MG EC tablet Take 1 tablet by mouth daily 30 tablet 3    atorvastatin (LIPITOR) 80 MG tablet Take 1 tablet by mouth nightly 30 tablet 3    glipiZIDE (GLUCOTROL) 5 MG tablet Take 1 tablet by mouth daily 60 tablet 3    acyclovir (ZOVIRAX) 400 MG tablet Take 1 tablet by mouth as needed      Vitamin B Complex-C CAPS Take by mouth      Vitamin D, Cholecalciferol, 25 MCG (1000 UT) CAPS Take by mouth      zolpidem (AMBIEN) 10 MG tablet Take 1 tablet by mouth.       meclizine (ANTIVERT) 12.5 MG tablet Take 1 tablet by mouth 3 times daily as needed  (Patient not taking: No sig reported)      Melatonin 10 MG TABS Take by mouth      loperamide (IMODIUM) 2 MG capsule Take 2 mg by mouth 4 times daily as needed for Diarrhea (Patient not taking: No sig reported)      escitalopram (LEXAPRO) 20 MG tablet Take 10 mg by mouth every evening       atenolol-chlorthalidone (TENORETIC) 50-25 MG per tablet Take 1 tablet by mouth daily      lisinopril (PRINIVIL;ZESTRIL) 20 MG tablet Take 20 mg by mouth every evening       cetirizine (ZYRTEC) 10 MG tablet Take 10 mg by mouth daily       Current Facility-Administered Medications   Medication Dose Route Frequency Provider Last Rate Last Admin    perflutren lipid microspheres (DEFINITY) injection 1.65 mg  1.5 mL IntraVENous ONCE PRN Manas Cr MD            Allergies: Avelox [moxifloxacin] and Penicillins       Review of Systems:    Denies any chest pain, headache, dyspnea, recent weight loss, fevers, chills or night sweats.        Physical Examination:    BP (!) 122/58   Pulse 66   Temp 97.2 °F (36.2 °C)   Resp 18   Ht 5' 2\" (1.575 m)   Wt 138 lb (62.6 kg)   SpO2 100%   BMI 25.24 kg/m²      On focused neurological examination, she  is awake alert oriented and rationally conversant. Speech is clear and fluent. Pupils are equal and reactive to light bilaterally, extraocular movements are intact, visual fields are full to confrontation. Her  face is symmetric and grossly intact to fine touch. Uvula and tongue are both midline. Shoulder shrug is symmetric and strong. Cervical spine is well aligned and nontender to direct palpation. Motor examination reveals preserved power in the upper and lower extremities at 5 out of 5 throughout. Reflexes are symmetric and brisk. Plantar responses are downgoing. There is no clonus. Patient is intact to fine touch in all dermatomes throughout. Palpation of the spine reveals no kyphotic or scoliotic gross deformities. There is no pain to deep percussion nor palpation. ASSESSMENT:    I personally reviewed Avis JACINTO Abel's radiographic images, particularly her MRI of the thoracic and lumbar spine 8/2/22 which demonstrates acute compression deformity anteriorly of T12.      1. Compression [facility] of T12 vertebra[facility]  -     External Referral To Orthotics  -     XR THORACIC SPINE (2 VIEWS); Future    MEDICAL DECISION MAKING & PLAN:    I showed the patient her images and explained the findings. A custom fit TLSO to provide external stabilization, reduce pain by restricting motion in the sagittal, coronal, and transverse planes of motion and facilitate healing of the fractured vertebrae. This will significantly reduce further risk of trauma to the spine and enable patient to be upright and participate in the rehabilitation process. Ultimately the patient will be able to achieve an increased level of physical activity safely and effectively, leading to an overall improved quality of life. The patient presents with a T12 compression fracture.   The patient is in need of a custom fit TLSO to correct and accommodate abnormal curvature of the spine, body habitus, provide external stabilization, reduce pain by restricting motion in the sagittal, coronal and transverse planes. The custom fit TLSO will be used to reduce motion and facilitate healing of the fracture. This will significantly reduce further trauma to the spine and associated pain. Ultimately the patient will be able to safely and effectively achieve an increased level of physical activity leading to an overall improved quality of life. Thank you so much for allowing us to participate in the care of this patient. Return in about 7 weeks (around 11/2/2022) for needs tests completed prior to appt, discuss results. Electronically signed by Harmeet Bourne MD on 10/26/2022 at 11:25 AM       NOTE: This report was transcribed using voice recognition software.  Every effort was made to ensure accuracy; however, inadvertent computerized transcription errors may be present

## 2022-09-15 DIAGNOSIS — Z12.31 SCREENING MAMMOGRAM FOR HIGH-RISK PATIENT: Primary | ICD-10-CM

## 2022-09-19 DIAGNOSIS — S22.080A COMPRESSION FRACTURE OF T12 VERTEBRA, INITIAL ENCOUNTER (HCC): Primary | ICD-10-CM

## 2022-09-23 ENCOUNTER — HOSPITAL ENCOUNTER (OUTPATIENT)
Dept: CT IMAGING | Age: 72
Discharge: HOME | End: 2022-09-25
Payer: MEDICARE

## 2022-09-23 VITALS
TEMPERATURE: 97.5 F | SYSTOLIC BLOOD PRESSURE: 139 MMHG | DIASTOLIC BLOOD PRESSURE: 67 MMHG | HEIGHT: 62 IN | OXYGEN SATURATION: 96 % | WEIGHT: 139 LBS | HEART RATE: 54 BPM | BODY MASS INDEX: 25.58 KG/M2 | RESPIRATION RATE: 18 BRPM

## 2022-09-23 DIAGNOSIS — S22.080A COMPRESSION FRACTURE OF T12 VERTEBRA, INITIAL ENCOUNTER (HCC): ICD-10-CM

## 2022-09-23 LAB
ANION GAP SERPL CALCULATED.3IONS-SCNC: 13 MMOL/L (ref 7–16)
BUN BLDV-MCNC: 24 MG/DL (ref 6–23)
CALCIUM SERPL-MCNC: 9.9 MG/DL (ref 8.6–10.2)
CHLORIDE BLD-SCNC: 100 MMOL/L (ref 98–107)
CO2: 28 MMOL/L (ref 22–29)
CREAT SERPL-MCNC: 1.3 MG/DL (ref 0.5–1)
GFR AFRICAN AMERICAN: 49
GFR NON-AFRICAN AMERICAN: 40 ML/MIN/1.73
GLUCOSE BLD-MCNC: 138 MG/DL (ref 74–99)
HCT VFR BLD CALC: 41.7 % (ref 34–48)
HEMOGLOBIN: 14.3 G/DL (ref 11.5–15.5)
INR BLD: 1
MCH RBC QN AUTO: 30.4 PG (ref 26–35)
MCHC RBC AUTO-ENTMCNC: 34.3 % (ref 32–34.5)
MCV RBC AUTO: 88.7 FL (ref 80–99.9)
PDW BLD-RTO: 13.7 FL (ref 11.5–15)
PLATELET # BLD: 147 E9/L (ref 130–450)
PMV BLD AUTO: 10.4 FL (ref 7–12)
POTASSIUM SERPL-SCNC: 3.3 MMOL/L (ref 3.5–5)
PROTHROMBIN TIME: 10.8 SEC (ref 9.3–12.4)
RBC # BLD: 4.7 E12/L (ref 3.5–5.5)
SODIUM BLD-SCNC: 141 MMOL/L (ref 132–146)
WBC # BLD: 6.9 E9/L (ref 4.5–11.5)

## 2022-09-23 PROCEDURE — 7100000010 HC PHASE II RECOVERY - FIRST 15 MIN

## 2022-09-23 PROCEDURE — 20225 BONE BIOPSY TROCAR/NDL DEEP: CPT

## 2022-09-23 PROCEDURE — 88341 IMHCHEM/IMCYTCHM EA ADD ANTB: CPT

## 2022-09-23 PROCEDURE — 85610 PROTHROMBIN TIME: CPT

## 2022-09-23 PROCEDURE — 7100000011 HC PHASE II RECOVERY - ADDTL 15 MIN

## 2022-09-23 PROCEDURE — 2709999900 CT GUIDED NEEDLE PLACEMENT

## 2022-09-23 PROCEDURE — 6360000002 HC RX W HCPCS: Performed by: RADIOLOGY

## 2022-09-23 PROCEDURE — 80048 BASIC METABOLIC PNL TOTAL CA: CPT

## 2022-09-23 PROCEDURE — 36415 COLL VENOUS BLD VENIPUNCTURE: CPT

## 2022-09-23 PROCEDURE — 85027 COMPLETE CBC AUTOMATED: CPT

## 2022-09-23 PROCEDURE — 88311 DECALCIFY TISSUE: CPT

## 2022-09-23 PROCEDURE — 88342 IMHCHEM/IMCYTCHM 1ST ANTB: CPT

## 2022-09-23 PROCEDURE — 88307 TISSUE EXAM BY PATHOLOGIST: CPT

## 2022-09-23 PROCEDURE — 2500000003 HC RX 250 WO HCPCS: Performed by: RADIOLOGY

## 2022-09-23 RX ORDER — FENTANYL CITRATE 50 UG/ML
INJECTION, SOLUTION INTRAMUSCULAR; INTRAVENOUS
Status: COMPLETED | OUTPATIENT
Start: 2022-09-23 | End: 2022-09-23

## 2022-09-23 RX ORDER — DIPHENHYDRAMINE HYDROCHLORIDE 50 MG/ML
INJECTION INTRAMUSCULAR; INTRAVENOUS
Status: COMPLETED | OUTPATIENT
Start: 2022-09-23 | End: 2022-09-23

## 2022-09-23 RX ORDER — LIDOCAINE HYDROCHLORIDE AND EPINEPHRINE BITARTRATE 20; .01 MG/ML; MG/ML
INJECTION, SOLUTION SUBCUTANEOUS
Status: COMPLETED | OUTPATIENT
Start: 2022-09-23 | End: 2022-09-23

## 2022-09-23 RX ORDER — MIDAZOLAM HYDROCHLORIDE 2 MG/2ML
INJECTION, SOLUTION INTRAMUSCULAR; INTRAVENOUS
Status: COMPLETED | OUTPATIENT
Start: 2022-09-23 | End: 2022-09-23

## 2022-09-23 RX ORDER — LIDOCAINE HYDROCHLORIDE 20 MG/ML
INJECTION, SOLUTION INFILTRATION; PERINEURAL
Status: COMPLETED | OUTPATIENT
Start: 2022-09-23 | End: 2022-09-23

## 2022-09-23 RX ADMIN — FENTANYL CITRATE 50 MCG: 50 INJECTION, SOLUTION INTRAMUSCULAR; INTRAVENOUS at 10:12

## 2022-09-23 RX ADMIN — FENTANYL CITRATE 25 MCG: 50 INJECTION, SOLUTION INTRAMUSCULAR; INTRAVENOUS at 10:32

## 2022-09-23 RX ADMIN — LIDOCAINE HYDROCHLORIDE,EPINEPHRINE BITARTRATE 5 ML: 20; .01 INJECTION, SOLUTION INFILTRATION; PERINEURAL at 10:27

## 2022-09-23 RX ADMIN — LIDOCAINE HYDROCHLORIDE,EPINEPHRINE BITARTRATE 3 ML: 20; .01 INJECTION, SOLUTION INFILTRATION; PERINEURAL at 10:35

## 2022-09-23 RX ADMIN — DIPHENHYDRAMINE HYDROCHLORIDE 25 MG: 50 INJECTION, SOLUTION INTRAMUSCULAR; INTRAVENOUS at 10:24

## 2022-09-23 RX ADMIN — LIDOCAINE HYDROCHLORIDE 10 ML: 20 INJECTION, SOLUTION INFILTRATION; PERINEURAL at 10:25

## 2022-09-23 RX ADMIN — MIDAZOLAM HYDROCHLORIDE 1 MG: 1 INJECTION, SOLUTION INTRAMUSCULAR; INTRAVENOUS at 10:11

## 2022-09-23 ASSESSMENT — ENCOUNTER SYMPTOMS
EYES NEGATIVE: 1
BACK PAIN: 1
ALLERGIC/IMMUNOLOGIC NEGATIVE: 1
RESPIRATORY NEGATIVE: 1
GASTROINTESTINAL NEGATIVE: 1

## 2022-09-23 ASSESSMENT — PAIN DESCRIPTION - DESCRIPTORS: DESCRIPTORS: ACHING

## 2022-09-23 ASSESSMENT — PAIN - FUNCTIONAL ASSESSMENT: PAIN_FUNCTIONAL_ASSESSMENT: 0-10

## 2022-09-23 NOTE — PRE SEDATION
Sedation Pre-Procedure Note    Patient Name: Taty Franklin   YOB: 1950  Room/Bed: Room/bed info not found  Medical Record Number: [id number]  Date: 9/23/2022   Time: 12:34 PM       Indication:  bone fracture    Consent: I have discussed with the patient and/or the patient representative the indication, alternatives, and the possible risks and/or complications of the planned procedure and the anesthesia methods. The patient and/or patient representative appear to understand and agree to proceed. Vital Signs:   Vitals:    09/23/22 1045   BP: 139/67   Pulse: 54   Resp: 18   Temp:    SpO2: 96%       Past Medical History:   has a past medical history of Acid reflux, Breast cancer ([address] Cerebral infarction due to embolism of right middle cerebral artery ([address] H/O renal calculi, History of therapeutic radiation, Hx antineoplastic chemo, Hyperlipidemia, Hypertension, IBS (irritable bowel syndrome), Insomnia, Left carotid stenosis, Pyelonephritis, Seasonal allergies, Stroke ([address] Type 2 diabetes mellitus without complication ([address] and UTI (urinary tract infection). Past Surgical History:   has a past surgical history that includes Tubal ligation; Cholecystectomy; Rotator cuff repair (Right); Hysterectomy, total abdominal; pr insj prph ctr vad w/subq port age 12 yr/> (N/A, 6/12/2018); TUNNELED CENTRAL VENOUS CATHETER W/ SUBCUTANEOUS PORT (02/27/2019); pr cysto/uretero w/lithotripsy &indwell stent insrt (Bilateral, 9/28/2018); pr cysto/uretero w/lithotripsy &indwell stent insrt (Bilateral, 10/25/2018); Breast biopsy (Right); Mastectomy, modified radical (Right, 2/27/2019); Mastectomy; and CT BIOPSY DEEP BONE PERCUTANEOUS (9/23/2022). Medications:   Scheduled Meds:   Continuous Infusions:   PRN Meds: perflutren lipid microspheres  Home Meds:   Prior to Admission medications    Medication Sig Start Date End Date Taking?  Authorizing Provider   meloxicam (MOBIC) 15 MG tablet take 1 tablet by mouth once daily  Patient not taking: Reported on 9/23/2022 6/24/22   Historical Provider, MD   divalproex (DEPAKOTE ER) 250 MG extended release tablet Take 1 tablet by mouth daily  Patient not taking: Reported on 9/23/2022 6/16/21   Pito Music, APRN - CNP   FARXIGA 10 MG tablet Take 1 tablet by mouth daily 5/10/21   Historical Provider, MD   famotidine (PEPCID) 20 MG tablet Take 20 mg by mouth daily    Historical Provider, MD   aspirin 81 MG EC tablet Take 1 tablet by mouth daily 2/25/21   Za Archuleta MD   atorvastatin (LIPITOR) 80 MG tablet Take 1 tablet by mouth nightly 2/24/21   Za Archuleta MD   glipiZIDE (GLUCOTROL) 5 MG tablet Take 1 tablet by mouth daily 2/24/21   Za Archuleta MD   acyclovir (ZOVIRAX) 400 MG tablet Take 1 tablet by mouth as needed 2/19/20   Historical Provider, MD   Vitamin B Complex-C CAPS Take by mouth    Historical Provider, MD   Vitamin D, Cholecalciferol, 25 MCG (1000 UT) CAPS Take by mouth    Historical Provider, MD   zolpidem (AMBIEN) 10 MG tablet Take 1 tablet by mouth.   3/6/20   Historical Provider, MD   meclizine (ANTIVERT) 12.5 MG tablet Take 1 tablet by mouth 3 times daily as needed   Patient not taking: Reported on 9/23/2022 4/9/20   Historical Provider, MD   Melatonin 10 MG TABS Take by mouth    Historical Provider, MD   loperamide (IMODIUM) 2 MG capsule Take 2 mg by mouth 4 times daily as needed for Diarrhea  Patient not taking: Reported on 9/23/2022    Historical Provider, MD   escitalopram (LEXAPRO) 20 MG tablet Take 10 mg by mouth every evening     Historical Provider, MD   atenolol-chlorthalidone (TENORETIC) 50-25 MG per tablet Take 1 tablet by mouth daily    Historical Provider, MD   lisinopril (PRINIVIL;ZESTRIL) 20 MG tablet Take 20 mg by mouth every evening     Historical Provider, MD   cetirizine (ZYRTEC) 10 MG tablet Take 10 mg by mouth daily    Historical Provider, MD     Coumadin Use Last 7 Days:  no  Antiplatelet drug therapy use last 7 days: no  Other

## 2022-09-23 NOTE — H&P
HISTORY AND PHYSICAL             Date: 9/23/2022        Patient Name: Dulcie Spurling     YOB: 1950      Age:  71 y.o.     Chief Complaint   Back pain , Hx of malignancy       History Obtained From   patient    History of Present Illness   Breast cancer, pathological compression fracture    Past Medical History     Past Medical History:   Diagnosis Date    Acid reflux     Breast cancer ([address] 06/2018    right    Cerebral infarction due to embolism of right middle cerebral artery ([address] 02/23/2021    H/O renal calculi     History of therapeutic radiation     Hx antineoplastic chemo     Hyperlipidemia     Hypertension     IBS (irritable bowel syndrome)     Insomnia     Left carotid stenosis 02/23/2021    Pyelonephritis 2019    Seasonal allergies     Stroke [facility]) 2013    tia    Type 2 diabetes mellitus without complication ([facility]     UTI (urinary tract infection) 2018        Past Surgical History     Past Surgical History:   Procedure Laterality Date    BREAST BIOPSY Right     CHOLECYSTECTOMY      HYSTERECTOMY, TOTAL ABDOMINAL (CERVIX REMOVED)      MASTECTOMY      MASTECTOMY, MODIFIED RADICAL Right 2/27/2019    REMOVAL OF MEDIPORT, RIGHT BREAST SIMPLE MASTECTOMY, BLUE DYE INJECTION, RIGHT AXILLARY DISSECTION performed by Ronnie Foss MD at [address] [facility] W/LITHOTRIPSY &INDWELL STENT INSRT Bilateral 9/28/2018    CYSTOSCOPY BILATERAL STENT INSERTION performed by Jb Tavera MD at [address] [facility] W/LITHOTRIPSY &INDWELL STENT INSRT Bilateral 10/25/2018    CYSTOSCOPY RETROGRADE PYELOGRAM URETEROSCOPY J STENT LASER LITHOTRIPSY BILATERAL performed by Jb Tavera MD at [facility] W/SUBQ PORT AGE 6 YR/> N/A 6/12/2018    PORT INSERTION performed by Danika Ulloa MD at [address] Right     TUBAL LIGATION      TUNNELED CENTRAL VENOUS CATHETER W/ SUBCUTANEOUS PORT  02/27/2019    removed        Medications Prior to Admission     Prior to Admission medications    Medication Sig Start Date End Date Taking? Authorizing Provider   meloxicam (MOBIC) 15 MG tablet take 1 tablet by mouth once daily  Patient not taking: Reported on 9/23/2022 6/24/22   Historical Provider, MD   divalproex (DEPAKOTE ER) 250 MG extended release tablet Take 1 tablet by mouth daily  Patient not taking: Reported on 9/23/2022 6/16/21   Jennell Aase, APRN - CNP   FARXIGA 10 MG tablet Take 1 tablet by mouth daily 5/10/21   Historical Provider, MD   famotidine (PEPCID) 20 MG tablet Take 20 mg by mouth daily    Historical Provider, MD   aspirin 81 MG EC tablet Take 1 tablet by mouth daily 2/25/21   [facility] MD   atorvastatin (LIPITOR) 80 MG tablet Take 1 tablet by mouth nightly 2/24/21   Marsha [facility] [place]   glipiZIDE (GLUCOTROL) 5 MG tablet Take 1 tablet by mouth daily 2/24/21   [facility] MD   acyclovir (ZOVIRAX) 400 MG tablet Take 1 tablet by mouth as needed 2/19/20   Historical Provider, MD   Vitamin B Complex-C CAPS Take by mouth    Historical Provider, MD   Vitamin D, Cholecalciferol, 25 MCG (1000 UT) CAPS Take by mouth    Historical Provider, MD   zolpidem (AMBIEN) 10 MG tablet Take 1 tablet by mouth.   3/6/20   Historical Provider, MD   meclizine (ANTIVERT) 12.5 MG tablet Take 1 tablet by mouth 3 times daily as needed   Patient not taking: Reported on 9/23/2022 4/9/20   Historical Provider, MD   Melatonin 10 MG TABS Take by mouth    Historical Provider, MD   loperamide (IMODIUM) 2 MG capsule Take 2 mg by mouth 4 times daily as needed for Diarrhea  Patient not taking: Reported on 9/23/2022    Historical Provider, MD   escitalopram (LEXAPRO) 20 MG tablet Take 10 mg by mouth every evening     Historical Provider, MD   atenolol-chlorthalidone (TENORETIC) 50-25 MG per tablet Take 1 tablet by mouth daily    Historical Provider, MD   lisinopril (PRINIVIL;ZESTRIL) 20 MG tablet Take 20 mg by mouth every evening     Historical Provider, MD   cetirizine (ZYRTEC) 10 MG tablet Take 10 mg by mouth daily    Historical Provider, MD        Allergies   Avelox [moxifloxacin] and Penicillins    Social History     Social History       Tobacco History       Smoking Status  Every Day Smoking Frequency  1 pack/day for 50.00 years (50.00 pk-yrs) Smoking Tobacco Type  Cigarettes      Smokeless Tobacco Use  Never      Tobacco Comments  smoking x 50 years- ongoing               Alcohol History       Alcohol Use Status  No              Drug Use       Drug Use Status  No              Sexual Activity       Sexually Active  Yes Partners  Male                    Family History     Family History   Problem Relation Age of Onset    Heart Disease Mother     Diabetes Mother     Hypertension Mother     Breast Cancer Mother [de-identified]    Hypertension Father     Cancer Sister         lymphoma    Stroke Maternal Grandmother     Stroke Paternal Grandmother        Review of Systems   Review of Systems   Constitutional: Negative. HENT: Negative. Eyes: Negative. Respiratory: Negative. Cardiovascular: Negative. Gastrointestinal: Negative. Endocrine: Negative. Genitourinary: Negative. Musculoskeletal:  Positive for back pain. Allergic/Immunologic: Negative. Neurological: Negative. Hematological: Negative. Psychiatric/Behavioral: Negative.        Physical Exam   /67   Pulse 54   Temp 97.5 °F (36.4 °C) (Temporal)   Resp 18   Ht 5' 2\" (1.575 m)   Wt 139 lb (63 kg)   SpO2 96%   BMI 25.42 kg/m²     Physical Exam    Labs      Recent Results (from the past 24 hour(s))   Protime-INR    Collection Time: 09/23/22  9:23 AM   Result Value Ref Range    Protime 10.8 9.3 - 12.4 sec    INR 1.0    Basic Metabolic Panel    Collection Time: 09/23/22  9:23 AM   Result Value Ref Range    Sodium 141 132 - 146 mmol/L    Potassium 3.3 (L) 3.5 - 5.0 mmol/L    Chloride 100 98 - 107 mmol/L    CO2 28 22 - 29 mmol/L    Anion Gap 13 7 - 16 mmol/L    Glucose 138 (H) 74 - 99 mg/dL    BUN 24 (H) 6 - 23 mg/dL    Creatinine 1.3 (H) 0.5 - 1.0 mg/dL    GFR Non-African American 40 >=60 mL/min/1.73    GFR African American 49     Calcium 9.9 8.6 - 10.2 mg/dL   CBC    Collection Time: 09/23/22  9:23 AM   Result Value Ref Range    WBC 6.9 4.5 - 11.5 E9/L    RBC 4.70 3.50 - 5.50 E12/L    Hemoglobin 14.3 11.5 - 15.5 g/dL    Hematocrit 41.7 34.0 - 48.0 %    MCV 88.7 80.0 - 99.9 fL    MCH 30.4 26.0 - 35.0 pg    MCHC 34.3 32.0 - 34.5 %    RDW 13.7 11.5 - 15.0 fL    Platelets 555 044 - 622 E9/L    MPV 10.4 7.0 - 12.0 fL        Imaging/Diagnostics Last 24 Hours   No results found.     Assessment        Plan   CT guided T12 biopsy    Consultations Ordered:  None    Electronically signed by Magalie Lai MD on 9/23/22 at 11:14 AM EDT

## 2022-09-23 NOTE — OR NURSING
Patient to CT for T12 biopsy. Patient placed prone on CT table, hooked to monitor, O2 via NC, NS KVO.

## 2022-09-23 NOTE — PROGRESS NOTES
Patient arrived to recovery. Verbal bedside report given to Angel Rivas upon transfer of patient care. Bandaged checked, dry sterile and intact, no bleeding.  Specimen dropped off at lab No. [id number]

## 2022-09-23 NOTE — PROCEDURES
1045Patient returned from procedure. Dressing checked, clean, dry, and intact. Patient stable. No s/s of complications noted or reported. Vitals will be checked q 15min, see flow sheets. Patient eating and drinking well with no s/s of complications noted or reported. 1115  with dsg check, small staining noted, edges marked. 1130Patient discharged, site was checked with every set of vitals. Site clean dry and intact. Discharge papers reviewed with patient, questions answered, discharge paper signed. Patient taken to door. Patient in stable condition, no s/s of complications noted or reported.

## 2022-10-06 ENCOUNTER — HOSPITAL ENCOUNTER (OUTPATIENT)
Dept: INFUSION THERAPY | Age: 72
Discharge: HOME | End: 2022-10-06

## 2022-10-06 ENCOUNTER — OFFICE VISIT (OUTPATIENT)
Dept: ONCOLOGY | Age: 72
End: 2022-10-06
Payer: MEDICARE

## 2022-10-06 VITALS
SYSTOLIC BLOOD PRESSURE: 134 MMHG | OXYGEN SATURATION: 96 % | HEART RATE: 62 BPM | BODY MASS INDEX: 25.8 KG/M2 | WEIGHT: 140.2 LBS | TEMPERATURE: 98.1 F | HEIGHT: 62 IN | DIASTOLIC BLOOD PRESSURE: 60 MMHG

## 2022-10-06 DIAGNOSIS — Z85.3 PERSONAL HISTORY OF BREAST CANCER: Primary | ICD-10-CM

## 2022-10-06 PROCEDURE — G8400 PT W/DXA NO RESULTS DOC: HCPCS | Performed by: INTERNAL MEDICINE

## 2022-10-06 PROCEDURE — 3017F COLORECTAL CA SCREEN DOC REV: CPT | Performed by: INTERNAL MEDICINE

## 2022-10-06 PROCEDURE — 1090F PRES/ABSN URINE INCON ASSESS: CPT | Performed by: INTERNAL MEDICINE

## 2022-10-06 PROCEDURE — 4004F PT TOBACCO SCREEN RCVD TLK: CPT | Performed by: INTERNAL MEDICINE

## 2022-10-06 PROCEDURE — 99212 OFFICE O/P EST SF 10 MIN: CPT

## 2022-10-06 PROCEDURE — 1123F ACP DISCUSS/DSCN MKR DOCD: CPT | Performed by: INTERNAL MEDICINE

## 2022-10-06 PROCEDURE — G8484 FLU IMMUNIZE NO ADMIN: HCPCS | Performed by: INTERNAL MEDICINE

## 2022-10-06 PROCEDURE — G8417 CALC BMI ABV UP PARAM F/U: HCPCS | Performed by: INTERNAL MEDICINE

## 2022-10-06 PROCEDURE — G8427 DOCREV CUR MEDS BY ELIG CLIN: HCPCS | Performed by: INTERNAL MEDICINE

## 2022-10-06 PROCEDURE — 99214 OFFICE O/P EST MOD 30 MIN: CPT | Performed by: INTERNAL MEDICINE

## 2022-10-06 NOTE — PROGRESS NOTES
Department of [facility] Oncology     Attending Clinic Note    Reason for Visit: Follow-up on a patient with Right Breast Cancer    PCP:  Tex Brandt    History of Present Illness: The mass was located in the 12 o'clock position of the right breast.     Breast cancer risk factors include age, gender, mother with breast cancer. Age of menarche was 16. Total hysterectomy at age 53. Patient was on hormonal therapy, patient unsure of how long. Patient is . Age of first live birth was 33. Patient did not breast feed. Bilateral screening mammogram on 2018:  Probable 15 mm thick soft tissue mass in the 12:00 position of the right breast.     Right Diagnostic Mammogram on 2018:  there is an 18 mm mass in the 12:00 position of the right breast consistent with carcinoma until proven otherwise. There is also an abnormal right axillary lymph node. Right Breast U/S on 2018:  18 mm mass in the 12:00 position of the right breast consistent with carcinoma until proven otherwise. Enlarged right axillary lymph node. On 04/10/2018:  A. Right breast, core biopsy, slides for review ( A): Poorly differentiated ductal carcinoma, [place] score 3+3+2 = 8.  B. 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 Side effects of AC-T reviewed with patient. She agreed to proceed. Mediport was placed. 2018 2D-Echo: EF 60%. Cycle # 1 Dose-Dense AC was on 2018. Cycle # 2 Dose-Dense AC was on 2018. Cycle # 3 Dose-Dense AC was on 2018.     Cycle # 4 Dose-Dense AC was on 08/02/2018. Cycle # 1 Dose-Dense Taxol was on 08/16/2018. Cycle # 2 Dose-Dense Taxol was on 08/30/2018. Cycle # 3 Dose Dense Taxol was on 09/13/2018. On 09/25/2018 Patient was admitted to the hospital for sepsis (due to complicated pyelonephritis with Klebsiella bacteremia), ZHAO bilateral ureteral stone and hydronephrosis s/p cysto/stent insertions 09/28/2018. Discharged on 10/01/2018 On Abx (cephalexin) for Klebsiella Bacteremia by ID team.     MRI Bilateral Breast 10/16/2018 noted Near complete response to therapy on the right with 3 residual right axillary lymph nodes demonstrating mild eccentric cortical thickening suggestive of neoplasm. Readmitted to the hospital on 10/27/2018 with thrombocytopenia, electrolyte disturbances, chills, fever, syncope, nausea and vomiting. Completed Abx    She underwent a removal of mediport, right breast simple mastectomy, blue dye injection, right axillary dissection on February 27, 2019. Pathological evaluation completed at [facility]):  A. Mediport capsule and cord: Device identified. Benign fibroadipose tissue. B.  Right breast, mastectomy: Biopsy site; negative for residual carcinoma. Skin with seborrheic keratosis. Size of largest metastatic deposit-1.3 cm. C.  Right axillary contents, regional resection: 5 of 7 lymph nodes positive for metastatic poorly differentiated mammary carcinoma (grade 3). Comment: The tumor cells in part C are immunoreactive with pankeratin. The cells are negative for LCA. Intradepartmental consultation is obtained. TNM classification (AJCC eighth edition)-  ypT0 N2a M0 TNBC   RT was completed on 05/24/2019. Adjuvant Xeloda was started on 06/04/2019. Significant fatigue, mouth sores, facial swelling/redness noted in August 2019. Patient decided to d/c the rest of remaining Xeloda on 08/26/2019 and wanted be on observation only. Sx resolved after d/c Xeloda    Today 10/06/2022: No fever chills.  Fair appetite and energy level. Back pain follows with NS team    Review of Systems:  CONSTITUTIONAL: No fever, chills. Fair appetite and energy level  ENMT: Eyes: No blurry vision Nose: No epistaxis. Mouth: No sore throat. RESPIRATORY: No hemoptysis, shortness of breath. CARDIOVASCULAR: No chest pain, SOB. GASTROINTESTINAL: No N/V, abdominal pain. GENITOURINARY: No dysuria, urinary frequency, hematuria. MSK: Back pain follows with NS team  NEURO: No syncope, presyncope, headache. Remainder:ROS NEGATIVE    Past Medical History:      Diagnosis Date    Acid reflux     Breast cancer ([facility] 06/2018    right    Cerebral infarction due to embolism of right middle cerebral artery ([facility] 02/23/2021    H/O renal calculi     History of therapeutic radiation     Hx antineoplastic chemo     Hyperlipidemia     Hypertension     IBS (irritable bowel syndrome)     Insomnia     Left carotid stenosis 02/23/2021    Pyelonephritis 2019    Seasonal allergies     Stroke [facility]) 2013    tia    Type 2 diabetes mellitus without complication ([facility]     UTI (urinary tract infection) 2018     Medications:  Reviewed and reconciled. Allergies: Allergies   Allergen Reactions    Avelox [Moxifloxacin] Hives    Penicillins Other (See Comments)     Physical Exam:  /60   Pulse 62   Temp 98.1 °F (36.7 °C)   Ht 5' 2\" (1.575 m)   Wt 140 lb 3.2 oz (63.6 kg)   SpO2 96%   BMI 25.64 kg/m²   GENERAL: Alert, oriented x 3, not in acute distress. LUNGS: CTA Jair  CVS: RRR  EXTREMITIES: Without clubbing, cyanosis, or edema. ECOG PS 1    Lab Results   Component Value Date    WBC 6.9 09/23/2022    HGB 14.3 09/23/2022    HCT 41.7 09/23/2022    MCV 88.7 09/23/2022     09/23/2022     Impression/Plan:  71 y.o. female with Right Breast Cancer: On 04/10/2018:  A.  Right breast, core biopsy: Poorly differentiated ductal carcinoma, Cobb score 3+3+2 = 8.  B. Right lymph node, core biopsy:  Metastatic poorly differentiated carcinoma    Comment: Per report from [facility] laboratory:  Estrogen receptor: Negative  Progesterone receptor: Negative  HER-2/jocelyn status: Negative (0)  Enclosed keratin immunohistochemical stains are positive    CT chest 05/11/2018:  Nodule just above the thyroid measures 2 x 2.7 cm. Thyroid nodules range up to 10 mm. Right axillary lymph nodes range up to 1.9 x 3.1 cm. No significant mediastinal or hilar LN. 2.4 mm groundglass nodule in the lingula     CT abdomen/pelvis 05/11/2018 noted no evidence of metastatic disease. Bone scan 05/11/2018 noted no convincing evidence of metastatic disease. PET/CT scan 05/23/2018:  Multiple enlarged hypermetabolic lymph nodes at level of the right axilla measuring up to 33 x 22 mm with hypermetabolic activity and SUV measuring up to 5.6. Enlarged lymph node located adjacent to posterior aspect of the right parotid gland which measures 12 x 10 mm with SUV of 4.8. Nodule associated with thyroid isthmus measuring 22 x 27 mm. This nodule is solid; however no FDG uptake within this lesion. Otherwise negative. U/S guided fine needle aspiration of a right parotid tail on 05/30/2018:   Cytology: Negative for malignant cells. Sheets of benign-appearing oncocytic cells present in a background of mature lymphocytes. Pattern best fits with Warthin tumor. U/S guided fine needle aspiration of thyroid isthmus on 05/30/2018:  Negative for malignant cells. Benign-appearing follicular cells, foam cells, abundant colloid and blood present. These findings would be compatible with colloid nodule/nodular goiter. 06/10/2018 CT Soft Tissue: 2.3 x 1.7 cm solid lesion within the superficial tail of the right parotid gland. Question a focal solid neoplasm such as a Warthin's tumor. 2.5 x 2.0 cm hyperdense structure within the midline visceral space just inferior to the cricoid cartilage  This may represent a solid lesion arising from the thyroid gland. ENT team consulted.     06/05/2018 MRI Breast: 2.5 x 0.7 x 1.4 cm Irregular, enhancing mass in the right breast 12:00 with associated artifact from a metallic clip. At least 15 abnormal right axillary lymph nodes consistent with known metastatic disease. No evidence of neoplasm on the left. 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 Side effects of AC-T reviewed with patient. She agreed to proceed. Mediport was placed. 06/16/2018  2D-Echo: EF 60%. Cycle # 1 Dose-Dense AC was on 06/21/2018. Cycle # 4 Dose-Dense AC was on 08/02/2018. Cycle # 1 Dose-Dense Taxol was on 08/16/2018. Cycle # 2 Dose-Dense Taxol was on 08/30/2018. Cycle # 3 Dose Dense Taxol was on 09/13/2018. On 09/25/2018 Patient was admitted to the hospital for sepsis (due to complicated pyelonephritis with Klebsiella bacteremia), ZHAO bilateral ureteral stone and hydronephrosis s/p cysto/stent insertions 09/28/2018. Discharged on 10/01/2018 On Abx (cephalexin) for Klebsiella Bacteremia by ID team.   Due to the above and worsening peripheral neuropathy, we omitted last cycle of Taxol. MRI Bilateral Breast 10/16/2018 noted Near complete response to therapy on the right with 3 residual right axillary lymph nodes demonstrating mild eccentric cortical thickening suggestive of neoplasm. Readmitted to the hospital on 10/27/2018 with thrombocytopenia, electrolyte disturbances, chills, fever, syncope, nausea and vomiting. Completed Abx    Right simple mastectomy - blue dye inj - right axillary dissection) was on 12/19/2018  A. Mediport capsule and cord: Device identified. Benign fibroadipose tissue. B.  Right breast, mastectomy: Biopsy site; negative for residual carcinoma. Skin with seborrheic keratosis. Size of largest metastatic deposit-1.3 cm. C.  Right axillary contents, regional resection:   5 of 7 lymph nodes positive for metastatic poorly differentiated mammary carcinoma (grade 3). Comment: The tumor cells in part C are immunoreactive with pankeratin. The cells are negative for LCA. Intradepartmental  consultation is obtained. TNM classification (AJCC eighth edition)-  ypT0 N2a M0 TNBC     RT was completed on 05/24/2019. We recommended adjuvant Xeloda bid 2 weeks on one week off for 6 cycles. Adjuvant Xeloda was started on 06/04/2019. Significant fatigue, mouth sores, facial swelling/redness noted in August 2019. Patient decided to d/c the rest of remaining Xeloda on 08/26/2019 and wanted be on observation only. Sx almost resolved after d/c Xeloda    Left screening mammogram 11/11/2021 Negative for malignancy    Lumbar back pain; [facility] at [facility] 06/27/2022 moderate T12 vertebral body compression fracture not seen with prior CT abdomen/pelvis study 10/27/2018 which may represent osteoporotic fracture but cannot exclude pathologic fracture. No findings of acute lumbosacral anterior wedge compression fracture, acute displaced fracture or traumatic listhesis identified. Bone scan 08/02/2022   Healing T12 compression fracture new as compared to prior bone scan dated 5/11/2018  Healing anterior right rib fractures  The scintigraphic pattern is otherwise similar to prior    MRI thoracic spine 08/02/2022:  Acute/subacute compression deformity at T12  Multilevel degenerative change with canal stenosis and foraminal narrowing most pronounced in the lower thoracic spine    MRI Lumbar spine 08/02/2022:  Acute/subacute compression deformity of T12  Multilevel degenerative disc disease with associated multilevel degenerative facet and ligamentous hypertrophy resulting in canal stenosis most severe at L2-L3  Bilateral foraminal narrowing    Neurosurgery team on board. CT guided T12 bone biopsy 09/23/2022:   Bone, T12 lesion, biopsy: Benign bone and unremarkable bone marrow, negative for malignancy   Comment:The patient's history of breast carcinoma is noted. Pankeratin and GATA3 immunostains are negative (show no evidence of metastatic carcinoma). Pathology reviewed. Labs reviewed.    RTC Nov 2022 mammogram same day    Gordy Sandhoff, MD  Medical Oncologist  Board Certified, Medical Oncology  Board Certified, Internal Medicine  October 6, 2022

## 2022-11-14 DIAGNOSIS — Z85.3 PERSONAL HISTORY OF BREAST CANCER: Primary | ICD-10-CM

## 2023-06-16 ENCOUNTER — HOSPITAL ENCOUNTER (INPATIENT)
Age: 73
LOS: 6 days | Discharge: HOME | DRG: 280 | End: 2023-06-22
Attending: INTERNAL MEDICINE | Admitting: INTERNAL MEDICINE
Payer: MEDICARE

## 2023-06-16 DIAGNOSIS — I35.0 SEVERE AORTIC STENOSIS: Primary | ICD-10-CM

## 2023-06-16 DIAGNOSIS — I50.41 ACUTE COMBINED SYSTOLIC AND DIASTOLIC CONGESTIVE HEART FAILURE (HCC): ICD-10-CM

## 2023-06-16 PROBLEM — I21.4 NSTEMI (NON-ST ELEVATED MYOCARDIAL INFARCTION) (HCC): Status: ACTIVE | Noted: 2023-06-16

## 2023-06-16 LAB — TROPONIN, HIGH SENSITIVITY: 75 NG/L (ref 0–9)

## 2023-06-16 PROCEDURE — 84484 ASSAY OF TROPONIN QUANT: CPT

## 2023-06-16 PROCEDURE — 6370000000 HC RX 637 (ALT 250 FOR IP): Performed by: FAMILY MEDICINE

## 2023-06-16 PROCEDURE — 93005 ELECTROCARDIOGRAM TRACING: CPT | Performed by: FAMILY MEDICINE

## 2023-06-16 PROCEDURE — 6360000002 HC RX W HCPCS: Performed by: FAMILY MEDICINE

## 2023-06-16 PROCEDURE — 2140000000 HC CCU INTERMEDIATE R&B

## 2023-06-16 PROCEDURE — 36415 COLL VENOUS BLD VENIPUNCTURE: CPT

## 2023-06-16 RX ORDER — HEPARIN SODIUM 10000 [USP'U]/100ML
5-30 INJECTION, SOLUTION INTRAVENOUS CONTINUOUS
Status: DISCONTINUED | OUTPATIENT
Start: 2023-06-16 | End: 2023-06-18

## 2023-06-16 RX ORDER — HEPARIN SODIUM 1000 [USP'U]/ML
30 INJECTION, SOLUTION INTRAVENOUS; SUBCUTANEOUS PRN
Status: DISCONTINUED | OUTPATIENT
Start: 2023-06-16 | End: 2023-06-16 | Stop reason: SDUPTHER

## 2023-06-16 RX ORDER — HEPARIN SODIUM 1000 [USP'U]/ML
60 INJECTION, SOLUTION INTRAVENOUS; SUBCUTANEOUS PRN
Status: DISCONTINUED | OUTPATIENT
Start: 2023-06-16 | End: 2023-06-16 | Stop reason: SDUPTHER

## 2023-06-16 RX ORDER — SODIUM CHLORIDE 9 MG/ML
INJECTION, SOLUTION INTRAVENOUS PRN
Status: DISCONTINUED | OUTPATIENT
Start: 2023-06-16 | End: 2023-06-22 | Stop reason: HOSPADM

## 2023-06-16 RX ORDER — HEPARIN SODIUM 1000 [USP'U]/ML
60 INJECTION, SOLUTION INTRAVENOUS; SUBCUTANEOUS PRN
Status: DISCONTINUED | OUTPATIENT
Start: 2023-06-16 | End: 2023-06-18

## 2023-06-16 RX ORDER — HEPARIN SODIUM 1000 [USP'U]/ML
60 INJECTION, SOLUTION INTRAVENOUS; SUBCUTANEOUS ONCE
Status: DISCONTINUED | OUTPATIENT
Start: 2023-06-16 | End: 2023-06-16

## 2023-06-16 RX ORDER — DEXTROSE MONOHYDRATE 100 MG/ML
INJECTION, SOLUTION INTRAVENOUS CONTINUOUS PRN
Status: DISCONTINUED | OUTPATIENT
Start: 2023-06-16 | End: 2023-06-22 | Stop reason: HOSPADM

## 2023-06-16 RX ORDER — HYDROCODONE BITARTRATE AND ACETAMINOPHEN 5; 325 MG/1; MG/1
1 TABLET ORAL EVERY 8 HOURS PRN
Status: DISCONTINUED | OUTPATIENT
Start: 2023-06-16 | End: 2023-06-22 | Stop reason: HOSPADM

## 2023-06-16 RX ORDER — ASPIRIN 325 MG
325 TABLET ORAL ONCE
Status: COMPLETED | OUTPATIENT
Start: 2023-06-16 | End: 2023-06-16

## 2023-06-16 RX ORDER — ATENOLOL 50 MG/1
50 TABLET ORAL DAILY
Status: DISCONTINUED | OUTPATIENT
Start: 2023-06-16 | End: 2023-06-21

## 2023-06-16 RX ORDER — HYDROCODONE BITARTRATE AND ACETAMINOPHEN 5; 325 MG/1; MG/1
1 TABLET ORAL EVERY 8 HOURS PRN
COMMUNITY

## 2023-06-16 RX ORDER — SODIUM CHLORIDE 0.9 % (FLUSH) 0.9 %
5-40 SYRINGE (ML) INJECTION PRN
Status: DISCONTINUED | OUTPATIENT
Start: 2023-06-16 | End: 2023-06-22 | Stop reason: HOSPADM

## 2023-06-16 RX ORDER — CETIRIZINE HYDROCHLORIDE 10 MG/1
10 TABLET ORAL DAILY
Status: DISCONTINUED | OUTPATIENT
Start: 2023-06-16 | End: 2023-06-22 | Stop reason: HOSPADM

## 2023-06-16 RX ORDER — ACETAMINOPHEN 650 MG/1
650 SUPPOSITORY RECTAL EVERY 6 HOURS PRN
Status: DISCONTINUED | OUTPATIENT
Start: 2023-06-16 | End: 2023-06-17

## 2023-06-16 RX ORDER — SODIUM CHLORIDE 0.9 % (FLUSH) 0.9 %
5-40 SYRINGE (ML) INJECTION EVERY 12 HOURS SCHEDULED
Status: DISCONTINUED | OUTPATIENT
Start: 2023-06-16 | End: 2023-06-22 | Stop reason: HOSPADM

## 2023-06-16 RX ORDER — ZOLPIDEM TARTRATE 5 MG/1
5 TABLET ORAL NIGHTLY PRN
Status: DISCONTINUED | OUTPATIENT
Start: 2023-06-16 | End: 2023-06-22 | Stop reason: HOSPADM

## 2023-06-16 RX ORDER — HEPARIN SODIUM 1000 [USP'U]/ML
30 INJECTION, SOLUTION INTRAVENOUS; SUBCUTANEOUS PRN
Status: DISCONTINUED | OUTPATIENT
Start: 2023-06-16 | End: 2023-06-18

## 2023-06-16 RX ORDER — PIOGLITAZONEHYDROCHLORIDE 15 MG/1
30 TABLET ORAL DAILY
Status: DISCONTINUED | OUTPATIENT
Start: 2023-06-16 | End: 2023-06-18

## 2023-06-16 RX ORDER — HEPARIN SODIUM 10000 [USP'U]/100ML
5-30 INJECTION, SOLUTION INTRAVENOUS CONTINUOUS
Status: DISCONTINUED | OUTPATIENT
Start: 2023-06-16 | End: 2023-06-16 | Stop reason: SDUPTHER

## 2023-06-16 RX ORDER — ESCITALOPRAM OXALATE 10 MG/1
10 TABLET ORAL EVERY EVENING
Status: DISCONTINUED | OUTPATIENT
Start: 2023-06-16 | End: 2023-06-22 | Stop reason: HOSPADM

## 2023-06-16 RX ORDER — LISINOPRIL 20 MG/1
20 TABLET ORAL EVERY EVENING
Status: DISCONTINUED | OUTPATIENT
Start: 2023-06-16 | End: 2023-06-22 | Stop reason: HOSPADM

## 2023-06-16 RX ORDER — GABAPENTIN 100 MG/1
200 CAPSULE ORAL NIGHTLY
Status: DISCONTINUED | OUTPATIENT
Start: 2023-06-16 | End: 2023-06-22 | Stop reason: HOSPADM

## 2023-06-16 RX ORDER — ATENOLOL AND CHLORTHALIDONE TABLET 50; 25 MG/1; MG/1
1 TABLET ORAL DAILY
Status: DISCONTINUED | OUTPATIENT
Start: 2023-06-16 | End: 2023-06-16 | Stop reason: CLARIF

## 2023-06-16 RX ORDER — ATORVASTATIN CALCIUM 40 MG/1
80 TABLET, FILM COATED ORAL NIGHTLY
Status: DISCONTINUED | OUTPATIENT
Start: 2023-06-16 | End: 2023-06-22 | Stop reason: HOSPADM

## 2023-06-16 RX ORDER — PIOGLITAZONEHYDROCHLORIDE 30 MG/1
30 TABLET ORAL DAILY
COMMUNITY

## 2023-06-16 RX ORDER — BACLOFEN 10 MG/1
5 TABLET ORAL 2 TIMES DAILY
COMMUNITY

## 2023-06-16 RX ORDER — POLYETHYLENE GLYCOL 3350 17 G/17G
17 POWDER, FOR SOLUTION ORAL DAILY PRN
Status: DISCONTINUED | OUTPATIENT
Start: 2023-06-16 | End: 2023-06-22 | Stop reason: HOSPADM

## 2023-06-16 RX ORDER — ONDANSETRON 2 MG/ML
4 INJECTION INTRAMUSCULAR; INTRAVENOUS EVERY 6 HOURS PRN
Status: DISCONTINUED | OUTPATIENT
Start: 2023-06-16 | End: 2023-06-22 | Stop reason: HOSPADM

## 2023-06-16 RX ORDER — ONDANSETRON 4 MG/1
4 TABLET, ORALLY DISINTEGRATING ORAL EVERY 8 HOURS PRN
Status: DISCONTINUED | OUTPATIENT
Start: 2023-06-16 | End: 2023-06-22 | Stop reason: HOSPADM

## 2023-06-16 RX ORDER — GABAPENTIN 100 MG/1
200 CAPSULE ORAL NIGHTLY
COMMUNITY

## 2023-06-16 RX ORDER — BACLOFEN 10 MG/1
5 TABLET ORAL 2 TIMES DAILY
Status: DISCONTINUED | OUTPATIENT
Start: 2023-06-16 | End: 2023-06-22 | Stop reason: HOSPADM

## 2023-06-16 RX ORDER — ACETAMINOPHEN 325 MG/1
650 TABLET ORAL EVERY 6 HOURS PRN
Status: DISCONTINUED | OUTPATIENT
Start: 2023-06-16 | End: 2023-06-17

## 2023-06-16 RX ORDER — CHLORTHALIDONE 25 MG/1
25 TABLET ORAL DAILY
Status: DISCONTINUED | OUTPATIENT
Start: 2023-06-16 | End: 2023-06-17

## 2023-06-16 RX ORDER — ASPIRIN 81 MG/1
81 TABLET, CHEWABLE ORAL DAILY
Status: DISCONTINUED | OUTPATIENT
Start: 2023-06-17 | End: 2023-06-22 | Stop reason: HOSPADM

## 2023-06-16 RX ADMIN — ASPIRIN 325 MG: 325 TABLET ORAL at 22:38

## 2023-06-16 RX ADMIN — LISINOPRIL 20 MG: 20 TABLET ORAL at 22:40

## 2023-06-16 RX ADMIN — ESCITALOPRAM OXALATE 10 MG: 10 TABLET ORAL at 22:40

## 2023-06-16 RX ADMIN — GABAPENTIN 200 MG: 100 CAPSULE ORAL at 22:38

## 2023-06-16 RX ADMIN — ATORVASTATIN CALCIUM 80 MG: 40 TABLET, FILM COATED ORAL at 22:40

## 2023-06-16 RX ADMIN — ZOLPIDEM TARTRATE 5 MG: 5 TABLET ORAL at 22:40

## 2023-06-16 RX ADMIN — BACLOFEN 5 MG: 10 TABLET ORAL at 22:40

## 2023-06-16 RX ADMIN — HEPARIN SODIUM 12 UNITS/KG/HR: 10000 INJECTION, SOLUTION INTRAVENOUS at 22:28

## 2023-06-16 ASSESSMENT — LIFESTYLE VARIABLES: HOW OFTEN DO YOU HAVE A DRINK CONTAINING ALCOHOL: NEVER

## 2023-06-17 PROBLEM — I35.1 NONRHEUMATIC AORTIC VALVE INSUFFICIENCY: Status: ACTIVE | Noted: 2023-06-17

## 2023-06-17 PROBLEM — R77.8 ELEVATED TROPONIN: Status: ACTIVE | Noted: 2023-06-17

## 2023-06-17 PROBLEM — I36.1 NONRHEUMATIC TRICUSPID VALVE REGURGITATION: Status: ACTIVE | Noted: 2023-06-17

## 2023-06-17 PROBLEM — I35.0 NONRHEUMATIC AORTIC VALVE STENOSIS: Status: ACTIVE | Noted: 2023-06-17

## 2023-06-17 PROBLEM — J96.01 ACUTE RESPIRATORY FAILURE WITH HYPOXIA (HCC): Status: ACTIVE | Noted: 2023-06-17

## 2023-06-17 PROBLEM — I34.0 NONRHEUMATIC MITRAL VALVE REGURGITATION: Status: ACTIVE | Noted: 2023-06-17

## 2023-06-17 PROBLEM — R79.89 ELEVATED TROPONIN: Status: ACTIVE | Noted: 2023-06-17

## 2023-06-17 PROBLEM — Z87.891 EX-SMOKER: Status: ACTIVE | Noted: 2023-06-17

## 2023-06-17 PROBLEM — R53.81 DEBILITATED PATIENT: Status: ACTIVE | Noted: 2023-06-17

## 2023-06-17 LAB
ALBUMIN SERPL-MCNC: 3.7 G/DL (ref 3.5–5.2)
ALP SERPL-CCNC: 78 U/L (ref 35–104)
ALT SERPL-CCNC: 18 U/L (ref 0–32)
ANION GAP SERPL CALCULATED.3IONS-SCNC: 10 MMOL/L (ref 7–16)
APTT BLD: 35.7 SEC (ref 24.5–35.1)
APTT BLD: 56.7 SEC (ref 24.5–35.1)
AST SERPL-CCNC: 19 U/L (ref 0–31)
B PARAP IS1001 DNA NPH QL NAA+NON-PROBE: NOT DETECTED
B PERT.PT PRMT NPH QL NAA+NON-PROBE: NOT DETECTED
B.E.: 10.4 MMOL/L (ref -3–3)
BILIRUB SERPL-MCNC: 0.5 MG/DL (ref 0–1.2)
BNP BLD-MCNC: ABNORMAL PG/ML (ref 0–125)
BUN SERPL-MCNC: 30 MG/DL (ref 6–23)
C PNEUM DNA NPH QL NAA+NON-PROBE: NOT DETECTED
CALCIUM SERPL-MCNC: 9.3 MG/DL (ref 8.6–10.2)
CHLORIDE SERPL-SCNC: 93 MMOL/L (ref 98–107)
CHOLESTEROL, TOTAL: 123 MG/DL (ref 0–199)
CO2 SERPL-SCNC: 34 MMOL/L (ref 22–29)
COHB: 0.6 % (ref 0–1.5)
CREAT SERPL-MCNC: 0.9 MG/DL (ref 0.5–1)
CRITICAL: ABNORMAL
DATE ANALYZED: ABNORMAL
DATE OF COLLECTION: ABNORMAL
ERYTHROCYTE [DISTWIDTH] IN BLOOD BY AUTOMATED COUNT: 13.8 FL (ref 11.5–15)
FLUAV RNA NPH QL NAA+NON-PROBE: NOT DETECTED
FLUBV RNA NPH QL NAA+NON-PROBE: NOT DETECTED
GLUCOSE SERPL-MCNC: 183 MG/DL (ref 74–99)
HADV DNA NPH QL NAA+NON-PROBE: NOT DETECTED
HBA1C MFR BLD: 6.1 % (ref 4–5.6)
HCO3: 36.1 MMOL/L (ref 22–26)
HCOV 229E RNA NPH QL NAA+NON-PROBE: NOT DETECTED
HCOV HKU1 RNA NPH QL NAA+NON-PROBE: NOT DETECTED
HCOV NL63 RNA NPH QL NAA+NON-PROBE: NOT DETECTED
HCOV OC43 RNA NPH QL NAA+NON-PROBE: NOT DETECTED
HCT VFR BLD AUTO: 36.7 % (ref 34–48)
HDLC SERPL-MCNC: 60 MG/DL
HGB BLD-MCNC: 11.3 G/DL (ref 11.5–15.5)
HHB: 5.4 % (ref 0–5)
HMPV RNA NPH QL NAA+NON-PROBE: NOT DETECTED
HPIV1 RNA NPH QL NAA+NON-PROBE: NOT DETECTED
HPIV2 RNA NPH QL NAA+NON-PROBE: NOT DETECTED
HPIV3 RNA NPH QL NAA+NON-PROBE: NOT DETECTED
HPIV4 RNA NPH QL NAA+NON-PROBE: NOT DETECTED
LAB: ABNORMAL
LDLC SERPL CALC-MCNC: 29 MG/DL (ref 0–99)
LV EF: 48 %
LVEF MODALITY: NORMAL
Lab: ABNORMAL
M PNEUMO DNA NPH QL NAA+NON-PROBE: NOT DETECTED
MAGNESIUM SERPL-MCNC: 1.7 MG/DL (ref 1.6–2.6)
MCH RBC QN AUTO: 27.8 PG (ref 26–35)
MCHC RBC AUTO-ENTMCNC: 30.8 % (ref 32–34.5)
MCV RBC AUTO: 90.4 FL (ref 80–99.9)
METER GLUCOSE: 189 MG/DL (ref 74–99)
METER GLUCOSE: 193 MG/DL (ref 74–99)
METER GLUCOSE: 285 MG/DL (ref 74–99)
METHB: 0.4 % (ref 0–1.5)
MODE: ABNORMAL
O2 CONTENT: 16.6 ML/DL
O2 SATURATION: 94.5 % (ref 92–98.5)
O2HB: 93.6 % (ref 94–97)
OPERATOR ID: 7291
PATIENT TEMP: 37 C
PCO2: 53.2 MMHG (ref 35–45)
PH BLOOD GAS: 7.45 (ref 7.35–7.45)
PLATELET # BLD AUTO: 229 E9/L (ref 130–450)
PMV BLD AUTO: 10.5 FL (ref 7–12)
PO2: 76.5 MMHG (ref 75–100)
POTASSIUM SERPL-SCNC: 3.5 MMOL/L (ref 3.5–5)
PROT SERPL-MCNC: 6.8 G/DL (ref 6.4–8.3)
RBC # BLD AUTO: 4.06 E12/L (ref 3.5–5.5)
RSV RNA NPH QL NAA+NON-PROBE: NOT DETECTED
RV+EV RNA NPH QL NAA+NON-PROBE: NOT DETECTED
SARS-COV-2 RNA NPH QL NAA+NON-PROBE: NOT DETECTED
SODIUM SERPL-SCNC: 137 MMOL/L (ref 132–146)
SOURCE, BLOOD GAS: ABNORMAL
THB: 12.6 G/DL (ref 11.5–16.5)
TIME ANALYZED: 1156
TRIGL SERPL-MCNC: 168 MG/DL (ref 0–149)
TROPONIN, HIGH SENSITIVITY: 82 NG/L (ref 0–9)
VLDLC SERPL CALC-MCNC: 34 MG/DL
WBC # BLD: 9.9 E9/L (ref 4.5–11.5)

## 2023-06-17 PROCEDURE — 85027 COMPLETE CBC AUTOMATED: CPT

## 2023-06-17 PROCEDURE — 6370000000 HC RX 637 (ALT 250 FOR IP): Performed by: FAMILY MEDICINE

## 2023-06-17 PROCEDURE — 80061 LIPID PANEL: CPT

## 2023-06-17 PROCEDURE — B2111ZZ FLUOROSCOPY OF MULTIPLE CORONARY ARTERIES USING LOW OSMOLAR CONTRAST: ICD-10-PCS | Performed by: INTERNAL MEDICINE

## 2023-06-17 PROCEDURE — 36600 WITHDRAWAL OF ARTERIAL BLOOD: CPT

## 2023-06-17 PROCEDURE — 80053 COMPREHEN METABOLIC PANEL: CPT

## 2023-06-17 PROCEDURE — 6360000002 HC RX W HCPCS: Performed by: CLINICAL NURSE SPECIALIST

## 2023-06-17 PROCEDURE — 0202U NFCT DS 22 TRGT SARS-COV-2: CPT

## 2023-06-17 PROCEDURE — 2580000003 HC RX 258: Performed by: FAMILY MEDICINE

## 2023-06-17 PROCEDURE — 2140000000 HC CCU INTERMEDIATE R&B

## 2023-06-17 PROCEDURE — 6360000002 HC RX W HCPCS: Performed by: INTERNAL MEDICINE

## 2023-06-17 PROCEDURE — 93306 TTE W/DOPPLER COMPLETE: CPT

## 2023-06-17 PROCEDURE — 6370000000 HC RX 637 (ALT 250 FOR IP): Performed by: INTERNAL MEDICINE

## 2023-06-17 PROCEDURE — 6360000002 HC RX W HCPCS: Performed by: FAMILY MEDICINE

## 2023-06-17 PROCEDURE — 82962 GLUCOSE BLOOD TEST: CPT

## 2023-06-17 PROCEDURE — 4A023N7 MEASUREMENT OF CARDIAC SAMPLING AND PRESSURE, LEFT HEART, PERCUTANEOUS APPROACH: ICD-10-PCS | Performed by: INTERNAL MEDICINE

## 2023-06-17 PROCEDURE — 83036 HEMOGLOBIN GLYCOSYLATED A1C: CPT

## 2023-06-17 PROCEDURE — 84484 ASSAY OF TROPONIN QUANT: CPT

## 2023-06-17 PROCEDURE — 2580000003 HC RX 258: Performed by: INTERNAL MEDICINE

## 2023-06-17 PROCEDURE — 36415 COLL VENOUS BLD VENIPUNCTURE: CPT

## 2023-06-17 PROCEDURE — 85730 THROMBOPLASTIN TIME PARTIAL: CPT

## 2023-06-17 PROCEDURE — 83880 ASSAY OF NATRIURETIC PEPTIDE: CPT

## 2023-06-17 PROCEDURE — 83735 ASSAY OF MAGNESIUM: CPT

## 2023-06-17 PROCEDURE — 99223 1ST HOSP IP/OBS HIGH 75: CPT | Performed by: INTERNAL MEDICINE

## 2023-06-17 PROCEDURE — 6370000000 HC RX 637 (ALT 250 FOR IP): Performed by: CLINICAL NURSE SPECIALIST

## 2023-06-17 PROCEDURE — 82805 BLOOD GASES W/O2 SATURATION: CPT

## 2023-06-17 PROCEDURE — APPSS60 APP SPLIT SHARED TIME 46-60 MINUTES: Performed by: CLINICAL NURSE SPECIALIST

## 2023-06-17 RX ORDER — MECOBALAMIN 5000 MCG
5 TABLET,DISINTEGRATING ORAL NIGHTLY PRN
Status: DISCONTINUED | OUTPATIENT
Start: 2023-06-17 | End: 2023-06-22 | Stop reason: HOSPADM

## 2023-06-17 RX ORDER — SODIUM CHLORIDE, SODIUM LACTATE, POTASSIUM CHLORIDE, CALCIUM CHLORIDE 600; 310; 30; 20 MG/100ML; MG/100ML; MG/100ML; MG/100ML
INJECTION, SOLUTION INTRAVENOUS CONTINUOUS
Status: ACTIVE | OUTPATIENT
Start: 2023-06-17 | End: 2023-06-18

## 2023-06-17 RX ORDER — MAGNESIUM SULFATE 1 G/100ML
1000 INJECTION INTRAVENOUS ONCE
Status: DISCONTINUED | OUTPATIENT
Start: 2023-06-17 | End: 2023-06-22

## 2023-06-17 RX ORDER — ACETAMINOPHEN 500 MG
1000 TABLET ORAL EVERY 6 HOURS PRN
Status: DISCONTINUED | OUTPATIENT
Start: 2023-06-17 | End: 2023-06-20 | Stop reason: SDUPTHER

## 2023-06-17 RX ORDER — POTASSIUM CHLORIDE 20 MEQ/1
40 TABLET, EXTENDED RELEASE ORAL ONCE
Status: COMPLETED | OUTPATIENT
Start: 2023-06-17 | End: 2023-06-17

## 2023-06-17 RX ORDER — MECOBALAMIN 5000 MCG
5 TABLET,DISINTEGRATING ORAL NIGHTLY
Status: DISCONTINUED | OUTPATIENT
Start: 2023-06-17 | End: 2023-06-22 | Stop reason: HOSPADM

## 2023-06-17 RX ORDER — FUROSEMIDE 10 MG/ML
20 INJECTION INTRAMUSCULAR; INTRAVENOUS 2 TIMES DAILY
Status: DISPENSED | OUTPATIENT
Start: 2023-06-17 | End: 2023-06-22

## 2023-06-17 RX ORDER — FUROSEMIDE 10 MG/ML
20 INJECTION INTRAMUSCULAR; INTRAVENOUS ONCE
Status: COMPLETED | OUTPATIENT
Start: 2023-06-17 | End: 2023-06-17

## 2023-06-17 RX ADMIN — ASPIRIN 81 MG: 81 TABLET, CHEWABLE ORAL at 10:56

## 2023-06-17 RX ADMIN — Medication 10 ML: at 10:55

## 2023-06-17 RX ADMIN — Medication 10 ML: at 21:39

## 2023-06-17 RX ADMIN — CETIRIZINE HYDROCHLORIDE 10 MG: 10 TABLET, FILM COATED ORAL at 10:56

## 2023-06-17 RX ADMIN — ATENOLOL 50 MG: 50 TABLET ORAL at 10:57

## 2023-06-17 RX ADMIN — Medication 5 MG: at 21:38

## 2023-06-17 RX ADMIN — BACLOFEN 5 MG: 10 TABLET ORAL at 10:58

## 2023-06-17 RX ADMIN — BACLOFEN 5 MG: 10 TABLET ORAL at 21:39

## 2023-06-17 RX ADMIN — SODIUM CHLORIDE, POTASSIUM CHLORIDE, SODIUM LACTATE AND CALCIUM CHLORIDE: 600; 310; 30; 20 INJECTION, SOLUTION INTRAVENOUS at 15:56

## 2023-06-17 RX ADMIN — ATORVASTATIN CALCIUM 80 MG: 40 TABLET, FILM COATED ORAL at 21:38

## 2023-06-17 RX ADMIN — ESCITALOPRAM OXALATE 10 MG: 10 TABLET ORAL at 18:04

## 2023-06-17 RX ADMIN — HEPARIN SODIUM 14 UNITS/KG/HR: 10000 INJECTION, SOLUTION INTRAVENOUS at 10:41

## 2023-06-17 RX ADMIN — FUROSEMIDE 20 MG: 10 INJECTION, SOLUTION INTRAMUSCULAR; INTRAVENOUS at 10:55

## 2023-06-17 RX ADMIN — PIOGLITAZONE 30 MG: 15 TABLET ORAL at 10:58

## 2023-06-17 RX ADMIN — GABAPENTIN 200 MG: 100 CAPSULE ORAL at 21:38

## 2023-06-17 RX ADMIN — HEPARIN SODIUM 1530 UNITS: 1000 INJECTION INTRAVENOUS; SUBCUTANEOUS at 10:44

## 2023-06-17 RX ADMIN — FUROSEMIDE 20 MG: 10 INJECTION, SOLUTION INTRAMUSCULAR; INTRAVENOUS at 18:04

## 2023-06-17 RX ADMIN — POTASSIUM CHLORIDE 40 MEQ: 1500 TABLET, EXTENDED RELEASE ORAL at 10:56

## 2023-06-17 ASSESSMENT — PAIN SCALES - GENERAL: PAINLEVEL_OUTOF10: 0

## 2023-06-17 ASSESSMENT — PAIN DESCRIPTION - LOCATION: LOCATION: BACK

## 2023-06-18 ENCOUNTER — APPOINTMENT (OUTPATIENT)
Dept: CT IMAGING | Age: 73
DRG: 280 | End: 2023-06-18
Attending: INTERNAL MEDICINE
Payer: MEDICARE

## 2023-06-18 ENCOUNTER — APPOINTMENT (OUTPATIENT)
Dept: ULTRASOUND IMAGING | Age: 73
DRG: 280 | End: 2023-06-18
Attending: INTERNAL MEDICINE
Payer: MEDICARE

## 2023-06-18 PROBLEM — I35.0 SEVERE AORTIC STENOSIS: Status: ACTIVE | Noted: 2023-06-18

## 2023-06-18 PROBLEM — I34.0 MITRAL REGURGITATION: Status: ACTIVE | Noted: 2023-06-18

## 2023-06-18 PROBLEM — E55.9 VITAMIN D DEFICIENCY: Status: ACTIVE | Noted: 2023-06-18

## 2023-06-18 PROBLEM — I35.1 AORTIC REGURGITATION: Status: ACTIVE | Noted: 2023-06-18

## 2023-06-18 LAB
ALBUMIN SERPL-MCNC: 3.2 G/DL (ref 3.5–5.2)
ALP SERPL-CCNC: 74 U/L (ref 35–104)
ALT SERPL-CCNC: 27 U/L (ref 0–32)
ANION GAP SERPL CALCULATED.3IONS-SCNC: 9 MMOL/L (ref 7–16)
APTT BLD: 57.1 SEC (ref 24.5–35.1)
APTT BLD: 59.3 SEC (ref 24.5–35.1)
AST SERPL-CCNC: 37 U/L (ref 0–31)
BILIRUB SERPL-MCNC: 0.4 MG/DL (ref 0–1.2)
BUN SERPL-MCNC: 26 MG/DL (ref 6–23)
CALCIUM SERPL-MCNC: 8.9 MG/DL (ref 8.6–10.2)
CHLORIDE SERPL-SCNC: 96 MMOL/L (ref 98–107)
CO2 SERPL-SCNC: 33 MMOL/L (ref 22–29)
CREAT SERPL-MCNC: 1 MG/DL (ref 0.5–1)
ERYTHROCYTE [DISTWIDTH] IN BLOOD BY AUTOMATED COUNT: 13.9 FL (ref 11.5–15)
FOLATE SERPL-MCNC: 8.6 NG/ML (ref 4.8–24.2)
GLUCOSE SERPL-MCNC: 192 MG/DL (ref 74–99)
HCT VFR BLD AUTO: 32.3 % (ref 34–48)
HGB BLD-MCNC: 9.9 G/DL (ref 11.5–15.5)
IRON SATN MFR SERPL: 23 % (ref 15–50)
IRON SERPL-MCNC: 51 MCG/DL (ref 37–145)
MAGNESIUM SERPL-MCNC: 1.6 MG/DL (ref 1.6–2.6)
MCH RBC QN AUTO: 28 PG (ref 26–35)
MCHC RBC AUTO-ENTMCNC: 30.7 % (ref 32–34.5)
MCV RBC AUTO: 91.5 FL (ref 80–99.9)
METER GLUCOSE: 166 MG/DL (ref 74–99)
METER GLUCOSE: 191 MG/DL (ref 74–99)
METER GLUCOSE: 201 MG/DL (ref 74–99)
PLATELET # BLD AUTO: 193 E9/L (ref 130–450)
PMV BLD AUTO: 10.4 FL (ref 7–12)
POTASSIUM SERPL-SCNC: 3.9 MMOL/L (ref 3.5–5)
PROCALCITONIN: 0.12 NG/ML (ref 0–0.08)
PROT SERPL-MCNC: 5.7 G/DL (ref 6.4–8.3)
RBC # BLD AUTO: 3.53 E12/L (ref 3.5–5.5)
SODIUM SERPL-SCNC: 138 MMOL/L (ref 132–146)
TIBC SERPL-MCNC: 222 MCG/DL (ref 250–450)
TSH SERPL-MCNC: 1.15 UIU/ML (ref 0.27–4.2)
VIT B12 SERPL-MCNC: 574 PG/ML (ref 211–946)
VITAMIN D 25-HYDROXY: 28 NG/ML (ref 30–100)
WBC # BLD: 6.4 E9/L (ref 4.5–11.5)

## 2023-06-18 PROCEDURE — 36415 COLL VENOUS BLD VENIPUNCTURE: CPT

## 2023-06-18 PROCEDURE — 6370000000 HC RX 637 (ALT 250 FOR IP): Performed by: INTERNAL MEDICINE

## 2023-06-18 PROCEDURE — 2580000003 HC RX 258: Performed by: FAMILY MEDICINE

## 2023-06-18 PROCEDURE — 6360000002 HC RX W HCPCS: Performed by: FAMILY MEDICINE

## 2023-06-18 PROCEDURE — 2140000000 HC CCU INTERMEDIATE R&B

## 2023-06-18 PROCEDURE — 99233 SBSQ HOSP IP/OBS HIGH 50: CPT | Performed by: INTERNAL MEDICINE

## 2023-06-18 PROCEDURE — 2700000000 HC OXYGEN THERAPY PER DAY

## 2023-06-18 PROCEDURE — 82746 ASSAY OF FOLIC ACID SERUM: CPT

## 2023-06-18 PROCEDURE — 71275 CT ANGIOGRAPHY CHEST: CPT

## 2023-06-18 PROCEDURE — 6360000002 HC RX W HCPCS: Performed by: INTERNAL MEDICINE

## 2023-06-18 PROCEDURE — 82607 VITAMIN B-12: CPT

## 2023-06-18 PROCEDURE — 6370000000 HC RX 637 (ALT 250 FOR IP): Performed by: FAMILY MEDICINE

## 2023-06-18 PROCEDURE — 84443 ASSAY THYROID STIM HORMONE: CPT

## 2023-06-18 PROCEDURE — 6360000004 HC RX CONTRAST MEDICATION: Performed by: RADIOLOGY

## 2023-06-18 PROCEDURE — 85027 COMPLETE CBC AUTOMATED: CPT

## 2023-06-18 PROCEDURE — 83735 ASSAY OF MAGNESIUM: CPT

## 2023-06-18 PROCEDURE — 85730 THROMBOPLASTIN TIME PARTIAL: CPT

## 2023-06-18 PROCEDURE — 2580000003 HC RX 258: Performed by: INTERNAL MEDICINE

## 2023-06-18 PROCEDURE — 82306 VITAMIN D 25 HYDROXY: CPT

## 2023-06-18 PROCEDURE — 82962 GLUCOSE BLOOD TEST: CPT

## 2023-06-18 PROCEDURE — 80053 COMPREHEN METABOLIC PANEL: CPT

## 2023-06-18 PROCEDURE — 84145 PROCALCITONIN (PCT): CPT

## 2023-06-18 PROCEDURE — 83550 IRON BINDING TEST: CPT

## 2023-06-18 PROCEDURE — 83540 ASSAY OF IRON: CPT

## 2023-06-18 PROCEDURE — 93970 EXTREMITY STUDY: CPT

## 2023-06-18 RX ORDER — MAGNESIUM SULFATE IN WATER 40 MG/ML
4000 INJECTION, SOLUTION INTRAVENOUS ONCE
Status: COMPLETED | OUTPATIENT
Start: 2023-06-18 | End: 2023-06-18

## 2023-06-18 RX ORDER — INSULIN LISPRO 100 [IU]/ML
0-4 INJECTION, SOLUTION INTRAVENOUS; SUBCUTANEOUS NIGHTLY
Status: DISCONTINUED | OUTPATIENT
Start: 2023-06-18 | End: 2023-06-22 | Stop reason: HOSPADM

## 2023-06-18 RX ORDER — ENOXAPARIN SODIUM 100 MG/ML
40 INJECTION SUBCUTANEOUS DAILY
Status: DISCONTINUED | OUTPATIENT
Start: 2023-06-19 | End: 2023-06-22 | Stop reason: HOSPADM

## 2023-06-18 RX ORDER — CHOLECALCIFEROL (VITAMIN D3) 50 MCG
2000 TABLET ORAL DAILY
Status: DISCONTINUED | OUTPATIENT
Start: 2023-06-18 | End: 2023-06-22 | Stop reason: HOSPADM

## 2023-06-18 RX ORDER — INSULIN LISPRO 100 [IU]/ML
0-4 INJECTION, SOLUTION INTRAVENOUS; SUBCUTANEOUS
Status: DISCONTINUED | OUTPATIENT
Start: 2023-06-18 | End: 2023-06-22 | Stop reason: HOSPADM

## 2023-06-18 RX ORDER — ERGOCALCIFEROL 1.25 MG/1
50000 CAPSULE ORAL WEEKLY
Status: DISCONTINUED | OUTPATIENT
Start: 2023-06-18 | End: 2023-06-22 | Stop reason: HOSPADM

## 2023-06-18 RX ADMIN — GABAPENTIN 200 MG: 100 CAPSULE ORAL at 20:59

## 2023-06-18 RX ADMIN — MAGNESIUM SULFATE HEPTAHYDRATE 4000 MG: 40 INJECTION, SOLUTION INTRAVENOUS at 09:51

## 2023-06-18 RX ADMIN — ACETAMINOPHEN 1000 MG: 500 TABLET ORAL at 17:33

## 2023-06-18 RX ADMIN — ERGOCALCIFEROL 50000 UNITS: 1.25 CAPSULE ORAL at 14:10

## 2023-06-18 RX ADMIN — Medication 2000 UNITS: at 14:10

## 2023-06-18 RX ADMIN — Medication 5 MG: at 20:59

## 2023-06-18 RX ADMIN — EMPAGLIFLOZIN 10 MG: 10 TABLET, FILM COATED ORAL at 09:38

## 2023-06-18 RX ADMIN — ESCITALOPRAM OXALATE 10 MG: 10 TABLET ORAL at 16:34

## 2023-06-18 RX ADMIN — Medication 10 ML: at 20:58

## 2023-06-18 RX ADMIN — HEPARIN SODIUM 14 UNITS/KG/HR: 10000 INJECTION, SOLUTION INTRAVENOUS at 14:08

## 2023-06-18 RX ADMIN — Medication 10 ML: at 09:26

## 2023-06-18 RX ADMIN — ASPIRIN 81 MG: 81 TABLET, CHEWABLE ORAL at 09:25

## 2023-06-18 RX ADMIN — FUROSEMIDE 20 MG: 10 INJECTION, SOLUTION INTRAMUSCULAR; INTRAVENOUS at 20:58

## 2023-06-18 RX ADMIN — BACLOFEN 5 MG: 10 TABLET ORAL at 20:59

## 2023-06-18 RX ADMIN — ATORVASTATIN CALCIUM 80 MG: 40 TABLET, FILM COATED ORAL at 20:59

## 2023-06-18 RX ADMIN — BACLOFEN 5 MG: 10 TABLET ORAL at 09:25

## 2023-06-18 RX ADMIN — CETIRIZINE HYDROCHLORIDE 10 MG: 10 TABLET, FILM COATED ORAL at 09:25

## 2023-06-18 RX ADMIN — IOPAMIDOL 60 ML: 755 INJECTION, SOLUTION INTRAVENOUS at 07:26

## 2023-06-18 RX ADMIN — INSULIN LISPRO 1 UNITS: 100 INJECTION, SOLUTION INTRAVENOUS; SUBCUTANEOUS at 16:34

## 2023-06-18 RX ADMIN — SODIUM CHLORIDE, POTASSIUM CHLORIDE, SODIUM LACTATE AND CALCIUM CHLORIDE: 600; 310; 30; 20 INJECTION, SOLUTION INTRAVENOUS at 01:38

## 2023-06-18 RX ADMIN — FUROSEMIDE 20 MG: 10 INJECTION, SOLUTION INTRAMUSCULAR; INTRAVENOUS at 09:24

## 2023-06-18 ASSESSMENT — PAIN DESCRIPTION - ONSET: ONSET: ON-GOING

## 2023-06-18 ASSESSMENT — PAIN DESCRIPTION - LOCATION: LOCATION: ARM

## 2023-06-18 ASSESSMENT — PAIN - FUNCTIONAL ASSESSMENT: PAIN_FUNCTIONAL_ASSESSMENT: ACTIVITIES ARE NOT PREVENTED

## 2023-06-18 ASSESSMENT — PAIN DESCRIPTION - ORIENTATION: ORIENTATION: RIGHT

## 2023-06-18 ASSESSMENT — PAIN DESCRIPTION - DESCRIPTORS: DESCRIPTORS: ACHING

## 2023-06-18 ASSESSMENT — PAIN DESCRIPTION - FREQUENCY: FREQUENCY: INTERMITTENT

## 2023-06-18 ASSESSMENT — PAIN DESCRIPTION - PAIN TYPE: TYPE: CHRONIC PAIN

## 2023-06-18 ASSESSMENT — PAIN SCALES - GENERAL: PAINLEVEL_OUTOF10: 3

## 2023-06-19 LAB
ALBUMIN SERPL-MCNC: 3.7 G/DL (ref 3.5–5.2)
ALP SERPL-CCNC: 109 U/L (ref 35–104)
ALT SERPL-CCNC: 39 U/L (ref 0–32)
ANION GAP SERPL CALCULATED.3IONS-SCNC: 14 MMOL/L (ref 7–16)
AST SERPL-CCNC: 38 U/L (ref 0–31)
BASOPHILS # BLD: 0.04 E9/L (ref 0–0.2)
BASOPHILS NFR BLD: 0.8 % (ref 0–2)
BILIRUB SERPL-MCNC: 0.6 MG/DL (ref 0–1.2)
BUN SERPL-MCNC: 28 MG/DL (ref 6–23)
CALCIUM SERPL-MCNC: 9.6 MG/DL (ref 8.6–10.2)
CHLORIDE SERPL-SCNC: 95 MMOL/L (ref 98–107)
CO2 SERPL-SCNC: 32 MMOL/L (ref 22–29)
CREAT SERPL-MCNC: 1.2 MG/DL (ref 0.5–1)
EKG ATRIAL RATE: 101 BPM
EKG ATRIAL RATE: 102 BPM
EKG ATRIAL RATE: 70 BPM
EKG P AXIS: 14 DEGREES
EKG P AXIS: 46 DEGREES
EKG P AXIS: 50 DEGREES
EKG P-R INTERVAL: 142 MS
EKG P-R INTERVAL: 150 MS
EKG P-R INTERVAL: 150 MS
EKG Q-T INTERVAL: 404 MS
EKG Q-T INTERVAL: 406 MS
EKG Q-T INTERVAL: 462 MS
EKG QRS DURATION: 80 MS
EKG QRS DURATION: 80 MS
EKG QRS DURATION: 84 MS
EKG QTC CALCULATION (BAZETT): 498 MS
EKG QTC CALCULATION (BAZETT): 526 MS
EKG QTC CALCULATION (BAZETT): 526 MS
EKG R AXIS: 19 DEGREES
EKG R AXIS: 22 DEGREES
EKG R AXIS: 23 DEGREES
EKG T AXIS: 126 DEGREES
EKG T AXIS: 131 DEGREES
EKG T AXIS: 132 DEGREES
EKG VENTRICULAR RATE: 101 BPM
EKG VENTRICULAR RATE: 102 BPM
EKG VENTRICULAR RATE: 70 BPM
EOSINOPHIL # BLD: 0.29 E9/L (ref 0.05–0.5)
EOSINOPHIL NFR BLD: 5.6 % (ref 0–6)
ERYTHROCYTE [DISTWIDTH] IN BLOOD BY AUTOMATED COUNT: 13.8 FL (ref 11.5–15)
GLUCOSE SERPL-MCNC: 177 MG/DL (ref 74–99)
HCT VFR BLD AUTO: 36.8 % (ref 34–48)
HGB BLD-MCNC: 11.8 G/DL (ref 11.5–15.5)
IMM GRANULOCYTES # BLD: 0.02 E9/L
IMM GRANULOCYTES NFR BLD: 0.4 % (ref 0–5)
LYMPHOCYTES # BLD: 0.79 E9/L (ref 1.5–4)
LYMPHOCYTES NFR BLD: 15.2 % (ref 20–42)
MAGNESIUM SERPL-MCNC: 2.2 MG/DL (ref 1.6–2.6)
MCH RBC QN AUTO: 28.4 PG (ref 26–35)
MCHC RBC AUTO-ENTMCNC: 32.1 % (ref 32–34.5)
MCV RBC AUTO: 88.7 FL (ref 80–99.9)
MONOCYTES # BLD: 0.47 E9/L (ref 0.1–0.95)
MONOCYTES NFR BLD: 9 % (ref 2–12)
NEUTROPHILS # BLD: 3.6 E9/L (ref 1.8–7.3)
NEUTS SEG NFR BLD: 69 % (ref 43–80)
PLATELET # BLD AUTO: 203 E9/L (ref 130–450)
PMV BLD AUTO: 10.2 FL (ref 7–12)
POTASSIUM SERPL-SCNC: 3.9 MMOL/L (ref 3.5–5)
PROT SERPL-MCNC: 6.9 G/DL (ref 6.4–8.3)
RBC # BLD AUTO: 4.15 E12/L (ref 3.5–5.5)
SODIUM SERPL-SCNC: 141 MMOL/L (ref 132–146)
WBC # BLD: 5.2 E9/L (ref 4.5–11.5)

## 2023-06-19 PROCEDURE — 85025 COMPLETE CBC W/AUTO DIFF WBC: CPT

## 2023-06-19 PROCEDURE — 6370000000 HC RX 637 (ALT 250 FOR IP): Performed by: FAMILY MEDICINE

## 2023-06-19 PROCEDURE — 2580000003 HC RX 258: Performed by: FAMILY MEDICINE

## 2023-06-19 PROCEDURE — 6370000000 HC RX 637 (ALT 250 FOR IP): Performed by: INTERNAL MEDICINE

## 2023-06-19 PROCEDURE — 2140000000 HC CCU INTERMEDIATE R&B

## 2023-06-19 PROCEDURE — 93005 ELECTROCARDIOGRAM TRACING: CPT | Performed by: INTERNAL MEDICINE

## 2023-06-19 PROCEDURE — 80053 COMPREHEN METABOLIC PANEL: CPT

## 2023-06-19 PROCEDURE — 99232 SBSQ HOSP IP/OBS MODERATE 35: CPT | Performed by: INTERNAL MEDICINE

## 2023-06-19 PROCEDURE — 93010 ELECTROCARDIOGRAM REPORT: CPT | Performed by: INTERNAL MEDICINE

## 2023-06-19 PROCEDURE — 6360000002 HC RX W HCPCS: Performed by: INTERNAL MEDICINE

## 2023-06-19 PROCEDURE — 83735 ASSAY OF MAGNESIUM: CPT

## 2023-06-19 PROCEDURE — 36415 COLL VENOUS BLD VENIPUNCTURE: CPT

## 2023-06-19 RX ADMIN — ATENOLOL 50 MG: 50 TABLET ORAL at 10:01

## 2023-06-19 RX ADMIN — BACLOFEN 5 MG: 10 TABLET ORAL at 20:40

## 2023-06-19 RX ADMIN — ESCITALOPRAM OXALATE 10 MG: 10 TABLET ORAL at 23:16

## 2023-06-19 RX ADMIN — GABAPENTIN 200 MG: 100 CAPSULE ORAL at 20:40

## 2023-06-19 RX ADMIN — Medication 5 MG: at 20:40

## 2023-06-19 RX ADMIN — ASPIRIN 81 MG: 81 TABLET, CHEWABLE ORAL at 10:01

## 2023-06-19 RX ADMIN — ENOXAPARIN SODIUM 40 MG: 100 INJECTION SUBCUTANEOUS at 09:31

## 2023-06-19 RX ADMIN — ACETAMINOPHEN 1000 MG: 500 TABLET ORAL at 13:48

## 2023-06-19 RX ADMIN — Medication 2000 UNITS: at 10:01

## 2023-06-19 RX ADMIN — EMPAGLIFLOZIN 10 MG: 10 TABLET, FILM COATED ORAL at 10:01

## 2023-06-19 RX ADMIN — BACLOFEN 5 MG: 10 TABLET ORAL at 09:59

## 2023-06-19 RX ADMIN — ATORVASTATIN CALCIUM 80 MG: 40 TABLET, FILM COATED ORAL at 20:40

## 2023-06-19 RX ADMIN — Medication 10 ML: at 20:40

## 2023-06-19 RX ADMIN — CETIRIZINE HYDROCHLORIDE 10 MG: 10 TABLET, FILM COATED ORAL at 10:01

## 2023-06-19 RX ADMIN — Medication 10 ML: at 09:00

## 2023-06-19 ASSESSMENT — PAIN DESCRIPTION - DESCRIPTORS
DESCRIPTORS: ACHING
DESCRIPTORS: ACHING

## 2023-06-19 ASSESSMENT — PAIN DESCRIPTION - LOCATION
LOCATION: ARM
LOCATION: ARM

## 2023-06-19 ASSESSMENT — PAIN DESCRIPTION - ORIENTATION
ORIENTATION: RIGHT
ORIENTATION: RIGHT

## 2023-06-19 ASSESSMENT — PAIN SCALES - GENERAL
PAINLEVEL_OUTOF10: 5
PAINLEVEL_OUTOF10: 1

## 2023-06-20 ENCOUNTER — APPOINTMENT (OUTPATIENT)
Dept: CARDIAC CATH/INVASIVE PROCEDURES | Age: 73
DRG: 280 | End: 2023-06-20
Attending: INTERNAL MEDICINE
Payer: MEDICARE

## 2023-06-20 LAB
ABO + RH BLD: NORMAL
ANION GAP SERPL CALCULATED.3IONS-SCNC: 11 MMOL/L (ref 7–16)
BLD GP AB SCN SERPL QL: NORMAL
BUN SERPL-MCNC: 23 MG/DL (ref 6–23)
CALCIUM SERPL-MCNC: 9.8 MG/DL (ref 8.6–10.2)
CHLORIDE SERPL-SCNC: 99 MMOL/L (ref 98–107)
CO2 SERPL-SCNC: 30 MMOL/L (ref 22–29)
CREAT SERPL-MCNC: 0.9 MG/DL (ref 0.5–1)
ERYTHROCYTE [DISTWIDTH] IN BLOOD BY AUTOMATED COUNT: 13.9 FL (ref 11.5–15)
GLUCOSE SERPL-MCNC: 181 MG/DL (ref 74–99)
HCT VFR BLD AUTO: 38.5 % (ref 34–48)
HGB BLD-MCNC: 12.2 G/DL (ref 11.5–15.5)
MAGNESIUM SERPL-MCNC: 1.9 MG/DL (ref 1.6–2.6)
MCH RBC QN AUTO: 28.1 PG (ref 26–35)
MCHC RBC AUTO-ENTMCNC: 31.7 % (ref 32–34.5)
MCV RBC AUTO: 88.7 FL (ref 80–99.9)
METER GLUCOSE: 182 MG/DL (ref 74–99)
METER GLUCOSE: 199 MG/DL (ref 74–99)
PLATELET # BLD AUTO: 219 E9/L (ref 130–450)
PMV BLD AUTO: 10.6 FL (ref 7–12)
POTASSIUM SERPL-SCNC: 3.7 MMOL/L (ref 3.5–5)
RBC # BLD AUTO: 4.34 E12/L (ref 3.5–5.5)
SODIUM SERPL-SCNC: 140 MMOL/L (ref 132–146)
WBC # BLD: 6.2 E9/L (ref 4.5–11.5)

## 2023-06-20 PROCEDURE — 93454 CORONARY ARTERY ANGIO S&I: CPT

## 2023-06-20 PROCEDURE — 93454 CORONARY ARTERY ANGIO S&I: CPT | Performed by: INTERNAL MEDICINE

## 2023-06-20 PROCEDURE — 2500000003 HC RX 250 WO HCPCS

## 2023-06-20 PROCEDURE — 85027 COMPLETE CBC AUTOMATED: CPT

## 2023-06-20 PROCEDURE — 2580000003 HC RX 258: Performed by: FAMILY MEDICINE

## 2023-06-20 PROCEDURE — 2709999900 HC NON-CHARGEABLE SUPPLY

## 2023-06-20 PROCEDURE — 36415 COLL VENOUS BLD VENIPUNCTURE: CPT

## 2023-06-20 PROCEDURE — 82962 GLUCOSE BLOOD TEST: CPT

## 2023-06-20 PROCEDURE — 86900 BLOOD TYPING SEROLOGIC ABO: CPT

## 2023-06-20 PROCEDURE — 97165 OT EVAL LOW COMPLEX 30 MIN: CPT

## 2023-06-20 PROCEDURE — 86850 RBC ANTIBODY SCREEN: CPT

## 2023-06-20 PROCEDURE — 6370000000 HC RX 637 (ALT 250 FOR IP): Performed by: INTERNAL MEDICINE

## 2023-06-20 PROCEDURE — C1760 CLOSURE DEV, VASC: HCPCS

## 2023-06-20 PROCEDURE — 6360000002 HC RX W HCPCS

## 2023-06-20 PROCEDURE — 83735 ASSAY OF MAGNESIUM: CPT

## 2023-06-20 PROCEDURE — 6370000000 HC RX 637 (ALT 250 FOR IP): Performed by: FAMILY MEDICINE

## 2023-06-20 PROCEDURE — C1894 INTRO/SHEATH, NON-LASER: HCPCS

## 2023-06-20 PROCEDURE — 80048 BASIC METABOLIC PNL TOTAL CA: CPT

## 2023-06-20 PROCEDURE — 2140000000 HC CCU INTERMEDIATE R&B

## 2023-06-20 PROCEDURE — 97535 SELF CARE MNGMENT TRAINING: CPT

## 2023-06-20 PROCEDURE — C1769 GUIDE WIRE: HCPCS

## 2023-06-20 PROCEDURE — 97530 THERAPEUTIC ACTIVITIES: CPT

## 2023-06-20 PROCEDURE — 86901 BLOOD TYPING SEROLOGIC RH(D): CPT

## 2023-06-20 PROCEDURE — 97161 PT EVAL LOW COMPLEX 20 MIN: CPT

## 2023-06-20 RX ORDER — SODIUM CHLORIDE 0.9 % (FLUSH) 0.9 %
5-40 SYRINGE (ML) INJECTION PRN
Status: DISCONTINUED | OUTPATIENT
Start: 2023-06-20 | End: 2023-06-22 | Stop reason: HOSPADM

## 2023-06-20 RX ORDER — SODIUM CHLORIDE 9 MG/ML
INJECTION, SOLUTION INTRAVENOUS PRN
Status: DISCONTINUED | OUTPATIENT
Start: 2023-06-20 | End: 2023-06-22 | Stop reason: HOSPADM

## 2023-06-20 RX ORDER — ACETAMINOPHEN 325 MG/1
650 TABLET ORAL EVERY 4 HOURS PRN
Status: DISCONTINUED | OUTPATIENT
Start: 2023-06-20 | End: 2023-06-22 | Stop reason: HOSPADM

## 2023-06-20 RX ORDER — SODIUM CHLORIDE 0.9 % (FLUSH) 0.9 %
5-40 SYRINGE (ML) INJECTION EVERY 12 HOURS SCHEDULED
Status: DISCONTINUED | OUTPATIENT
Start: 2023-06-20 | End: 2023-06-22 | Stop reason: HOSPADM

## 2023-06-20 RX ADMIN — Medication 2000 UNITS: at 22:02

## 2023-06-20 RX ADMIN — ACETAMINOPHEN 1000 MG: 500 TABLET ORAL at 10:01

## 2023-06-20 RX ADMIN — BACLOFEN 5 MG: 10 TABLET ORAL at 22:03

## 2023-06-20 RX ADMIN — BACLOFEN 5 MG: 10 TABLET ORAL at 08:37

## 2023-06-20 RX ADMIN — ATENOLOL 50 MG: 50 TABLET ORAL at 08:37

## 2023-06-20 RX ADMIN — Medication 10 ML: at 08:37

## 2023-06-20 RX ADMIN — ASPIRIN 81 MG: 81 TABLET, CHEWABLE ORAL at 08:37

## 2023-06-20 RX ADMIN — Medication 10 ML: at 22:10

## 2023-06-20 RX ADMIN — ATORVASTATIN CALCIUM 80 MG: 40 TABLET, FILM COATED ORAL at 22:02

## 2023-06-20 RX ADMIN — CETIRIZINE HYDROCHLORIDE 10 MG: 10 TABLET, FILM COATED ORAL at 22:03

## 2023-06-20 RX ADMIN — Medication 5 MG: at 22:10

## 2023-06-20 RX ADMIN — GABAPENTIN 200 MG: 100 CAPSULE ORAL at 22:03

## 2023-06-20 RX ADMIN — ESCITALOPRAM OXALATE 10 MG: 10 TABLET ORAL at 22:08

## 2023-06-20 ASSESSMENT — PAIN SCALES - GENERAL
PAINLEVEL_OUTOF10: 0
PAINLEVEL_OUTOF10: 10
PAINLEVEL_OUTOF10: 0
PAINLEVEL_OUTOF10: 9
PAINLEVEL_OUTOF10: 0
PAINLEVEL_OUTOF10: 7
PAINLEVEL_OUTOF10: 7

## 2023-06-20 ASSESSMENT — PAIN DESCRIPTION - ORIENTATION
ORIENTATION: RIGHT
ORIENTATION: RIGHT

## 2023-06-20 ASSESSMENT — PAIN DESCRIPTION - LOCATION
LOCATION: ARM
LOCATION: ARM

## 2023-06-20 ASSESSMENT — PAIN DESCRIPTION - DESCRIPTORS
DESCRIPTORS: ACHING;DISCOMFORT;THROBBING
DESCRIPTORS: ACHING;DISCOMFORT;THROBBING

## 2023-06-20 ASSESSMENT — PAIN DESCRIPTION - DIRECTION
RADIATING_TOWARDS: NONRADIATING
RADIATING_TOWARDS: NONRADIATING

## 2023-06-20 ASSESSMENT — PAIN DESCRIPTION - PAIN TYPE
TYPE: CHRONIC PAIN
TYPE: CHRONIC PAIN

## 2023-06-20 ASSESSMENT — PAIN DESCRIPTION - ONSET
ONSET: GRADUAL
ONSET: ON-GOING

## 2023-06-20 ASSESSMENT — PAIN DESCRIPTION - FREQUENCY
FREQUENCY: INTERMITTENT
FREQUENCY: CONTINUOUS

## 2023-06-20 ASSESSMENT — PAIN - FUNCTIONAL ASSESSMENT
PAIN_FUNCTIONAL_ASSESSMENT: ACTIVITIES ARE NOT PREVENTED
PAIN_FUNCTIONAL_ASSESSMENT: ACTIVITIES ARE NOT PREVENTED

## 2023-06-20 NOTE — PATIENT CARE CONFERENCE
[facility] Quality Flow/Interdisciplinary Rounds Progress Note        Quality Flow Rounds held on June 20, 2023    Disciplines Attending:  Bedside Nurse, , , and Nursing Unit Leadership    Shelton Goldman was admitted on 6/16/2023  7:39 PM    Anticipated Discharge Date:       Disposition:    Sonny Score:  Sonny Scale Score: 16    Readmission Risk              Risk of Unplanned Readmission:  19           Discussed patient goal for the day, patient clinical progression, and barriers to discharge.   The following Goal(s) of the Day/Commitment(s) have been identified:  Diagnostics - Report Results and Labs - Report Results      Trudy Lopez RN  June 20, 2023

## 2023-06-21 LAB
ALBUMIN SERPL-MCNC: 3.9 G/DL (ref 3.5–5.2)
ALP SERPL-CCNC: 108 U/L (ref 35–104)
ALT SERPL-CCNC: 28 U/L (ref 0–32)
ANION GAP SERPL CALCULATED.3IONS-SCNC: 14 MMOL/L (ref 7–16)
ANISOCYTOSIS: ABNORMAL
AST SERPL-CCNC: 23 U/L (ref 0–31)
BASOPHILS # BLD: 0.11 E9/L (ref 0–0.2)
BASOPHILS NFR BLD: 1.7 % (ref 0–2)
BILIRUB SERPL-MCNC: 0.5 MG/DL (ref 0–1.2)
BNP BLD-MCNC: 8609 PG/ML (ref 0–125)
BUN SERPL-MCNC: 25 MG/DL (ref 6–23)
BURR CELLS: ABNORMAL
CALCIUM SERPL-MCNC: 9.8 MG/DL (ref 8.6–10.2)
CHLORIDE SERPL-SCNC: 99 MMOL/L (ref 98–107)
CO2 SERPL-SCNC: 26 MMOL/L (ref 22–29)
CREAT SERPL-MCNC: 1 MG/DL (ref 0.5–1)
EOSINOPHIL # BLD: 0.38 E9/L (ref 0.05–0.5)
EOSINOPHIL NFR BLD: 6.1 % (ref 0–6)
ERYTHROCYTE [DISTWIDTH] IN BLOOD BY AUTOMATED COUNT: 13.9 FL (ref 11.5–15)
GLUCOSE SERPL-MCNC: 296 MG/DL (ref 74–99)
HCT VFR BLD AUTO: 37.5 % (ref 34–48)
HGB BLD-MCNC: 11.9 G/DL (ref 11.5–15.5)
LYMPHOCYTES # BLD: 0.74 E9/L (ref 1.5–4)
LYMPHOCYTES NFR BLD: 12.2 % (ref 20–42)
MCH RBC QN AUTO: 28.1 PG (ref 26–35)
MCHC RBC AUTO-ENTMCNC: 31.7 % (ref 32–34.5)
MCV RBC AUTO: 88.7 FL (ref 80–99.9)
METER GLUCOSE: 275 MG/DL (ref 74–99)
METER GLUCOSE: 366 MG/DL (ref 74–99)
MONOCYTES # BLD: 0.25 E9/L (ref 0.1–0.95)
MONOCYTES NFR BLD: 4.3 % (ref 2–12)
NEUTROPHILS # BLD: 4.71 E9/L (ref 1.8–7.3)
NEUTS SEG NFR BLD: 75.7 % (ref 43–80)
PLATELET # BLD AUTO: 186 E9/L (ref 130–450)
PMV BLD AUTO: 10.6 FL (ref 7–12)
POIKILOCYTES: ABNORMAL
POLYCHROMASIA: ABNORMAL
POTASSIUM SERPL-SCNC: 4.1 MMOL/L (ref 3.5–5)
PROT SERPL-MCNC: 7.1 G/DL (ref 6.4–8.3)
RBC # BLD AUTO: 4.23 E12/L (ref 3.5–5.5)
SODIUM SERPL-SCNC: 139 MMOL/L (ref 132–146)
WBC # BLD: 6.2 E9/L (ref 4.5–11.5)

## 2023-06-21 PROCEDURE — 97535 SELF CARE MNGMENT TRAINING: CPT

## 2023-06-21 PROCEDURE — 82962 GLUCOSE BLOOD TEST: CPT

## 2023-06-21 PROCEDURE — 6370000000 HC RX 637 (ALT 250 FOR IP): Performed by: FAMILY MEDICINE

## 2023-06-21 PROCEDURE — 85025 COMPLETE CBC W/AUTO DIFF WBC: CPT

## 2023-06-21 PROCEDURE — 2580000003 HC RX 258: Performed by: INTERNAL MEDICINE

## 2023-06-21 PROCEDURE — 83880 ASSAY OF NATRIURETIC PEPTIDE: CPT

## 2023-06-21 PROCEDURE — 6360000002 HC RX W HCPCS: Performed by: INTERNAL MEDICINE

## 2023-06-21 PROCEDURE — 97530 THERAPEUTIC ACTIVITIES: CPT

## 2023-06-21 PROCEDURE — 2580000003 HC RX 258: Performed by: FAMILY MEDICINE

## 2023-06-21 PROCEDURE — 36415 COLL VENOUS BLD VENIPUNCTURE: CPT

## 2023-06-21 PROCEDURE — 6360000002 HC RX W HCPCS: Performed by: NURSE PRACTITIONER

## 2023-06-21 PROCEDURE — 80053 COMPREHEN METABOLIC PANEL: CPT

## 2023-06-21 PROCEDURE — 6370000000 HC RX 637 (ALT 250 FOR IP): Performed by: NURSE PRACTITIONER

## 2023-06-21 PROCEDURE — 6370000000 HC RX 637 (ALT 250 FOR IP): Performed by: INTERNAL MEDICINE

## 2023-06-21 PROCEDURE — 99231 SBSQ HOSP IP/OBS SF/LOW 25: CPT | Performed by: INTERNAL MEDICINE

## 2023-06-21 PROCEDURE — 2140000000 HC CCU INTERMEDIATE R&B

## 2023-06-21 RX ORDER — FUROSEMIDE 40 MG/1
20 TABLET ORAL DAILY
Status: DISCONTINUED | OUTPATIENT
Start: 2023-06-22 | End: 2023-06-22 | Stop reason: HOSPADM

## 2023-06-21 RX ORDER — CARVEDILOL 6.25 MG/1
12.5 TABLET ORAL 2 TIMES DAILY WITH MEALS
Status: DISCONTINUED | OUTPATIENT
Start: 2023-06-21 | End: 2023-06-22 | Stop reason: HOSPADM

## 2023-06-21 RX ORDER — SPIRONOLACTONE 25 MG/1
25 TABLET ORAL DAILY
Status: DISCONTINUED | OUTPATIENT
Start: 2023-06-21 | End: 2023-06-22 | Stop reason: HOSPADM

## 2023-06-21 RX ADMIN — CARVEDILOL 12.5 MG: 6.25 TABLET, FILM COATED ORAL at 18:45

## 2023-06-21 RX ADMIN — GABAPENTIN 200 MG: 100 CAPSULE ORAL at 23:54

## 2023-06-21 RX ADMIN — SODIUM CHLORIDE, PRESERVATIVE FREE 10 ML: 5 INJECTION INTRAVENOUS at 10:00

## 2023-06-21 RX ADMIN — Medication 2000 UNITS: at 10:00

## 2023-06-21 RX ADMIN — ACETAMINOPHEN 650 MG: 325 TABLET ORAL at 14:23

## 2023-06-21 RX ADMIN — FUROSEMIDE 20 MG: 10 INJECTION, SOLUTION INTRAMUSCULAR; INTRAVENOUS at 18:46

## 2023-06-21 RX ADMIN — SPIRONOLACTONE 25 MG: 25 TABLET ORAL at 11:23

## 2023-06-21 RX ADMIN — ATORVASTATIN CALCIUM 80 MG: 40 TABLET, FILM COATED ORAL at 23:53

## 2023-06-21 RX ADMIN — ESCITALOPRAM OXALATE 10 MG: 10 TABLET ORAL at 18:46

## 2023-06-21 RX ADMIN — Medication 5 MG: at 23:54

## 2023-06-21 RX ADMIN — ENOXAPARIN SODIUM 40 MG: 100 INJECTION SUBCUTANEOUS at 10:00

## 2023-06-21 RX ADMIN — Medication 10 ML: at 23:56

## 2023-06-21 RX ADMIN — BACLOFEN 5 MG: 10 TABLET ORAL at 10:00

## 2023-06-21 RX ADMIN — EMPAGLIFLOZIN 10 MG: 10 TABLET, FILM COATED ORAL at 09:00

## 2023-06-21 RX ADMIN — INSULIN LISPRO 2 UNITS: 100 INJECTION, SOLUTION INTRAVENOUS; SUBCUTANEOUS at 11:34

## 2023-06-21 RX ADMIN — INSULIN LISPRO 4 UNITS: 100 INJECTION, SOLUTION INTRAVENOUS; SUBCUTANEOUS at 18:48

## 2023-06-21 RX ADMIN — LISINOPRIL 20 MG: 20 TABLET ORAL at 18:52

## 2023-06-21 RX ADMIN — SODIUM CHLORIDE, PRESERVATIVE FREE 10 ML: 5 INJECTION INTRAVENOUS at 23:55

## 2023-06-21 RX ADMIN — Medication 10 ML: at 09:00

## 2023-06-21 RX ADMIN — CETIRIZINE HYDROCHLORIDE 10 MG: 10 TABLET, FILM COATED ORAL at 10:00

## 2023-06-21 RX ADMIN — BACLOFEN 5 MG: 10 TABLET ORAL at 23:54

## 2023-06-21 RX ADMIN — ASPIRIN 81 MG: 81 TABLET, CHEWABLE ORAL at 10:00

## 2023-06-21 ASSESSMENT — PAIN DESCRIPTION - DESCRIPTORS
DESCRIPTORS: ACHING
DESCRIPTORS: ACHING

## 2023-06-21 ASSESSMENT — PAIN DESCRIPTION - ORIENTATION
ORIENTATION: RIGHT
ORIENTATION: RIGHT

## 2023-06-21 ASSESSMENT — EJECTION FRACTION
EF_VALUE: 45-50%
EF_SOURCE: 2D ECHO

## 2023-06-21 ASSESSMENT — PAIN DESCRIPTION - LOCATION
LOCATION: ARM
LOCATION: ARM

## 2023-06-21 ASSESSMENT — PAIN SCALES - GENERAL
PAINLEVEL_OUTOF10: 10
PAINLEVEL_OUTOF10: 5

## 2023-06-21 NOTE — DISCHARGE INSTRUCTIONS
HEART FAILURE  / CONGESTIVE HEART FAILURE  DISCHARGE INSTRUCTIONS:  GUIDELINES TO FOLLOW AT HOME    Self- Managed Care:     MEDICATIONS:  Take your medication as directed. If you are experiencing any side effects, inform your doctor, Do not stop taking any of your medications without letting your doctor know. Check with your doctor before taking any over-the-counter medications / herbal / or dietary supplements. They may interfere with your other medications. Do not take ibuprofen (Advil or Motrin) and naproxen (Aleve) without talking to your doctor first. They could make your heart failure worse. WEIGHT MONITORING:   Weigh yourself everyday (with the same scale) around the same time of the day and write it down. (you can chart them on a calendar or keep track of them on paper. Notify your doctor of a weight gain of 3 pounds or more in 1 day   OR a total of 5 pounds or more in 1 week    Take your weight record to your doctor visits  Also, the same goes if you loose more than 3# in one day, let your heart doctor know. DIET:   Cardiac heart healthy diet- Low saturated / low trans fat, no added salt, caffeine restricted, Low sodium diet-   No more than 2,000mg (2 grams) of salt / sodium per day (which equals to a little less than  a teaspoon of salt)  If your doctor wants you on a fluid restriction. ..it is usually recommended a fluid limit of 2,000cc -  Fluid restriction- 2,000 ml (milliliters) = 64 ounces = you can have 8 glasses of fluid per day (each glass 8 ounces)    Follow a low salt diet - avoid using salt at the table, avoid / limit use of canned soups, processed / packaged foods, salted snacks, olives and pickles. Do not use a salt substitute without checking with your doctor, they may contain a high amount of potassioum. (Mrs. Christianne Landeros is safe to use).     Limit the use of alcohol       CALL YOUR DOCTOR THE FIRST DAY YOU NOTICE ANY OF THESE   SYMPTOMS:  You have a weight

## 2023-06-21 NOTE — PROCEDURES
[address]                  [address] [place],  [place]                            CARDIAC CATHETERIZATION    PATIENT NAME: Brian Harding                       :        1950  MED REC NO:   [id number]                            ROOM:       [id number]  ACCOUNT NO:   [de-identified]                           ADMIT DATE: 2023  PROVIDER:     Lauren Pierre MD    DATE OF PROCEDURE:  2023    PROCEDURES PERFORMED:  1. Coronary angiography. 2.  Conscious sedation using Versed and fentanyl. Indication #4 with score of 8. INDICATION FOR PROCEDURE:  The patient is a 73-year-old female who was  admitted to the hospital with shortness of breath, was found to have  non-ST elevation MI, the patient was brought to the cath lab for further  evaluation. DESCRIPTION OF PROCEDURE:  After appropriate informed consent, the right  wrist area was prepped and draped in the usual sterile fashion. A  time-out was completed. The right wrist area was locally anesthetized  with 2 mL of 2% lidocaine. A 21-gauge needle was used to access the  right radial artery. A 6-Icelandic introducer sheath was used to cannulate  the right radial artery. I attempted advancing the wire, we met with a  difficulty, so angiogram was performed and it showed mid-sized radial  artery without any difficulty. Then, we tried to advance a Glidewire  and catheter went smoothly, however, attempted to advance the catheter  even using 4-Icelandic catheter, we met with a difficulty, so another  angiogram was performed. There was a bifurcation at the junction of the  radial artery and axillary artery and brachial artery and that was going  through a small branch, after several attempts, we got the left branch. Right radial approach was abandoned. Attention was directed to the  right groin area, which was locally anesthetized with 10 mL of 2%  lidocaine.   A Cook needle was used to access the

## 2023-06-21 NOTE — CARE COORDINATION
6/21, Patient admitted for possible stroke. SW went to meet with patient in room. Unable to meet with patient due to PT/OT in room working with patient. Spouse had left hospital.  Call placed to [facility] spouse and vm left to return SW call on discharge planning. Per RN spouse takes care of patient when patient is at home. Patient is currently on 2L 02 and will need pulse ox testing to see if 02 will be needed should patient go home. TOVA/TINY to follow.       NOE Dimas  [facility] Case Management  [phone]

## 2023-06-21 NOTE — CONSULTS
CHF NURSE NAVIGATOR CONSULT NOTE:      Patient currently admitted with diagnosis of acute heart failure. Patient was awake and alert during the consultation. She was engaged and asked appropriate questions throughout the education session. She is agreeable to scheduling follow ups . Scheduling with the CHF clinic No: Does not meet criteria, cannot stand due to previous stroke . Future Appointments   Date Time Provider Braxton Jimenez   6/22/2023  3:30 PM [facility] FAST TRACK 2 [facility]   6/22/2023  3:45 PM Zohaib Harmon MD MED ONC [facility]       Barriers to Care:  Contributing risk factors for Heart Failure are identified as impairment:  physical: previous stroke. The patient is ordered:  Diet: ADULT DIET; Regular; Low Fat/Low Chol/High Fiber/2 gm Na   Sodium controlled diet Yes  Fluid restriction daily ordered (fluid restriction recommended if patient is hyponatremic and/or diuretic is initiated or increased) No  FR:   Daily Weights: Patient Vitals for the past 96 hrs (Last 3 readings):   Weight   06/21/23 0412 132 lb 4 oz (60 kg)   06/19/23 0548 113 lb 8 oz (51.5 kg)   06/18/23 0601 113 lb (51.3 kg)     I/O:   Intake/Output Summary (Last 24 hours) at 6/21/2023 1846  Last data filed at 6/21/2023 1827  Gross per 24 hour   Intake 600 ml   Output 2000 ml   Net -1400 ml              We reviewed the introduction to Heart Failure, the HF zones, signs and symptoms to report on day 1 of onset, medications, medication compliance, the importance of obtaining daily weights, following a low sodium diet, reading food labels for the sodium content, keeping physician appointments, and smoking cessation. We discussed writing / tracking daily weights on a calendar / log because a 5 pound gain in 1 week can sneak up if you are not tracking it. I advised patient they can reduce the risk for Heart Failure exacerbations by modifying / controlling the risk factors.  We discussed self-managed

## 2023-06-21 NOTE — PLAN OF CARE
Patient's chart updated to reflect:      . - HF care plan, HF education points and HF discharge instructions.  -Orders: 2 gram sodium diet, daily weights, I/O.  -PCP and cardiology follow up appointments to be scheduled within 7 days of hospital discharge. -CHF education session will be provided to the patient prior to hospital discharge.        Kierra Michele RN, MSN, CV-BC  Heart Failure Navigator
assistive devices as needed     Problem: Genitourinary - Adult  Goal: Urinary catheter remains patent  6/20/2023 1820 by Marcela Solorio RN  Outcome: Progressing  Flowsheets (Taken 6/20/2023 1320)  Urinary catheter remains patent: Assess patency of urinary catheter  6/20/2023 1025 by Marcela Solorio RN  Outcome: Progressing  Flowsheets (Taken 6/20/2023 0068)  Urinary catheter remains patent: Assess patency of urinary catheter     Problem: Metabolic/Fluid and Electrolytes - Adult  Goal: Electrolytes maintained within normal limits  6/20/2023 1820 by Marcela Solorio RN  Outcome: Progressing  Flowsheets (Taken 6/20/2023 1320)  Electrolytes maintained within normal limits:   Monitor labs and assess patient for signs and symptoms of electrolyte imbalances   Administer electrolyte replacement as ordered  6/20/2023 1025 by Marcela Solorio RN  Outcome: Progressing  Flowsheets (Taken 6/20/2023 1161)  Electrolytes maintained within normal limits:   Monitor labs and assess patient for signs and symptoms of electrolyte imbalances   Administer electrolyte replacement as ordered  Goal: Glucose maintained within prescribed range  6/20/2023 1820 by Marcela Solorio RN  Outcome: Progressing  Flowsheets (Taken 6/20/2023 1320)  Glucose maintained within prescribed range:   Monitor blood glucose as ordered   Assess for signs and symptoms of hyperglycemia and hypoglycemia  6/20/2023 1025 by Marcela Solorio RN  Outcome: Progressing  Flowsheets (Taken 6/20/2023 0835)  Glucose maintained within prescribed range:   Monitor blood glucose as ordered   Assess for signs and symptoms of hyperglycemia and hypoglycemia     Problem: Anxiety  Goal: Will report anxiety at manageable levels  6/20/2023 1820 by Marcela Solorio RN  Outcome: Progressing  Flowsheets (Taken 6/20/2023 1320)  Will report anxiety at manageable levels: Provide emotional support with 1:1 interaction with staff  6/20/2023 1025 by Marcela Solorio RN  Outcome: Progressing  Flowsheets (Taken 6/20/2023

## 2023-06-22 VITALS
WEIGHT: 123.46 LBS | DIASTOLIC BLOOD PRESSURE: 57 MMHG | BODY MASS INDEX: 22.72 KG/M2 | HEART RATE: 91 BPM | RESPIRATION RATE: 16 BRPM | OXYGEN SATURATION: 98 % | HEIGHT: 62 IN | TEMPERATURE: 97.3 F | SYSTOLIC BLOOD PRESSURE: 122 MMHG

## 2023-06-22 LAB
ANION GAP SERPL CALCULATED.3IONS-SCNC: 17 MMOL/L (ref 7–16)
BUN SERPL-MCNC: 25 MG/DL (ref 6–23)
CALCIUM SERPL-MCNC: 9.9 MG/DL (ref 8.6–10.2)
CHLORIDE SERPL-SCNC: 96 MMOL/L (ref 98–107)
CO2 SERPL-SCNC: 26 MMOL/L (ref 22–29)
CREAT SERPL-MCNC: 1.2 MG/DL (ref 0.5–1)
GLUCOSE SERPL-MCNC: 228 MG/DL (ref 74–99)
MAGNESIUM SERPL-MCNC: 1.5 MG/DL (ref 1.6–2.6)
METER GLUCOSE: 211 MG/DL (ref 74–99)
METER GLUCOSE: 318 MG/DL (ref 74–99)
POTASSIUM SERPL-SCNC: 3.4 MMOL/L (ref 3.5–5)
SODIUM SERPL-SCNC: 139 MMOL/L (ref 132–146)

## 2023-06-22 PROCEDURE — 80048 BASIC METABOLIC PNL TOTAL CA: CPT

## 2023-06-22 PROCEDURE — 36415 COLL VENOUS BLD VENIPUNCTURE: CPT

## 2023-06-22 PROCEDURE — 6370000000 HC RX 637 (ALT 250 FOR IP): Performed by: FAMILY MEDICINE

## 2023-06-22 PROCEDURE — 6370000000 HC RX 637 (ALT 250 FOR IP): Performed by: NURSE PRACTITIONER

## 2023-06-22 PROCEDURE — 83735 ASSAY OF MAGNESIUM: CPT

## 2023-06-22 PROCEDURE — 82962 GLUCOSE BLOOD TEST: CPT

## 2023-06-22 PROCEDURE — 6360000002 HC RX W HCPCS: Performed by: INTERNAL MEDICINE

## 2023-06-22 PROCEDURE — 6370000000 HC RX 637 (ALT 250 FOR IP): Performed by: INTERNAL MEDICINE

## 2023-06-22 PROCEDURE — 6360000002 HC RX W HCPCS: Performed by: FAMILY MEDICINE

## 2023-06-22 PROCEDURE — 2580000003 HC RX 258: Performed by: FAMILY MEDICINE

## 2023-06-22 RX ORDER — POTASSIUM CHLORIDE 20 MEQ/1
40 TABLET, EXTENDED RELEASE ORAL ONCE
Status: COMPLETED | OUTPATIENT
Start: 2023-06-22 | End: 2023-06-22

## 2023-06-22 RX ORDER — CHOLECALCIFEROL (VITAMIN D3) 50 MCG
2000 TABLET ORAL DAILY
Qty: 30 TABLET | Refills: 0 | Status: SHIPPED | OUTPATIENT
Start: 2023-06-23 | End: 2023-07-23

## 2023-06-22 RX ORDER — MAGNESIUM SULFATE IN WATER 40 MG/ML
2000 INJECTION, SOLUTION INTRAVENOUS ONCE
Status: COMPLETED | OUTPATIENT
Start: 2023-06-22 | End: 2023-06-22

## 2023-06-22 RX ORDER — CARVEDILOL 12.5 MG/1
12.5 TABLET ORAL 2 TIMES DAILY WITH MEALS
Qty: 60 TABLET | Refills: 3 | Status: SHIPPED | OUTPATIENT
Start: 2023-06-22

## 2023-06-22 RX ORDER — FUROSEMIDE 20 MG/1
20 TABLET ORAL DAILY
Qty: 60 TABLET | Refills: 3 | Status: SHIPPED | OUTPATIENT
Start: 2023-06-23

## 2023-06-22 RX ORDER — SPIRONOLACTONE 25 MG/1
25 TABLET ORAL DAILY
Qty: 30 TABLET | Refills: 3 | Status: SHIPPED | OUTPATIENT
Start: 2023-06-23

## 2023-06-22 RX ADMIN — HYDROCODONE BITARTRATE AND ACETAMINOPHEN 1 TABLET: 5; 325 TABLET ORAL at 13:09

## 2023-06-22 RX ADMIN — EMPAGLIFLOZIN 10 MG: 10 TABLET, FILM COATED ORAL at 10:36

## 2023-06-22 RX ADMIN — BACLOFEN 5 MG: 10 TABLET ORAL at 10:35

## 2023-06-22 RX ADMIN — INSULIN LISPRO 1 UNITS: 100 INJECTION, SOLUTION INTRAVENOUS; SUBCUTANEOUS at 08:00

## 2023-06-22 RX ADMIN — Medication 2000 UNITS: at 10:37

## 2023-06-22 RX ADMIN — CETIRIZINE HYDROCHLORIDE 10 MG: 10 TABLET, FILM COATED ORAL at 10:35

## 2023-06-22 RX ADMIN — ACETAMINOPHEN 650 MG: 325 TABLET ORAL at 07:11

## 2023-06-22 RX ADMIN — Medication 10 ML: at 13:15

## 2023-06-22 RX ADMIN — FUROSEMIDE 20 MG: 40 TABLET ORAL at 10:36

## 2023-06-22 RX ADMIN — LISINOPRIL 20 MG: 20 TABLET ORAL at 16:54

## 2023-06-22 RX ADMIN — INSULIN LISPRO 3 UNITS: 100 INJECTION, SOLUTION INTRAVENOUS; SUBCUTANEOUS at 16:54

## 2023-06-22 RX ADMIN — SPIRONOLACTONE 25 MG: 25 TABLET ORAL at 10:36

## 2023-06-22 RX ADMIN — ENOXAPARIN SODIUM 40 MG: 100 INJECTION SUBCUTANEOUS at 09:00

## 2023-06-22 RX ADMIN — CARVEDILOL 12.5 MG: 6.25 TABLET, FILM COATED ORAL at 09:00

## 2023-06-22 RX ADMIN — MAGNESIUM SULFATE HEPTAHYDRATE 2000 MG: 40 INJECTION, SOLUTION INTRAVENOUS at 13:15

## 2023-06-22 RX ADMIN — ACETAMINOPHEN 650 MG: 325 TABLET ORAL at 18:20

## 2023-06-22 RX ADMIN — POTASSIUM CHLORIDE 40 MEQ: 1500 TABLET, EXTENDED RELEASE ORAL at 13:09

## 2023-06-22 RX ADMIN — ESCITALOPRAM OXALATE 10 MG: 10 TABLET ORAL at 16:54

## 2023-06-22 RX ADMIN — ASPIRIN 81 MG: 81 TABLET, CHEWABLE ORAL at 10:35

## 2023-06-22 RX ADMIN — CARVEDILOL 12.5 MG: 6.25 TABLET, FILM COATED ORAL at 16:54

## 2023-06-22 RX ADMIN — ACETAMINOPHEN 650 MG: 325 TABLET ORAL at 00:08

## 2023-06-22 ASSESSMENT — PAIN SCALES - GENERAL
PAINLEVEL_OUTOF10: 8
PAINLEVEL_OUTOF10: 6
PAINLEVEL_OUTOF10: 7
PAINLEVEL_OUTOF10: 6
PAINLEVEL_OUTOF10: 2
PAINLEVEL_OUTOF10: 5

## 2023-06-22 ASSESSMENT — PAIN DESCRIPTION - DESCRIPTORS
DESCRIPTORS: ACHING
DESCRIPTORS: ACHING;DISCOMFORT
DESCRIPTORS: ACHING

## 2023-06-22 ASSESSMENT — PAIN DESCRIPTION - ONSET
ONSET: ON-GOING
ONSET: ON-GOING

## 2023-06-22 ASSESSMENT — PAIN DESCRIPTION - LOCATION
LOCATION: ARM

## 2023-06-22 ASSESSMENT — PAIN DESCRIPTION - PAIN TYPE
TYPE: CHRONIC PAIN
TYPE: CHRONIC PAIN

## 2023-06-22 ASSESSMENT — PAIN DESCRIPTION - ORIENTATION
ORIENTATION: RIGHT

## 2023-06-22 ASSESSMENT — PAIN DESCRIPTION - FREQUENCY
FREQUENCY: CONTINUOUS
FREQUENCY: CONTINUOUS

## 2023-06-22 ASSESSMENT — PAIN DESCRIPTION - DIRECTION
RADIATING_TOWARDS: NONRADIATING
RADIATING_TOWARDS: NONRADIATING

## 2023-06-22 NOTE — PROGRESS NOTES
CLINICAL PHARMACY NOTE: MEDS TO BEDS    Total # of Prescriptions Filled: 1   The following medications were delivered to the patient:  Jardiance 10 mg    Additional Documentation:   Delivered meds to patient at bedside  Ondina Cooper signed for them
CLINICAL PHARMACY NOTE: MEDS TO BEDS    Total # of Prescriptions Filled: 4   The following medications were delivered to the patient:  Vitamin D3 50 mcg  Spironolactone 25 mg  Carvedilol 12.5 mg  Furosemide 20 mg    Additional Documentation:   Delivered to  in room
Discharged to home Goals met. Discharge instructions given verbalized an understanding. Monitor removed and placed in nurses station.
Hospitalist Progress Note      SYNOPSIS: Patient admitted on 2023 for NSTEMI (non-ST elevated myocardial infarction) [facility])      SUBJECTIVE:    Patient seen and examined. She denies any complaints today. Discussed with patient and  concern hydrodilatation results as coronaries were clean. Concerning TAVR cardiology is considering for outpatient. Also discussed again with them as discussed yesterday concerning the left axillary lymph node needs to follow-up with her oncologist.  Per  they have an appointment tomorrow with her oncologist.  Discussed this to be brought up with her oncologist given her history of breast cancer. Records reviewed. 71-year-old female with a past medical history of GERD, breast cancer, multiple CVAs, hypertension, type 2 diabetes presented due to shortness of breath she was transferred from [facility] given concern for NSTEMI CHF. Also found to be in acute hypoxic and at time which resolved when she got here. CT angiogram was negative for PE. Initially started on IV lasix given concern for possible CHF. Echocardiogram showed new cardiomyopathy EF of 45% with severe aortic stenosis. Sp heart cath 2023 with clear coronary arteries. Stable overnight. No other overnight issues reported. Temp (24hrs), Av.1 °F (36.7 °C), Min:98 °F (36.7 °C), Max:98.1 °F (36.7 °C)    DIET: ADULT DIET; Regular; Low Fat/Low Chol/High Fiber/2 gm Na  CODE: Full Code    Intake/Output Summary (Last 24 hours) at 2023 1002  Last data filed at 2023 0904  Gross per 24 hour   Intake 180 ml   Output 1250 ml   Net -1070 ml       OBJECTIVE:    BP (!) 151/69   Pulse 63   Temp 98.1 °F (36.7 °C) (Temporal)   Resp 18   Ht 5' 2\" (1.575 m)   Wt 132 lb 4 oz (60 kg)   SpO2 95%   BMI 24.19 kg/m²     General appearance: No apparent distress, appears stated age and cooperative. HEENT:  Conjunctivae/corneas clear. Neck: Supple. No jugular venous distention.
Occupational Therapy  OCCUPATIONAL THERAPY INITIAL EVALUATION    [address] [address] [address]      Date:2023                                                Patient Name: Allyson La  MRN: [id number]  : 1950  Room: [address] [place] [address]    Evaluating OT: Sylvain Prakash OTR/L #[id number]     Referring Provider: Elliott Barthel, MD  Specific Provider Orders/Date: OT eval and treat 23    Diagnosis: NSTEMI (non-ST elevated myocardial infarction) [facility]) [I21.4]   Pt admitted to hospital with SOB And chest pain; transfer from [facility]. Surgery / Procedure: scheduled heart cath 23     Pertinent Medical History:  has a past medical history of Acid reflux, Breast cancer ([address] Cerebral infarction due to embolism of right middle cerebral artery ([address] H/O renal calculi, History of therapeutic radiation, Hx antineoplastic chemo, Hyperlipidemia, Hypertension, IBS (irritable bowel syndrome), Insomnia, Left carotid stenosis, Pyelonephritis, Seasonal allergies, Stroke ([address] Type 2 diabetes mellitus without complication ([address] and UTI (urinary tract infection).        Precautions:  Fall Risk, Hx CVA with R hemiparesis, cognition, bed alarm, incontinence     Assessment of current deficits    [x] Functional mobility  [x]ADLs  [x] Strength               [x]Cognition    [x] Functional transfers   [x] IADLs         [x] Safety Awareness   [x]Endurance    [x] Fine Coordination              [x] Balance      [x] Vision/perception   [x]Sensation     [x]Gross Motor Coordination  [x] ROM  [] Delirium                   [x] Motor Control     OT PLAN OF CARE   OT POC based on physician orders, patient diagnosis and results of clinical assessment    Frequency/Duration 1-3 days/wk for 2 weeks PRN   Specific OT Treatment Interventions to include:   * Instruction/training on adapted ADL techniques and AE recommendations to increase functional independence within
PULSE OX ON ROOM AIR SITTING ___97_%   PULSE OX ON ROOM AIR AMBULATING _96__%
Patient off the floor to CVL via transport. Informed consent signed by patient's  and placed in patient's soft chart. Preop checklist completed.
Physical Therapy    Physical Therapy Initial Assessment       Name: Jean-Pierre Price  : 1950  MRN: [id number]      Date of Service: 2023    Evaluating PT:  Prabhakar Orr PT, DPKATALINA  [id number]    Room #:  6618/5320-I  Diagnosis:  NSTEMI (non-ST elevated myocardial infarction) [facility]) [I21.4]  PMHx/PSHx:   has a past medical history of Acid reflux, Breast cancer ([facility] Cerebral infarction due to embolism of right middle cerebral artery ([facility] [address] [facility] H/O renal calculi, History of therapeutic radiation, Hx antineoplastic chemo, Hyperlipidemia, Hypertension, IBS (irritable bowel syndrome), Insomnia, Left carotid stenosis, Pyelonephritis, Seasonal allergies, Stroke ([facility] [address] [facility] Type 2 diabetes mellitus without complication ([facility] and UTI (urinary tract infection). Procedure/Surgery:    Precautions:  R Hemiparesis, R hypertonicity\, Falls, Incontinence, Alarm/Cognition  Equipment Needs:      SUBJECTIVE:    Pt lives with spouse in a 2 story home with first floor set up. Pt has ramped entrance. Pt was performing OPPT/OT and was ambulating with [employer]. At home, pt performed stand pivot transfers with spouse. Spouse reports he performs 95% of transfer (MaxA). Uses Transport chair  Equipment Owned:   Transport Chair  [employer]  OBJECTIVE:   Initial Evaluation  Date: 23 Treatment Short Term/ Long Term   Goals   AM-PAC 6 Clicks 53/61     Was pt agreeable to Eval/treatment? yes     Does pt have pain? No c/o pain     Bed Mobility  Rolling: NT  Supine to sit: MaA  Sit to supine: NT  Scooting: MaxA  Rolling: Independent    Supine to sit:  Independent    Sit to supine: Independent    Scooting: Independent     Transfers Sit to stand: MaxA  Stand to sit: MaxA  Stand pivot: MaxA no AD  Sit to stand: supervision  Stand to sit: supervision  Stand pivot: supervision   Ambulation    NT- unable / weankess  25 feet with supervision AAD   Stair negotiation: ascended and descended  NT  4 steps single rail supervision   ROM BUE:  Defer to OT  BLE:  [facility]
Physical Therapy  Physical Therapy Treatment      Name: [facility]  : 1950  MRN: [id number]      Date of Service: 2023    Evaluating PT:  Navneet Hou PT, DPT  [id number]    Room #:  2193/2502-M  Diagnosis:  NSTEMI (non-ST elevated myocardial infarction) [facility]) [I21.4]  PMHx/PSHx:   has a past medical history of Acid reflux, Breast cancer ([facility] Cerebral infarction due to embolism of right middle cerebral artery ([address] H/O renal calculi, History of therapeutic radiation, Hx antineoplastic chemo, Hyperlipidemia, Hypertension, IBS (irritable bowel syndrome), Insomnia, Left carotid stenosis, Pyelonephritis, Seasonal allergies, Stroke ([facility] Type 2 diabetes mellitus without complication ([facility] [facility] and UTI (urinary tract infection). has a past surgical history that includes Tubal ligation; Cholecystectomy; Rotator cuff repair (Right); Hysterectomy, total abdominal; pr insj prph ctr vad w/subq port age 12 yr/> (N/A, 2018); TUNNELED CENTRAL VENOUS CATHETER W/ SUBCUTANEOUS PORT (2019); pr cysto/uretero w/lithotripsy &indwell stent insrt (Bilateral, 2018); pr cysto/uretero w/lithotripsy &indwell stent insrt (Bilateral, 10/25/2018); Breast biopsy (Right); Mastectomy, modified radical (Right, 2019); Mastectomy; CT BIOPSY DEEP BONE PERCUTANEOUS (2022); and Cardiac catheterization (2023). Procedure/Surgery:  Cardiac catheterization (2023)  Precautions:  R Hemiparesis, R hypertonicity, Falls, Incontinence, external catheter  Equipment Needs:  TBD  Equipment Owned: Transport Chair, [employer]    SUBJECTIVE:    Pt lives with spouse in a 2 story home with first floor set up. Pt has ramped entrance. Pt was performing OP PT/OT and was ambulating with [employer]. At home, pt performed stand pivot transfers with spouse. Spouse reports he performs 95% of transfer (MaxA). Uses Transport chair.     OBJECTIVE:   Initial Evaluation  Date: 23 Treatment  2023 Short Term/ Long Term   Goals   AM-PAC 6
Physician Progress Note      Jonelle Dale  CSN #:                  [id number]  :                       1950  ADMIT DATE:       2023 7:39 PM  [address] [place] DATE:  Sathyasandra Howard  PROVIDER #:        Faith Evans MD          QUERY TEXT:    Per Dr. Lilli Becerra query response, Chronic Systolic CHF. Per your progress note and   cardiology, suspected acute CHF. Due to conflicting acuity, please document in   progress notes and discharge summary if you are evaluating and /or treating   any of the following: The medical record reflects the following:  Risk Factors: HTN, CAD, Cardiomyopathy, hx severe aortic valve stenosis  Clinical Indicators: Queried for type of CHF, Dr. Lilli Becerra response 23 \"This   patient has chronic systolic CHF/HFrEF. \" 23 progress note \"Acute   hypoxia-resolved probably from suspected acute CHF\". Per cardiology notes   \"Suspect secondary to acute exacerbation of CHF\". TTE ECHO: EF 45-50%, There   is doppler evidence of stage II diastolic dysfunction. CTA chest small   bilateral pleural effusions. BNP 19,760. Treatment: ECHO, labs, Lasix IV    Thank you,  Donato Sandoval, RN, BSN, CRCR, Clinical Documentation Improvement  Options provided:  -- Acute on Chronic Systolic CHF confirmed  -- Acute CHF ruled out, Chronic Systolic CHF confirmed  -- Other - I will add my own diagnosis  -- Disagree - Not applicable / Not valid  -- Disagree - Clinically unable to determine / Unknown  -- Refer to Clinical Documentation Reviewer    PROVIDER RESPONSE TEXT:    Acute on Chronic Systolic CHF confirmed.     Query created by: Consuelo Loving on 2023 12:12 PM      Electronically signed by:  Faith Evans MD 2023 12:28 PM
Physician Progress Note      Rosalva Mckenzie  [facility] #:                  [id number]  :                       1950  ADMIT DATE:       2023 7:39 PM  [address] [place] DATE:    PROVIDER #:        Curtis Stewart MD          QUERY TEXT:    Suspected Acute CHF per cardiology notes. Type not specified. If possible,   please document in progress notes and discharge summary further specificity   regarding the type and acuity of CHF:    The medical record reflects the following:  Risk Factors: HTN, CAD, Cardiomyopathy, hx severe aortic valve stenosis  Clinical Indicators: Per cardiology notes \"Suspect secondary to acute   exacerbation of CHF\". TTE ECHO: EF 45-50%, There is doppler evidence of stage   II diastolic dysfunction. CTA chest small bilateral pleural effusions. BNP   19,760  Treatment: ECHO, labs, Lasix IV    Thank you,  Annie Parker RN, BSN, CRCR, Clinical Documentation Improvement  Options provided:  -- Acute on Chronic Systolic CHF/HFrEF  -- Acute on Chronic Diastolic CHF/HFpEF  -- Acute on Chronic Systolic and Diastolic CHF  -- Acute Systolic CHF/HFrEF  -- Acute Diastolic CHF/HFpEF  -- Acute Systolic and Diastolic CHF  -- Chronic Systolic CHF/HFrEF  -- Chronic Diastolic CHF/HFpEF  -- Chronic Systolic and Diastolic CHF  -- Other - I will add my own diagnosis  -- Disagree - Not applicable / Not valid  -- Disagree - Clinically unable to determine / Unknown  -- Refer to Clinical Documentation Reviewer    PROVIDER RESPONSE TEXT:    This patient has chronic systolic CHF/HFrEF.     Query created by: Arianna Mitchell on 2023 6:32 AM      Electronically signed by:  Curtis Stewart MD 2023 4:37 PM
Plan for [address] [place] [address] with Dr Kayla Hughes 6/20/2023  Electronically signed by CARINE Moore on 6/19/2023 at 10:33 AM
independence within precautions       * Training on energy conservation strategies, correct breathing pattern and techniques to improve independence/tolerance for self-care routine  * Functional transfer/mobility training/DME recommendations for increased independence, safety, and fall prevention  * Patient/Family education to increase follow through with safety techniques and functional independence  * Recommendation of environmental modifications for increased safety with functional transfers/mobility and ADLs  * Cognitive retraining/development of therapeutic activities to improve problem solving, judgement, memory, and attention for increased safety/participation in ADL/IADL tasks  * Sensory re-education to improve body/limb awareness, maintain/improve skin integrity, and improve hand/UE motor function  * Visual-perceptual training to improve environmental scanning, visual attention/focus, and oculomotor skills for increased safety/independence with functional transfers/mobility and ADLs  * Splinting/positioning for increased function, prevention of contractures, and improve skin integrity  * Therapeutic exercise to improve motor endurance, ROM, and functional strength for ADLs/functional transfers  * Therapeutic activities to facilitate/challenge dynamic balance, stand tolerance for increased safety and independence with ADLs  * Therapeutic activities to facilitate gross/fine motor skills for increased independence with ADLs  * Neuro-muscular re-education: facilitation of righting/equilibrium reactions, midline orientation, scapular stability/mobility, normalization of muscle tone, and facilitation of volitional active controled movement  * Positioning to improve skin integrity, interaction with environment and functional independence     Recommended Adaptive Equipment:  TBD      Home Living: Pt lives with  in a 2 story home with 1st floor set-up.  Ramp entrance     Bathroom setup: sponge bathes (walk-in
chloride flush  5-40 mL IntraVENous 2 times per day    atorvastatin  80 mg Oral Nightly    aspirin  81 mg Oral Daily    atenolol  50 mg Oral Daily     PRN Meds: acetaminophen **OR** [DISCONTINUED] acetaminophen, melatonin, glucose, dextrose bolus **OR** dextrose bolus, glucagon (rDNA), dextrose, HYDROcodone-acetaminophen, zolpidem, sodium chloride flush, sodium chloride, ondansetron **OR** ondansetron, polyethylene glycol, perflutren lipid microspheres    Labs:     Recent Labs     06/18/23 0438 06/19/23  0646 06/20/23  0609   WBC 6.4 5.2 6.2   HGB 9.9* 11.8 12.2   HCT 32.3* 36.8 38.5    203 219       Recent Labs     06/18/23 0438 06/19/23  0646 06/20/23  0609    141 140   K 3.9 3.9 3.7   CL 96* 95* 99   CO2 33* 32* 30*   BUN 26* 28* 23   CREATININE 1.0 1.2* 0.9   CALCIUM 8.9 9.6 9.8       Recent Labs     06/18/23 0438 06/19/23  0646   PROT 5.7* 6.9   ALKPHOS 74 109*   ALT 27 39*   AST 37* 38*   BILITOT 0.4 0.6       No results for input(s): INR in the last 72 hours. No results for input(s): Klarissa Mas in the last 72 hours. Chronic labs:    Lab Results   Component Value Date    CHOL 123 06/17/2023    TRIG 168 (H) 06/17/2023    HDL 60 06/17/2023    LDLCALC 29 06/17/2023    TSH 1.150 06/18/2023    INR 1.0 09/23/2022    LABA1C 6.1 (H) 06/17/2023       Radiology: REVIEWED DAILY    +++++++++++++++++++++++++++++++++++++++++++++++++  Ayana Nevarez MD  [facility] Physician - [address] [place] [address] [place], [facility]  +++++++++++++++++++++++++++++++++++++++++++++++++  NOTE: This report was transcribed using voice recognition software. Every effort was made to ensure accuracy; however, inadvertent computerized transcription errors may be present.
seen within the   right upper lobe. Enlarged lymph nodes seen in the left axillary region which correlation with   ultrasound and mammography is recommended with nodular density seen within   the left breast.             Lab Review   Lab Results   Component Value Date/Time     06/20/2023 06:09 AM    K 3.7 06/20/2023 06:09 AM    K 3.9 06/18/2023 04:38 AM    CL 99 06/20/2023 06:09 AM    CO2 30 06/20/2023 06:09 AM    BUN 23 06/20/2023 06:09 AM    CREATININE 0.9 06/20/2023 06:09 AM    GLUCOSE 181 06/20/2023 06:09 AM    GLUCOSE 300 06/16/2023 05:47 AM    CALCIUM 9.8 06/20/2023 06:09 AM     Lab Results   Component Value Date/Time    WBC 6.2 06/20/2023 06:09 AM    HGB 12.2 06/20/2023 06:09 AM    HCT 38.5 06/20/2023 06:09 AM    MCV 88.7 06/20/2023 06:09 AM     06/20/2023 06:09 AM     I have personally reviewed the laboratory, cardiac diagnostic and radiographic testing as outlined above:    Assessment:     Acute hypoxic respiratory failure: Improving,  Suspect secondary to acute exacerbation of CHF, pulmonary embolism was ruled out. Elevated troponin: ACS type I versus ACS type II, suspect the latter, will trend troponin, will treat CHF  Cardiomyopathy: Newly diagnosed, etiology?,  Several risk factors for CAD, has history of severe aortic valve stenosis, will schedule for cardiac catheterization to rule out ischemic etiology and evaluation of coronary anatomy in preparation for aortic valve replacement  Prolonged QTc on EKG: will follow   aortic valve stenosis: severe  Aortic valve regurgitation: mild to moderate  Mitral valve regurgitation: Mild to moderate  Tricuspid valve regurgitation: Mild  Hypertension: Controlled, hypotensive at times. Hyperlipidemia: On Lipitor  Type 2 diabetes mellitus  History of TIA and CVA    Recommendations:     1. Continue current treatment  2. Cardiac catheterization in a.m., was rescheduled till tomorrow due to Cath Lab scheduling  3.   Basic metabolic panel and CBC in
Extremities: no edema   Neurologic: Grossly normal and non focal, CN II-XII intact       Dru Leonard MD   Hospitalist Service   06/19/23 11:04 AM
bilateral pleural effusions with   atelectatic changes seen lung bases and post radiation change seen within the   right upper lobe. Enlarged lymph nodes seen in the left axillary region which correlation with   ultrasound and mammography is recommended with nodular density seen within   the left breast.             Lab Review   Lab Results   Component Value Date/Time     06/21/2023 09:49 AM    K 4.1 06/21/2023 09:49 AM    CL 99 06/21/2023 09:49 AM    CO2 26 06/21/2023 09:49 AM    BUN 25 06/21/2023 09:49 AM    CREATININE 1.0 06/21/2023 09:49 AM    GLUCOSE 296 06/21/2023 09:49 AM    GLUCOSE 300 06/16/2023 05:47 AM    CALCIUM 9.8 06/21/2023 09:49 AM     Lab Results   Component Value Date/Time    WBC 6.2 06/21/2023 09:49 AM    HGB 11.9 06/21/2023 09:49 AM    HCT 37.5 06/21/2023 09:49 AM    MCV 88.7 06/21/2023 09:49 AM     06/21/2023 09:49 AM     I have personally reviewed the laboratory, cardiac diagnostic and radiographic testing as outlined above:    Assessment:     Acute hypoxic respiratory failure: Improved  Elevated troponin: No CAD on cardiac cath done on 6/20/2023  Cardiomyopathy: Nonischemic  Prolonged QTc on EKG: Proved  aortic valve stenosis: severe  Aortic valve regurgitation: mild to moderate  Mitral valve regurgitation: Mild to moderate  Tricuspid valve regurgitation: Mild  Hypertension: Controlled  Hyperlipidemia: On Lipitor  Type 2 diabetes mellitus  History of TIA and CVA    Recommendations:     1. Continue current treatment  2. Basic metabolic panel and CBC in a.m. 3.  Referral to valve clinic for outpatient evaluation for TAVR (done)    Can be discharged in a.m. from cardiology standpoint    Discussed with patient and her  at bedside  Discussed with the nursing staff  Electronically signed by Leticia Cerrato MD on 6/21/2023 at 10:32 PM  NOTE: This report was transcribed using voice recognition software.  Every effort was made to ensure accuracy; however, inadvertent

## 2023-06-22 NOTE — CARE COORDINATION
6/22, CM & SW met with patient at bedside along with spouse in room. Discussed discharge planning and recent PT eval-10/24 and ambulated 3 feet with Galo CAZARES. Spouse acknowledged that patient was ambulating approximately 75 feet when at home. Discussed safety concerns with patient going home. Spouse and patient declined any in home services or KAT. Spouse is confident in taking care of patient which he normally does on a day to day basis. Patient will follow up with OP Drs and continue with her OP therapy she was doing at the [facility] in [facility]. DME at the home is [facility], transport chair and [employer]. No 02 needs at this time. Patient and spouse feel patient has no needs at this time and will return home once medically cleared for discharge. TOVA/TINY to follow.       Em[facility]  PHYSICIANS [facility] Case Management  [phone]

## 2023-06-22 NOTE — DISCHARGE SUMMARY
[place]  +++++++++++++++++++++++++++++++++++++++++++++++++  NOTE: This report was transcribed using voice recognition software. Every effort was made to ensure accuracy; however, inadvertent computerized transcription errors may be present.

## 2023-06-26 ENCOUNTER — HOSPITAL ENCOUNTER (OUTPATIENT)
Dept: INFUSION THERAPY | Age: 73
Discharge: HOME | End: 2023-06-26

## 2023-06-26 ENCOUNTER — OFFICE VISIT (OUTPATIENT)
Dept: ONCOLOGY | Age: 73
End: 2023-06-26
Payer: MEDICARE

## 2023-06-26 ENCOUNTER — TELEPHONE (OUTPATIENT)
Dept: CARDIOLOGY CLINIC | Age: 73
End: 2023-06-26

## 2023-06-26 VITALS
SYSTOLIC BLOOD PRESSURE: 110 MMHG | BODY MASS INDEX: 23.37 KG/M2 | TEMPERATURE: 97.1 F | HEART RATE: 85 BPM | DIASTOLIC BLOOD PRESSURE: 52 MMHG | OXYGEN SATURATION: 98 % | WEIGHT: 127 LBS | HEIGHT: 62 IN

## 2023-06-26 DIAGNOSIS — Z85.3 PERSONAL HISTORY OF BREAST CANCER: Primary | ICD-10-CM

## 2023-06-26 DIAGNOSIS — Z85.3 PERSONAL HISTORY OF BREAST CANCER: ICD-10-CM

## 2023-06-26 PROCEDURE — G8400 PT W/DXA NO RESULTS DOC: HCPCS | Performed by: INTERNAL MEDICINE

## 2023-06-26 PROCEDURE — 1123F ACP DISCUSS/DSCN MKR DOCD: CPT | Performed by: INTERNAL MEDICINE

## 2023-06-26 PROCEDURE — G8427 DOCREV CUR MEDS BY ELIG CLIN: HCPCS | Performed by: INTERNAL MEDICINE

## 2023-06-26 PROCEDURE — 3074F SYST BP LT 130 MM HG: CPT | Performed by: INTERNAL MEDICINE

## 2023-06-26 PROCEDURE — 3017F COLORECTAL CA SCREEN DOC REV: CPT | Performed by: INTERNAL MEDICINE

## 2023-06-26 PROCEDURE — 3078F DIAST BP <80 MM HG: CPT | Performed by: INTERNAL MEDICINE

## 2023-06-26 PROCEDURE — 99213 OFFICE O/P EST LOW 20 MIN: CPT

## 2023-06-26 PROCEDURE — 4004F PT TOBACCO SCREEN RCVD TLK: CPT | Performed by: INTERNAL MEDICINE

## 2023-06-26 PROCEDURE — 1090F PRES/ABSN URINE INCON ASSESS: CPT | Performed by: INTERNAL MEDICINE

## 2023-06-26 PROCEDURE — G8420 CALC BMI NORM PARAMETERS: HCPCS | Performed by: INTERNAL MEDICINE

## 2023-06-26 PROCEDURE — 1111F DSCHRG MED/CURRENT MED MERGE: CPT | Performed by: INTERNAL MEDICINE

## 2023-06-26 PROCEDURE — 99214 OFFICE O/P EST MOD 30 MIN: CPT | Performed by: INTERNAL MEDICINE

## 2023-06-27 ENCOUNTER — TELEPHONE (OUTPATIENT)
Dept: ONCOLOGY | Age: 73
End: 2023-06-27

## 2023-06-27 ENCOUNTER — APPOINTMENT (OUTPATIENT)
Dept: GENERAL RADIOLOGY | Age: 73
End: 2023-06-27
Attending: INTERNAL MEDICINE
Payer: MEDICARE

## 2023-06-27 ENCOUNTER — HOSPITAL ENCOUNTER (OUTPATIENT)
Dept: GENERAL RADIOLOGY | Age: 73
Discharge: HOME | End: 2023-06-29
Payer: MEDICARE

## 2023-06-27 DIAGNOSIS — R59.9 ENLARGED LYMPH NODES: Primary | ICD-10-CM

## 2023-06-27 DIAGNOSIS — Z85.3 PERSONAL HISTORY OF BREAST CANCER: ICD-10-CM

## 2023-06-27 PROCEDURE — 77065 DX MAMMO INCL CAD UNI: CPT

## 2023-06-27 PROCEDURE — 76882 US LMTD JT/FCL EVL NVASC XTR: CPT

## 2023-06-27 PROCEDURE — G0279 TOMOSYNTHESIS, MAMMO: HCPCS

## 2023-06-30 ENCOUNTER — HOSPITAL ENCOUNTER (OUTPATIENT)
Dept: OTHER | Age: 73
Discharge: HOME | End: 2023-06-30

## 2023-07-03 NOTE — TELEPHONE ENCOUNTER
Left message for patient to call the office. I was unable to reach patient and schedule a hospital f/u, a letter will be mailed out today.

## 2023-07-06 ENCOUNTER — HOSPITAL ENCOUNTER (OUTPATIENT)
Dept: GENERAL RADIOLOGY | Age: 73
Discharge: HOME | End: 2023-07-08
Payer: MEDICARE

## 2023-07-06 DIAGNOSIS — R59.9 ENLARGED LYMPH NODES: ICD-10-CM

## 2023-07-06 PROCEDURE — 6360000002 HC RX W HCPCS

## 2023-07-06 PROCEDURE — 2709999900 US GUIDED NEEDLE PLACEMENT

## 2023-07-06 PROCEDURE — 2580000003 HC RX 258

## 2023-07-06 PROCEDURE — 10035 PLMT SFT TISS LOCLZJ DEV 1ST: CPT

## 2023-07-06 NOTE — PROGRESS NOTES
Met with patient and , Eloy Mcneill, prior to her breast biopsy. Instructed on ultrasound guided breast biopsy procedure. Instructed that results will be available in approximately 3-5 business days. Patient and  are agreeable to discussion of breast biopsy results by phone. Instructed that her physician will also be notified of results. Provided with folder containing my contact information and post biopsy discharge instructions. Instructed to call me if she has any questions or concerns about her biopsy. Verbalizes understanding.

## 2023-07-09 LAB
Lab: NORMAL
REPORT: NORMAL
THIS TEST SENT TO: NORMAL

## 2023-07-13 ENCOUNTER — TELEPHONE (OUTPATIENT)
Dept: GENERAL RADIOLOGY | Age: 73
End: 2023-07-13

## 2023-07-13 DIAGNOSIS — Z85.3 PERSONAL HISTORY OF BREAST CANCER: Primary | ICD-10-CM

## 2023-07-13 NOTE — TELEPHONE ENCOUNTER
Per patient and 's request at time of left axillary biopsy, I called and spoke with her  to advise of results. Instructed that the results indicates metastatic poorly differentiated carcinoma consistent with breast origin, extensively involving esequiel tissue. Instructed on next steps including following up with her healthcare professional regarding a referral to a surgeon for surgical consultation and metastatic workup, including consideration for breast MRI to find primary neoplasm.  states patient's previous breast surgeon was Dr. Virgil Kat and he would like a referral made to her again. Questions answered to his apparent satisfaction. Breast pathology report faxed to Dr. Blaze Lawrence.

## 2023-07-17 PROBLEM — R79.89 ELEVATED TROPONIN: Status: RESOLVED | Noted: 2023-06-17 | Resolved: 2023-07-17

## 2023-07-17 PROBLEM — R77.8 ELEVATED TROPONIN: Status: RESOLVED | Noted: 2023-06-17 | Resolved: 2023-07-17

## 2023-07-24 ENCOUNTER — TELEPHONE (OUTPATIENT)
Dept: ONCOLOGY | Age: 73
End: 2023-07-24

## 2023-07-24 ENCOUNTER — HOSPITAL ENCOUNTER (OUTPATIENT)
Dept: INFUSION THERAPY | Age: 73
Discharge: HOME | End: 2023-07-24

## 2023-07-24 NOTE — TELEPHONE ENCOUNTER
07/24/23 Lavon Lopez  left a vm at 7:01am stating that Noah Boston will not be in today. She has been hospitalized with sepsis.

## 2023-08-31 ENCOUNTER — OFFICE VISIT (OUTPATIENT)
Dept: BREAST CENTER | Age: 73
End: 2023-08-31
Payer: MEDICARE

## 2023-08-31 VITALS
WEIGHT: 130 LBS | TEMPERATURE: 98 F | RESPIRATION RATE: 20 BRPM | DIASTOLIC BLOOD PRESSURE: 62 MMHG | HEART RATE: 90 BPM | BODY MASS INDEX: 23.92 KG/M2 | SYSTOLIC BLOOD PRESSURE: 118 MMHG | OXYGEN SATURATION: 97 % | HEIGHT: 62 IN

## 2023-08-31 DIAGNOSIS — C50.912 CARCINOMA OF LEFT BREAST METASTATIC TO AXILLARY LYMPH NODE (HCC): Primary | ICD-10-CM

## 2023-08-31 DIAGNOSIS — Z17.1 MALIGNANT NEOPLASM OF RIGHT BREAST IN FEMALE, ESTROGEN RECEPTOR NEGATIVE, UNSPECIFIED SITE OF BREAST (HCC): Primary | ICD-10-CM

## 2023-08-31 DIAGNOSIS — C50.911 MALIGNANT NEOPLASM OF RIGHT BREAST IN FEMALE, ESTROGEN RECEPTOR NEGATIVE, UNSPECIFIED SITE OF BREAST (HCC): Primary | ICD-10-CM

## 2023-08-31 DIAGNOSIS — Z17.1 MALIGNANT NEOPLASM OF RIGHT BREAST IN FEMALE, ESTROGEN RECEPTOR NEGATIVE, UNSPECIFIED SITE OF BREAST (HCC): ICD-10-CM

## 2023-08-31 DIAGNOSIS — C50.911 MALIGNANT NEOPLASM OF RIGHT BREAST IN FEMALE, ESTROGEN RECEPTOR NEGATIVE, UNSPECIFIED SITE OF BREAST (HCC): ICD-10-CM

## 2023-08-31 DIAGNOSIS — C77.3 CARCINOMA OF LEFT BREAST METASTATIC TO AXILLARY LYMPH NODE (HCC): ICD-10-CM

## 2023-08-31 DIAGNOSIS — C50.912 CARCINOMA OF LEFT BREAST METASTATIC TO AXILLARY LYMPH NODE (HCC): ICD-10-CM

## 2023-08-31 DIAGNOSIS — C77.3 CARCINOMA OF LEFT BREAST METASTATIC TO AXILLARY LYMPH NODE (HCC): Primary | ICD-10-CM

## 2023-08-31 LAB
ALBUMIN SERPL-MCNC: 4.4 G/DL (ref 3.5–5.2)
ALP BLD-CCNC: 90 U/L (ref 35–104)
ALT SERPL-CCNC: 24 U/L (ref 0–32)
ANION GAP SERPL CALCULATED.3IONS-SCNC: 19 MMOL/L (ref 7–16)
AST SERPL-CCNC: 27 U/L (ref 0–31)
ATYPICAL LYMPHOCYTE ABSOLUTE COUNT: 0.05 K/UL (ref 0–0.46)
ATYPICAL LYMPHOCYTES: 1 % (ref 0–4)
BASOPHILS ABSOLUTE: 0 K/UL (ref 0–0.2)
BASOPHILS RELATIVE PERCENT: 0 % (ref 0–2)
BILIRUB SERPL-MCNC: 0.3 MG/DL (ref 0–1.2)
BUN BLDV-MCNC: 25 MG/DL (ref 6–23)
CALCIUM SERPL-MCNC: 9.8 MG/DL (ref 8.6–10.2)
CHLORIDE BLD-SCNC: 95 MMOL/L (ref 98–107)
CO2: 25 MMOL/L (ref 22–29)
CREAT SERPL-MCNC: 1 MG/DL (ref 0.5–1)
EOSINOPHILS ABSOLUTE: 0.1 K/UL (ref 0.05–0.5)
EOSINOPHILS RELATIVE PERCENT: 2 % (ref 0–6)
GFR SERPL CREATININE-BSD FRML MDRD: >60 ML/MIN/1.73M2
GLUCOSE BLD-MCNC: 212 MG/DL (ref 74–99)
HCT VFR BLD CALC: 36.9 % (ref 34–48)
HEMOGLOBIN: 11.8 G/DL (ref 11.5–15.5)
LYMPHOCYTES ABSOLUTE: 0.47 K/UL (ref 1.5–4)
LYMPHOCYTES RELATIVE PERCENT: 8 % (ref 20–42)
MCH RBC QN AUTO: 28.9 PG (ref 26–35)
MCHC RBC AUTO-ENTMCNC: 32 G/DL (ref 32–34.5)
MCV RBC AUTO: 90.2 FL (ref 80–99.9)
MONOCYTES ABSOLUTE: 0.26 K/UL (ref 0.1–0.95)
MONOCYTES RELATIVE PERCENT: 4 % (ref 2–12)
NEUTROPHILS ABSOLUTE: 5.11 K/UL (ref 1.8–7.3)
NEUTROPHILS RELATIVE PERCENT: 85 % (ref 43–80)
PDW BLD-RTO: 15.7 % (ref 11.5–15)
PLATELET # BLD: 155 K/UL (ref 130–450)
PMV BLD AUTO: 10.3 FL (ref 7–12)
POTASSIUM SERPL-SCNC: 3.5 MMOL/L (ref 3.5–5)
RBC # BLD: 4.09 M/UL (ref 3.5–5.5)
RBC # BLD: ABNORMAL 10*6/UL
RBC # BLD: ABNORMAL 10*6/UL
SODIUM BLD-SCNC: 139 MMOL/L (ref 132–146)
TOTAL PROTEIN: 7.7 G/DL (ref 6.4–8.3)
WBC # BLD: 6 K/UL (ref 4.5–11.5)

## 2023-08-31 PROCEDURE — 3078F DIAST BP <80 MM HG: CPT | Performed by: SURGERY

## 2023-08-31 PROCEDURE — 1090F PRES/ABSN URINE INCON ASSESS: CPT | Performed by: SURGERY

## 2023-08-31 PROCEDURE — G8420 CALC BMI NORM PARAMETERS: HCPCS | Performed by: SURGERY

## 2023-08-31 PROCEDURE — 1123F ACP DISCUSS/DSCN MKR DOCD: CPT | Performed by: SURGERY

## 2023-08-31 PROCEDURE — 3074F SYST BP LT 130 MM HG: CPT | Performed by: SURGERY

## 2023-08-31 PROCEDURE — 3017F COLORECTAL CA SCREEN DOC REV: CPT | Performed by: SURGERY

## 2023-08-31 PROCEDURE — 99203 OFFICE O/P NEW LOW 30 MIN: CPT | Performed by: SURGERY

## 2023-08-31 PROCEDURE — 99205 OFFICE O/P NEW HI 60 MIN: CPT | Performed by: SURGERY

## 2023-08-31 PROCEDURE — 36415 COLL VENOUS BLD VENIPUNCTURE: CPT | Performed by: SURGERY

## 2023-08-31 PROCEDURE — G8400 PT W/DXA NO RESULTS DOC: HCPCS | Performed by: SURGERY

## 2023-08-31 PROCEDURE — 4004F PT TOBACCO SCREEN RCVD TLK: CPT | Performed by: SURGERY

## 2023-08-31 PROCEDURE — G8427 DOCREV CUR MEDS BY ELIG CLIN: HCPCS | Performed by: SURGERY

## 2023-08-31 RX ORDER — NITROFURANTOIN 25; 75 MG/1; MG/1
CAPSULE ORAL
COMMUNITY
Start: 2023-08-29

## 2023-08-31 RX ORDER — LIDOCAINE HYDROCHLORIDE 10 MG/ML
5 INJECTION, SOLUTION EPIDURAL; INFILTRATION; INTRACAUDAL; PERINEURAL ONCE
Status: COMPLETED | OUTPATIENT
Start: 2023-08-31 | End: 2023-08-31

## 2023-08-31 RX ORDER — MECLIZINE HCL 12.5 MG/1
TABLET ORAL
COMMUNITY
Start: 2020-03-06

## 2023-08-31 RX ORDER — ACETAMINOPHEN 325 MG/1
650 TABLET ORAL EVERY 6 HOURS PRN
COMMUNITY

## 2023-08-31 RX ADMIN — LIDOCAINE HYDROCHLORIDE 5 ML: 10 INJECTION, SOLUTION EPIDURAL; INFILTRATION; INTRACAUDAL; PERINEURAL at 11:08

## 2023-08-31 NOTE — PROGRESS NOTES
Date of Visit: 8/31/2023  New Patient Invasive Breast Cancer    07/27/23-not seen. Admitted for sepsis  08/31/23    DIAGNOSIS:  1. (07/27/23) LEFT axillary metastasis with occult primary  ER (-) TN (-) HER2 (-)  * Consistent with breast  2. (2018) History of RIGHT breast cancer  Clinical stage: T2N2M0  ER (-) TN (-) HER2 (-)  Pathologic: [id number]  3. Other  * 2021 CVA  * 2023 Cardiac cath    TREATMENT  1. (2018) ddAC-T  2. (02/27/19) RIGHT MRM  3. RT  4. Xeloda (dc)    IMAGING/PROCEDURES:  1. (06/18/23) Chest CT  * LEFT breast density and abnormal nodes LEFT axilla  2. (06/27/23) LEFT dt-mammogram and US: BIRADS-4  * No findings in breast via mammo or sono  * Several abnormal axillary nodes  3. (07/13/23) LEFT axillary US biopsy    HISTORY OF PRESENT ILLNESS  Zoë Dorota was in the office today for consultation regarding her recent left axillary biopsy demonstrating metastatic breast cancer. Kristopher Rowland was treated for a triple negative breast cancer in 2018. At that time she underwent dose dense ACT followed by a right modified radical mastectomy and radiation therapy. The patient recently had some medical issues and a CT scan in June 18 demonstrated the density in the breast as well has abnormal left axillary lymph nodes. Dedicated breast imaging on June 27 demonstrated no findings in the breast with mammography or sonography. Biopsy of one of the left axillary lymph nodes demonstrated findings consistent with triple negative breast cancer. The patient is in the office now to receive the recommendations. BREAST SYMPTOMS  Avis reports no symptoms of the right chest wall or left breast or axilla. PAST BREAST HISTORY  Kristopher Rowland underwent treatment for right breast triple negative breast cancer as described above. BREAST CANCER RISK FACTORS  Family history: The patient reports that her mother had breast cancer in her 80s. She has no other relatives with breast, ovary, stomach or pancreatic cancer.     PAST

## 2023-08-31 NOTE — PATIENT INSTRUCTIONS
Office will call with CT scan date and time. Schedulers will call with breast MRI date and time. Nico Martinez will call with all test results. Any questions or concerns, please contact the office at [phone].

## 2023-09-01 ENCOUNTER — TELEPHONE (OUTPATIENT)
Dept: BREAST CENTER | Age: 73
End: 2023-09-01

## 2023-09-01 DIAGNOSIS — C77.3 CARCINOMA OF LEFT BREAST METASTATIC TO AXILLARY LYMPH NODE (HCC): Primary | ICD-10-CM

## 2023-09-01 DIAGNOSIS — C50.912 CARCINOMA OF LEFT BREAST METASTATIC TO AXILLARY LYMPH NODE (HCC): Primary | ICD-10-CM

## 2023-09-01 NOTE — TELEPHONE ENCOUNTER
Notified  that we added on a nurse-visit same day she sees Dr. Dolly Lopez on 9/8. We will be able to clip her skin biopsy suture(s) same day.

## 2023-09-06 ENCOUNTER — TELEPHONE (OUTPATIENT)
Dept: BREAST CENTER | Age: 73
End: 2023-09-06

## 2023-09-07 ENCOUNTER — TELEPHONE (OUTPATIENT)
Dept: BREAST CENTER | Age: 73
End: 2023-09-07

## 2023-09-07 ENCOUNTER — HOSPITAL ENCOUNTER (OUTPATIENT)
Dept: MRI IMAGING | Age: 73
Discharge: HOME | End: 2023-09-09

## 2023-09-07 DIAGNOSIS — C50.912 CARCINOMA OF LEFT BREAST METASTATIC TO AXILLARY LYMPH NODE (HCC): ICD-10-CM

## 2023-09-07 DIAGNOSIS — C77.3 CARCINOMA OF LEFT BREAST METASTATIC TO AXILLARY LYMPH NODE (HCC): ICD-10-CM

## 2023-09-07 NOTE — TELEPHONE ENCOUNTER
RN received call from Jovita in [address]. Patient came to have MRI performed today 9/7/2023. Patient is wheelchair bound and unable to get up the steps to have breast MRI performed. Patient  is unable to get patient on table as its a liability. RN verbalized understanding and advised would notify Librado Essex of information.        Electronically signed by David Preciado RN on 9/7/23 at 8:48 AM EDT

## 2023-09-08 ENCOUNTER — HOSPITAL ENCOUNTER (OUTPATIENT)
Dept: INFUSION THERAPY | Age: 73
Discharge: HOME | End: 2023-09-08
Payer: MEDICARE

## 2023-09-08 ENCOUNTER — TELEPHONE (OUTPATIENT)
Dept: BREAST CENTER | Age: 73
End: 2023-09-08

## 2023-09-08 ENCOUNTER — OFFICE VISIT (OUTPATIENT)
Dept: ONCOLOGY | Age: 73
End: 2023-09-08
Payer: MEDICARE

## 2023-09-08 ENCOUNTER — NURSE ONLY (OUTPATIENT)
Dept: BREAST CENTER | Age: 73
End: 2023-09-08

## 2023-09-08 VITALS
WEIGHT: 121 LBS | SYSTOLIC BLOOD PRESSURE: 130 MMHG | TEMPERATURE: 97.7 F | OXYGEN SATURATION: 100 % | BODY MASS INDEX: 22.26 KG/M2 | HEIGHT: 62 IN | DIASTOLIC BLOOD PRESSURE: 61 MMHG | HEART RATE: 87 BPM

## 2023-09-08 DIAGNOSIS — Z85.3 PERSONAL HISTORY OF BREAST CANCER: Primary | ICD-10-CM

## 2023-09-08 DIAGNOSIS — Z85.3 PERSONAL HISTORY OF BREAST CANCER: ICD-10-CM

## 2023-09-08 DIAGNOSIS — C50.911 MALIGNANT NEOPLASM OF RIGHT BREAST IN FEMALE, ESTROGEN RECEPTOR NEGATIVE, UNSPECIFIED SITE OF BREAST (HCC): Primary | ICD-10-CM

## 2023-09-08 DIAGNOSIS — C50.912 CARCINOMA OF LEFT BREAST METASTATIC TO AXILLARY LYMPH NODE (HCC): ICD-10-CM

## 2023-09-08 DIAGNOSIS — Z17.1 MALIGNANT NEOPLASM OF RIGHT BREAST IN FEMALE, ESTROGEN RECEPTOR NEGATIVE, UNSPECIFIED SITE OF BREAST (HCC): Primary | ICD-10-CM

## 2023-09-08 DIAGNOSIS — C77.3 CARCINOMA OF LEFT BREAST METASTATIC TO AXILLARY LYMPH NODE (HCC): ICD-10-CM

## 2023-09-08 LAB
ALBUMIN SERPL-MCNC: 4.2 G/DL (ref 3.5–5.2)
ALP SERPL-CCNC: 75 U/L (ref 35–104)
ALT SERPL-CCNC: 14 U/L (ref 0–32)
ANION GAP SERPL CALCULATED.3IONS-SCNC: 16 MMOL/L (ref 7–16)
AST SERPL-CCNC: 18 U/L (ref 0–31)
BASOPHILS # BLD: 0.04 K/UL (ref 0–0.2)
BASOPHILS NFR BLD: 1 % (ref 0–2)
BILIRUB SERPL-MCNC: 0.2 MG/DL (ref 0–1.2)
BUN SERPL-MCNC: 19 MG/DL (ref 6–23)
CALCIUM SERPL-MCNC: 9.5 MG/DL (ref 8.6–10.2)
CHLORIDE SERPL-SCNC: 98 MMOL/L (ref 98–107)
CO2 SERPL-SCNC: 29 MMOL/L (ref 22–29)
CREAT SERPL-MCNC: 0.8 MG/DL (ref 0.5–1)
EOSINOPHIL # BLD: 0.2 K/UL (ref 0.05–0.5)
EOSINOPHILS RELATIVE PERCENT: 5 % (ref 0–6)
ERYTHROCYTE [DISTWIDTH] IN BLOOD BY AUTOMATED COUNT: 15 % (ref 11.5–15)
GFR SERPL CREATININE-BSD FRML MDRD: >60 ML/MIN/1.73M2
GLUCOSE SERPL-MCNC: 166 MG/DL (ref 74–99)
HCT VFR BLD AUTO: 37.2 % (ref 34–48)
HGB BLD-MCNC: 11.6 G/DL (ref 11.5–15.5)
IMM GRANULOCYTES # BLD AUTO: 0.03 K/UL (ref 0–0.58)
IMM GRANULOCYTES NFR BLD: 1 % (ref 0–5)
LYMPHOCYTES NFR BLD: 0.68 K/UL (ref 1.5–4)
LYMPHOCYTES RELATIVE PERCENT: 16 % (ref 20–42)
MCH RBC QN AUTO: 28.9 PG (ref 26–35)
MCHC RBC AUTO-ENTMCNC: 31.2 G/DL (ref 32–34.5)
MCV RBC AUTO: 92.5 FL (ref 80–99.9)
MONOCYTES NFR BLD: 0.38 K/UL (ref 0.1–0.95)
MONOCYTES NFR BLD: 9 % (ref 2–12)
NEUTROPHILS NFR BLD: 70 % (ref 43–80)
NEUTS SEG NFR BLD: 3.03 K/UL (ref 1.8–7.3)
PLATELET # BLD AUTO: 212 K/UL (ref 130–450)
PMV BLD AUTO: 10.7 FL (ref 7–12)
POTASSIUM SERPL-SCNC: 3.4 MMOL/L (ref 3.5–5)
PROT SERPL-MCNC: 7.2 G/DL (ref 6.4–8.3)
RBC # BLD AUTO: 4.02 M/UL (ref 3.5–5.5)
SODIUM SERPL-SCNC: 143 MMOL/L (ref 132–146)
SURGICAL PATHOLOGY REPORT: NORMAL
WBC OTHER # BLD: 4.4 K/UL (ref 4.5–11.5)

## 2023-09-08 PROCEDURE — 3017F COLORECTAL CA SCREEN DOC REV: CPT | Performed by: INTERNAL MEDICINE

## 2023-09-08 PROCEDURE — G8400 PT W/DXA NO RESULTS DOC: HCPCS | Performed by: INTERNAL MEDICINE

## 2023-09-08 PROCEDURE — 85025 COMPLETE CBC W/AUTO DIFF WBC: CPT

## 2023-09-08 PROCEDURE — 4004F PT TOBACCO SCREEN RCVD TLK: CPT | Performed by: INTERNAL MEDICINE

## 2023-09-08 PROCEDURE — 3078F DIAST BP <80 MM HG: CPT | Performed by: INTERNAL MEDICINE

## 2023-09-08 PROCEDURE — 3074F SYST BP LT 130 MM HG: CPT | Performed by: INTERNAL MEDICINE

## 2023-09-08 PROCEDURE — 80053 COMPREHEN METABOLIC PANEL: CPT

## 2023-09-08 PROCEDURE — 99212 OFFICE O/P EST SF 10 MIN: CPT

## 2023-09-08 PROCEDURE — 1123F ACP DISCUSS/DSCN MKR DOCD: CPT | Performed by: INTERNAL MEDICINE

## 2023-09-08 PROCEDURE — 36415 COLL VENOUS BLD VENIPUNCTURE: CPT

## 2023-09-08 PROCEDURE — 99214 OFFICE O/P EST MOD 30 MIN: CPT | Performed by: INTERNAL MEDICINE

## 2023-09-08 PROCEDURE — G8420 CALC BMI NORM PARAMETERS: HCPCS | Performed by: INTERNAL MEDICINE

## 2023-09-08 PROCEDURE — G8427 DOCREV CUR MEDS BY ELIG CLIN: HCPCS | Performed by: INTERNAL MEDICINE

## 2023-09-08 PROCEDURE — 1090F PRES/ABSN URINE INCON ASSESS: CPT | Performed by: INTERNAL MEDICINE

## 2023-09-08 NOTE — PROGRESS NOTES
Patient was seen in Dr. Luis Carlos Espinoza office for a suture removal of the right axilla. Patient skin was intact and well approximated. Suture was clipped and removed. Patient tolerated procedure. RN also went over all PET-CT prep instructions and patient verbalized understanding.         Electronically signed by Apryl Bland RN on 9/8/23 at 12:45 PM EDT

## 2023-09-08 NOTE — PROGRESS NOTES
Department of [facility] Oncology     Attending Clinic Note    Reason for Visit: Follow-up on a patient with Right Breast Cancer    PCP:  Claire Curry    History of Present Illness: The mass was located in the 12 o'clock position of the right breast.     Breast cancer risk factors include age, gender, mother with breast cancer. Age of menarche was 16. Total hysterectomy at age 53. Patient was on hormonal therapy, patient unsure of how long. Patient is . Age of first live birth was 33. Patient did not breast feed. Bilateral screening mammogram on 2018:  Probable 15 mm thick soft tissue mass in the 12:00 position of the right breast.     Right Diagnostic Mammogram on 2018:  there is an 18 mm mass in the 12:00 position of the right breast consistent with carcinoma until proven otherwise. There is also an abnormal right axillary lymph node. Right Breast U/S on 2018:  18 mm mass in the 12:00 position of the right breast consistent with carcinoma until proven otherwise. Enlarged right axillary lymph node. On 04/10/2018:  A. Right breast, core biopsy, slides for review ( A): Poorly differentiated ductal carcinoma, [place] score 3+3+2 = 8.  B. Right lymph node, core biopsy, slides for review ( B):  Metastatic poorly differentiated carcinoma    Comment:   Per report from [facility] laboratory:  Estrogen receptor: Negative  Progesterone receptor: Negative  HER-2/jocelyn status: Negative (0)  Enclosed keratin immunohistochemical stains are positive    Stage IIIA T2 N2 M0 IDC Grade 3   Triple Negative Right Breast Cancer: We recommended neoadjuvant chemotherapy consisting of Dose-Dense AC-T. Side effects of AC-T reviewed with patient. She agreed to proceed. Mediport was placed. 2018 2D-Echo: EF 60%. Cycle # 1 Dose-Dense AC was on 2018. Cycle # 2 Dose-Dense AC was on 2018. Cycle # 3 Dose-Dense AC was on 2018.     Cycle # 4 Dose-Dense AC was on

## 2023-09-08 NOTE — TELEPHONE ENCOUNTER
Patient is scheduled for PET scan 9/22/23 @ 11:00am / Arrival 10:00am  [address] [facility] --- Instructions will be given to patient

## 2023-09-11 DIAGNOSIS — Z17.1 MALIGNANT NEOPLASM OF RIGHT BREAST IN FEMALE, ESTROGEN RECEPTOR NEGATIVE, UNSPECIFIED SITE OF BREAST (HCC): Primary | ICD-10-CM

## 2023-09-11 DIAGNOSIS — C50.911 MALIGNANT NEOPLASM OF RIGHT BREAST IN FEMALE, ESTROGEN RECEPTOR NEGATIVE, UNSPECIFIED SITE OF BREAST (HCC): Primary | ICD-10-CM

## 2023-09-11 NOTE — PROGRESS NOTES
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 This would also be metastatic.     I informed him it is likely that the LEFT node is a contralateral met    I do not believe surgery is beneficial    I placed referrals to med and rad onc

## 2023-09-12 ENCOUNTER — TELEPHONE (OUTPATIENT)
Dept: CASE MANAGEMENT | Age: 73
End: 2023-09-12

## 2023-09-12 NOTE — TELEPHONE ENCOUNTER
Dr. Branden Hall requesting [facility] CDx and PDL1 with CPS score on pathology from 7/6/23. Order completed via online portal. Copy of patient's insurance card, face sheet, and path report along with test requisition form  was submitted via online portal to [facility]. Email confirmation received of order. Order number [id number]. [facility] is to obtain specimen. All forms given to Dr. Pancho Mcguire office staff to be scanned into patient's EMR. [Alert] : alert [Acute Distress] : no acute distress [Normocephalic] : normocephalic [Flat Open Anterior Wilson] : flat open anterior fontanelle [Icteric sclera] : nonicteric sclera [PERRL] : PERRL [Red Reflex Bilateral] : red reflex bilateral [Normally Placed Ears] : normally placed ears [Auricles Well Formed] : auricles well formed [Clear Tympanic membranes] : clear tympanic membranes [Light reflex present] : light reflex present [Bony structures visible] : bony structures visible [Patent Auditory Canal] : patent auditory canal [Discharge] : no discharge [Nares Patent] : nares patent [Palate Intact] : palate intact [Uvula Midline] : uvula midline [Supple, full passive range of motion] : supple, full passive range of motion [Palpable Masses] : no palpable masses [Symmetric Chest Rise] : symmetric chest rise [Clear to Auscultation Bilaterally] : clear to auscultation bilaterally [Regular Rate and Rhythm] : regular rate and rhythm [S1, S2 present] : S1, S2 present [Murmurs] : no murmurs [+2 Femoral Pulses] : +2 femoral pulses [Soft] : soft [Tender] : nontender [Distended] : not distended [Bowel Sounds] : bowel sounds present [Umbilical Stump Dry, Clean, Intact] : umbilical stump dry, clean, intact [Hepatomegaly] : no hepatomegaly [Splenomegaly] : no splenomegaly [Normal external genitailia] : normal external genitalia [Circumcised] : not circumcised [Central Urethral Opening] : central urethral opening [Testicles Descended Bilaterally] : testicles descended bilaterally [Patent] : patent [Normally Placed] : normally placed [No Abnormal Lymph Nodes Palpated] : no abnormal lymph nodes palpated [Fallon-Ortolani] : negative Fallon-Ortolani [Symmetric Flexed Extremities] : symmetric flexed extremities [Spinal Dimple] : no spinal dimple [Tuft of Hair] : no tuft of hair [Startle Reflex] : startle reflex present [Suck Reflex] : suck reflex present [Rooting] : rooting reflex present [Palmar Grasp] : palmar grasp present [Plantar Grasp] : plantar reflex present [Symmetric Meka] : symmetric [place] [Jaundice] : not jaundice

## 2023-09-22 ENCOUNTER — HOSPITAL ENCOUNTER (OUTPATIENT)
Dept: PET IMAGING | Age: 73
End: 2023-09-22
Payer: MEDICARE

## 2023-09-22 LAB — GLUCOSE BLD-MCNC: 108 MG/DL (ref 74–99)

## 2023-09-22 PROCEDURE — 78815 PET IMAGE W/CT SKULL-THIGH: CPT

## 2023-09-22 PROCEDURE — 82962 GLUCOSE BLOOD TEST: CPT

## 2023-09-22 PROCEDURE — A9552 F18 FDG: HCPCS | Performed by: RADIOLOGY

## 2023-09-22 PROCEDURE — 3430000000 HC RX DIAGNOSTIC RADIOPHARMACEUTICAL: Performed by: RADIOLOGY

## 2023-09-22 RX ORDER — FLUDEOXYGLUCOSE F 18 200 MCI/ML
15 INJECTION, SOLUTION INTRAVENOUS
Status: COMPLETED | OUTPATIENT
Start: 2023-09-22 | End: 2023-09-22

## 2023-09-22 RX ADMIN — FLUDEOXYGLUCOSE F 18 15 MILLICURIE: 200 INJECTION, SOLUTION INTRAVENOUS at 10:15

## 2023-09-26 ENCOUNTER — HOSPITAL ENCOUNTER (OUTPATIENT)
Dept: RADIATION ONCOLOGY | Age: 73
Discharge: HOME | End: 2023-09-26
Payer: MEDICARE

## 2023-09-26 ENCOUNTER — HOSPITAL ENCOUNTER (OUTPATIENT)
Dept: MRI IMAGING | Age: 73
Discharge: HOME | End: 2023-09-28
Attending: INTERNAL MEDICINE
Payer: MEDICARE

## 2023-09-26 ENCOUNTER — TELEPHONE (OUTPATIENT)
Dept: CASE MANAGEMENT | Age: 73
End: 2023-09-26

## 2023-09-26 VITALS
SYSTOLIC BLOOD PRESSURE: 151 MMHG | HEART RATE: 77 BPM | TEMPERATURE: 97.2 F | RESPIRATION RATE: 18 BRPM | DIASTOLIC BLOOD PRESSURE: 68 MMHG

## 2023-09-26 DIAGNOSIS — Z17.1 MALIGNANT NEOPLASM OF CENTRAL PORTION OF RIGHT BREAST IN FEMALE, ESTROGEN RECEPTOR NEGATIVE (HCC): Primary | ICD-10-CM

## 2023-09-26 DIAGNOSIS — C77.3 CARCINOMA OF LEFT BREAST METASTATIC TO AXILLARY LYMPH NODE (HCC): Primary | ICD-10-CM

## 2023-09-26 DIAGNOSIS — C50.912 CARCINOMA OF LEFT BREAST METASTATIC TO AXILLARY LYMPH NODE (HCC): Primary | ICD-10-CM

## 2023-09-26 DIAGNOSIS — C77.3 SECONDARY MALIGNANCY OF AXILLARY NODE (HCC): ICD-10-CM

## 2023-09-26 DIAGNOSIS — Z85.3 PERSONAL HISTORY OF BREAST CANCER: ICD-10-CM

## 2023-09-26 DIAGNOSIS — C50.111 MALIGNANT NEOPLASM OF CENTRAL PORTION OF RIGHT BREAST IN FEMALE, ESTROGEN RECEPTOR NEGATIVE (HCC): Primary | ICD-10-CM

## 2023-09-26 PROCEDURE — 70551 MRI BRAIN STEM W/O DYE: CPT

## 2023-09-26 PROCEDURE — 99205 OFFICE O/P NEW HI 60 MIN: CPT

## 2023-09-26 PROCEDURE — 99205 OFFICE O/P NEW HI 60 MIN: CPT | Performed by: RADIOLOGY

## 2023-09-26 PROCEDURE — 77470 SPECIAL RADIATION TREATMENT: CPT | Performed by: RADIOLOGY

## 2023-09-26 ASSESSMENT — PAIN DESCRIPTION - PAIN TYPE: TYPE: CHRONIC PAIN

## 2023-09-26 ASSESSMENT — PAIN DESCRIPTION - FREQUENCY: FREQUENCY: INTERMITTENT

## 2023-09-26 ASSESSMENT — PAIN DESCRIPTION - LOCATION: LOCATION: ARM

## 2023-09-26 NOTE — PROGRESS NOTES
Radiation Oncology Consult Note         9/26/2023    Mehreen Willard  68 y.o.   1950    REFERRING PROVIDER: Kevin Chamorro    PCP:  Claire Curry    CHIEF COMPLAINT: I hava a Hx of Right breast cancer, now my cancer spread to my left axilla    DIAGNOSIS:  1) clinical stage T2 N2 M0 triple negative invasive ductal carcinoma of the right breast, SP neoadjuvant chemotherapy, right mastectomy and axillary dissection stage ypT0 ypN2, M0.  2) esequiel metastasis at the level of the left axilla, skin is metastasis at the level of the right arm    STAGING:  Cancer Staging   Breast cancer metastasized to axillary lymph node, right ([address])  Staging form: Breast, AJCC 8th Edition  - Clinical: No stage assigned - Unsigned  - Pathologic: No Stage Recommended (ypT0, pN2, cM0, G3, ER-, WI-, HER2-) - Signed by Susanna Mackey MD on 3/15/2019      HISTORY OF PRESENT ILLNESS: Ms. Mehreen Willard  is a 68y.o. year old female, who has past history positive for a stage IIIA T2 N2 M0 IDC Grade 3   Triple Negative Right Breast Cancer: She underwent neoadjuvant chemotherapy consisting of Dose-Dense AC-T. According to the following scheme:  Cycle # 1 Dose-Dense AC was on 06/21/2018. Cycle # 2 Dose-Dense AC was on 07/05/2018. Cycle # 3 Dose-Dense AC was on 07/19/2018. Cycle # 4 Dose-Dense AC was on 08/02/2018. Cycle # 1 Dose-Dense Taxol was on 08/16/2018. Cycle # 2 Dose-Dense Taxol was on 08/30/2018. Cycle # 3 Dose Dense Taxol was on 09/13/2018. She underwent  right breast simple mastectomy, blue dye injection, right axillary dissection on February 27, 2019. Pathological evaluation completed at [facility]:  B. Right breast, mastectomy: Biopsy site; negative for residual carcinoma. Skin with seborrheic keratosis. C.  Right axillary contents, regional resection: 5 of 7 lymph nodes positive for metastatic poorly differentiated mammary carcinoma (grade 3). Size of largest metastatic deposit-1.3 cm.   Comment: The tumor cells in part C are
due to patient already being seen by PT.           [address]    The patient reports the following signs/symptoms of lymphedema: None    Please ask the provider to assess patient for lymphedema for any reported signs or symptoms so a referral to Lymphedema Therapy can be considered. PELVIC FLOOR DYSFUNCTION SCREENING ASSESSMENT    - Do you have any pelvic pain? No     - Do you have any fecal constipation or fecal incontinence? Yes/ diarrhea off and on     - Do you have any urinary incontinence or difficulty starting to urinate? Yes     ARE ANY OF THE ABOVE ARE ANSWERED YES: Yes - but NO PT referral request sent due to R/T her stroke. PREHAB AUDIOLOGY REFERRAL    - Is patient planned to receive Cisplatin? No. This patient is not planned to start Cisplatin. - Is patient planned to receive radiation therapy that may be directed toward auditory canals or nerves? No. Patient is not planned to start radiation therapy to auditory canals or nerves. - Is patient complaining of new onset hearing loss? No. Patient is not complaining of new onset hearing loss. Patient education given on reviewed side effects of radiation therapy and fall prevention utilizing slides and handouts. The patient expresses understanding and acceptance of instructions.  Gurdeep Carolina RN 9/26/2023 9:23 AM           Gurdeep Carolina RN

## 2023-09-26 NOTE — TELEPHONE ENCOUNTER
Met with patient during her initial consultation with Dr. Krissy Hernandez for radiation therapy for her metastatic breast cancer diagnosis. Introduced myself and explained my role with cancer patients receiving treatment within our cancer centers. Patient was friendly and receptive. States that all of her questions and concerns were fully addressed during her consultation appointment with the radiation therapy nurse and physician. Denies any problems with insurance coverage for medication or transportation for the daily treatments. Reviewed additional ancillary services available at the center. Denies any referral needs at this time. Patient follows with Dr. Tres East at the [facility] location. She will be contacted for her simulation. Patient provided with written literature of Patient Resource Booklet: Breast Cancer, ACS Booklet: Radiation Therapy: What It Is, How It Helps, [facility] [employer] [facility], and [employer]. Provided patient with my contact information, office hours, and encouragement to call me with questions or concerns. Patient verbalizes understanding and appreciative of nurse navigator visit. Emotional support provided and greater than 25 minutes spent with patient. Nurse navigator will continue to follow.  SHERRY ElkinsN, RN, Oncology Nurse Navigator

## 2023-09-29 ENCOUNTER — OFFICE VISIT (OUTPATIENT)
Dept: ONCOLOGY | Age: 73
End: 2023-09-29

## 2023-09-29 ENCOUNTER — HOSPITAL ENCOUNTER (OUTPATIENT)
Dept: INFUSION THERAPY | Age: 73
Discharge: HOME | End: 2023-09-29
Payer: MEDICARE

## 2023-09-29 VITALS
DIASTOLIC BLOOD PRESSURE: 68 MMHG | SYSTOLIC BLOOD PRESSURE: 150 MMHG | OXYGEN SATURATION: 98 % | TEMPERATURE: 97.4 F | HEART RATE: 75 BPM | WEIGHT: 117.6 LBS | BODY MASS INDEX: 21.51 KG/M2

## 2023-09-29 DIAGNOSIS — Z85.3 PERSONAL HISTORY OF BREAST CANCER: ICD-10-CM

## 2023-09-29 DIAGNOSIS — C50.912 CARCINOMA OF LEFT BREAST METASTATIC TO AXILLARY LYMPH NODE (HCC): ICD-10-CM

## 2023-09-29 DIAGNOSIS — C77.3 CARCINOMA OF LEFT BREAST METASTATIC TO AXILLARY LYMPH NODE (HCC): ICD-10-CM

## 2023-09-29 LAB
ALBUMIN SERPL-MCNC: 3.8 G/DL (ref 3.5–5.2)
ALP SERPL-CCNC: 65 U/L (ref 35–104)
ALT SERPL-CCNC: 15 U/L (ref 0–32)
ANION GAP SERPL CALCULATED.3IONS-SCNC: 8 MMOL/L (ref 7–16)
AST SERPL-CCNC: 19 U/L (ref 0–31)
BASOPHILS # BLD: 0.04 K/UL (ref 0–0.2)
BASOPHILS NFR BLD: 1 % (ref 0–2)
BILIRUB SERPL-MCNC: 0.2 MG/DL (ref 0–1.2)
BUN SERPL-MCNC: 12 MG/DL (ref 6–23)
CALCIUM SERPL-MCNC: 9.1 MG/DL (ref 8.6–10.2)
CHLORIDE SERPL-SCNC: 104 MMOL/L (ref 98–107)
CO2 SERPL-SCNC: 27 MMOL/L (ref 22–29)
CREAT SERPL-MCNC: 0.8 MG/DL (ref 0.5–1)
EOSINOPHIL # BLD: 0.08 K/UL (ref 0.05–0.5)
EOSINOPHILS RELATIVE PERCENT: 2 % (ref 0–6)
ERYTHROCYTE [DISTWIDTH] IN BLOOD BY AUTOMATED COUNT: 14.3 % (ref 11.5–15)
GFR SERPL CREATININE-BSD FRML MDRD: >60 ML/MIN/1.73M2
GLUCOSE SERPL-MCNC: 146 MG/DL (ref 74–99)
HCT VFR BLD AUTO: 35.8 % (ref 34–48)
HGB BLD-MCNC: 11.5 G/DL (ref 11.5–15.5)
LYMPHOCYTES NFR BLD: 0.66 K/UL (ref 1.5–4)
LYMPHOCYTES RELATIVE PERCENT: 15 % (ref 20–42)
MCH RBC QN AUTO: 29.3 PG (ref 26–35)
MCHC RBC AUTO-ENTMCNC: 32.1 G/DL (ref 32–34.5)
MCV RBC AUTO: 91.1 FL (ref 80–99.9)
MONOCYTES NFR BLD: 0.19 K/UL (ref 0.1–0.95)
MONOCYTES NFR BLD: 4 % (ref 2–12)
NEUTROPHILS NFR BLD: 78 % (ref 43–80)
NEUTS SEG NFR BLD: 3.44 K/UL (ref 1.8–7.3)
PLATELET # BLD AUTO: 159 K/UL (ref 130–450)
PMV BLD AUTO: 10 FL (ref 7–12)
POTASSIUM SERPL-SCNC: 3.9 MMOL/L (ref 3.5–5)
PROT SERPL-MCNC: 6.7 G/DL (ref 6.4–8.3)
RBC # BLD AUTO: 3.93 M/UL (ref 3.5–5.5)
RBC # BLD: ABNORMAL 10*6/UL
SODIUM SERPL-SCNC: 139 MMOL/L (ref 132–146)
WBC OTHER # BLD: 4.4 K/UL (ref 4.5–11.5)

## 2023-09-29 PROCEDURE — 36415 COLL VENOUS BLD VENIPUNCTURE: CPT

## 2023-09-29 PROCEDURE — 80053 COMPREHEN METABOLIC PANEL: CPT

## 2023-09-29 PROCEDURE — 85025 COMPLETE CBC W/AUTO DIFF WBC: CPT

## 2023-09-29 NOTE — PROGRESS NOTES
consistent with breast origin, extensive involving of the esequiel tissue, ER negative less than 1%, VA negative less than 1%, and HER2 negative, 0 by IHC. The patient has a skin lesion on her right arm, which was biopsied on 8/31/2023, results are pending. The patient likely has a recurrent metastatic triple negative breast cancer, rather than a second primary, there was no evidence of abnormalities in the left breast, diagnosis and prognosis were discussed with the patient and her spouse. The patient had a brain MRI done on 9/26/2023, revealing changes from the stroke, no evidence of brain metastasis. Scan was done on 9/1/2023, revealing extensive left axillary lymph level 1 and 2 lymphadenopathy which demonstrates mild to moderate uptake, consistent with biopsy-proven metastatic disease, the result/images were reviewed with the patient and her spouse. The patient has decreased activity since her stroke, her spouse is with her and is very supportive, reviewed options moving forward, surgery is not recommended, we discussed possibly radiation therapy to the left axilla, the patient met with Dr. Christiane Schafer, she was recommended radiation therapy, which is being planned. The patient had foundation 1 done and PD-L1 testing, no actionable mutations, his PD-L1 CPS score is 5 0, he is a candidate for treatment with chemoimmunotherapy, Abraxane and Keytruda, the side effects and the schedule of treatment were reviewed with the patient and her spouse, we also discussed possibly following her with imaging studies after the radiation therapy and starting systemic upon disease progression. RTC in 7 to 8 weeks.     Jodi Rizvi MD   HEMATOLOGY/MEDICAL [facility]  [address] [facility] MED ONCOLOGY  [address] [address] [address]  Dept: [address] [place] [address] Avenue: [phone]

## 2023-10-11 ENCOUNTER — HOSPITAL ENCOUNTER (OUTPATIENT)
Dept: RADIATION ONCOLOGY | Age: 73
End: 2023-10-11
Payer: MEDICARE

## 2023-10-11 ENCOUNTER — HOSPITAL ENCOUNTER (OUTPATIENT)
Dept: RADIATION ONCOLOGY | Age: 73
Discharge: HOME | End: 2023-10-11
Payer: MEDICARE

## 2023-10-12 ENCOUNTER — HOSPITAL ENCOUNTER (OUTPATIENT)
Dept: CT IMAGING | Age: 73
Discharge: HOME | End: 2023-10-14

## 2023-10-12 ENCOUNTER — APPOINTMENT (OUTPATIENT)
Dept: RADIATION ONCOLOGY | Age: 73
End: 2023-10-12
Payer: MEDICARE

## 2023-10-12 ENCOUNTER — HOSPITAL ENCOUNTER (OUTPATIENT)
Dept: RADIATION ONCOLOGY | Age: 73
Discharge: HOME | End: 2023-10-12
Payer: MEDICARE

## 2023-10-25 ENCOUNTER — HOSPITAL ENCOUNTER (OUTPATIENT)
Dept: RADIATION ONCOLOGY | Age: 73
Discharge: HOME | End: 2023-10-25
Payer: MEDICARE

## 2023-10-25 PROCEDURE — 77301 RADIOTHERAPY DOSE PLAN IMRT: CPT | Performed by: RADIOLOGY

## 2023-10-25 PROCEDURE — 77334 RADIATION TREATMENT AID(S): CPT | Performed by: RADIOLOGY

## 2023-10-25 PROCEDURE — 77300 RADIATION THERAPY DOSE PLAN: CPT | Performed by: RADIOLOGY

## 2023-10-25 PROCEDURE — 77338 DESIGN MLC DEVICE FOR IMRT: CPT | Performed by: RADIOLOGY

## 2023-10-31 ENCOUNTER — TELEPHONE (OUTPATIENT)
Dept: RADIATION ONCOLOGY | Age: 73
End: 2023-10-31

## 2023-10-31 NOTE — TELEPHONE ENCOUNTER
Spoke to Ngozi Burns , [place] states Dr Jessica Sampson will see patient after radiation treatment completed. I will update Dr Susie Powers. I spoke to patients  and he states \" in my understanding patient is to start Chemo pills after radiation completed\".

## 2023-11-01 ENCOUNTER — HOSPITAL ENCOUNTER (OUTPATIENT)
Dept: RADIATION ONCOLOGY | Age: 73
Discharge: HOME | End: 2023-11-01
Payer: MEDICARE

## 2023-11-01 ENCOUNTER — TELEPHONE (OUTPATIENT)
Dept: INFUSION THERAPY | Age: 73
End: 2023-11-01

## 2023-11-01 VITALS
RESPIRATION RATE: 18 BRPM | DIASTOLIC BLOOD PRESSURE: 64 MMHG | TEMPERATURE: 97.6 F | HEART RATE: 91 BPM | SYSTOLIC BLOOD PRESSURE: 121 MMHG

## 2023-11-01 DIAGNOSIS — C77.3 BREAST CANCER METASTASIZED TO AXILLARY LYMPH NODE, RIGHT (HCC): Primary | ICD-10-CM

## 2023-11-01 DIAGNOSIS — C50.911 BREAST CANCER METASTASIZED TO AXILLARY LYMPH NODE, RIGHT (HCC): Primary | ICD-10-CM

## 2023-11-01 PROCEDURE — 77385 HC NTSTY MODUL RAD TX DLVR SMPL: CPT | Performed by: RADIOLOGY

## 2023-11-01 PROCEDURE — 77014 CHG CT GUIDANCE RADIATION THERAPY FLDS PLACEMENT: CPT | Performed by: RADIOLOGY

## 2023-11-01 RX ORDER — CAPECITABINE 500 MG/1
1000 TABLET, FILM COATED ORAL 2 TIMES DAILY
Qty: 80 TABLET | Refills: 0 | Status: ACTIVE | OUTPATIENT
Start: 2023-11-01

## 2023-11-01 NOTE — PROGRESS NOTES
DEPARTMENT OF RADIATION ONCOLOGY   ON TREATMENT VISIT       11/1/2023      NAME:  Peggy Abel    YOB: 1950    DIAGNOSIS: 1) clinical stage T2 N2 M0 triple negative invasive ductal carcinoma of the right breast, SP neoadjuvant chemotherapy, right mastectomy and axillary dissection stage ypT0 ypN2, M0.  2) esequiel metastasis at the level of the left axilla, skin is metastasis at the level of the right arm    SUBJECTIVE:   Lauren King has now received 266 cGy in 1 fractions directed to the to the left axilla and [address] in 1 fraction to the right upper. Past medical, surgical, social and family histories reviewed and updated as indicated. PAIN: No    ALLERGIES:  Avelox [moxifloxacin]         Current Outpatient Medications   Medication Sig Dispense Refill    meclizine (ANTIVERT) 12.5 MG tablet 1-2 tablets as needed Orally every 6 hours as needed for 90 days      acetaminophen (TYLENOL) 325 MG tablet Take 2 tablets by mouth every 6 hours as needed for Pain      carvedilol (COREG) 12.5 MG tablet Take 1 tablet by mouth 2 times daily (with meals) 60 tablet 3    vitamin D (CHOLECALCIFEROL) 50 MCG (2000 UT) TABS tablet Take 1 tablet by mouth daily 30 tablet 0    empagliflozin (JARDIANCE) 10 MG tablet Take 1 tablet by mouth daily 90 tablet 0    HYDROcodone-acetaminophen (NORCO) 5-325 MG per tablet Take 1 tablet by mouth every 8 hours as needed for Pain.      metFORMIN (GLUCOPHAGE) 1000 MG tablet Take 1 tablet by mouth 2 times daily (with meals)      baclofen (LIORESAL) 10 MG tablet Take 0.5 tablets by mouth 2 times daily      gabapentin (NEURONTIN) 100 MG capsule Take 2 capsules by mouth at bedtime.       pioglitazone (ACTOS) 30 MG tablet Take 1 tablet by mouth daily      atorvastatin (LIPITOR) 80 MG tablet Take 1 tablet by mouth nightly 30 tablet 3    acyclovir (ZOVIRAX) 400 MG tablet Take 1 tablet by mouth as needed      zolpidem (AMBIEN) 10 MG tablet Take 1 tablet by mouth.      escitalopram

## 2023-11-02 ENCOUNTER — APPOINTMENT (OUTPATIENT)
Dept: RADIATION ONCOLOGY | Age: 73
End: 2023-11-02
Payer: MEDICARE

## 2023-11-02 ENCOUNTER — HOSPITAL ENCOUNTER (OUTPATIENT)
Dept: RADIATION ONCOLOGY | Age: 73
Discharge: HOME | End: 2023-11-02
Payer: MEDICARE

## 2023-11-02 ENCOUNTER — TELEMEDICINE (OUTPATIENT)
Dept: ONCOLOGY | Age: 73
End: 2023-11-02

## 2023-11-02 DIAGNOSIS — Z85.3 PERSONAL HISTORY OF BREAST CANCER: Primary | ICD-10-CM

## 2023-11-02 PROCEDURE — 77385 HC NTSTY MODUL RAD TX DLVR SMPL: CPT | Performed by: RADIOLOGY

## 2023-11-02 NOTE — PROGRESS NOTES
in canal stenosis most severe at L2-L3  Bilateral foraminal narrowing    Neurosurgery team on board. CT guided T12 bone biopsy 09/23/2022:   Bone, T12 lesion, biopsy: Benign bone and unremarkable bone marrow, negative for malignancy   Comment:The patient's history of breast carcinoma is noted. Pankeratin and GATA3 immunostains are negative (show no evidence of metastatic carcinoma). CTA chest 06/16/2023:  No evidence of pulmonary embolism. Small bilateral pleural effusions with atelectatic changes seen lung bases and post radiation change seen within the right upper lobe. The patient was under the care of Dr. Tg Casas, today 9/8/2023 was the first office visit for the patient with me, the patient had a work-up for the left axilla lymphadenopathy, including an ultrasound-guided biopsy on 7/6/2023, pathology was consistent with metastatic poorly differentiated carcinoma, consistent with breast origin, extensive involving of the esequiel tissue, ER negative less than 1%, MS negative less than 1%, and HER2 negative, 0 by IHC. The patient has a skin lesion on her right arm, which was biopsied on 8/31/2023, results are pending. The patient likely has a recurrent metastatic triple negative breast cancer, rather than a second primary, there was no evidence of abnormalities in the left breast, diagnosis and prognosis were discussed with the patient and her spouse. The patient had a brain MRI done on 9/26/2023, revealing changes from the stroke, no evidence of brain metastasis. Scan was done on 9/1/2023, revealing extensive left axillary lymph level 1 and 2 lymphadenopathy which demonstrates mild to moderate uptake, consistent with biopsy-proven metastatic disease, the result/images were reviewed with the patient and her spouse.       The patient has decreased activity since her stroke, her spouse is with her and is very supportive, reviewed options moving forward, surgery is not recommended, we discussed

## 2023-11-03 ENCOUNTER — HOSPITAL ENCOUNTER (OUTPATIENT)
Dept: RADIATION ONCOLOGY | Age: 73
Discharge: HOME | End: 2023-11-03
Payer: MEDICARE

## 2023-11-03 PROCEDURE — 77385 HC NTSTY MODUL RAD TX DLVR SMPL: CPT | Performed by: RADIOLOGY

## 2023-11-06 ENCOUNTER — HOSPITAL ENCOUNTER (OUTPATIENT)
Dept: RADIATION ONCOLOGY | Age: 73
Discharge: HOME | End: 2023-11-06
Payer: MEDICARE

## 2023-11-06 PROCEDURE — 77014 CHG CT GUIDANCE RADIATION THERAPY FLDS PLACEMENT: CPT | Performed by: RADIOLOGY

## 2023-11-06 PROCEDURE — 77385 HC NTSTY MODUL RAD TX DLVR SMPL: CPT | Performed by: RADIOLOGY

## 2023-11-07 ENCOUNTER — HOSPITAL ENCOUNTER (OUTPATIENT)
Dept: RADIATION ONCOLOGY | Age: 73
Discharge: HOME | End: 2023-11-07
Payer: MEDICARE

## 2023-11-07 PROCEDURE — 77385 HC NTSTY MODUL RAD TX DLVR SMPL: CPT | Performed by: RADIOLOGY

## 2023-11-08 ENCOUNTER — HOSPITAL ENCOUNTER (OUTPATIENT)
Dept: RADIATION ONCOLOGY | Age: 73
Discharge: HOME | End: 2023-11-08
Payer: MEDICARE

## 2023-11-08 VITALS
DIASTOLIC BLOOD PRESSURE: 68 MMHG | SYSTOLIC BLOOD PRESSURE: 124 MMHG | HEART RATE: 84 BPM | TEMPERATURE: 96.8 F | RESPIRATION RATE: 18 BRPM

## 2023-11-08 DIAGNOSIS — C77.3 BREAST CANCER METASTASIZED TO AXILLARY LYMPH NODE, RIGHT (HCC): Primary | ICD-10-CM

## 2023-11-08 DIAGNOSIS — C50.911 BREAST CANCER METASTASIZED TO AXILLARY LYMPH NODE, RIGHT (HCC): Primary | ICD-10-CM

## 2023-11-08 PROCEDURE — 77385 HC NTSTY MODUL RAD TX DLVR SMPL: CPT | Performed by: RADIOLOGY

## 2023-11-08 ASSESSMENT — PAIN DESCRIPTION - LOCATION: LOCATION: ARM

## 2023-11-08 ASSESSMENT — PAIN DESCRIPTION - PAIN TYPE: TYPE: CHRONIC PAIN

## 2023-11-08 ASSESSMENT — PAIN DESCRIPTION - ORIENTATION: ORIENTATION: RIGHT

## 2023-11-08 NOTE — PROGRESS NOTES
Avis Abel  11/8/2023  9:54 AM      No chief complaint on file. Wt Readings from Last 3 Encounters:   09/29/23 53.3 kg (117 lb 9.6 oz)   09/08/23 54.9 kg (121 lb)   08/31/23 59 kg (130 lb)       Comments: 1596 to the left axilla. Patient is doing well without any complaints. She denies any headache nausea or vomiting. She has no shortness of breath cough or hemoptysis. She has no pain, weight loss or fatigue. Examination the skin over the treated area looks good.     Patient is tolerating treatment well      Plan: Radiation to continue as planned      Electronically signed by Karlene Hall MD on 11/8/23 at 9:54 AM EST

## 2023-11-09 ENCOUNTER — TELEPHONE (OUTPATIENT)
Dept: RADIATION ONCOLOGY | Age: 73
End: 2023-11-09

## 2023-11-09 ENCOUNTER — HOSPITAL ENCOUNTER (OUTPATIENT)
Dept: RADIATION ONCOLOGY | Age: 73
Discharge: HOME | End: 2023-11-09
Payer: MEDICARE

## 2023-11-09 PROCEDURE — 77385 HC NTSTY MODUL RAD TX DLVR SMPL: CPT | Performed by: RADIOLOGY

## 2023-11-09 NOTE — TELEPHONE ENCOUNTER
This RN received a call from Shawna Boom (patient's ) stating that Verenice probably had another stroke. He states after Avis's treatment they stopped in a restaurant to eat and he noticed that Verenice has difficulty picking up the spoon to eat. They left the restaurant and went to [facility]. Taya Alejandro states now she can't move her left arm and left leg. I told him that I appreciated his call to let us know, emotional support given, therapists and Dr Randy Luke updated.

## 2023-11-10 ENCOUNTER — APPOINTMENT (OUTPATIENT)
Dept: RADIATION ONCOLOGY | Age: 73
End: 2023-11-10
Payer: MEDICARE

## 2023-11-13 ENCOUNTER — TELEPHONE (OUTPATIENT)
Dept: INFUSION THERAPY | Age: 73
End: 2023-11-13

## 2023-11-13 ENCOUNTER — APPOINTMENT (OUTPATIENT)
Dept: RADIATION ONCOLOGY | Age: 73
End: 2023-11-13
Payer: MEDICARE

## 2023-11-13 NOTE — TELEPHONE ENCOUNTER
Patient's  Parker Menendez has reached out to [address] [place] [address] with questions. Eva Tirado from [address] [place] [address] reached out to our office to call patient's  back. This nurse has reached out. No answer. Message left for patient or Parker Menendez to call back. Our office phone # given on message.

## 2023-11-13 NOTE — TELEPHONE ENCOUNTER
Patients  returned call stating that patient is in [facility] with a stroke and then will go to rehab. He would like to know what happens with radiation and xeloda. Explained to the patient to reach out to radiation for further instructions on when she would be able to resume RT and I will discuss Xeloda with Dr Aiden Trejo, but patient is not to take medication until resumes radiation. Tawnya Second, patient's  states understanding.

## 2023-11-14 ENCOUNTER — APPOINTMENT (OUTPATIENT)
Dept: RADIATION ONCOLOGY | Age: 73
End: 2023-11-14
Payer: MEDICARE

## 2023-11-15 ENCOUNTER — HOSPITAL ENCOUNTER (OUTPATIENT)
Dept: RADIATION ONCOLOGY | Age: 73
Discharge: HOME | End: 2023-11-15
Payer: MEDICARE

## 2023-11-15 ENCOUNTER — APPOINTMENT (OUTPATIENT)
Dept: RADIATION ONCOLOGY | Age: 73
End: 2023-11-15
Payer: MEDICARE

## 2023-11-16 ENCOUNTER — TELEPHONE (OUTPATIENT)
Dept: CASE MANAGEMENT | Age: 73
End: 2023-11-16

## 2023-11-16 ENCOUNTER — TELEPHONE (OUTPATIENT)
Dept: RADIATION ONCOLOGY | Age: 73
End: 2023-11-16

## 2023-11-16 ENCOUNTER — APPOINTMENT (OUTPATIENT)
Dept: RADIATION ONCOLOGY | Age: 73
End: 2023-11-16
Payer: MEDICARE

## 2023-11-16 NOTE — TELEPHONE ENCOUNTER
Received a call from [place], [place] asking re:  if patient is re-starting her radiation treatment tomorrow. I spoke to Cuauhtemoc yoon, she states that  is suppose to call us to let us know if the facility is able to transport patient from the [address] [place] [address] where Leatha Vinson is a resident. Since that call the Nan did not call us back to give us an update, I relayed this information to [facility]. I then called Nan Rose @ 4:13 PM, no answer, I left a VM to please give our office or me a call to give us an update.

## 2023-11-16 NOTE — TELEPHONE ENCOUNTER
Received call from Lucretia López at [facility] requesting to find out if patient is able to get RT tomorrow per Dr Martha Thornton. I called [facility] and left message to return my call. I also called [facility] to confirm if they are providing transportation for patient to receive treatment. Patient is currently there for rehab. I was unable to speak to anyone, so I left my contact information for return call. I also called [facility]  tech number and spoke to Colby. She states that patient  was to call them once patient could resume treatment. She will call  to follow up. Patient is on schedule for tomorrow 11-17-23.

## 2023-11-17 ENCOUNTER — TELEPHONE (OUTPATIENT)
Dept: INFUSION THERAPY | Age: 73
End: 2023-11-17

## 2023-11-17 ENCOUNTER — APPOINTMENT (OUTPATIENT)
Dept: RADIATION ONCOLOGY | Age: 73
End: 2023-11-17
Payer: MEDICARE

## 2023-11-17 ENCOUNTER — TELEPHONE (OUTPATIENT)
Dept: RADIATION ONCOLOGY | Age: 73
End: 2023-11-17

## 2023-11-17 NOTE — TELEPHONE ENCOUNTER
Patient's  has called and stated that the facility, The nurse at [facility] rehab, states they will not give \"chemo pill\" Xeloda, at their facility so they do not expose the other patients. After reviewed with Dr Yessi Charles and Tushar Bryant, RN, NN, patient's  to contact the Director of Nursing at the facility to see if they can give, or have spouse give Xeloda and also to transfer patient to Radiation. Patient's  will let us know.

## 2023-11-17 NOTE — TELEPHONE ENCOUNTER
Lb Jaguar (patient's ) returned my call this morning stating that [facility] might be able to provide transport for Verenice but he was not told for sure. I stressed it to Lb Jaguar that Verenice is not scheduled to resume radiation treatment until we know for sure that she has transportation arranged. He states he will call me back when he get information from the  at [facility]. He states he will also call Dr Bonilla's office today.

## 2023-11-19 ENCOUNTER — APPOINTMENT (OUTPATIENT)
Dept: RADIATION ONCOLOGY | Age: 73
End: 2023-11-19
Payer: MEDICARE

## 2023-11-20 ENCOUNTER — APPOINTMENT (OUTPATIENT)
Dept: RADIATION ONCOLOGY | Age: 73
End: 2023-11-20
Payer: MEDICARE

## 2023-11-21 ENCOUNTER — APPOINTMENT (OUTPATIENT)
Dept: RADIATION ONCOLOGY | Age: 73
End: 2023-11-21
Payer: MEDICARE

## 2023-11-21 PROCEDURE — 77014 CHG CT GUIDANCE RADIATION THERAPY FLDS PLACEMENT: CPT | Performed by: RADIOLOGY

## 2023-11-21 PROCEDURE — 77385 HC NTSTY MODUL RAD TX DLVR SMPL: CPT | Performed by: RADIOLOGY

## 2023-11-22 ENCOUNTER — APPOINTMENT (OUTPATIENT)
Dept: RADIATION ONCOLOGY | Age: 73
End: 2023-11-22
Payer: MEDICARE

## 2023-11-22 VITALS
BODY MASS INDEX: 23.23 KG/M2 | TEMPERATURE: 97.3 F | SYSTOLIC BLOOD PRESSURE: 136 MMHG | WEIGHT: 127 LBS | DIASTOLIC BLOOD PRESSURE: 77 MMHG | RESPIRATION RATE: 18 BRPM | HEART RATE: 80 BPM

## 2023-11-22 DIAGNOSIS — Z17.1 MALIGNANT NEOPLASM OF CENTRAL PORTION OF RIGHT BREAST IN FEMALE, ESTROGEN RECEPTOR NEGATIVE (HCC): ICD-10-CM

## 2023-11-22 DIAGNOSIS — C50.111 MALIGNANT NEOPLASM OF CENTRAL PORTION OF RIGHT BREAST IN FEMALE, ESTROGEN RECEPTOR NEGATIVE (HCC): ICD-10-CM

## 2023-11-22 DIAGNOSIS — C77.3 BREAST CANCER METASTASIZED TO AXILLARY LYMPH NODE, RIGHT (HCC): Primary | ICD-10-CM

## 2023-11-22 DIAGNOSIS — C50.911 BREAST CANCER METASTASIZED TO AXILLARY LYMPH NODE, RIGHT (HCC): Primary | ICD-10-CM

## 2023-11-22 DIAGNOSIS — C77.3 SECONDARY MALIGNANCY OF AXILLARY NODE (HCC): ICD-10-CM

## 2023-11-22 PROCEDURE — 77385 HC NTSTY MODUL RAD TX DLVR SMPL: CPT | Performed by: RADIOLOGY

## 2023-11-22 PROCEDURE — 77014 CHG CT GUIDANCE RADIATION THERAPY FLDS PLACEMENT: CPT | Performed by: RADIOLOGY

## 2023-11-22 NOTE — PROGRESS NOTES
DEPARTMENT OF RADIATION ONCOLOGY   ON TREATMENT VISIT       11/22/2023      NAME:  Avis Abel    YOB: 1950    DIAGNOSIS: 1) clinical stage T2 N2 M0 triple negative invasive ductal carcinoma of the right breast, SP neoadjuvant chemotherapy, right mastectomy and axillary dissection stage ypT0 ypN2, M0.  2) esequiel metastasis at the level of the left axilla, skin is metastasis at the level of the right arm    SUBJECTIVE:   Lawson Acharya has now received 2394 cGy in 9 fractions directed to the left axilla, 2520 cGy in 9 fraction to the upper arm skin, 2240 cGy in 8 fractions to the skin of the right internal.  The patient had another stroke and is now in rehab. The skin lesions are not visible anymore. Past medical, surgical, social and family histories reviewed and updated as indicated. PAIN: No    ALLERGIES:  Avelox [moxifloxacin]         Current Outpatient Medications   Medication Sig Dispense Refill    urea (CARMOL) 10 % cream Apply topically as needed. 85 g 1    capecitabine (XELODA) 500 MG chemo tablet Take 2 tablets (1,000 mg total) by mouth 2 times daily . Take only on days when receiving radiation.  80 tablet 0    meclizine (ANTIVERT) 12.5 MG tablet 1-2 tablets as needed Orally every 6 hours as needed for 90 days      acetaminophen (TYLENOL) 325 MG tablet Take 2 tablets by mouth every 6 hours as needed for Pain      carvedilol (COREG) 12.5 MG tablet Take 1 tablet by mouth 2 times daily (with meals) 60 tablet 3    vitamin D (CHOLECALCIFEROL) 50 MCG (2000 UT) TABS tablet Take 1 tablet by mouth daily 30 tablet 0    empagliflozin (JARDIANCE) 10 MG tablet Take 1 tablet by mouth daily 90 tablet 0    HYDROcodone-acetaminophen (NORCO) 5-325 MG per tablet Take 1 tablet by mouth every 8 hours as needed for Pain.      metFORMIN (GLUCOPHAGE) 1000 MG tablet Take 1 tablet by mouth 2 times daily (with meals)      baclofen (LIORESAL) 10 MG tablet Take 0.5 tablets by mouth 2 times daily

## 2023-11-27 ENCOUNTER — APPOINTMENT (OUTPATIENT)
Dept: RADIATION ONCOLOGY | Age: 73
End: 2023-11-27
Payer: MEDICARE

## 2023-11-27 PROCEDURE — 77427 RADIATION TX MANAGEMENT X5: CPT | Performed by: RADIOLOGY

## 2023-11-27 PROCEDURE — 77385 HC NTSTY MODUL RAD TX DLVR SMPL: CPT | Performed by: RADIOLOGY

## 2023-11-27 PROCEDURE — 77336 RADIATION PHYSICS CONSULT: CPT | Performed by: RADIOLOGY

## 2023-11-27 PROCEDURE — 77014 CHG CT GUIDANCE RADIATION THERAPY FLDS PLACEMENT: CPT | Performed by: RADIOLOGY

## 2023-11-28 ENCOUNTER — APPOINTMENT (OUTPATIENT)
Dept: RADIATION ONCOLOGY | Age: 73
End: 2023-11-28
Payer: MEDICARE

## 2023-11-28 PROCEDURE — 77385 HC NTSTY MODUL RAD TX DLVR SMPL: CPT | Performed by: RADIOLOGY

## 2023-11-28 PROCEDURE — 77014 CHG CT GUIDANCE RADIATION THERAPY FLDS PLACEMENT: CPT | Performed by: RADIOLOGY

## 2023-11-29 ENCOUNTER — APPOINTMENT (OUTPATIENT)
Dept: RADIATION ONCOLOGY | Age: 73
End: 2023-11-29
Payer: MEDICARE

## 2023-11-29 ENCOUNTER — HOSPITAL ENCOUNTER (OUTPATIENT)
Dept: RADIATION ONCOLOGY | Age: 73
Discharge: HOME | End: 2023-11-29
Payer: MEDICARE

## 2023-11-29 VITALS
DIASTOLIC BLOOD PRESSURE: 61 MMHG | SYSTOLIC BLOOD PRESSURE: 119 MMHG | RESPIRATION RATE: 18 BRPM | HEART RATE: 89 BPM | TEMPERATURE: 97.1 F

## 2023-11-29 DIAGNOSIS — C50.911 BREAST CANCER METASTASIZED TO AXILLARY LYMPH NODE, RIGHT (HCC): Primary | ICD-10-CM

## 2023-11-29 DIAGNOSIS — C77.3 BREAST CANCER METASTASIZED TO AXILLARY LYMPH NODE, RIGHT (HCC): Primary | ICD-10-CM

## 2023-11-29 PROCEDURE — 77385 HC NTSTY MODUL RAD TX DLVR SMPL: CPT | Performed by: RADIOLOGY

## 2023-11-29 PROCEDURE — 77014 CHG CT GUIDANCE RADIATION THERAPY FLDS PLACEMENT: CPT | Performed by: RADIOLOGY

## 2023-11-29 ASSESSMENT — PAIN DESCRIPTION - FREQUENCY: FREQUENCY: INTERMITTENT

## 2023-11-29 ASSESSMENT — PAIN DESCRIPTION - PAIN TYPE: TYPE: CHRONIC PAIN

## 2023-11-29 ASSESSMENT — PAIN DESCRIPTION - ORIENTATION: ORIENTATION: RIGHT

## 2023-11-29 ASSESSMENT — PAIN DESCRIPTION - LOCATION: LOCATION: ARM

## 2023-11-29 ASSESSMENT — PAIN SCALES - GENERAL: PAINLEVEL_OUTOF10: 4

## 2023-11-29 ASSESSMENT — PAIN DESCRIPTION - DESCRIPTORS: DESCRIPTORS: ACHING

## 2023-11-30 ENCOUNTER — APPOINTMENT (OUTPATIENT)
Dept: RADIATION ONCOLOGY | Age: 73
End: 2023-11-30
Payer: MEDICARE

## 2023-11-30 PROCEDURE — 77385 HC NTSTY MODUL RAD TX DLVR SMPL: CPT | Performed by: RADIOLOGY

## 2023-11-30 PROCEDURE — 77014 CHG CT GUIDANCE RADIATION THERAPY FLDS PLACEMENT: CPT | Performed by: RADIOLOGY

## 2023-12-01 ENCOUNTER — HOSPITAL ENCOUNTER (OUTPATIENT)
Dept: RADIATION ONCOLOGY | Age: 73
Discharge: HOME | End: 2023-12-01
Payer: MEDICARE

## 2023-12-01 PROCEDURE — 77385 HC NTSTY MODUL RAD TX DLVR SMPL: CPT | Performed by: RADIOLOGY

## 2023-12-01 PROCEDURE — 77338 DESIGN MLC DEVICE FOR IMRT: CPT | Performed by: RADIOLOGY

## 2023-12-01 PROCEDURE — 77300 RADIATION THERAPY DOSE PLAN: CPT | Performed by: RADIOLOGY

## 2023-12-04 ENCOUNTER — HOSPITAL ENCOUNTER (OUTPATIENT)
Dept: RADIATION ONCOLOGY | Age: 73
Discharge: HOME | End: 2023-12-04
Payer: MEDICARE

## 2023-12-04 PROCEDURE — 77014 CHG CT GUIDANCE RADIATION THERAPY FLDS PLACEMENT: CPT | Performed by: RADIOLOGY

## 2023-12-05 ENCOUNTER — HOSPITAL ENCOUNTER (OUTPATIENT)
Dept: RADIATION ONCOLOGY | Age: 73
Discharge: HOME | End: 2023-12-05
Payer: MEDICARE

## 2023-12-05 PROCEDURE — 77385 HC NTSTY MODUL RAD TX DLVR SMPL: CPT | Performed by: RADIOLOGY

## 2023-12-05 PROCEDURE — 77014 CHG CT GUIDANCE RADIATION THERAPY FLDS PLACEMENT: CPT | Performed by: RADIOLOGY

## 2023-12-06 ENCOUNTER — HOSPITAL ENCOUNTER (OUTPATIENT)
Dept: RADIATION ONCOLOGY | Age: 73
Discharge: HOME | End: 2023-12-06
Payer: MEDICARE

## 2023-12-06 VITALS
TEMPERATURE: 97.9 F | SYSTOLIC BLOOD PRESSURE: 120 MMHG | HEART RATE: 93 BPM | DIASTOLIC BLOOD PRESSURE: 68 MMHG | RESPIRATION RATE: 18 BRPM

## 2023-12-06 DIAGNOSIS — C77.3 SECONDARY MALIGNANCY OF AXILLARY NODE (HCC): Primary | ICD-10-CM

## 2023-12-06 PROCEDURE — 77385 HC NTSTY MODUL RAD TX DLVR SMPL: CPT | Performed by: RADIOLOGY

## 2023-12-06 PROCEDURE — 77014 CHG CT GUIDANCE RADIATION THERAPY FLDS PLACEMENT: CPT | Performed by: RADIOLOGY

## 2023-12-06 NOTE — PROGRESS NOTES
DEPARTMENT OF RADIATION ONCOLOGY   ON TREATMENT VISIT       12/6/2023      NAME:  Aleyda Abel    YOB: 1950    DIAGNOSIS:     SUBJECTIVE:   Abraham Allison has now received 4200 in 15 fractions to the right upper arm, 250 cGy to the left axilla boost cGy in 4256 centigrays in  16 fractions directed to the left axilla. Past medical, surgical, social and family histories reviewed and updated as indicated. PAIN: No    ALLERGIES:  Avelox [moxifloxacin]         Current Outpatient Medications   Medication Sig Dispense Refill    urea (CARMOL) 10 % cream Apply topically as needed. 85 g 1    capecitabine (XELODA) 500 MG chemo tablet Take 2 tablets (1,000 mg total) by mouth 2 times daily . Take only on days when receiving radiation. 80 tablet 0    meclizine (ANTIVERT) 12.5 MG tablet 1-2 tablets as needed Orally every 6 hours as needed for 90 days      acetaminophen (TYLENOL) 325 MG tablet Take 2 tablets by mouth every 6 hours as needed for Pain      carvedilol (COREG) 12.5 MG tablet Take 1 tablet by mouth 2 times daily (with meals) 60 tablet 3    vitamin D (CHOLECALCIFEROL) 50 MCG (2000 UT) TABS tablet Take 1 tablet by mouth daily 30 tablet 0    empagliflozin (JARDIANCE) 10 MG tablet Take 1 tablet by mouth daily 90 tablet 0    HYDROcodone-acetaminophen (NORCO) 5-325 MG per tablet Take 1 tablet by mouth every 8 hours as needed for Pain.      metFORMIN (GLUCOPHAGE) 1000 MG tablet Take 1 tablet by mouth 2 times daily (with meals)      baclofen (LIORESAL) 10 MG tablet Take 0.5 tablets by mouth 2 times daily      gabapentin (NEURONTIN) 100 MG capsule Take 2 capsules by mouth at bedtime.       pioglitazone (ACTOS) 30 MG tablet Take 1 tablet by mouth daily      atorvastatin (LIPITOR) 80 MG tablet Take 1 tablet by mouth nightly 30 tablet 3    acyclovir (ZOVIRAX) 400 MG tablet Take 1 tablet by mouth as needed      zolpidem (AMBIEN) 10 MG tablet Take 1 tablet by mouth.      escitalopram (LEXAPRO) 20 MG tablet

## 2023-12-07 ENCOUNTER — HOSPITAL ENCOUNTER (OUTPATIENT)
Dept: RADIATION ONCOLOGY | Age: 73
Discharge: HOME | End: 2023-12-07
Payer: MEDICARE

## 2023-12-07 PROCEDURE — 77385 HC NTSTY MODUL RAD TX DLVR SMPL: CPT | Performed by: RADIOLOGY

## 2023-12-08 ENCOUNTER — CLINICAL DOCUMENTATION (OUTPATIENT)
Dept: RADIATION ONCOLOGY | Age: 73
End: 2023-12-08

## 2023-12-08 ENCOUNTER — HOSPITAL ENCOUNTER (OUTPATIENT)
Dept: RADIATION ONCOLOGY | Age: 73
Discharge: HOME | End: 2023-12-08
Payer: MEDICARE

## 2023-12-08 ENCOUNTER — TELEPHONE (OUTPATIENT)
Dept: INFUSION THERAPY | Age: 73
End: 2023-12-08

## 2023-12-08 PROCEDURE — 77336 RADIATION PHYSICS CONSULT: CPT | Performed by: RADIOLOGY

## 2023-12-08 PROCEDURE — 77385 HC NTSTY MODUL RAD TX DLVR SMPL: CPT | Performed by: RADIOLOGY

## 2023-12-08 NOTE — TELEPHONE ENCOUNTER
This nurse spoke to patient's , Nan Roes, and per Dr Carin Whitaker, cancel todays clinic visit. Nan Rose aware that per Dr Carin Whitaker, patient is given a break from [place] after radiation.   Rescheduled to 1/16/24 11:30

## 2023-12-08 NOTE — PROGRESS NOTES
Avis Abel  12/8/2023  7:53 AM          Current Outpatient Medications   Medication Sig Dispense Refill    urea (CARMOL) 10 % cream Apply topically as needed. 85 g 1    capecitabine (XELODA) 500 MG chemo tablet Take 2 tablets (1,000 mg total) by mouth 2 times daily . Take only on days when receiving radiation. 80 tablet 0    meclizine (ANTIVERT) 12.5 MG tablet 1-2 tablets as needed Orally every 6 hours as needed for 90 days      acetaminophen (TYLENOL) 325 MG tablet Take 2 tablets by mouth every 6 hours as needed for Pain      carvedilol (COREG) 12.5 MG tablet Take 1 tablet by mouth 2 times daily (with meals) 60 tablet 3    vitamin D (CHOLECALCIFEROL) 50 MCG (2000 UT) TABS tablet Take 1 tablet by mouth daily 30 tablet 0    empagliflozin (JARDIANCE) 10 MG tablet Take 1 tablet by mouth daily 90 tablet 0    HYDROcodone-acetaminophen (NORCO) 5-325 MG per tablet Take 1 tablet by mouth every 8 hours as needed for Pain.      metFORMIN (GLUCOPHAGE) 1000 MG tablet Take 1 tablet by mouth 2 times daily (with meals)      baclofen (LIORESAL) 10 MG tablet Take 0.5 tablets by mouth 2 times daily      gabapentin (NEURONTIN) 100 MG capsule Take 2 capsules by mouth at bedtime. pioglitazone (ACTOS) 30 MG tablet Take 1 tablet by mouth daily      atorvastatin (LIPITOR) 80 MG tablet Take 1 tablet by mouth nightly 30 tablet 3    acyclovir (ZOVIRAX) 400 MG tablet Take 1 tablet by mouth as needed      zolpidem (AMBIEN) 10 MG tablet Take 1 tablet by mouth.      escitalopram (LEXAPRO) 20 MG tablet Take 0.5 tablets by mouth every evening      lisinopril (PRINIVIL;ZESTRIL) 20 MG tablet Take 1 tablet by mouth every evening      cetirizine (ZYRTEC) 10 MG tablet Take 1 tablet by mouth daily       No current facility-administered medications for this encounter. This is an up-to-date medication list.    Please take this list to your next care provider, and discard any previous medication lists.

## 2024-01-15 ENCOUNTER — HOSPITAL ENCOUNTER (OUTPATIENT)
Dept: RADIATION ONCOLOGY | Age: 74
Discharge: HOME | End: 2024-01-15

## 2024-01-15 DIAGNOSIS — C50.919 BREAST CANCER METASTASIZED TO SKIN, UNSPECIFIED LATERALITY (HCC): ICD-10-CM

## 2024-01-15 DIAGNOSIS — C77.3 SECONDARY MALIGNANCY OF AXILLARY NODE (HCC): Primary | ICD-10-CM

## 2024-01-15 DIAGNOSIS — C79.2 BREAST CANCER METASTASIZED TO SKIN, UNSPECIFIED LATERALITY (HCC): ICD-10-CM

## 2024-01-15 PROCEDURE — NBSRV NON-BILLABLE SERVICE: Performed by: NURSE PRACTITIONER

## 2024-01-15 NOTE — PROGRESS NOTES
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     Subjective:  Avis Abel completed 4760 cGy/ 17 fractions directed to the R arm on 12/07/2023. Also completed 5006 cGy/ 19 fractions directed to the L axilla + L axilla bst.    The patient is seen today in 6 week post XRT completion symptom check, accompanied by her  Maikel into the exam room. The patient reports some skin irritation described as soreness to treatment site. Maikel continues to apply moisturizing lotion to skin at treatment site daily. The patient denies fevers or chills. No nausea, vomiting or diarrhea. No change in appetite or weight (per 's report, patient unable to stand/ unable to be weighed today). The patient with history of stroke x 2. The patient is non-weight bearing, michelle lift to wheelchair. Right arm paralysis. The patient reports bladder and bowel incontinence.       Patient is following with:    Medical Oncology- Dr. Samuel Morales.     Breast Surgery- Dr. Amaya.    Primary Care- Dr. Schafer.    Pain: controlled.     Past medical, surgical, social and family histories reviewed and updated as indicated.    ALLERGIES:  Avelox [moxifloxacin]         Current Outpatient Medications   Medication Sig Dispense Refill    urea (CARMOL) 10 % cream Apply topically as needed. 85 g 1    capecitabine (XELODA) 500 MG chemo tablet Take 2 tablets (1,000 mg total) by mouth 2 times daily . Take only on days when receiving radiation. 80 tablet 0    meclizine (ANTIVERT) 12.5 MG tablet 1-2 tablets as needed Orally every 6 hours as needed for 90 days      acetaminophen (TYLENOL) 325 MG tablet Take 2 tablets by mouth every 6 hours as needed for Pain      carvedilol (COREG) 12.5 MG tablet Take 1 tablet by mouth 2 times daily (with meals) 60 tablet 3    HYDROcodone-acetaminophen (NORCO) 5-325 MG per

## 2024-01-16 ENCOUNTER — OFFICE VISIT (OUTPATIENT)
Dept: ONCOLOGY | Age: 74
End: 2024-01-16
Payer: MEDICARE

## 2024-01-16 ENCOUNTER — HOSPITAL ENCOUNTER (OUTPATIENT)
Dept: INFUSION THERAPY | Age: 74
Discharge: HOME | End: 2024-01-16
Payer: MEDICARE

## 2024-01-16 VITALS
HEIGHT: 62 IN | TEMPERATURE: 98.1 F | OXYGEN SATURATION: 100 % | WEIGHT: 120.3 LBS | SYSTOLIC BLOOD PRESSURE: 129 MMHG | HEART RATE: 104 BPM | DIASTOLIC BLOOD PRESSURE: 64 MMHG | BODY MASS INDEX: 22.14 KG/M2

## 2024-01-16 DIAGNOSIS — C77.3 CARCINOMA OF LEFT BREAST METASTATIC TO AXILLARY LYMPH NODE (HCC): Primary | ICD-10-CM

## 2024-01-16 DIAGNOSIS — C77.3 BREAST CANCER METASTASIZED TO AXILLARY LYMPH NODE, RIGHT (HCC): Primary | ICD-10-CM

## 2024-01-16 DIAGNOSIS — C50.911 BREAST CANCER METASTASIZED TO AXILLARY LYMPH NODE, RIGHT (HCC): Primary | ICD-10-CM

## 2024-01-16 DIAGNOSIS — C50.912 CARCINOMA OF LEFT BREAST METASTATIC TO AXILLARY LYMPH NODE (HCC): Primary | ICD-10-CM

## 2024-01-16 DIAGNOSIS — Z85.3 PERSONAL HISTORY OF BREAST CANCER: ICD-10-CM

## 2024-01-16 LAB
ALBUMIN SERPL-MCNC: 4 G/DL (ref 3.5–5.2)
ALP SERPL-CCNC: 94 U/L (ref 35–104)
ALT SERPL-CCNC: 22 U/L (ref 0–32)
ANION GAP SERPL CALCULATED.3IONS-SCNC: 13 MMOL/L (ref 7–16)
AST SERPL-CCNC: 20 U/L (ref 0–31)
BASOPHILS # BLD: 0.08 K/UL (ref 0–0.2)
BASOPHILS NFR BLD: 2 % (ref 0–2)
BILIRUB SERPL-MCNC: 0.3 MG/DL (ref 0–1.2)
BUN SERPL-MCNC: 43 MG/DL (ref 6–23)
CALCIUM SERPL-MCNC: 9.9 MG/DL (ref 8.6–10.2)
CHLORIDE SERPL-SCNC: 97 MMOL/L (ref 98–107)
CO2 SERPL-SCNC: 25 MMOL/L (ref 22–29)
CREAT SERPL-MCNC: 1 MG/DL (ref 0.5–1)
EOSINOPHIL # BLD: 0.04 K/UL (ref 0.05–0.5)
EOSINOPHILS RELATIVE PERCENT: 1 % (ref 0–6)
ERYTHROCYTE [DISTWIDTH] IN BLOOD BY AUTOMATED COUNT: 16.6 % (ref 11.5–15)
GFR SERPL CREATININE-BSD FRML MDRD: >60 ML/MIN/1.73M2
GLUCOSE SERPL-MCNC: 183 MG/DL (ref 74–99)
HCT VFR BLD AUTO: 35.9 % (ref 34–48)
HGB BLD-MCNC: 11.5 G/DL (ref 11.5–15.5)
LYMPHOCYTES NFR BLD: 0.47 K/UL (ref 1.5–4)
LYMPHOCYTES RELATIVE PERCENT: 10 % (ref 20–42)
MCH RBC QN AUTO: 30.4 PG (ref 26–35)
MCHC RBC AUTO-ENTMCNC: 32 G/DL (ref 32–34.5)
MCV RBC AUTO: 95 FL (ref 80–99.9)
MONOCYTES NFR BLD: 0.43 K/UL (ref 0.1–0.95)
MONOCYTES NFR BLD: 10 % (ref 2–12)
NEUTROPHILS NFR BLD: 77 % (ref 43–80)
NEUTS SEG NFR BLD: 3.48 K/UL (ref 1.8–7.3)
PLATELET # BLD AUTO: 196 K/UL (ref 130–450)
PMV BLD AUTO: 10.4 FL (ref 7–12)
POTASSIUM SERPL-SCNC: 4.6 MMOL/L (ref 3.5–5)
PROT SERPL-MCNC: 7 G/DL (ref 6.4–8.3)
RBC # BLD AUTO: 3.78 M/UL (ref 3.5–5.5)
RBC # BLD: NORMAL 10*6/UL
SODIUM SERPL-SCNC: 135 MMOL/L (ref 132–146)
WBC OTHER # BLD: 4.5 K/UL (ref 4.5–11.5)

## 2024-01-16 PROCEDURE — 36415 COLL VENOUS BLD VENIPUNCTURE: CPT

## 2024-01-16 PROCEDURE — G8427 DOCREV CUR MEDS BY ELIG CLIN: HCPCS | Performed by: INTERNAL MEDICINE

## 2024-01-16 PROCEDURE — 1123F ACP DISCUSS/DSCN MKR DOCD: CPT | Performed by: INTERNAL MEDICINE

## 2024-01-16 PROCEDURE — G8400 PT W/DXA NO RESULTS DOC: HCPCS | Performed by: INTERNAL MEDICINE

## 2024-01-16 PROCEDURE — 1036F TOBACCO NON-USER: CPT | Performed by: INTERNAL MEDICINE

## 2024-01-16 PROCEDURE — 3074F SYST BP LT 130 MM HG: CPT | Performed by: INTERNAL MEDICINE

## 2024-01-16 PROCEDURE — 80053 COMPREHEN METABOLIC PANEL: CPT

## 2024-01-16 PROCEDURE — G8420 CALC BMI NORM PARAMETERS: HCPCS | Performed by: INTERNAL MEDICINE

## 2024-01-16 PROCEDURE — 99214 OFFICE O/P EST MOD 30 MIN: CPT | Performed by: INTERNAL MEDICINE

## 2024-01-16 PROCEDURE — G8484 FLU IMMUNIZE NO ADMIN: HCPCS | Performed by: INTERNAL MEDICINE

## 2024-01-16 PROCEDURE — 3078F DIAST BP <80 MM HG: CPT | Performed by: INTERNAL MEDICINE

## 2024-01-16 PROCEDURE — 85025 COMPLETE CBC W/AUTO DIFF WBC: CPT

## 2024-01-16 PROCEDURE — 3017F COLORECTAL CA SCREEN DOC REV: CPT | Performed by: INTERNAL MEDICINE

## 2024-01-16 PROCEDURE — 1090F PRES/ABSN URINE INCON ASSESS: CPT | Performed by: INTERNAL MEDICINE

## 2024-01-16 RX ORDER — ONDANSETRON HYDROCHLORIDE 8 MG/1
8 TABLET, FILM COATED ORAL EVERY 12 HOURS PRN
Qty: 30 TABLET | Refills: 1 | Status: SHIPPED | OUTPATIENT
Start: 2024-01-16 | End: 2024-02-15

## 2024-01-29 ENCOUNTER — HOSPITAL ENCOUNTER (OUTPATIENT)
Dept: CT IMAGING | Age: 74
Discharge: HOME | End: 2024-01-31
Attending: INTERNAL MEDICINE
Payer: MEDICARE

## 2024-01-29 DIAGNOSIS — C77.3 BREAST CANCER METASTASIZED TO AXILLARY LYMPH NODE, RIGHT (HCC): ICD-10-CM

## 2024-01-29 DIAGNOSIS — C50.911 BREAST CANCER METASTASIZED TO AXILLARY LYMPH NODE, RIGHT (HCC): ICD-10-CM

## 2024-01-29 PROCEDURE — 6360000004 HC RX CONTRAST MEDICATION: Performed by: RADIOLOGY

## 2024-01-29 PROCEDURE — 74177 CT ABD & PELVIS W/CONTRAST: CPT

## 2024-01-29 PROCEDURE — 71260 CT THORAX DX C+: CPT

## 2024-01-29 RX ADMIN — IOPAMIDOL 18 ML: 755 INJECTION, SOLUTION INTRAVENOUS at 11:25

## 2024-01-29 RX ADMIN — IOPAMIDOL 75 ML: 755 INJECTION, SOLUTION INTRAVENOUS at 11:26

## 2024-01-29 NOTE — PROGRESS NOTES
Pt came in as an outpatient for a c/a/p with iv contrast.  Placed pt's iv in right arm.  Did not notice limb alert band on pt until after exam when  pointed it out.  Pt did not mention not being able to use right arm.  Pt and pt's  were told to call ordering doctor with any issues or concerns. A new limb alert band was placed on pt before leaving because of it being faded and unreadable.  I called and spoke with Dr. Morales's nurse, Mary, about the situation and husbands concerns.  Safecare placed.

## 2024-02-01 ENCOUNTER — HOSPITAL ENCOUNTER (OUTPATIENT)
Dept: NUCLEAR MEDICINE | Age: 74
Discharge: HOME | End: 2024-02-01
Attending: INTERNAL MEDICINE
Payer: MEDICARE

## 2024-02-01 DIAGNOSIS — C77.3 BREAST CANCER METASTASIZED TO AXILLARY LYMPH NODE, RIGHT (HCC): ICD-10-CM

## 2024-02-01 DIAGNOSIS — C50.911 BREAST CANCER METASTASIZED TO AXILLARY LYMPH NODE, RIGHT (HCC): ICD-10-CM

## 2024-02-01 PROCEDURE — 78306 BONE IMAGING WHOLE BODY: CPT | Performed by: INTERNAL MEDICINE

## 2024-02-01 PROCEDURE — 3430000000 HC RX DIAGNOSTIC RADIOPHARMACEUTICAL: Performed by: RADIOLOGY

## 2024-02-01 PROCEDURE — A9503 TC99M MEDRONATE: HCPCS | Performed by: RADIOLOGY

## 2024-02-01 RX ORDER — TC 99M MEDRONATE 20 MG/10ML
25 INJECTION, POWDER, LYOPHILIZED, FOR SOLUTION INTRAVENOUS
Status: COMPLETED | OUTPATIENT
Start: 2024-02-01 | End: 2024-02-01

## 2024-02-01 RX ADMIN — TC 99M MEDRONATE 25 MILLICURIE: 20 INJECTION, POWDER, LYOPHILIZED, FOR SOLUTION INTRAVENOUS at 10:47

## 2024-02-13 ENCOUNTER — HOSPITAL ENCOUNTER (OUTPATIENT)
Dept: INFUSION THERAPY | Age: 74
Discharge: HOME | End: 2024-02-13

## 2024-02-13 ENCOUNTER — SCHEDULED TELEPHONE ENCOUNTER (OUTPATIENT)
Dept: ONCOLOGY | Age: 74
End: 2024-02-13
Payer: MEDICARE

## 2024-02-13 DIAGNOSIS — C77.3 CARCINOMA OF LEFT BREAST METASTATIC TO AXILLARY LYMPH NODE (HCC): Primary | ICD-10-CM

## 2024-02-13 DIAGNOSIS — C50.912 CARCINOMA OF LEFT BREAST METASTATIC TO AXILLARY LYMPH NODE (HCC): Primary | ICD-10-CM

## 2024-02-13 PROCEDURE — 99441 PR PHYS/QHP TELEPHONE EVALUATION 5-10 MIN: CPT | Performed by: INTERNAL MEDICINE

## 2024-02-14 NOTE — PROGRESS NOTES
Patient was Telephone virtual visit.  Patient has MYCHART.  
limited, she is with her spouse who is very supportive.  The patient had received concurrent chemo/RT using Xeloda, treatment was completed on 12/8/2023.  She had a stroke during the treatment.  Doing better at this time.    The patient had foundation 1 done and PD-L1 testing, no actionable mutations, his PD-L1 CPS score is 5 0, he is a candidate for treatment with chemoimmunotherapy, Abraxane and Keytruda, the side effects and the schedule of treatment were reviewed with the patient and her spouse, we also discussed following her with imaging studies after the radiation therapy and starting systemic upon disease progression.    Overall the patient had good response to the treatment, will decrease in the left axillary lymphadenopathy, we decided to proceed with repeat imaging, will not have a PET done at this time due to the recent radiation therapy, will have CT scans of the chest, abdomen, pelvis and bone scan done, if no evidence of new metastatic lesions, will follow her closely with imaging studies, and start treatment upon progression.  The patient and her spouse are comfortable with this plan.    Restaging CT scans of the chest, abdomen, pelvis and bone scan were done on 1/29/2024, which I had reviewed, she has asymmetric soft tissues in the left chest wall with adjacent adenopathy, had overall improved, pulmonary nodule in the left upper lobe, stable, sclerotic signal involving the lateral tibial body with 50% height loss, present previously, the patient has been having issues with L1, heterogeneous 2.6 cm enhancing soft tissue mass could be exophytic from the thyroid or metastatic adenopathy in the lower neck, and constipation, recommended to have a PET scan done about 2 months, and to start systemic therapy if there is evidence of disease progression.    Referral was placed to palliative medicine for symptoms management.    Follow-up in 2 months.    TANVI AREVALO MD   HEMATOLOGY/MEDICAL ONCOLOGY  [facility]

## 2024-02-20 ENCOUNTER — TELEPHONE (OUTPATIENT)
Dept: PALLATIVE CARE | Age: 74
End: 2024-02-20

## 2024-02-20 NOTE — TELEPHONE ENCOUNTER
Called and spoke with Maikel regarding referral for his wife Avis. Maikel states his wife would not be able to come to a clinic appointment as she has so much pain with traveling in a car. Avis does take Norco 5/325 mg prescribed by her PCP, but they do not wish to continue this. Maikel states he is primary caregiver for his wife. There are days she does not need any pain med and there are days the Norco does not even work. Support provided through active listening. Patient will be scheduled with [facility].

## 2024-02-22 ENCOUNTER — TELEPHONE (OUTPATIENT)
Dept: PALLATIVE CARE | Age: 74
End: 2024-02-22

## 2024-02-22 NOTE — TELEPHONE ENCOUNTER
spoke with pt  Maikel to schedule appt for new referral. Pt was referred to Palliative care for a home visit, pt scheduled to be seen on 3/1 with MARILYN Whatley at 9am. Agreed to appt time and date.       Lacey HART,W  Palliative Medicine

## 2024-03-01 ENCOUNTER — OFFICE VISIT (OUTPATIENT)
Dept: PALLATIVE CARE | Age: 74
End: 2024-03-01

## 2024-03-01 VITALS
HEART RATE: 72 BPM | SYSTOLIC BLOOD PRESSURE: 134 MMHG | RESPIRATION RATE: 12 BRPM | DIASTOLIC BLOOD PRESSURE: 68 MMHG | OXYGEN SATURATION: 99 %

## 2024-03-01 DIAGNOSIS — C77.3 BREAST CANCER METASTASIZED TO AXILLARY LYMPH NODE, RIGHT (HCC): Primary | ICD-10-CM

## 2024-03-01 DIAGNOSIS — Z51.5 PALLIATIVE CARE BY SPECIALIST: ICD-10-CM

## 2024-03-01 DIAGNOSIS — G89.3 PAIN DUE TO NEOPLASM: ICD-10-CM

## 2024-03-01 DIAGNOSIS — C50.911 BREAST CANCER METASTASIZED TO AXILLARY LYMPH NODE, RIGHT (HCC): Primary | ICD-10-CM

## 2024-03-01 RX ORDER — HYDROCODONE BITARTRATE AND ACETAMINOPHEN 5; 325 MG/1; MG/1
1-2 TABLET ORAL EVERY 6 HOURS PRN
Qty: 60 TABLET | Refills: 0 | Status: SHIPPED | OUTPATIENT
Start: 2024-03-01 | End: 2024-03-16

## 2024-03-01 NOTE — PROGRESS NOTES
problem, tinnitus, hoarseness, dysphagia, odynophagia  RESPIRATORY: cough, shortness of breath, sputum expectoration.  CARDIOVASCULAR:  Chest pain/pressure, palpitation, syncope, irregular beats  GASTROINTESTINAL:  abdominal or rectal pain, diarrhea, constipation, .  GENITOURINARY:  Burning, frequency, urgency, incontinence, discharge  INTEGUMENTARY: rash, wound, pruritis  HEMATOLOGIC/LYMPHATIC:  Swelling, sores, gum bleeding, easy bruising, pica.  MUSCULOSKELETAL:  pain, edema, joint swelling or redness  NEUROLOGICAL:  light headed, dizziness, loss of consciousness, weakness, change in memory, seizures, tremors    Objective:     Physical Exam  /68   Pulse 72   Resp 12   SpO2 99%     Gen:  Alert, appears stated age, well nourished, in no acute distress  HEENT:  Normocephalic, conjunctiva pink, no drainage, mucosa moist  Neck:  Supple  Lungs:  CTA bilaterally, no audible rhonchi or wheezes noted  Heart:  RRR, no murmur, rub, or gallop noted during exam  Abd:  Soft, non tender, non distended, BS+  M/S/Ext:  Moving all extremities, no edema, pulses present, bilateral hand contractures noted, right side weakness present, RUE tender to touch  Skin:  Warm and dry  Neuro:  PERRL, Alert, oriented x 3; following commands    [place] Symptom Assessment Score       3/1/2024     9:00 AM   [place] Score   Pain Score 5   Tiredness Score 7   Nausea Score 2   Depression Score 3   Anxiety Score 3   Drowsiness Score Not drowsy   Appetite Score 5   Wellbeing Score Best feeling of wellbeing   Dyspnea Score No shortness of breath   Other Problem Score Best possible response   Total Assessment Score(calculated) 25     Assessed by: patient and provider.    Opioid Risk Tool Scores    If Patient is Female If Patient is Male   Family History of Substance Abuse:     Alcohol [] 1 [] 3   Illegal Drugs [] 2 [] 3   Prescription Drugs [] 4 [] 3    Personal History of Substance Abuse:     Alcohol [] 3 [] 3   Illegal Drugs [] 4 [] 4

## 2024-03-29 ENCOUNTER — HOSPITAL ENCOUNTER (INPATIENT)
Age: 74
LOS: 3 days | Discharge: HOME HEALTH | DRG: 280 | End: 2024-04-01
Attending: STUDENT IN AN ORGANIZED HEALTH CARE EDUCATION/TRAINING PROGRAM | Admitting: INTERNAL MEDICINE
Payer: MEDICARE

## 2024-03-29 PROBLEM — I47.10 SVT (SUPRAVENTRICULAR TACHYCARDIA) (HCC): Status: ACTIVE | Noted: 2024-03-29

## 2024-03-29 LAB
ERYTHROCYTE [DISTWIDTH] IN BLOOD BY AUTOMATED COUNT: 14 % (ref 11.5–15)
GLUCOSE BLD-MCNC: 258 MG/DL (ref 74–99)
HBA1C MFR BLD: 5.2 % (ref 4–5.6)
HCT VFR BLD AUTO: 34.9 % (ref 34–48)
HGB BLD-MCNC: 11.1 G/DL (ref 11.5–15.5)
MCH RBC QN AUTO: 29 PG (ref 26–35)
MCHC RBC AUTO-ENTMCNC: 31.8 G/DL (ref 32–34.5)
MCV RBC AUTO: 91.1 FL (ref 80–99.9)
PARTIAL THROMBOPLASTIN TIME: 44.4 SEC (ref 24.5–35.1)
PLATELET, FLUORESCENCE: 125 K/UL (ref 130–450)
PMV BLD AUTO: 10.6 FL (ref 7–12)
RBC # BLD AUTO: 3.83 M/UL (ref 3.5–5.5)
WBC OTHER # BLD: 4.5 K/UL (ref 4.5–11.5)

## 2024-03-29 PROCEDURE — 99222 1ST HOSP IP/OBS MODERATE 55: CPT | Performed by: INTERNAL MEDICINE

## 2024-03-29 PROCEDURE — 36415 COLL VENOUS BLD VENIPUNCTURE: CPT

## 2024-03-29 PROCEDURE — 2580000003 HC RX 258: Performed by: INTERNAL MEDICINE

## 2024-03-29 PROCEDURE — 83036 HEMOGLOBIN GLYCOSYLATED A1C: CPT

## 2024-03-29 PROCEDURE — 85730 THROMBOPLASTIN TIME PARTIAL: CPT

## 2024-03-29 PROCEDURE — 82962 GLUCOSE BLOOD TEST: CPT

## 2024-03-29 PROCEDURE — 6370000000 HC RX 637 (ALT 250 FOR IP): Performed by: INTERNAL MEDICINE

## 2024-03-29 PROCEDURE — 85027 COMPLETE CBC AUTOMATED: CPT

## 2024-03-29 PROCEDURE — 6360000002 HC RX W HCPCS: Performed by: INTERNAL MEDICINE

## 2024-03-29 PROCEDURE — 2140000000 HC CCU INTERMEDIATE R&B

## 2024-03-29 RX ORDER — BACLOFEN 10 MG/1
5 TABLET ORAL 2 TIMES DAILY
Status: DISCONTINUED | OUTPATIENT
Start: 2024-03-29 | End: 2024-04-01 | Stop reason: HOSPADM

## 2024-03-29 RX ORDER — HYDROCODONE BITARTRATE AND ACETAMINOPHEN 7.5; 325 MG/1; MG/1
1 TABLET ORAL EVERY 6 HOURS PRN
COMMUNITY

## 2024-03-29 RX ORDER — POLYETHYLENE GLYCOL 3350 17 G/17G
17 POWDER, FOR SOLUTION ORAL DAILY PRN
Status: DISCONTINUED | OUTPATIENT
Start: 2024-03-29 | End: 2024-04-01 | Stop reason: HOSPADM

## 2024-03-29 RX ORDER — SODIUM CHLORIDE 0.9 % (FLUSH) 0.9 %
5-40 SYRINGE (ML) INJECTION EVERY 12 HOURS SCHEDULED
Status: DISCONTINUED | OUTPATIENT
Start: 2024-03-29 | End: 2024-04-01 | Stop reason: HOSPADM

## 2024-03-29 RX ORDER — HYDROCODONE BITARTRATE AND ACETAMINOPHEN 7.5; 325 MG/1; MG/1
1 TABLET ORAL EVERY 6 HOURS PRN
Status: DISCONTINUED | OUTPATIENT
Start: 2024-03-29 | End: 2024-04-01 | Stop reason: HOSPADM

## 2024-03-29 RX ORDER — SODIUM CHLORIDE 0.9 % (FLUSH) 0.9 %
5-40 SYRINGE (ML) INJECTION PRN
Status: DISCONTINUED | OUTPATIENT
Start: 2024-03-29 | End: 2024-04-01 | Stop reason: HOSPADM

## 2024-03-29 RX ORDER — LISINOPRIL 20 MG/1
20 TABLET ORAL EVERY EVENING
Status: DISCONTINUED | OUTPATIENT
Start: 2024-03-29 | End: 2024-03-30

## 2024-03-29 RX ORDER — ACETAMINOPHEN 325 MG/1
650 TABLET ORAL EVERY 6 HOURS PRN
Status: DISCONTINUED | OUTPATIENT
Start: 2024-03-29 | End: 2024-03-29

## 2024-03-29 RX ORDER — GABAPENTIN 300 MG/1
300 CAPSULE ORAL 4 TIMES DAILY
Status: DISCONTINUED | OUTPATIENT
Start: 2024-03-29 | End: 2024-04-01 | Stop reason: HOSPADM

## 2024-03-29 RX ORDER — ACETAMINOPHEN 650 MG/1
650 SUPPOSITORY RECTAL EVERY 6 HOURS PRN
Status: DISCONTINUED | OUTPATIENT
Start: 2024-03-29 | End: 2024-04-01 | Stop reason: HOSPADM

## 2024-03-29 RX ORDER — HEPARIN SODIUM 1000 [USP'U]/ML
4000 INJECTION, SOLUTION INTRAVENOUS; SUBCUTANEOUS PRN
Status: DISCONTINUED | OUTPATIENT
Start: 2024-03-29 | End: 2024-03-30

## 2024-03-29 RX ORDER — SODIUM CHLORIDE 9 MG/ML
INJECTION, SOLUTION INTRAVENOUS PRN
Status: DISCONTINUED | OUTPATIENT
Start: 2024-03-29 | End: 2024-04-01 | Stop reason: HOSPADM

## 2024-03-29 RX ORDER — ATORVASTATIN CALCIUM 40 MG/1
80 TABLET, FILM COATED ORAL NIGHTLY
Status: DISCONTINUED | OUTPATIENT
Start: 2024-03-29 | End: 2024-04-01 | Stop reason: HOSPADM

## 2024-03-29 RX ORDER — POTASSIUM CHLORIDE 20 MEQ/1
40 TABLET, EXTENDED RELEASE ORAL PRN
Status: DISCONTINUED | OUTPATIENT
Start: 2024-03-29 | End: 2024-04-01 | Stop reason: HOSPADM

## 2024-03-29 RX ORDER — ACETAMINOPHEN 325 MG/1
650 TABLET ORAL EVERY 6 HOURS PRN
Status: DISCONTINUED | OUTPATIENT
Start: 2024-03-29 | End: 2024-04-01 | Stop reason: HOSPADM

## 2024-03-29 RX ORDER — HEPARIN SODIUM 1000 [USP'U]/ML
4000 INJECTION, SOLUTION INTRAVENOUS; SUBCUTANEOUS ONCE
Status: DISCONTINUED | OUTPATIENT
Start: 2024-03-29 | End: 2024-03-30 | Stop reason: ALTCHOICE

## 2024-03-29 RX ORDER — ONDANSETRON 4 MG/1
4 TABLET, ORALLY DISINTEGRATING ORAL EVERY 8 HOURS PRN
Status: DISCONTINUED | OUTPATIENT
Start: 2024-03-29 | End: 2024-04-01 | Stop reason: HOSPADM

## 2024-03-29 RX ORDER — ESCITALOPRAM OXALATE 10 MG/1
10 TABLET ORAL EVERY EVENING
Status: DISCONTINUED | OUTPATIENT
Start: 2024-03-29 | End: 2024-04-01 | Stop reason: HOSPADM

## 2024-03-29 RX ORDER — HEPARIN SODIUM 10000 [USP'U]/100ML
5-30 INJECTION, SOLUTION INTRAVENOUS CONTINUOUS
Status: DISCONTINUED | OUTPATIENT
Start: 2024-03-29 | End: 2024-03-30

## 2024-03-29 RX ORDER — HEPARIN SODIUM 1000 [USP'U]/ML
2000 INJECTION, SOLUTION INTRAVENOUS; SUBCUTANEOUS PRN
Status: DISCONTINUED | OUTPATIENT
Start: 2024-03-29 | End: 2024-03-30

## 2024-03-29 RX ORDER — INSULIN LISPRO 100 [IU]/ML
0-8 INJECTION, SOLUTION INTRAVENOUS; SUBCUTANEOUS
Status: DISCONTINUED | OUTPATIENT
Start: 2024-03-30 | End: 2024-04-01 | Stop reason: HOSPADM

## 2024-03-29 RX ORDER — POTASSIUM CHLORIDE 7.45 MG/ML
10 INJECTION INTRAVENOUS PRN
Status: DISCONTINUED | OUTPATIENT
Start: 2024-03-29 | End: 2024-04-01 | Stop reason: HOSPADM

## 2024-03-29 RX ORDER — ONDANSETRON 2 MG/ML
4 INJECTION INTRAMUSCULAR; INTRAVENOUS EVERY 6 HOURS PRN
Status: DISCONTINUED | OUTPATIENT
Start: 2024-03-29 | End: 2024-04-01 | Stop reason: HOSPADM

## 2024-03-29 RX ORDER — GLUCAGON 1 MG/ML
1 KIT INJECTION PRN
Status: DISCONTINUED | OUTPATIENT
Start: 2024-03-29 | End: 2024-04-01 | Stop reason: HOSPADM

## 2024-03-29 RX ORDER — INSULIN LISPRO 100 [IU]/ML
0-4 INJECTION, SOLUTION INTRAVENOUS; SUBCUTANEOUS NIGHTLY
Status: DISCONTINUED | OUTPATIENT
Start: 2024-03-29 | End: 2024-04-01 | Stop reason: HOSPADM

## 2024-03-29 RX ORDER — DEXTROSE MONOHYDRATE 100 MG/ML
INJECTION, SOLUTION INTRAVENOUS CONTINUOUS PRN
Status: DISCONTINUED | OUTPATIENT
Start: 2024-03-29 | End: 2024-04-01 | Stop reason: HOSPADM

## 2024-03-29 RX ORDER — MAGNESIUM SULFATE IN WATER 40 MG/ML
2000 INJECTION, SOLUTION INTRAVENOUS PRN
Status: DISCONTINUED | OUTPATIENT
Start: 2024-03-29 | End: 2024-04-01 | Stop reason: HOSPADM

## 2024-03-29 RX ADMIN — HEPARIN SODIUM 2000 UNITS: 1000 INJECTION INTRAVENOUS; SUBCUTANEOUS at 20:42

## 2024-03-29 RX ADMIN — BACLOFEN 5 MG: 10 TABLET ORAL at 21:06

## 2024-03-29 RX ADMIN — ATORVASTATIN CALCIUM 80 MG: 40 TABLET, FILM COATED ORAL at 21:06

## 2024-03-29 RX ADMIN — HEPARIN SODIUM 12 UNITS/KG/HR: 10000 INJECTION, SOLUTION INTRAVENOUS at 20:40

## 2024-03-29 RX ADMIN — SODIUM CHLORIDE, PRESERVATIVE FREE 10 ML: 5 INJECTION INTRAVENOUS at 20:41

## 2024-03-29 RX ADMIN — GABAPENTIN 300 MG: 300 CAPSULE ORAL at 21:06

## 2024-03-29 RX ADMIN — ESCITALOPRAM OXALATE 10 MG: 10 TABLET ORAL at 19:57

## 2024-03-29 ASSESSMENT — PAIN SCALES - GENERAL
PAINLEVEL_OUTOF10: 1
PAINLEVEL_OUTOF10: 4

## 2024-03-29 ASSESSMENT — PAIN DESCRIPTION - ORIENTATION: ORIENTATION: RIGHT

## 2024-03-29 ASSESSMENT — PAIN - FUNCTIONAL ASSESSMENT: PAIN_FUNCTIONAL_ASSESSMENT: PREVENTS OR INTERFERES SOME ACTIVE ACTIVITIES AND ADLS

## 2024-03-29 ASSESSMENT — PAIN DESCRIPTION - DESCRIPTORS: DESCRIPTORS: ACHING

## 2024-03-29 ASSESSMENT — PAIN DESCRIPTION - LOCATION: LOCATION: ARM

## 2024-03-29 NOTE — H&P
[facility] History and Physical      CHIEF COMPLAINT: Shortness of breath    History of Present Illness: This is 74-year-old female with past medical history of diabetes mellitus, seizure, hypertension, hyperlipidemia went to [facility] ER due to shortness of breath.  And she will also having palpitations her EKG shows he had heart rate around 160, in ER she been treated with adenosine and amiodarone.  In ER workup she is found to have very high troponin 3 145 and an NSTEMI diagnosed.  They contacted with cardiologist for transfer and accepted the patient for cardiac cath.  During my encounter she was doing okay and using oxygen but her heart rate was well-controlled.    Informant(s) for H&P:    REVIEW OF SYSTEMS:  A comprehensive review of systems was negative except for: what is in the HPI      PMH:  Past Medical History:   Diagnosis Date    Acid reflux     Breast cancer (HCC) 06/2018    right    Breast cancer (HCC) 2023    left axilla    Cerebral infarction due to embolism of right middle cerebral artery (HCC) 02/23/2021    H/O renal calculi     History of therapeutic radiation     Hx antineoplastic chemo     Hyperlipidemia     Hypertension     IBS (irritable bowel syndrome)     Insomnia     Left carotid stenosis 02/23/2021    Pyelonephritis 2019    Seasonal allergies     Secondary malignancy of axillary node (HCC) 9/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

## 2024-03-30 PROBLEM — I27.20 PULMONARY HTN (HCC): Status: ACTIVE | Noted: 2024-03-30

## 2024-03-30 PROBLEM — I50.31 ACUTE DIASTOLIC (CONGESTIVE) HEART FAILURE (HCC): Status: ACTIVE | Noted: 2024-03-30

## 2024-03-30 LAB
ALBUMIN SERPL-MCNC: 3.4 G/DL (ref 3.5–5.2)
ALP SERPL-CCNC: 192 U/L (ref 35–104)
ALT SERPL-CCNC: 54 U/L (ref 0–32)
ANION GAP SERPL CALCULATED.3IONS-SCNC: 14 MMOL/L (ref 7–16)
AST SERPL-CCNC: 72 U/L (ref 0–31)
BILIRUB SERPL-MCNC: 0.7 MG/DL (ref 0–1.2)
BNP SERPL-MCNC: ABNORMAL PG/ML (ref 0–125)
BUN SERPL-MCNC: 24 MG/DL (ref 6–23)
CALCIUM SERPL-MCNC: 9.2 MG/DL (ref 8.6–10.2)
CHLORIDE SERPL-SCNC: 100 MMOL/L (ref 98–107)
CO2 SERPL-SCNC: 27 MMOL/L (ref 22–29)
CREAT SERPL-MCNC: 0.8 MG/DL (ref 0.5–1)
GFR SERPL CREATININE-BSD FRML MDRD: 74 ML/MIN/1.73M2
GLUCOSE BLD-MCNC: 140 MG/DL (ref 74–99)
GLUCOSE BLD-MCNC: 171 MG/DL (ref 74–99)
GLUCOSE BLD-MCNC: 182 MG/DL (ref 74–99)
GLUCOSE BLD-MCNC: 238 MG/DL (ref 74–99)
GLUCOSE SERPL-MCNC: 164 MG/DL (ref 74–99)
PARTIAL THROMBOPLASTIN TIME: 73.4 SEC (ref 24.5–35.1)
PARTIAL THROMBOPLASTIN TIME: 76.1 SEC (ref 24.5–35.1)
POTASSIUM SERPL-SCNC: 4.1 MMOL/L (ref 3.5–5)
PROT SERPL-MCNC: 6.3 G/DL (ref 6.4–8.3)
SODIUM SERPL-SCNC: 141 MMOL/L (ref 132–146)
TROPONIN I SERPL HS-MCNC: 629 NG/L (ref 0–9)

## 2024-03-30 PROCEDURE — 6360000002 HC RX W HCPCS: Performed by: INTERNAL MEDICINE

## 2024-03-30 PROCEDURE — 82962 GLUCOSE BLOOD TEST: CPT

## 2024-03-30 PROCEDURE — 99223 1ST HOSP IP/OBS HIGH 75: CPT | Performed by: INTERNAL MEDICINE

## 2024-03-30 PROCEDURE — 6370000000 HC RX 637 (ALT 250 FOR IP): Performed by: INTERNAL MEDICINE

## 2024-03-30 PROCEDURE — 2700000000 HC OXYGEN THERAPY PER DAY

## 2024-03-30 PROCEDURE — 2580000003 HC RX 258: Performed by: INTERNAL MEDICINE

## 2024-03-30 PROCEDURE — 80074 ACUTE HEPATITIS PANEL: CPT

## 2024-03-30 PROCEDURE — 83880 ASSAY OF NATRIURETIC PEPTIDE: CPT

## 2024-03-30 PROCEDURE — 80053 COMPREHEN METABOLIC PANEL: CPT

## 2024-03-30 PROCEDURE — 2140000000 HC CCU INTERMEDIATE R&B

## 2024-03-30 PROCEDURE — 85730 THROMBOPLASTIN TIME PARTIAL: CPT

## 2024-03-30 PROCEDURE — 93005 ELECTROCARDIOGRAM TRACING: CPT | Performed by: NURSE PRACTITIONER

## 2024-03-30 PROCEDURE — 84484 ASSAY OF TROPONIN QUANT: CPT

## 2024-03-30 PROCEDURE — 36415 COLL VENOUS BLD VENIPUNCTURE: CPT

## 2024-03-30 PROCEDURE — APPSS60 APP SPLIT SHARED TIME 46-60 MINUTES: Performed by: NURSE PRACTITIONER

## 2024-03-30 RX ORDER — LISINOPRIL 5 MG/1
10 TABLET ORAL EVERY EVENING
Status: DISCONTINUED | OUTPATIENT
Start: 2024-03-31 | End: 2024-04-01 | Stop reason: HOSPADM

## 2024-03-30 RX ORDER — METOPROLOL SUCCINATE 25 MG/1
50 TABLET, EXTENDED RELEASE ORAL DAILY
Status: DISCONTINUED | OUTPATIENT
Start: 2024-03-30 | End: 2024-03-31

## 2024-03-30 RX ORDER — 0.9 % SODIUM CHLORIDE 0.9 %
500 INTRAVENOUS SOLUTION INTRAVENOUS ONCE
Status: COMPLETED | OUTPATIENT
Start: 2024-03-30 | End: 2024-03-30

## 2024-03-30 RX ORDER — FUROSEMIDE 10 MG/ML
20 INJECTION INTRAMUSCULAR; INTRAVENOUS ONCE
Status: COMPLETED | OUTPATIENT
Start: 2024-03-30 | End: 2024-03-30

## 2024-03-30 RX ADMIN — GABAPENTIN 300 MG: 300 CAPSULE ORAL at 08:33

## 2024-03-30 RX ADMIN — ATORVASTATIN CALCIUM 80 MG: 40 TABLET, FILM COATED ORAL at 21:37

## 2024-03-30 RX ADMIN — GABAPENTIN 300 MG: 300 CAPSULE ORAL at 16:55

## 2024-03-30 RX ADMIN — HYDROCODONE BITARTRATE AND ACETAMINOPHEN 1 TABLET: 7.5; 325 TABLET ORAL at 10:42

## 2024-03-30 RX ADMIN — METOPROLOL SUCCINATE 50 MG: 25 TABLET, EXTENDED RELEASE ORAL at 18:09

## 2024-03-30 RX ADMIN — APIXABAN 5 MG: 5 TABLET, FILM COATED ORAL at 21:37

## 2024-03-30 RX ADMIN — HYDROCODONE BITARTRATE AND ACETAMINOPHEN 1 TABLET: 7.5; 325 TABLET ORAL at 04:00

## 2024-03-30 RX ADMIN — INSULIN LISPRO 2 UNITS: 100 INJECTION, SOLUTION INTRAVENOUS; SUBCUTANEOUS at 16:55

## 2024-03-30 RX ADMIN — GABAPENTIN 300 MG: 300 CAPSULE ORAL at 21:37

## 2024-03-30 RX ADMIN — ESCITALOPRAM OXALATE 10 MG: 10 TABLET ORAL at 16:55

## 2024-03-30 RX ADMIN — ACETAMINOPHEN 650 MG: 325 TABLET ORAL at 08:32

## 2024-03-30 RX ADMIN — LISINOPRIL 20 MG: 20 TABLET ORAL at 16:55

## 2024-03-30 RX ADMIN — SODIUM CHLORIDE, PRESERVATIVE FREE 10 ML: 5 INJECTION INTRAVENOUS at 21:37

## 2024-03-30 RX ADMIN — SODIUM CHLORIDE 500 ML: 9 INJECTION, SOLUTION INTRAVENOUS at 21:36

## 2024-03-30 RX ADMIN — BACLOFEN 5 MG: 10 TABLET ORAL at 08:33

## 2024-03-30 RX ADMIN — FUROSEMIDE 20 MG: 10 INJECTION, SOLUTION INTRAMUSCULAR; INTRAVENOUS at 18:09

## 2024-03-30 ASSESSMENT — PAIN - FUNCTIONAL ASSESSMENT
PAIN_FUNCTIONAL_ASSESSMENT: PREVENTS OR INTERFERES SOME ACTIVE ACTIVITIES AND ADLS
PAIN_FUNCTIONAL_ASSESSMENT: PREVENTS OR INTERFERES WITH ALL ACTIVE AND SOME PASSIVE ACTIVITIES

## 2024-03-30 ASSESSMENT — PAIN DESCRIPTION - DESCRIPTORS
DESCRIPTORS: ACHING
DESCRIPTORS: DISCOMFORT;TENDER
DESCRIPTORS: ACHING;DISCOMFORT

## 2024-03-30 ASSESSMENT — PAIN SCALES - GENERAL
PAINLEVEL_OUTOF10: 5
PAINLEVEL_OUTOF10: 0
PAINLEVEL_OUTOF10: 10
PAINLEVEL_OUTOF10: 7
PAINLEVEL_OUTOF10: 0
PAINLEVEL_OUTOF10: 10

## 2024-03-30 ASSESSMENT — PAIN DESCRIPTION - ORIENTATION
ORIENTATION: RIGHT

## 2024-03-30 ASSESSMENT — PAIN DESCRIPTION - LOCATION
LOCATION: ARM

## 2024-03-30 NOTE — CONSULTS
of the patient, ordering medications/tests/procedures, coordinating care, and documenting clinical information in the EHR.I also discussed the differential diagnosis and all of the proposed management plans with the patient and individuals accompanying the patient.     NOTE: This report was transcribed using voice recognition software. Every effort was made to ensure accuracy; however, inadvertent computerized transcription errors may be present

## 2024-03-31 PROBLEM — I47.20 VENTRICULAR TACHYCARDIA (HCC): Status: ACTIVE | Noted: 2024-03-31

## 2024-03-31 LAB
ALBUMIN SERPL-MCNC: 3.1 G/DL (ref 3.5–5.2)
ALP SERPL-CCNC: 155 U/L (ref 35–104)
ALT SERPL-CCNC: 37 U/L (ref 0–32)
ANION GAP SERPL CALCULATED.3IONS-SCNC: 11 MMOL/L (ref 7–16)
ANION GAP SERPL CALCULATED.3IONS-SCNC: 9 MMOL/L (ref 7–16)
AST SERPL-CCNC: 36 U/L (ref 0–31)
BILIRUB SERPL-MCNC: 0.5 MG/DL (ref 0–1.2)
BUN SERPL-MCNC: 27 MG/DL (ref 6–23)
BUN SERPL-MCNC: 28 MG/DL (ref 6–23)
CALCIUM SERPL-MCNC: 9 MG/DL (ref 8.6–10.2)
CALCIUM SERPL-MCNC: 9.2 MG/DL (ref 8.6–10.2)
CHLORIDE SERPL-SCNC: 100 MMOL/L (ref 98–107)
CHLORIDE SERPL-SCNC: 101 MMOL/L (ref 98–107)
CO2 SERPL-SCNC: 28 MMOL/L (ref 22–29)
CO2 SERPL-SCNC: 30 MMOL/L (ref 22–29)
CREAT SERPL-MCNC: 0.9 MG/DL (ref 0.5–1)
CREAT SERPL-MCNC: 1 MG/DL (ref 0.5–1)
ERYTHROCYTE [DISTWIDTH] IN BLOOD BY AUTOMATED COUNT: 13.7 % (ref 11.5–15)
GFR SERPL CREATININE-BSD FRML MDRD: 60 ML/MIN/1.73M2
GFR SERPL CREATININE-BSD FRML MDRD: 65 ML/MIN/1.73M2
GLUCOSE BLD-MCNC: 158 MG/DL (ref 74–99)
GLUCOSE BLD-MCNC: 259 MG/DL (ref 74–99)
GLUCOSE SERPL-MCNC: 134 MG/DL (ref 74–99)
GLUCOSE SERPL-MCNC: 153 MG/DL (ref 74–99)
HCT VFR BLD AUTO: 29.7 % (ref 34–48)
HGB BLD-MCNC: 9.5 G/DL (ref 11.5–15.5)
MAGNESIUM SERPL-MCNC: 1.4 MG/DL (ref 1.6–2.6)
MAGNESIUM SERPL-MCNC: 2.1 MG/DL (ref 1.6–2.6)
MCH RBC QN AUTO: 29.2 PG (ref 26–35)
MCHC RBC AUTO-ENTMCNC: 32 G/DL (ref 32–34.5)
MCV RBC AUTO: 91.4 FL (ref 80–99.9)
PHOSPHATE SERPL-MCNC: 4.6 MG/DL (ref 2.5–4.5)
PLATELET # BLD AUTO: 129 K/UL (ref 130–450)
PMV BLD AUTO: 10.3 FL (ref 7–12)
POTASSIUM SERPL-SCNC: 3.6 MMOL/L (ref 3.5–5)
POTASSIUM SERPL-SCNC: 3.8 MMOL/L (ref 3.5–5)
PROT SERPL-MCNC: 5.9 G/DL (ref 6.4–8.3)
RBC # BLD AUTO: 3.25 M/UL (ref 3.5–5.5)
SODIUM SERPL-SCNC: 139 MMOL/L (ref 132–146)
SODIUM SERPL-SCNC: 140 MMOL/L (ref 132–146)
WBC OTHER # BLD: 3.6 K/UL (ref 4.5–11.5)

## 2024-03-31 PROCEDURE — 99233 SBSQ HOSP IP/OBS HIGH 50: CPT | Performed by: INTERNAL MEDICINE

## 2024-03-31 PROCEDURE — 6370000000 HC RX 637 (ALT 250 FOR IP): Performed by: INTERNAL MEDICINE

## 2024-03-31 PROCEDURE — 83735 ASSAY OF MAGNESIUM: CPT

## 2024-03-31 PROCEDURE — 36415 COLL VENOUS BLD VENIPUNCTURE: CPT

## 2024-03-31 PROCEDURE — 2580000003 HC RX 258: Performed by: INTERNAL MEDICINE

## 2024-03-31 PROCEDURE — 85027 COMPLETE CBC AUTOMATED: CPT

## 2024-03-31 PROCEDURE — 80053 COMPREHEN METABOLIC PANEL: CPT

## 2024-03-31 PROCEDURE — 80048 BASIC METABOLIC PNL TOTAL CA: CPT

## 2024-03-31 PROCEDURE — 84100 ASSAY OF PHOSPHORUS: CPT

## 2024-03-31 PROCEDURE — 2700000000 HC OXYGEN THERAPY PER DAY

## 2024-03-31 PROCEDURE — 2140000000 HC CCU INTERMEDIATE R&B

## 2024-03-31 PROCEDURE — 82962 GLUCOSE BLOOD TEST: CPT

## 2024-03-31 PROCEDURE — 6360000002 HC RX W HCPCS: Performed by: INTERNAL MEDICINE

## 2024-03-31 RX ORDER — POTASSIUM CHLORIDE 20 MEQ/1
20 TABLET, EXTENDED RELEASE ORAL ONCE
Status: COMPLETED | OUTPATIENT
Start: 2024-03-31 | End: 2024-03-31

## 2024-03-31 RX ORDER — FUROSEMIDE 10 MG/ML
20 INJECTION INTRAMUSCULAR; INTRAVENOUS ONCE
Status: COMPLETED | OUTPATIENT
Start: 2024-03-31 | End: 2024-03-31

## 2024-03-31 RX ORDER — METOPROLOL SUCCINATE 50 MG/1
50 TABLET, EXTENDED RELEASE ORAL 2 TIMES DAILY
Status: DISCONTINUED | OUTPATIENT
Start: 2024-04-01 | End: 2024-04-01 | Stop reason: HOSPADM

## 2024-03-31 RX ORDER — MAGNESIUM SULFATE IN WATER 40 MG/ML
2000 INJECTION, SOLUTION INTRAVENOUS ONCE
Status: DISCONTINUED | OUTPATIENT
Start: 2024-03-31 | End: 2024-03-31

## 2024-03-31 RX ADMIN — ESCITALOPRAM OXALATE 10 MG: 10 TABLET ORAL at 18:12

## 2024-03-31 RX ADMIN — ACETAMINOPHEN 650 MG: 325 TABLET ORAL at 15:23

## 2024-03-31 RX ADMIN — GABAPENTIN 300 MG: 300 CAPSULE ORAL at 21:06

## 2024-03-31 RX ADMIN — GABAPENTIN 300 MG: 300 CAPSULE ORAL at 14:03

## 2024-03-31 RX ADMIN — SODIUM CHLORIDE, PRESERVATIVE FREE 10 ML: 5 INJECTION INTRAVENOUS at 08:36

## 2024-03-31 RX ADMIN — HYDROCODONE BITARTRATE AND ACETAMINOPHEN 1 TABLET: 7.5; 325 TABLET ORAL at 00:15

## 2024-03-31 RX ADMIN — ATORVASTATIN CALCIUM 80 MG: 40 TABLET, FILM COATED ORAL at 21:06

## 2024-03-31 RX ADMIN — APIXABAN 5 MG: 5 TABLET, FILM COATED ORAL at 08:36

## 2024-03-31 RX ADMIN — HYDROCODONE BITARTRATE AND ACETAMINOPHEN 1 TABLET: 7.5; 325 TABLET ORAL at 23:27

## 2024-03-31 RX ADMIN — APIXABAN 5 MG: 5 TABLET, FILM COATED ORAL at 21:07

## 2024-03-31 RX ADMIN — BACLOFEN 5 MG: 10 TABLET ORAL at 08:36

## 2024-03-31 RX ADMIN — MAGNESIUM SULFATE HEPTAHYDRATE 2000 MG: 40 INJECTION, SOLUTION INTRAVENOUS at 08:42

## 2024-03-31 RX ADMIN — POTASSIUM CHLORIDE 20 MEQ: 1500 TABLET, EXTENDED RELEASE ORAL at 12:06

## 2024-03-31 RX ADMIN — GABAPENTIN 300 MG: 300 CAPSULE ORAL at 08:36

## 2024-03-31 RX ADMIN — ACETAMINOPHEN 650 MG: 325 TABLET ORAL at 08:36

## 2024-03-31 RX ADMIN — SODIUM CHLORIDE, PRESERVATIVE FREE 10 ML: 5 INJECTION INTRAVENOUS at 23:29

## 2024-03-31 RX ADMIN — BACLOFEN 5 MG: 10 TABLET ORAL at 21:06

## 2024-03-31 RX ADMIN — FUROSEMIDE 20 MG: 10 INJECTION, SOLUTION INTRAMUSCULAR; INTRAVENOUS at 15:25

## 2024-03-31 RX ADMIN — GABAPENTIN 300 MG: 300 CAPSULE ORAL at 18:12

## 2024-03-31 ASSESSMENT — PAIN DESCRIPTION - LOCATION
LOCATION: HEAD
LOCATION: ARM
LOCATION: OTHER (COMMENT)
LOCATION: OTHER (COMMENT)
LOCATION: GENERALIZED

## 2024-03-31 ASSESSMENT — PAIN SCALES - GENERAL
PAINLEVEL_OUTOF10: 5
PAINLEVEL_OUTOF10: 0
PAINLEVEL_OUTOF10: 10
PAINLEVEL_OUTOF10: 7
PAINLEVEL_OUTOF10: 5
PAINLEVEL_OUTOF10: 7
PAINLEVEL_OUTOF10: 0
PAINLEVEL_OUTOF10: 1

## 2024-03-31 ASSESSMENT — PAIN DESCRIPTION - PAIN TYPE
TYPE: CHRONIC PAIN
TYPE: ACUTE PAIN

## 2024-03-31 ASSESSMENT — PAIN DESCRIPTION - ORIENTATION: ORIENTATION: OTHER (COMMENT)

## 2024-03-31 ASSESSMENT — PAIN DESCRIPTION - DESCRIPTORS
DESCRIPTORS: ACHING
DESCRIPTORS: ACHING;DISCOMFORT

## 2024-04-01 VITALS
OXYGEN SATURATION: 95 % | RESPIRATION RATE: 16 BRPM | BODY MASS INDEX: 21.71 KG/M2 | HEART RATE: 73 BPM | HEIGHT: 62 IN | SYSTOLIC BLOOD PRESSURE: 98 MMHG | TEMPERATURE: 97.4 F | WEIGHT: 118 LBS | DIASTOLIC BLOOD PRESSURE: 51 MMHG

## 2024-04-01 LAB
ALBUMIN SERPL-MCNC: 3.3 G/DL (ref 3.5–5.2)
ALP SERPL-CCNC: 165 U/L (ref 35–104)
ALT SERPL-CCNC: 32 U/L (ref 0–32)
ANION GAP SERPL CALCULATED.3IONS-SCNC: 10 MMOL/L (ref 7–16)
AST SERPL-CCNC: 31 U/L (ref 0–31)
BILIRUB SERPL-MCNC: 0.4 MG/DL (ref 0–1.2)
BUN SERPL-MCNC: 22 MG/DL (ref 6–23)
CALCIUM SERPL-MCNC: 9.6 MG/DL (ref 8.6–10.2)
CHLORIDE SERPL-SCNC: 101 MMOL/L (ref 98–107)
CO2 SERPL-SCNC: 30 MMOL/L (ref 22–29)
CREAT SERPL-MCNC: 0.8 MG/DL (ref 0.5–1)
GFR SERPL CREATININE-BSD FRML MDRD: 79 ML/MIN/1.73M2
GLUCOSE SERPL-MCNC: 124 MG/DL (ref 74–99)
HAV IGM SERPL QL IA: NONREACTIVE
HBV CORE IGM SERPL QL IA: NONREACTIVE
HBV SURFACE AG SERPL QL IA: NONREACTIVE
HCV AB SERPL QL IA: NONREACTIVE
MAGNESIUM SERPL-MCNC: 1.7 MG/DL (ref 1.6–2.6)
PHOSPHATE SERPL-MCNC: 3.6 MG/DL (ref 2.5–4.5)
POTASSIUM SERPL-SCNC: 3.9 MMOL/L (ref 3.5–5)
PROT SERPL-MCNC: 6.5 G/DL (ref 6.4–8.3)
SODIUM SERPL-SCNC: 141 MMOL/L (ref 132–146)

## 2024-04-01 PROCEDURE — 6370000000 HC RX 637 (ALT 250 FOR IP): Performed by: INTERNAL MEDICINE

## 2024-04-01 PROCEDURE — 36415 COLL VENOUS BLD VENIPUNCTURE: CPT

## 2024-04-01 PROCEDURE — 84100 ASSAY OF PHOSPHORUS: CPT

## 2024-04-01 PROCEDURE — 80053 COMPREHEN METABOLIC PANEL: CPT

## 2024-04-01 PROCEDURE — 99232 SBSQ HOSP IP/OBS MODERATE 35: CPT | Performed by: INTERNAL MEDICINE

## 2024-04-01 PROCEDURE — 2580000003 HC RX 258: Performed by: INTERNAL MEDICINE

## 2024-04-01 PROCEDURE — 83735 ASSAY OF MAGNESIUM: CPT

## 2024-04-01 PROCEDURE — 6360000002 HC RX W HCPCS: Performed by: INTERNAL MEDICINE

## 2024-04-01 RX ORDER — LISINOPRIL 10 MG/1
10 TABLET ORAL EVERY EVENING
Qty: 30 TABLET | Refills: 3 | Status: SHIPPED | OUTPATIENT
Start: 2024-04-01

## 2024-04-01 RX ORDER — METOPROLOL SUCCINATE 50 MG/1
50 TABLET, EXTENDED RELEASE ORAL 2 TIMES DAILY
Qty: 30 TABLET | Refills: 3 | Status: SHIPPED | OUTPATIENT
Start: 2024-04-01

## 2024-04-01 RX ORDER — POTASSIUM CHLORIDE 20 MEQ/1
20 TABLET, EXTENDED RELEASE ORAL ONCE
Status: COMPLETED | OUTPATIENT
Start: 2024-04-01 | End: 2024-04-01

## 2024-04-01 RX ORDER — FUROSEMIDE 20 MG/1
20 TABLET ORAL DAILY
Qty: 60 TABLET | Refills: 3 | Status: SHIPPED | OUTPATIENT
Start: 2024-04-02

## 2024-04-01 RX ORDER — MAGNESIUM SULFATE IN WATER 40 MG/ML
2000 INJECTION, SOLUTION INTRAVENOUS ONCE
Status: COMPLETED | OUTPATIENT
Start: 2024-04-01 | End: 2024-04-01

## 2024-04-01 RX ORDER — FUROSEMIDE 40 MG/1
20 TABLET ORAL DAILY
Status: DISCONTINUED | OUTPATIENT
Start: 2024-04-01 | End: 2024-04-01 | Stop reason: HOSPADM

## 2024-04-01 RX ADMIN — POTASSIUM CHLORIDE 20 MEQ: 1500 TABLET, EXTENDED RELEASE ORAL at 10:59

## 2024-04-01 RX ADMIN — ACETAMINOPHEN 650 MG: 325 TABLET ORAL at 15:49

## 2024-04-01 RX ADMIN — GABAPENTIN 300 MG: 300 CAPSULE ORAL at 09:04

## 2024-04-01 RX ADMIN — FUROSEMIDE 20 MG: 40 TABLET ORAL at 10:59

## 2024-04-01 RX ADMIN — APIXABAN 5 MG: 5 TABLET, FILM COATED ORAL at 09:04

## 2024-04-01 RX ADMIN — SODIUM CHLORIDE, PRESERVATIVE FREE 10 ML: 5 INJECTION INTRAVENOUS at 09:04

## 2024-04-01 RX ADMIN — BACLOFEN 5 MG: 10 TABLET ORAL at 09:04

## 2024-04-01 RX ADMIN — ACETAMINOPHEN 650 MG: 325 TABLET ORAL at 09:08

## 2024-04-01 RX ADMIN — GABAPENTIN 300 MG: 300 CAPSULE ORAL at 15:49

## 2024-04-01 RX ADMIN — METOPROLOL SUCCINATE 50 MG: 50 TABLET, FILM COATED, EXTENDED RELEASE ORAL at 09:05

## 2024-04-01 RX ADMIN — MAGNESIUM SULFATE HEPTAHYDRATE 2000 MG: 40 INJECTION, SOLUTION INTRAVENOUS at 11:12

## 2024-04-01 ASSESSMENT — PAIN DESCRIPTION - LOCATION: LOCATION: ARM

## 2024-04-01 ASSESSMENT — PAIN DESCRIPTION - ORIENTATION: ORIENTATION: RIGHT

## 2024-04-01 ASSESSMENT — PAIN DESCRIPTION - DESCRIPTORS: DESCRIPTORS: ACHING

## 2024-04-01 ASSESSMENT — PAIN SCALES - GENERAL
PAINLEVEL_OUTOF10: 10
PAINLEVEL_OUTOF10: 0

## 2024-04-01 NOTE — DISCHARGE INSTR - MEDS
this medicine.  IMPORTANT NOTE: This document tells you briefly how to take your medicine, but it does not tell you all there is to know about it. Your doctor or pharmacist may give you other documents about your medicine. Please talk to them if you have any questions. Always follow their advice.  There is a more complete description of this medicine available in English. Scan this code on your smartphone or tablet or use the web address below. You can also ask your pharmacist for a printout. If you have any questions, please ask your pharmacist.  The display and use of this drug information is subject to Terms of Use.  More information about [employer] - ORAL      Copyright(c) 2023 [employer]  Selected from data included with permission and copyright by [employer]. This copyrighted material has been downloaded from a licensed data provider and is not for distribution, except as may be authorized by the applicable terms of use.  Conditions of Use: The information in this database is intended to supplement, not substitute for the expertise and judgment of healthcare professionals. The information is not intended to cover all possible uses, directions, precautions, drug interactions or adverse effects nor should it be construed to indicate that use of a particular drug is safe, appropriate or effective for you or anyone else. A healthcare professional should be consulted before taking any drug, changing any diet or commencing or discontinuing any course of treatment. The display and use of this drug information is subject to express Terms of Use.  Care instructions adapted under license by [employer]. If you have questions about a medical condition or this instruction, always ask your healthcare professional. [employer] disclaims any warranty or liability for your use of this information.

## 2024-04-01 NOTE — PLAN OF CARE
Problem: Discharge Planning  Goal: Discharge to home or other facility with appropriate resources  3/30/2024 0756 by Pascale Foster RN  Outcome: Progressing     Problem: Pain  Goal: Verbalizes/displays adequate comfort level or baseline comfort level  3/30/2024 0756 by Pascale Foster RN  Outcome: Progressing     Problem: Respiratory - Adult  Goal: Achieves optimal ventilation and oxygenation  3/30/2024 0756 by Pascale Foster RN  Outcome: Progressing     Problem: Cardiovascular - Adult  Goal: Maintains optimal cardiac output and hemodynamic stability  3/30/2024 0756 by Pascale Foster RN  Outcome: Progressing     Problem: Cardiovascular - Adult  Goal: Absence of cardiac dysrhythmias or at baseline  3/30/2024 0756 by Pascale Foster RN  Outcome: Progressing     Problem: Skin/Tissue Integrity - Adult  Goal: Skin integrity remains intact  3/30/2024 0756 by Pascale Foster RN  Outcome: Progressing     Problem: Musculoskeletal - Adult  Goal: Maintain proper alignment of affected body part  3/30/2024 0756 by Pascale Foster RN  Outcome: Progressing     Problem: Metabolic/Fluid and Electrolytes - Adult  Goal: Electrolytes maintained within normal limits  3/30/2024 0756 by Pascale Foster RN  Outcome: Progressing     Problem: Metabolic/Fluid and Electrolytes - Adult  Goal: Hemodynamic stability and optimal renal function maintained  3/30/2024 0756 by Pascale Foster RN  Outcome: Progressing     Problem: Metabolic/Fluid and Electrolytes - Adult  Goal: Glucose maintained within prescribed range  3/30/2024 0756 by Pascale Foster RN  Outcome: Progressing     Problem: Skin/Tissue Integrity  Goal: Absence of new skin breakdown  Description: 1.  Monitor for areas of redness and/or skin breakdown  2.  Assess vascular access sites hourly  3.  Every 4-6 hours minimum:  Change oxygen saturation probe site  4.  Every 4-6 hours:  If on nasal continuous positive airway pressure, respiratory therapy assess nares and determine need 
  Problem: Discharge Planning  Goal: Discharge to home or other facility with appropriate resources  Outcome: Progressing  Flowsheets (Taken 3/29/2024 2000)  Discharge to home or other facility with appropriate resources: Identify barriers to discharge with patient and caregiver     Problem: Pain  Goal: Verbalizes/displays adequate comfort level or baseline comfort level  Outcome: Progressing     Problem: Respiratory - Adult  Goal: Achieves optimal ventilation and oxygenation  Outcome: Progressing     Problem: Cardiovascular - Adult  Goal: Maintains optimal cardiac output and hemodynamic stability  Outcome: Progressing     Problem: Cardiovascular - Adult  Goal: Absence of cardiac dysrhythmias or at baseline  Outcome: Progressing     Problem: Skin/Tissue Integrity - Adult  Goal: Skin integrity remains intact  Outcome: Progressing     Problem: Musculoskeletal - Adult  Goal: Maintain proper alignment of affected body part  Outcome: Progressing     Problem: Metabolic/Fluid and Electrolytes - Adult  Goal: Electrolytes maintained within normal limits  Outcome: Progressing     Problem: Metabolic/Fluid and Electrolytes - Adult  Goal: Glucose maintained within prescribed range  Outcome: Progressing     
  Problem: Discharge Planning  Goal: Discharge to home or other facility with appropriate resources  Outcome: Progressing  Flowsheets (Taken 3/31/2024 2030)  Discharge to home or other facility with appropriate resources:   Identify barriers to discharge with patient and caregiver   Arrange for needed discharge resources and transportation as appropriate   Identify discharge learning needs (meds, wound care, etc)     Problem: Discharge Planning  Goal: Discharge to home or other facility with appropriate resources  Outcome: Progressing  Flowsheets (Taken 3/31/2024 2030)  Discharge to home or other facility with appropriate resources:   Identify barriers to discharge with patient and caregiver   Arrange for needed discharge resources and transportation as appropriate   Identify discharge learning needs (meds, wound care, etc)     Problem: Pain  Goal: Verbalizes/displays adequate comfort level or baseline comfort level  Outcome: Progressing     Problem: Respiratory - Adult  Goal: Achieves optimal ventilation and oxygenation  Outcome: Progressing     Problem: Cardiovascular - Adult  Goal: Maintains optimal cardiac output and hemodynamic stability  Outcome: Progressing  Flowsheets (Taken 3/31/2024 2030)  Maintains optimal cardiac output and hemodynamic stability:   Monitor blood pressure and heart rate   Monitor urine output and notify Licensed Independent Practitioner for values outside of normal range   Assess for signs of decreased cardiac output     Problem: Cardiovascular - Adult  Goal: Absence of cardiac dysrhythmias or at baseline  Outcome: Progressing  Flowsheets (Taken 3/31/2024 2030)  Absence of cardiac dysrhythmias or at baseline:   Monitor cardiac rate and rhythm   Assess for signs of decreased cardiac output   Administer antiarrhythmia medication and electrolyte replacement as ordered     Problem: Skin/Tissue Integrity - Adult  Goal: Skin integrity remains intact  Outcome: Progressing     Problem: 
  Problem: Skin/Tissue Integrity - Adult  Goal: Skin integrity remains intact  Outcome: Progressing  Flowsheets  Taken 3/30/2024 2308  Skin Integrity Remains Intact:   Monitor for areas of redness and/or skin breakdown   Assess vascular access sites hourly  Taken 3/30/2024 2105  Skin Integrity Remains Intact: (apply cream to coccyx, Q2hur turns)   Monitor for areas of redness and/or skin breakdown   Assess vascular access sites hourly

## 2024-04-01 NOTE — DISCHARGE INSTRUCTIONS
Follow-up with primary care physician within 1 week of discharge from hospital  Please review changes to pre-hospital admission medications and prescriptions for new medications upon discharge from the hospital with PCP  Please review results of labs and imaging studies with PCP  Follow-up with consultants as directed by them   If recurrence or worsening of symptoms please call primary care physician or return to the ER immediately  Diet: No diet orders on file

## 2024-04-01 NOTE — PROGRESS NOTES
[facility] Hospitalist Progress Note     Admitting Date and Time: 3/29/2024  5:31 PM  had no chief complaint listed for this encounter.  Admit Dx: SVT (supraventricular tachycardia) ([facility]) [I47.10]  NSTEMI (non-ST elevated myocardial infarction) ([facility]) [I21.4]    Synopsis: 74-year-old female with past medical history of diabetes mellitus, seizure, hypertension, hyperlipidemia went to [facility] ER due to shortness of breath. And she will also having palpitations her EKG shows he had heart rate around 160, in ER she been treated with adenosine and amiodarone. In ER workup she is found to have very high troponin 3 145 and an NSTEMI diagnosed. They contacted with cardiologist for transfer and accepted the patient for cardiac cath.    Subjective:  Patient is being followed for SVT (supraventricular tachycardia) ([facility]) [I47.10]  NSTEMI (non-ST elevated myocardial infarction) ([facility]) [I21.4]     Patient was seen and examined this morning at bedside  Resting in bed, no acute distress     Discuss with cardiology     ROS: denies fever, chills, cp, sob, n/v, HA unless stated above.      atorvastatin  80 mg Oral Nightly    baclofen  5 mg Oral BID    escitalopram  10 mg Oral QPM    gabapentin  300 mg Oral 4x Daily    lisinopril  20 mg Oral QPM    sodium chloride flush  5-40 mL IntraVENous 2 times per day    heparin (porcine)  4,000 Units IntraVENous Once    insulin lispro  0-8 Units SubCUTAneous TID WC    insulin lispro  0-4 Units SubCUTAneous Nightly     sodium chloride flush, 5-40 mL, PRN  sodium chloride, , PRN  potassium chloride, 40 mEq, PRN   Or  potassium alternative oral replacement, 40 mEq, PRN   Or  potassium chloride, 10 mEq, PRN  magnesium sulfate, 2,000 mg, PRN  ondansetron, 4 mg, Q8H PRN   Or  ondansetron, 4 mg, Q6H PRN  polyethylene glycol, 17 g, Daily PRN  acetaminophen, 650 mg, 
    INPATIENT CARDIOLOGY FOLLOW-UP    Name: Avis Abel    Age: 74 y.o.    Date of Admission: 3/29/2024  5:31 PM    Date of Service: 4/1/2024    Chief Complaint: Follow-up for non-ST elevation MI    Interim History:  No new overnight cardiac complaints. Currently with no complaints of CP, SOB, palpitations, dizziness, or lightheadedness. SR on telemetry.    Review of Systems:   Cardiac: As per HPI  General: No fever, chills  Pulmonary: As per HPI  HEENT: No visual disturbances, difficult swallowing  GI: No nausea, vomiting  Endocrine: No thyroid disease or DM  Musculoskeletal: NIETO x 4, no focal motor deficits  Skin: Intact, no rashes  Neuro/Psych: No headache or seizures    Problem List:  Patient Active Problem List   Diagnosis    Malignant neoplasm of central portion of right breast in female, estrogen receptor negative (HCC)    Breast cancer metastasized to axillary lymph node, right (HCC)    Type 2 diabetes mellitus with hyperglycemia, without long-term current use of insulin (HCC)    HTN (hypertension)    History of CVA (cerebrovascular accident)    Hyperlipidemia    Chemotherapy-induced neuropathy (HCC)    Left carotid stenosis    Cryptogenic stroke (HCC)    NSTEMI (non-ST elevated myocardial infarction) (HCC)    Debilitated patient    Ex-smoker    Acute hypoxic respiratory failure (HCC)    Nonrheumatic aortic valve insufficiency    Nonrheumatic aortic valve stenosis    Nonrheumatic mitral valve regurgitation    Nonrheumatic tricuspid valve regurgitation    Vitamin D deficiency    Severe aortic valve stenosis    Mitral regurgitation    Aortic regurgitation    Carcinoma of left breast metastatic to axillary lymph node (HCC)    Secondary malignancy of axillary node (HCC)    SVT (supraventricular tachycardia) (HCC)    Acute diastolic (congestive) heart failure (HCC)    Pulmonary HTN (HCC)    Ventricular tachycardia (HCC)       Allergies:  Allergies   Allergen Reactions    Avelox [Moxifloxacin] Hives       Current 
4 Eyes Skin Assessment     NAME:  Avis Abel  YOB: 1950  MEDICAL RECORD NUMBER:  [id number]    The patient is being assessed for  Admission    I agree that at least one RN has performed a thorough Head to Toe Skin Assessment on the patient. ALL assessment sites listed below have been assessed.      Areas assessed by both nurses:    Head, Face, Ears, Shoulders, Back, Chest, Arms, Elbows, Hands, Sacrum. Buttock, Coccyx, Ischium, Legs. Feet and Heels, and Under Medical Devices         Does the Patient have a Wound? No noted wound(s)  LUE bruising   Old scar R masectomy        Sonny Prevention initiated by RN: Yes  Wound Care Orders initiated by RN: No    Pressure Injury (Stage 3,4, Unstageable, DTI, NWPT, and Complex wounds) if present, place Wound referral order by RN under : No    New Ostomies, if present place, Ostomy referral order under : No     Nurse 1 eSignature: Electronically signed by Chuyita Patterson RN on 3/30/24 at 6:00 AM EDT    **SHARE this note so that the co-signing nurse can place an eSignature**    Nurse 2 eSignature: Electronically signed by Danay Lorenzo RN on 3/30/24 at 6:01 AM EDT  
4 Eyes Skin Assessment     NAME:  Avis Abel  YOB: 1950  MEDICAL RECORD NUMBER:  [id number]    The patient is being assessed for  Admission    I agree that at least one RN has performed a thorough Head to Toe Skin Assessment on the patient. ALL assessment sites listed below have been assessed.      Areas assessed by both nurses:    Head, Face, Ears, Shoulders, Back, Chest, Arms, Elbows, Hands, Sacrum. Buttock, Coccyx, Ischium, Legs. Feet and Heels, and Under Medical Devices         Does the Patient have a Wound? No noted wound(s)  R breast old mastectomy,  Bruising RAYMOND Dennis Prevention initiated by RN: Yes  Wound Care Orders initiated by RN: No    Pressure Injury (Stage 3,4, Unstageable, DTI, NWPT, and Complex wounds) if present, place Wound referral order by RN under : No    New Ostomies, if present place, Ostomy referral order under : No     Nurse 1 eSignature: Electronically signed by Chuyita Patterson RN on 3/30/24 at 5:30 AM EDT    **SHARE this note so that the co-signing nurse can place an eSignature  
Annita Albarado notifief that B/P is low 93/55  HR 93.  This RN did not give the lisinopril that was ordered 20 mg.  Also, Dr. Monreal ordered a heparin gtt to be run at 12 units/kg/hr. The PTT is 44.4 I used the protocol and only gave 2000 units bolus and started at 12 units kg per HR.   This is because the PTT in [facility] on transport to [facility] was 122 after giving 4000 bolus and started on 12 units/ kg/hr.  Chuyita Patterson RN    
CLINICAL PHARMACY NOTE: MEDS TO BEDS    Total # of Prescriptions Filled: 4   The following medications were delivered to the patient:  Eliquis 5  Lisinopril 10  Lasix 20  Metoprolol ER 50    Additional Documentation:   Pharmacy  by  Maikel  
Debra removed, cleaned and placed ib rack  
Dr. Ireland notified of consult via LILLIAN Cain NP. To be handled in the am  
Dr. Monreal states ok to hold lisinopril dose this evening. IV heparin initiated at 12 units/kg/hr  Also he states ok to hold 4000 unit heparin bolus and give 2000 unit heparin bolus for PTT 44.4 and follow protocol for repeat PTT in 6 hours with adjustments as needed. Continue to monitor B/P. No further orders at this time. Chuyita Patterson RN    
IV notified for possible placement of midline due to NSTEMI   IV heparin current infusing with only a 22 G in L AC.   Patient hx of DVT R arm plus mastectomy.  NO right arm. Chuyita Patterson RN      
Patient is seen in follow-up for non-ST elevation MI    Subjective:     Ms. Abel feels okay today, denies chest pain or shortness of breath  Sitting in bed in bed more awake than yesterday    ROS:  CONSTITUTIONAL:  negative for  fevers, chills  HEENT:  negative for earaches, nasal congestion and epistaxis  RESPIRATORY:  negative for  dry cough, cough with sputum,wheezing and hemoptysis  GASTROINTESTINAL:  negative for nausea, vomiting  MUSCULOSKELETAL:  negative for  myalgias, arthralgias  NEUROLOGICAL:  negative for visual disturbance, dysphagia    Medication side effects: none    Scheduled Meds:   [Held by provider] lisinopril  10 mg Oral QPM    metoprolol succinate  50 mg Oral Daily    apixaban  5 mg Oral BID    atorvastatin  80 mg Oral Nightly    baclofen  5 mg Oral BID    escitalopram  10 mg Oral QPM    gabapentin  300 mg Oral 4x Daily    sodium chloride flush  5-40 mL IntraVENous 2 times per day    insulin lispro  0-8 Units SubCUTAneous TID WC    insulin lispro  0-4 Units SubCUTAneous Nightly     Continuous Infusions:   sodium chloride      dextrose       PRN Meds:sodium chloride flush, sodium chloride, potassium chloride **OR** potassium alternative oral replacement **OR** potassium chloride, magnesium sulfate, ondansetron **OR** ondansetron, polyethylene glycol, acetaminophen **OR** acetaminophen, HYDROcodone-acetaminophen, glucose, dextrose bolus **OR** dextrose bolus, glucagon (rDNA), dextrose    I/O last 3 completed shifts:  In: 1527.4 [P.O.:960; I.V.:67; IV Piggyback:500.4]  Out: 300 [Urine:300]  No intake/output data recorded.      Objective:      Physical Exam:   BP (!) 88/52   Pulse 68   Temp 97.4 °F (36.3 °C) (Temporal)   Resp 16   Ht 1.575 m (5' 2\")   Wt 53.5 kg (118 lb)   SpO2 96%   BMI 21.58 kg/m²   CONSTITUTIONAL:  awake, alert, cooperative, no apparent distress, and appears stated age  HEAD:  normocepalic, without obvious abnormality, atraumatic  NECK:  Supple, symmetrical, trachea 
Pt assisted to w/c for trasport to home  
([facility])  Resolved Problems:    * No resolved hospital problems. *    Non-ST elevation MI  Patient is known medical condition of coronary artery disease  She had a recent heart cath with normal coronaries  Off heparin, on Eliquis per cardiology  Heart cath? Await Further Cardiology Recommendations  Continue to monitor for chest pain, shortness of breath    Acute decompensated heart failure with preserved ejection fraction  Elevated proBNP 31 K  Continue input output record  Hold lisinopril due to low blood pressure  Continue close monitoring    Supraventricular tachycardia, episodes of nonsustained V. tach  Status post adenosine in the ED  Continue telemetry monitoring  Replace mag and potassium  Keep magnesium above 2 and potassium above 4    Essential hypertension, diabetes mellitus, CVA, mood disorder  Continue home medicine  2 CVA with residual left and right-sided weakness  Monitor blood pressure blood sugar  Hypoglycemia protocol    Disposition: Pending cardiology recommendations    NOTE: This report was transcribed using voice recognition software. Every effort was made to ensure accuracy; however, inadvertent computerized transcription errors may be present.    Electronically signed by Leland De La Garza MD on 3/31/2024 at 10:28 AM

## 2024-04-01 NOTE — ACP (ADVANCE CARE PLANNING)
4/1/24, Advance Care Planning   Healthcare Decision Maker:    Primary Decision Maker: Maikel Abel - [facility] - [phone]    Click here to complete Healthcare Decision Makers including selection of the Healthcare Decision Maker Relationship (ie \"Primary\").

## 2024-04-01 NOTE — CARE COORDINATION
4/1/24, Patient admitted for SVT.  Patient came from [place].  SW spoke with spouse on patient.  Discussed transition of care/discharge preference and SW role.  Patient lives with spouse in a 3 story home with bed and bath on the 1st floor for the patient.  There is a ramp that is used for the patient.  DME is a WC, WW, hospital bed, and transport chair.  Spouse says he has \"everything he needs at home for patient\".  Patient was having [facility] come out to the home.  Call placed to Gisel at [facility].  Gisel says they will try to recertify patient on the 3rd of April.  Not sure if ESVIN can occur since patient has exhausted home services but Gisel says they will try.  Information requested was faxed to Gisel at [phone].  Updated spouse on [facility].  Patient was informed about there will be co-pays involved with home meds which he will pay.  Spouse to transport patient to her home.  Nursing updated on the above.  SW to follow.      Case Management Assessment  Initial Evaluation    Date/Time of Evaluation: 4/1/2024 2:53 PM  Assessment Completed by: NOE Henao    If patient is discharged prior to next notation, then this note serves as note for discharge by case management.    Patient Name: Avis Abel                   YOB: 1950  Diagnosis: SVT (supraventricular tachycardia) ([facility]) [I47.10]  NSTEMI (non-ST elevated myocardial infarction) ([facility]) [I21.4]                   Date / Time: 3/29/2024  5:31 PM    Patient Admission Status: Inpatient   Readmission Risk (Low < 19, Mod (19-27), High > 27): Readmission Risk Score: 16.4    Current PCP: Ranjith Schafer PA-C  PCP verified by CM? Yes    Chart Reviewed: Yes      History Provided by: Spouse  Patient Orientation: Other (see comment) (Information was asked of spouse due to patient not available)    Patient Cognition: Other (see comment) (Patient not available-information was obtained from

## 2024-04-01 NOTE — DISCHARGE SUMMARY
[facility] Hospitalist Physician Discharge Summary       Ranjith Schafer PA-C  [address] [facility] [address] [phone]    Schedule an appointment as soon as possible for a visit in 1 week(s)  Hospital followup    Shannen Goncalves MD  [address]  [facility] [address]  [phone]    Schedule an appointment as soon as possible for a visit in 1 week(s)  Hospital followup      Activity level: As tolerated     Dispo: Home with [facility]    Condition on discharge: Stable     Patient ID:  Avis Abel  [id number]  74 y.o.  1950    Admit date: 3/29/2024    Discharge date and time:  4/1/2024  2:08 PM    Admission Diagnoses: Principal Problem:    SVT (supraventricular tachycardia) (HCC)  Active Problems:    NSTEMI (non-ST elevated myocardial infarction) (HCC)    Acute hypoxic respiratory failure (HCC)    Severe aortic valve stenosis    Acute diastolic (congestive) heart failure (HCC)    Pulmonary HTN (HCC)    Ventricular tachycardia (HCC)  Resolved Problems:    * No resolved hospital problems. *      Discharge Diagnoses: Principal Problem:    SVT (supraventricular tachycardia) (HCC)  Active Problems:    NSTEMI (non-ST elevated myocardial infarction) (HCC)    Acute hypoxic respiratory failure (HCC)    Severe aortic valve stenosis    Acute diastolic (congestive) heart failure (HCC)    Pulmonary HTN (HCC)    Ventricular tachycardia (HCC)  Resolved Problems:    * No resolved hospital problems. *      Consults:  IP CONSULT TO CARDIOLOGY  IP CONSULT TO DIETITIAN    Hospital Course:   Patient Avis Abel is a 74 y.o. presented with SVT (supraventricular tachycardia) (HCC) [I47.10]  NSTEMI (non-ST elevated myocardial infarction) (HCC) [I21.4]    74-year-old female with past medical history of diabetes mellitus, seizure, hypertension, hyperlipidemia went to [facility] ER due to shortness of breath. And she will also having palpitations her EKG shows he had heart rate around 160, in ER she been

## 2024-04-02 ENCOUNTER — TELEPHONE (OUTPATIENT)
Dept: PALLATIVE CARE | Age: 74
End: 2024-04-02

## 2024-04-02 DIAGNOSIS — C50.911 BREAST CANCER METASTASIZED TO AXILLARY LYMPH NODE, RIGHT (HCC): ICD-10-CM

## 2024-04-02 DIAGNOSIS — G89.3 PAIN DUE TO NEOPLASM: ICD-10-CM

## 2024-04-02 DIAGNOSIS — C77.3 BREAST CANCER METASTASIZED TO AXILLARY LYMPH NODE, RIGHT (HCC): ICD-10-CM

## 2024-04-02 DIAGNOSIS — Z51.5 PALLIATIVE CARE BY SPECIALIST: ICD-10-CM

## 2024-04-02 LAB
EKG ATRIAL RATE: 100 BPM
EKG P AXIS: 45 DEGREES
EKG P-R INTERVAL: 152 MS
EKG Q-T INTERVAL: 374 MS
EKG QRS DURATION: 76 MS
EKG QTC CALCULATION (BAZETT): 482 MS
EKG R AXIS: 12 DEGREES
EKG T AXIS: 151 DEGREES
EKG VENTRICULAR RATE: 100 BPM

## 2024-04-02 RX ORDER — HYDROCODONE BITARTRATE AND ACETAMINOPHEN 5; 325 MG/1; MG/1
1-2 TABLET ORAL EVERY 6 HOURS PRN
Qty: 60 TABLET | Refills: 0 | Status: SHIPPED | OUTPATIENT
Start: 2024-04-02 | End: 2024-04-17

## 2024-04-02 NOTE — TELEPHONE ENCOUNTER
Call from Maikel regarding pain medications for Avis. Maikel states he gives her 2 tylenol in the morning and at bedtime and once in awhile he gives her Norco 5/325 mg in the afternoon if pain is bad. Discussed trying Norco in the morning and at bedtime and taking the tylenol in the afternoon if needed. Maikel verbalizes understanding. Maikel feels that pain s from nreve endings. Questioned Maikel if she still takes gabapentin. Maikel shares that Avis takes Gabapentin 300 mg morning and night, once in awhile he will give an afternoon dose. Instructed Maikel to give this medication consistently every 8 hours, it will be more effective. Maikel agrees to do this. Maikel asks if there is something to give if pain his excruciating for Avis and nothing seems to be working. Explained he can give another Norco, as order is for 1-2 tablets. Maikel agrees to try this. Follow up ron I scheduled 4/25/24. Pharmacy is [facility].

## 2024-04-02 NOTE — TELEPHONE ENCOUNTER
Contacted pt, spoke to  Maikel to schedule pt for Palliative care follow up home visit with MARILYN Whatley. Pt scheduled to be seen on 4/25 at 9:45am, Maikel agreed to appt time and date. Advised to call for any needs.      Lacey HART,LSW  Palliative Medicine

## 2024-04-23 ENCOUNTER — SCHEDULED TELEPHONE ENCOUNTER (OUTPATIENT)
Dept: ONCOLOGY | Age: 74
End: 2024-04-23
Payer: MEDICARE

## 2024-04-23 DIAGNOSIS — C50.912 CARCINOMA OF LEFT BREAST METASTATIC TO AXILLARY LYMPH NODE (HCC): Primary | ICD-10-CM

## 2024-04-23 DIAGNOSIS — C77.3 CARCINOMA OF LEFT BREAST METASTATIC TO AXILLARY LYMPH NODE (HCC): Primary | ICD-10-CM

## 2024-04-23 PROCEDURE — 99441 PR PHYS/QHP TELEPHONE EVALUATION 5-10 MIN: CPT | Performed by: INTERNAL MEDICINE

## 2024-04-25 ENCOUNTER — OFFICE VISIT (OUTPATIENT)
Dept: PALLATIVE CARE | Age: 74
End: 2024-04-25
Payer: MEDICARE

## 2024-04-25 VITALS
SYSTOLIC BLOOD PRESSURE: 136 MMHG | RESPIRATION RATE: 12 BRPM | DIASTOLIC BLOOD PRESSURE: 68 MMHG | HEART RATE: 74 BPM | OXYGEN SATURATION: 98 %

## 2024-04-25 DIAGNOSIS — C50.911 BREAST CANCER METASTASIZED TO AXILLARY LYMPH NODE, RIGHT (HCC): ICD-10-CM

## 2024-04-25 DIAGNOSIS — Z71.89 GOALS OF CARE, COUNSELING/DISCUSSION: ICD-10-CM

## 2024-04-25 DIAGNOSIS — Z51.5 PALLIATIVE CARE BY SPECIALIST: Primary | ICD-10-CM

## 2024-04-25 DIAGNOSIS — C77.3 BREAST CANCER METASTASIZED TO AXILLARY LYMPH NODE, RIGHT (HCC): ICD-10-CM

## 2024-04-25 DIAGNOSIS — R53.81 PHYSICAL DEBILITY: ICD-10-CM

## 2024-04-25 DIAGNOSIS — G89.3 PAIN DUE TO NEOPLASM: ICD-10-CM

## 2024-04-25 PROCEDURE — 3075F SYST BP GE 130 - 139MM HG: CPT | Performed by: NURSE PRACTITIONER

## 2024-04-25 PROCEDURE — 1036F TOBACCO NON-USER: CPT | Performed by: NURSE PRACTITIONER

## 2024-04-25 PROCEDURE — 1090F PRES/ABSN URINE INCON ASSESS: CPT | Performed by: NURSE PRACTITIONER

## 2024-04-25 PROCEDURE — 99348 HOME/RES VST EST LOW MDM 30: CPT | Performed by: NURSE PRACTITIONER

## 2024-04-25 PROCEDURE — 3078F DIAST BP <80 MM HG: CPT | Performed by: NURSE PRACTITIONER

## 2024-04-25 PROCEDURE — G8420 CALC BMI NORM PARAMETERS: HCPCS | Performed by: NURSE PRACTITIONER

## 2024-04-25 PROCEDURE — 99497 ADVNCD CARE PLAN 30 MIN: CPT | Performed by: NURSE PRACTITIONER

## 2024-04-25 PROCEDURE — 1123F ACP DISCUSS/DSCN MKR DOCD: CPT | Performed by: NURSE PRACTITIONER

## 2024-04-25 PROCEDURE — 3017F COLORECTAL CA SCREEN DOC REV: CPT | Performed by: NURSE PRACTITIONER

## 2024-04-25 NOTE — PROGRESS NOTES
Advance Care Planning      Palliative Medicine Provider (MD/NP)  Advance Care Planning (ACP) Conversation      Date of Conversation: 04/25/24  The patient and/or authorized decision maker consented to a voluntary Advance Care Planning conversation.   Individuals present for the conversation:   Patient with decision making capacity and Spouse Maikel    Legal Healthcare Agent(s):    Primary Decision Maker: Maikel Abel - Spouse - [phone]    ACP documents available in EMR prior to discussion:  -Power of  for Healthcare  -Living Will    Primary Palliative Diagnosis(es):  Breast Cancer  CVA with heniplegia and function paraplegia    Conversation Summary:  We spoke at length regarding ACP  She has her Living Will and HC-POA at great length  Much discussion was had regarding resuscitation  The patient is unsure regarding her wishes for resuscitation at this time  Risks and Benefits of resuscitation were discussed at great length  The  reports that she has a DNR at [facility], but she is not sure at this time regarding her wishes  They are encouraged to discuss resuscitation further    Resuscitation Status:  Full Code  I reviewed with the patient and family that given multiple medical co-morbidities and advanced disease process, in the event of cardiac arrest, the chances of surviving may likely be low. It was also reviewed that if they survived a cardiac arrest, a return to prior level of function also may be low.    Outcomes / Completed Documentation:  An explanation of advance directives and their importance was provided and the following forms completed:    -No new documents completed.    If new document completed, original was provided to patient and/or family member.    Copy was placed for scanning into the [facility] EMR.      I spent 30 minutes providing separately identifiable ACP services with the patient and/or surrogate decision maker in a voluntary, in-person conversation discussing the patient's 
shortness of breath No shortness of breath   Other Problem Score Best possible response Best possible response   Total Assessment Score(calculated) 13 25     Assessed by: patient and provider.    Opioid Risk Tool Scores    If Patient is Female If Patient is Male   Family History of Substance Abuse:     Alcohol [] 1 [] 3   Illegal Drugs [] 2 [] 3   Prescription Drugs [] 4 [] 3    Personal History of Substance Abuse:     Alcohol [] 3 [] 3   Illegal Drugs [] 4 [] 4   Prescription Drugs [] 5 [] 5    Age between 16 and 45 [] 1 [] 1   History of Preadolescent Sexual Abuse [] 3 [] 0   Psychological Disease     ADD, OCD, Bipolar, or Schizophrenia [] 2 [] 2   Depression [] 1 [] 1   Total 0   Low Risk = 0-3          Moderate Risk = 4-7          High Risk = 8 and above       Current Medications:  Medications reviewed: yes    Controlled Substances Monitoring: [place] reviewed 4/25/24.  RX Monitoring Periodic Controlled Substance Monitoring   4/25/2024  10:42 AM Possible medication side effects, risk of tolerance/dependence & alternative treatments discussed.;No signs of potential drug abuse or diversion identified.;Assessed functional status (ability to engage in work or other purposeful activities, the pain intensity and its interference with activities of daily living, quality of family life and social activities, and the physical activity);Obtaining appropriate analgesic effect of treatment.       GIULIANA Ricardo - CNP  Palliative Medicine    MDM/Time:  The total encounter time on this service date was 25 minutes, which was spent performing a face-to-face encounter and personally completing the provider-level activities documented in the note.  This includes time spent prior to the visit and after the visit in direct care of the patient.  This time does not include time spent in any separately reportable services.        Thank you for allowing Palliative Medicine to participate in the care of Avis Abel.    Note: This

## 2024-04-25 NOTE — PROGRESS NOTES
Patient was Telephone virtual visit.  Patient has MYCHART.  
chills, fever, syncope, nausea and vomiting. Completed Abx    Right simple mastectomy - blue dye inj - right axillary dissection) was on 12/19/2018  A.  Mediport capsule and cord: Device identified. Benign fibroadipose tissue.  B.  Right breast, mastectomy: Biopsy site; negative for residual carcinoma. Skin with seborrheic keratosis. Size of largest metastatic deposit-1.3 cm.  C.  Right axillary contents, regional resection:   5 of 7 lymph nodes positive for metastatic poorly differentiated mammary carcinoma (grade 3).     Comment: The tumor cells in part C are immunoreactive with pankeratin.  The cells are negative for LCA.  Intradepartmental  consultation is obtained.     TNM classification (AJCC eighth edition)-  ypT0 N2a M0 TNBC     RT was completed on 05/24/2019.    We recommended adjuvant Xeloda bid 2 weeks on one week off for 6 cycles. Adjuvant Xeloda was started on 06/04/2019.   Significant fatigue, mouth sores, facial swelling/redness noted in August 2019.  Patient decided to d/c the rest of remaining Xeloda on 08/26/2019 and wanted be on observation only. Sx almost resolved after d/c Xeloda    Left screening mammogram 11/11/2021 Negative for malignancy    Lumbar back pain; X-Ray at [facility] 06/27/2022 moderate T12 vertebral body compression fracture not seen with prior CT abdomen/pelvis study 10/27/2018 which may represent osteoporotic fracture but cannot exclude pathologic fracture.   No findings of acute lumbosacral anterior wedge compression fracture, acute displaced fracture or traumatic listhesis identified.     Bone scan 08/02/2022   Healing T12 compression fracture new as compared to prior bone scan dated 5/11/2018  Healing anterior right rib fractures  The scintigraphic pattern is otherwise similar to prior    MRI thoracic spine 08/02/2022:  Acute/subacute compression deformity at T12  Multilevel degenerative change with canal stenosis and foraminal narrowing most pronounced in the

## 2024-04-26 ENCOUNTER — TELEPHONE (OUTPATIENT)
Dept: PALLATIVE CARE | Age: 74
End: 2024-04-26

## 2024-04-26 NOTE — TELEPHONE ENCOUNTER
spoke with Gisel from [facility] regarding adding on home aide service for pt. This was discussed with pt and her  during home visit the day prior and pt and  had requested additional help in the home with bathing for pt.  advised she would reach out to pts home health company to see if an aide service could be added on for pt. Spoke with Gisel at [facility] who stated an aide service order could be sent to their office from our provider requesting this and they could provide service to pt.  will have MARILYN Whatley provide referral for pt.    Called and updated  Maikel.      [facility] Care:   Phone: [phone]  Fax: [phone]      Lacey Suero MSW,LSW  Palliative Medicine

## 2024-04-29 DIAGNOSIS — R53.81 PHYSICAL DEBILITY: Primary | ICD-10-CM

## 2024-05-01 ENCOUNTER — TELEPHONE (OUTPATIENT)
Dept: PALLATIVE CARE | Age: 74
End: 2024-05-01

## 2024-05-01 DIAGNOSIS — Z51.5 PALLIATIVE CARE BY SPECIALIST: Primary | ICD-10-CM

## 2024-05-01 DIAGNOSIS — G89.3 PAIN DUE TO NEOPLASM: ICD-10-CM

## 2024-05-01 DIAGNOSIS — C50.911 BREAST CANCER METASTASIZED TO AXILLARY LYMPH NODE, RIGHT (HCC): ICD-10-CM

## 2024-05-01 DIAGNOSIS — C77.3 BREAST CANCER METASTASIZED TO AXILLARY LYMPH NODE, RIGHT (HCC): ICD-10-CM

## 2024-05-01 RX ORDER — OXYCODONE AND ACETAMINOPHEN 7.5; 325 MG/1; MG/1
1 TABLET ORAL EVERY 6 HOURS PRN
Qty: 60 TABLET | Refills: 0 | Status: SHIPPED | OUTPATIENT
Start: 2024-05-01 | End: 2024-05-16

## 2024-05-01 NOTE — TELEPHONE ENCOUNTER
Called back to kale Ko , new order sent per Shayan Whatley CNP . Stop Norco, start Percocet 7.6 mg every 6 hours as needed for pain. Ask Maikel in message to let us know if change is more effective for pain management.

## 2024-05-01 NOTE — TELEPHONE ENCOUNTER
Avis's  Maikel called and states that Norco 5-325 is not working for patient. Med was doubled up but no help. Wants to know if we can try something else. Pharmacy [facility] [address] [place]. Last home visit 4-.

## 2024-05-06 DIAGNOSIS — G89.3 PAIN DUE TO NEOPLASM: ICD-10-CM

## 2024-05-06 DIAGNOSIS — C77.3 BREAST CANCER METASTASIZED TO AXILLARY LYMPH NODE, RIGHT (HCC): ICD-10-CM

## 2024-05-06 DIAGNOSIS — C50.911 BREAST CANCER METASTASIZED TO AXILLARY LYMPH NODE, RIGHT (HCC): ICD-10-CM

## 2024-05-06 DIAGNOSIS — Z51.5 PALLIATIVE CARE BY SPECIALIST: ICD-10-CM

## 2024-05-06 RX ORDER — OXYCODONE AND ACETAMINOPHEN 7.5; 325 MG/1; MG/1
1 TABLET ORAL EVERY 6 HOURS PRN
Qty: 60 TABLET | Refills: 0 | Status: SHIPPED | OUTPATIENT
Start: 2024-05-06 | End: 2024-05-21

## 2024-05-06 NOTE — TELEPHONE ENCOUNTER
Maikel called stating prescription for new pain medication was not at their [facility] [address], in [place]. Next ron with [facility]. Please resend Percocet 7.5/325 mg. Maikel notified.

## 2024-05-21 ENCOUNTER — HOSPITAL ENCOUNTER (OUTPATIENT)
Dept: INFUSION THERAPY | Age: 74
End: 2024-05-21

## 2024-05-23 ENCOUNTER — TELEPHONE (OUTPATIENT)
Dept: ONCOLOGY | Age: 74
End: 2024-05-23

## 2024-05-23 NOTE — TELEPHONE ENCOUNTER
Patient cancelled today's, 05/21/24, follow up appointment with Dr. Samuel Morales via [facility].  Will call to reschedule at earliest convenience.

## 2024-05-28 ENCOUNTER — TELEPHONE (OUTPATIENT)
Dept: PALLATIVE CARE | Age: 74
End: 2024-05-28

## 2024-05-28 DIAGNOSIS — C50.911 BREAST CANCER METASTASIZED TO AXILLARY LYMPH NODE, RIGHT (HCC): ICD-10-CM

## 2024-05-28 DIAGNOSIS — G89.3 PAIN DUE TO NEOPLASM: ICD-10-CM

## 2024-05-28 DIAGNOSIS — C77.3 BREAST CANCER METASTASIZED TO AXILLARY LYMPH NODE, RIGHT (HCC): ICD-10-CM

## 2024-05-28 DIAGNOSIS — Z51.5 PALLIATIVE CARE BY SPECIALIST: ICD-10-CM

## 2024-05-28 RX ORDER — BACLOFEN 10 MG/1
5 TABLET ORAL 2 TIMES DAILY PRN
Qty: 7 TABLET | Refills: 0 | Status: SHIPPED | OUTPATIENT
Start: 2024-05-28 | End: 2024-06-04

## 2024-05-28 RX ORDER — OXYCODONE AND ACETAMINOPHEN 7.5; 325 MG/1; MG/1
1 TABLET ORAL EVERY 6 HOURS PRN
Qty: 60 TABLET | Refills: 0 | Status: SHIPPED | OUTPATIENT
Start: 2024-05-28 | End: 2024-06-12

## 2024-05-28 NOTE — TELEPHONE ENCOUNTER
[facility] called to clarify percocet order. Notified pharmacist 15 days instead of 28 days.

## 2024-05-28 NOTE — TELEPHONE ENCOUNTER
Patient Avis's  Maikel called and left voicemail on desk phone stating that most recent pain med oxycodone-acetaminophen 7.5-325 is not working. Last home visit 4-. [facility]

## 2024-05-28 NOTE — TELEPHONE ENCOUNTER
Called and spoke with Jaren , Avis's , and asked how frequently he gives pain medication to Avis. Jaren states he has slowly increased the frequency of the Percocet 7.5/325 mg to every 5-6 hours, not giving more than 4 in a day. Jaren states he does not see much difference in her pain after giving medication. Jaren states she gets very tense at times and has tremors. Discussed with Annita Woodson CNP and new order for a muscle relaxer given. Avis will need a refill soon for Percocet  too. Pharmacy is [facility] in [place]. Next ron with [facility].

## 2024-05-31 ENCOUNTER — TELEPHONE (OUTPATIENT)
Dept: PALLATIVE CARE | Age: 74
End: 2024-05-31

## 2024-05-31 DIAGNOSIS — C50.911 BREAST CANCER METASTASIZED TO AXILLARY LYMPH NODE, RIGHT (HCC): Primary | ICD-10-CM

## 2024-05-31 DIAGNOSIS — C77.3 BREAST CANCER METASTASIZED TO AXILLARY LYMPH NODE, RIGHT (HCC): Primary | ICD-10-CM

## 2024-05-31 DIAGNOSIS — I63.9 CEREBROVASCULAR ACCIDENT (CVA), UNSPECIFIED MECHANISM (HCC): ICD-10-CM

## 2024-05-31 NOTE — TELEPHONE ENCOUNTER
Incoming call from pt spouse Maikel stating that pt had another stroke earlier today and is unable to move her eyes at this time, difficulty closing and opening her mouth as well difficulty moving her arms. Maikel feels that it is time to transition pt to hospice services now as he wants to keep her at home to care for her. Maikel would like a referral for hospice care sent to [facility]. Referral placed for hospice by Palliative care MARILYN Whatley. Faxed to [facility]. No further Palliative follow up.      [facility]:  Phone: [phone]  Fax: [phone]      Lacey HART,LSW  Palliative Medicine

## 2025-06-26 NOTE — PROGRESS NOTES
LABS DRAW PERIPHERALLY.  DRY DRESSING APPLIED PROVIDER:[TOKEN:[[id number]],FOLLOWUP:[1 week]],FREE:[LAST:[[facility]],PHONE:[phone]],FAX:[phone]   [phone]    -],ADDRESS:[[address] [place] [address]],FOLLOWUP:[1 week]]

## (undated) DEVICE — Device

## (undated) DEVICE — STANDARD HYPODERMIC NEEDLE,ALUMINUM HUB: Brand: MONOJECT

## (undated) DEVICE — TUBING, SUCTION, 3/16" X 12', STRAIGHT: Brand: MEDLINE

## (undated) DEVICE — CATHETER URET 5FR L70CM OPN END SGL LUMN INJ HUB FLEXIMA

## (undated) DEVICE — GLOVE SURG L12IN SZ 65FNGR THK94MIL TRNSLUC YEL LTX

## (undated) DEVICE — DRAPE C ARM UNIV W41XL74IN CLR PLAS XR VELC CLSR POLY STRP

## (undated) DEVICE — BAG DRAINAGE CONTAINER 15 LT FLUID COLLCTN

## (undated) DEVICE — TOWEL,OR,DSP,ST,BLUE,STD,6/PK,12PK/CS: Brand: MEDLINE

## (undated) DEVICE — PATIENT RETURN ELECTRODE, SINGLE-USE, CONTACT QUALITY MONITORING, ADULT, WITH 9FT CORD, FOR PATIENTS WEIGING OVER 33LBS. (15KG): Brand: MEGADYNE

## (undated) DEVICE — 3M™ MEDIPORE™ + PAD 3573: Brand: 3M™ MEDIPORE™

## (undated) DEVICE — CAMERA STRYKER 1488 HD GEN

## (undated) DEVICE — SWABSTICK SURG PREP BENZOIN TINCTURE SINGLE ST

## (undated) DEVICE — SUTURE BOOTIES, YELLOW, STERILE, 5 PAIR/PAD; 5 PADS/BOX: Brand: KEY SURGICAL SUTURE BOOTIES

## (undated) DEVICE — SOLUTION SURG PREP ANTIMICROBIAL 4 OZ SKIN WND EXIDINE

## (undated) DEVICE — GLOVE ORANGE PI 7 1/2   MSG9075

## (undated) DEVICE — GOWN,SIRUS,FABRNF,XL,20/CS: Brand: MEDLINE

## (undated) DEVICE — LABEL MED 4 IN SURG PANEL W/ PEN STRL

## (undated) DEVICE — CYSTO PACK: Brand: MEDLINE INDUSTRIES, INC.

## (undated) DEVICE — HOSE CONN FOR WST MGMT SYS NEPTUNE 2

## (undated) DEVICE — SOLUTION IV IRRIG WATER 1000ML POUR BRL 2F7114

## (undated) DEVICE — DRAIN SURG 15FR L3/16IN DIA4.7MM SIL CHN RND HUBLESS FULL

## (undated) DEVICE — SET EXTN L14IN 1ML IV L BOR NO FLTR NO STPCOCK

## (undated) DEVICE — SURGICAL PROCEDURE PACK BASIC

## (undated) DEVICE — 18 GA N.G. KIT, 10 PACK: Brand: SITE-RITE

## (undated) DEVICE — MARKER SURG MARGIN STD 6 CLR INK ASST CORR CLP

## (undated) DEVICE — CATHETER F BLLN 5CC 18FR 2 W HYDRGEL COAT LESS TRAUM LUB

## (undated) DEVICE — APPLIER LIG CLP M L11IN TI STR RNG HNDL FOR 20 CLP DISP

## (undated) DEVICE — GUIDEWIRE VASC L150CM DIA0.035IN TAPR 3CM HYDRPHLC NIT ANG

## (undated) DEVICE — SKIN AFFIX SURG ADHESIVE 72/CS 0.55ML: Brand: MEDLINE

## (undated) DEVICE — CONTROL SYRINGE LUER-LOCK TIP: Brand: MONOJECT

## (undated) DEVICE — DILATOR/SHEATH SET: Brand: 8/10 DILATOR/SHEATH SET

## (undated) DEVICE — DRAPE THER FLUID WARMING 66X44 IN FLAT SLUSH DBL DISC ORS

## (undated) DEVICE — SYRINGE MED 10ML LUERLOCK TIP W/O SFTY DISP

## (undated) DEVICE — GUIDEWIRE URO L150CM DIA0.035IN TAPR 3CM NIT HYDRPHLC ANG

## (undated) DEVICE — PLASMABLADE PS210-030S 3.0S LOCK: Brand: PLASMABLADE™

## (undated) DEVICE — TAPE,ELASTIC,FOAM,CURAD,3"X5.5YD,LF: Brand: CURAD

## (undated) DEVICE — CHLORAPREP 26ML ORANGE

## (undated) DEVICE — 3M™ IOBAN™ 2 ANTIMICROBIAL INCISE DRAPE 6640EZ: Brand: IOBAN™ 2

## (undated) DEVICE — ELECTRODE PT RET AD L9FT HI MOIST COND ADH HYDRGEL CORDED

## (undated) DEVICE — Z INACTIVE USE 2635503 SOLUTION IRRIG 3000ML ST H2O USP UROMATIC PLAS CONT

## (undated) DEVICE — DRIP REDUCTION MANIFOLD

## (undated) DEVICE — READY WET SKIN SCRUB TRAY-LF: Brand: MEDLINE INDUSTRIES, INC.

## (undated) DEVICE — SPONGE,LAP,4"X18",XR,ST,5/PK,40PK/CS: Brand: MEDLINE INDUSTRIES, INC.

## (undated) DEVICE — PACK,UNIV, II AURORA: Brand: MEDLINE

## (undated) DEVICE — GLOVE SURG SZ 75 STD WHT LTX SYN POLYMER BEAD REINF ANTI RL

## (undated) DEVICE — PEN: MARKING STD 100/CS: Brand: MEDICAL ACTION INDUSTRIES

## (undated) DEVICE — GUIDEWIRE ENDOSCP L150CM DIA0.035IN TIP 3CM PTFE NIT

## (undated) DEVICE — 3M™ STERI-STRIP™ REINFORCED ADHESIVE SKIN CLOSURES, R1548, 1 IN X 5 IN (25 MM X 125 MM), 4 STRIPS/ENVELOPE: Brand: 3M™ STERI-STRIP™

## (undated) DEVICE — MEDIA CONTRAST ISOVUE  300 10X50ML

## (undated) DEVICE — SYRINGE IRRIG 60ML SFT PLIABLE BLB EZ TO GRP 1 HND USE W/

## (undated) DEVICE — 4-PORT MANIFOLD: Brand: NEPTUNE 2

## (undated) DEVICE — BAG DRNGE COMB PK

## (undated) DEVICE — Z INACTIVE USE 2660664 SOLUTION IRRIG 3000ML 0.9% SOD CHL USP UROMATIC PLAS CONT

## (undated) DEVICE — SHEET,DRAPE,53X77,STERILE: Brand: MEDLINE

## (undated) DEVICE — 1.5L THIN WALL CAN: Brand: CRD

## (undated) DEVICE — GOWN,SIRUS,FABRNF,L,20/CS: Brand: MEDLINE

## (undated) DEVICE — GAUZE,SPONGE,4"X4",8PLY,STRL,LF,10/TRAY: Brand: MEDLINE

## (undated) DEVICE — SET INST MINOR PROCEDURE

## (undated) DEVICE — SET SURG INSTR MINI VASC

## (undated) DEVICE — INTENDED FOR TISSUE SEPARATION, AND OTHER PROCEDURES THAT REQUIRE A SHARP SURGICAL BLADE TO PUNCTURE OR CUT.: Brand: BARD-PARKER ® STAINLESS STEEL BLADES

## (undated) DEVICE — STAPLER SKIN L39MM DIA0.53MM CRWN 5.7MM S STL FIX HD PROX

## (undated) DEVICE — COVER US PRB W5XL96IN LTX W/ GEL

## (undated) DEVICE — MAGNETIC INSTR DRAPE 20X16: Brand: MEDLINE INDUSTRIES, INC.